# Patient Record
Sex: FEMALE | Race: WHITE | HISPANIC OR LATINO | Employment: FULL TIME | URBAN - METROPOLITAN AREA
[De-identification: names, ages, dates, MRNs, and addresses within clinical notes are randomized per-mention and may not be internally consistent; named-entity substitution may affect disease eponyms.]

---

## 2019-02-14 ENCOUNTER — OFFICE VISIT (OUTPATIENT)
Dept: MEDICAL GROUP | Facility: MEDICAL CENTER | Age: 34
End: 2019-02-14
Payer: COMMERCIAL

## 2019-02-14 ENCOUNTER — TELEPHONE (OUTPATIENT)
Dept: MEDICAL GROUP | Facility: MEDICAL CENTER | Age: 34
End: 2019-02-14

## 2019-02-14 ENCOUNTER — HOSPITAL ENCOUNTER (OUTPATIENT)
Dept: LAB | Facility: MEDICAL CENTER | Age: 34
End: 2019-02-14
Attending: INTERNAL MEDICINE
Payer: COMMERCIAL

## 2019-02-14 VITALS
HEIGHT: 63 IN | RESPIRATION RATE: 20 BRPM | WEIGHT: 293 LBS | OXYGEN SATURATION: 94 % | SYSTOLIC BLOOD PRESSURE: 132 MMHG | BODY MASS INDEX: 51.91 KG/M2 | DIASTOLIC BLOOD PRESSURE: 90 MMHG | TEMPERATURE: 97.2 F | HEART RATE: 88 BPM

## 2019-02-14 DIAGNOSIS — E11.65 UNCONTROLLED TYPE 2 DIABETES MELLITUS WITH HYPERGLYCEMIA (HCC): ICD-10-CM

## 2019-02-14 DIAGNOSIS — K76.0 FATTY LIVER DISEASE, NONALCOHOLIC: ICD-10-CM

## 2019-02-14 DIAGNOSIS — B37.2 CANDIDIASIS OF SKIN: ICD-10-CM

## 2019-02-14 DIAGNOSIS — Z76.89 ESTABLISHING CARE WITH NEW DOCTOR, ENCOUNTER FOR: ICD-10-CM

## 2019-02-14 DIAGNOSIS — N91.5 HYPOMENORRHEA: ICD-10-CM

## 2019-02-14 DIAGNOSIS — E66.01 MORBIDLY OBESE (HCC): ICD-10-CM

## 2019-02-14 DIAGNOSIS — Z23 NEED FOR VACCINATION: ICD-10-CM

## 2019-02-14 LAB
ALBUMIN SERPL BCP-MCNC: 3.8 G/DL (ref 3.2–4.9)
ALBUMIN/GLOB SERPL: 0.9 G/DL
ALP SERPL-CCNC: 112 U/L (ref 30–99)
ALT SERPL-CCNC: 47 U/L (ref 2–50)
ANION GAP SERPL CALC-SCNC: 8 MMOL/L (ref 0–11.9)
AST SERPL-CCNC: 32 U/L (ref 12–45)
BILIRUB SERPL-MCNC: 0.5 MG/DL (ref 0.1–1.5)
BUN SERPL-MCNC: 11 MG/DL (ref 8–22)
CALCIUM SERPL-MCNC: 8.8 MG/DL (ref 8.5–10.5)
CHLORIDE SERPL-SCNC: 101 MMOL/L (ref 96–112)
CHOLEST SERPL-MCNC: 154 MG/DL (ref 100–199)
CO2 SERPL-SCNC: 26 MMOL/L (ref 20–33)
CREAT SERPL-MCNC: 0.47 MG/DL (ref 0.5–1.4)
ERYTHROCYTE [DISTWIDTH] IN BLOOD BY AUTOMATED COUNT: 39.1 FL (ref 35.9–50)
FASTING STATUS PATIENT QL REPORTED: NORMAL
GLOBULIN SER CALC-MCNC: 4.1 G/DL (ref 1.9–3.5)
GLUCOSE BLD-MCNC: 251 MG/DL (ref 70–100)
GLUCOSE SERPL-MCNC: 225 MG/DL (ref 65–99)
HBA1C MFR BLD: 12.3 % (ref ?–5.8)
HCT VFR BLD AUTO: 50.5 % (ref 37–47)
HDLC SERPL-MCNC: 33 MG/DL
HGB BLD-MCNC: 16.4 G/DL (ref 12–16)
INT CON NEG: NEGATIVE
INT CON POS: POSITIVE
LDLC SERPL CALC-MCNC: 87 MG/DL
MCH RBC QN AUTO: 27.8 PG (ref 27–33)
MCHC RBC AUTO-ENTMCNC: 32.5 G/DL (ref 33.6–35)
MCV RBC AUTO: 85.7 FL (ref 81.4–97.8)
PLATELET # BLD AUTO: 232 K/UL (ref 164–446)
PMV BLD AUTO: 11.2 FL (ref 9–12.9)
POTASSIUM SERPL-SCNC: 3.7 MMOL/L (ref 3.6–5.5)
PROT SERPL-MCNC: 7.9 G/DL (ref 6–8.2)
RBC # BLD AUTO: 5.89 M/UL (ref 4.2–5.4)
SODIUM SERPL-SCNC: 135 MMOL/L (ref 135–145)
TRIGL SERPL-MCNC: 169 MG/DL (ref 0–149)
TSH SERPL DL<=0.005 MIU/L-ACNC: 3.32 UIU/ML (ref 0.38–5.33)
WBC # BLD AUTO: 9.4 K/UL (ref 4.8–10.8)

## 2019-02-14 PROCEDURE — 83036 HEMOGLOBIN GLYCOSYLATED A1C: CPT | Performed by: INTERNAL MEDICINE

## 2019-02-14 PROCEDURE — 84443 ASSAY THYROID STIM HORMONE: CPT

## 2019-02-14 PROCEDURE — 99204 OFFICE O/P NEW MOD 45 MIN: CPT | Mod: 25 | Performed by: INTERNAL MEDICINE

## 2019-02-14 PROCEDURE — 90686 IIV4 VACC NO PRSV 0.5 ML IM: CPT | Performed by: INTERNAL MEDICINE

## 2019-02-14 PROCEDURE — 80061 LIPID PANEL: CPT

## 2019-02-14 PROCEDURE — 80053 COMPREHEN METABOLIC PANEL: CPT

## 2019-02-14 PROCEDURE — 90471 IMMUNIZATION ADMIN: CPT | Performed by: INTERNAL MEDICINE

## 2019-02-14 PROCEDURE — 85027 COMPLETE CBC AUTOMATED: CPT

## 2019-02-14 PROCEDURE — 36415 COLL VENOUS BLD VENIPUNCTURE: CPT

## 2019-02-14 PROCEDURE — 82962 GLUCOSE BLOOD TEST: CPT | Performed by: INTERNAL MEDICINE

## 2019-02-14 RX ORDER — FLUCONAZOLE 150 MG/1
150 TABLET ORAL DAILY
Qty: 2 TAB | Refills: 2 | Status: SHIPPED | OUTPATIENT
Start: 2019-02-14 | End: 2019-03-07

## 2019-02-14 RX ORDER — GLIMEPIRIDE 4 MG/1
4 TABLET ORAL EVERY MORNING
Qty: 30 TAB | Refills: 1 | Status: SHIPPED | OUTPATIENT
Start: 2019-02-14 | End: 2019-03-07

## 2019-02-14 ASSESSMENT — PATIENT HEALTH QUESTIONNAIRE - PHQ9: CLINICAL INTERPRETATION OF PHQ2 SCORE: 0

## 2019-02-14 NOTE — PROGRESS NOTES
Subjective:   Priti Begum is a 33 y.o. female here today to establish care and     Chief complaint: Polyuria/ polydipsia, perineal itching    1. Establishing care with new doctor, encounter for    Patient is not seen a doctor for about 3 years, she has not had blood work for over 5 years.  Her mother is my patient.  She did have Pap/pelvic exam by GYN 3 years ago which was negative.    2. Need for vaccination    Requesting flu shot    3. Uncontrolled type 2 diabetes mellitus with hyperglycemia (HCC)    Patient has been checking her fingersticks on her mother's glucometer and they have been reading as high as 300.  Today in the office POCT hemoglobin A1c is 12.3 with fasting blood sugar 251.  Last lab work in 2013 was  normal with A1c 6.2 and FBS 88.  Patient notes increased thirst, excessive urination with nocturia several times per night.  She denies neuropathy, change in vision.    4. Morbidly obese (HCC)    Patient weight has been as high as 450 pounds however she notes that she has lost almost 60 pounds over the past year or so despite no increased exercise level.  She has been trying to watch her diet somewhat, she has cut back on how much soda she is drinking (previously 48-60 ounces per day).  She does not do regular aerobic exercise.  She is busy, however caring for 3 siblings ages 9-13, and taking care of her disabled mother who is on dialysis.  Bariatric surgery has been raised, patient is never pursued this.    5. Hypomenorrhea    Patient rarely gets menstrual.,  This is been attributed to PCO S.  Ultrasound was limited due to her body habitus several years ago.    6. Fatty liver disease, nonalcoholic    Patient had ultrasound in October 2013 which revealed enlarged liver almost 20 cm with fatty infiltration.  Her LFTs were not elevated at that time.    7. Candidiasis of skin    Patient complains of itchiness and redness of both vulva and in the groin.  She is tried over-the-counter  Monistat with some relief, but symptoms remain.      Current medicines (including changes today)  Current Outpatient Prescriptions   Medication Sig Dispense Refill   • fluconazole (DIFLUCAN) 150 MG tablet Take 1 Tab by mouth every day. 2 Tab 2   • SitaGLIPtin-MetFORMIN HCl 100-1000 MG TABLET SR 24 HR Take 1 Tab by mouth every day. 30 Tab 1     No current facility-administered medications for this visit.      She  has no past medical history of Allergy; Arrhythmia; Asthma; or Cancer (HCC).    Social History     Social History   • Marital status: Single     Spouse name: N/A   • Number of children: N/A   • Years of education: N/A     Social History Main Topics   • Smoking status: Never Smoker   • Smokeless tobacco: Never Used   • Alcohol use No   • Drug use: No   • Sexual activity: Yes     Partners: Male      Comment:      Other Topics Concern   • Not on file     Social History Narrative   • No narrative on file     Family History   Problem Relation Age of Onset   • Diabetes Mother    • Hypertension Mother    • Kidney Disease Mother    • Lung Disease Maternal Grandmother         emphysema (smoker)   • Diabetes Maternal Grandmother        ROS       - Constitutional: Negative for fever, chills,  and fatigue/generalized weakness.  Weight loss as above    - HEENT: Negative for headaches, vision changes      - Respiratory: Negative for cough, Shortness of breath    - Cardiovascular: Negative for chest pain and bilateral lower extremity edema.     - Gastrointestinal: Negative for heartburn,  abdominal pain,  diarrhea, constipation     - Genitourinary: Negative for dysuria  and urinary incontinence.  Increased urinary frequency    - Musculoskeletal: Negative for  back pain and joint pain.     - Skin: Pruritus as above    - Neurological: Negative for dizziness,  tremors, focal weakness     - Psychiatric/Behavioral: Negative for depression and memory loss.             Objective:     Blood pressure 132/90, pulse 88,  "temperature 36.2 °C (97.2 °F), temperature source Temporal, resp. rate 20, height 1.6 m (5' 3\"), weight (!) 174.9 kg (385 lb 9.6 oz), SpO2 94 %, not currently breastfeeding. Body mass index is 68.31 kg/m².     Physical Exam:  Constitutional: Alert, no distress.  Skin: Warm, dry, good turgor, no rashes in visible areas.  Eye: Equal, round and reactive, conjunctiva clear, lids normal.  ENMT: Lips without lesions, good dentition, oropharynx clear. Hearing grossly intact.  Neck: No masses, no thyromegaly. No cervical or supraclavicular lymphadenopathy  Respiratory: Unlabored respiratory effort, lungs clear to auscultation, no wheezes, no rhonchi.  Cardiovascular: Regular rate and rhythm, without murmur, no edema.  Abdomen: Soft, non-tender, no masses, no hepatosplenomegaly.  Psych: Alert and oriented x3, normal affect and mood. Insight and judgment good.  Tearful with diagnosis of diabetes            Assessment and Plan:   The following treatment plan was discussed    1. Establishing care with new doctor, encounter for        2. Need for vaccination      - Influenza Vaccine Quad Injection >3Y (PF)    3. Uncontrolled type 2 diabetes mellitus with hyperglycemia (HCC)    -I think patient would best be served by establishing with endocrinology, her mother sees Melquiades Berkowitz.  She will likely require insulin to control her blood sugars.  - Lipid Profile; Future  - SitaGLIPtin-MetFORMIN HCl 100-1000 MG TABLET SR 24 HR; Take 1 Tab by mouth every day.  Dispense: 30 Tab; Refill: 1  - REFERRAL TO ENDOCRINOLOGY  - POCT Glucose  - POCT Hemoglobin A1C    4. Morbidly obese (HCC)    Patient would be a good candidate for bariatric surgery, will control her diabetes first.    5. Hypomenorrhea    -Patient is likely infertile due to PCOS.  Hopefully with weight loss and control of her blood sugar her menses will return.  - TSH WITH REFLEX TO FT4; Future    6. Fatty liver disease, nonalcoholic    -  - Comp Metabolic Panel; Future  - CBC " WITHOUT DIFFERENTIAL; Future    7. Candidiasis of skin      - fluconazole (DIFLUCAN) 150 MG tablet; Take 1 Tab by mouth every day.  Dispense: 2 Tab; Refill: 2      Followup: Return in about 2 weeks (around 2/28/2019).

## 2019-02-14 NOTE — TELEPHONE ENCOUNTER
1. Caller Name: Priti Begum                                           Call Back Number: [phone] (home)       Patient approves a detailed voicemail message: yes    Pt called to let you know that the metformin would cost over $300 after insurance. Is there something else you could prescribe? Please advise.

## 2019-02-14 NOTE — TELEPHONE ENCOUNTER
Call prescription into patient's pharmacy for 2 generic medications.  These will likely not be enough to control her blood sugar, but will help until she can be seen by endocrinology.

## 2019-03-07 ENCOUNTER — OFFICE VISIT (OUTPATIENT)
Dept: MEDICAL GROUP | Facility: MEDICAL CENTER | Age: 34
End: 2019-03-07
Payer: COMMERCIAL

## 2019-03-07 VITALS
HEART RATE: 88 BPM | SYSTOLIC BLOOD PRESSURE: 138 MMHG | BODY MASS INDEX: 51.91 KG/M2 | HEIGHT: 63 IN | WEIGHT: 293 LBS | DIASTOLIC BLOOD PRESSURE: 92 MMHG | RESPIRATION RATE: 20 BRPM | OXYGEN SATURATION: 96 % | TEMPERATURE: 97.4 F

## 2019-03-07 DIAGNOSIS — E66.01 MORBIDLY OBESE (HCC): ICD-10-CM

## 2019-03-07 DIAGNOSIS — I15.2 HYPERTENSION DUE TO ENDOCRINE DISORDER: ICD-10-CM

## 2019-03-07 DIAGNOSIS — E11.65 UNCONTROLLED TYPE 2 DIABETES MELLITUS WITH HYPERGLYCEMIA (HCC): ICD-10-CM

## 2019-03-07 PROBLEM — B37.2 CANDIDIASIS OF SKIN: Status: RESOLVED | Noted: 2019-02-14 | Resolved: 2019-03-07

## 2019-03-07 PROCEDURE — 99214 OFFICE O/P EST MOD 30 MIN: CPT | Performed by: INTERNAL MEDICINE

## 2019-03-07 RX ORDER — INSULIN GLARGINE 100 [IU]/ML
15 INJECTION, SOLUTION SUBCUTANEOUS
Qty: 10 ML | Refills: 2 | Status: SHIPPED | OUTPATIENT
Start: 2019-03-07 | End: 2019-03-08 | Stop reason: SDUPTHER

## 2019-03-07 RX ORDER — LISINOPRIL 10 MG/1
10 TABLET ORAL DAILY
Qty: 30 TAB | Refills: 3 | Status: SHIPPED | OUTPATIENT
Start: 2019-03-07 | End: 2019-09-30

## 2019-03-07 NOTE — PROGRESS NOTES
Chief Complaint   Patient presents with   • Results     labs     Chief complaint: 3-week follow-up of newly diagnosed diabetes.    HISTORY OF PRESENT ILLNESS: Patient is a 33 y.o. female patient who presents today to discuss the evaluation and management of:          1. Uncontrolled type 2 diabetes mellitus with hyperglycemia (HCC)    When I saw the patient 3 weeks ago, she presented with polydipsia, polyuria, severe Candida yeast infection.  Hemoglobin A1c in office was 12.3 and fasting blood sugar 251.  She was started on metformin 1000 mg  twice daily and glimepiride 4 mg every morning.  The patient is doing dramatically better.  All of her symptoms have resolved.  She is having some shakiness in the late morning several hours after taking the glimepiride.  She has been checking her fingersticks on her mother's glucometer, all of her fastings have been in the 150-170 range.    Patient's comp he has a metabolic panel was otherwise normal.  Her cholesterol was 154, LDL 87.  2. Hypertension due to endocrine disorder    Patient's blood pressure remains borderline high.  She is not currently taking any antihypertensive medications.    3. Morbidly obese (HCC)    Unfortunately, patient's weight is gone up 7 pounds.  She likely was dehydrated due to glycosuria when she was here 3 weeks ago.  She is starting to monitor her diet more carefully, and is going to start walking soon.  We discussed bariatric surgery, this is something patient would be interested in once her diabetes is better controlled.        Patient Active Problem List    Diagnosis Date Noted   • Hypertension due to endocrine disorder 03/07/2019   • Uncontrolled type 2 diabetes mellitus with hyperglycemia (HCC) 02/14/2019   • Fatty liver disease, nonalcoholic 01/27/2016   • Hypomenorrhea 01/27/2016   • Morbidly obese (HCC) 10/31/2013   • PCOS (polycystic ovarian syndrome) 10/31/2013        Allergies:Nkda [no known drug allergy]    Current meds including  "changes today  Current Outpatient Prescriptions   Medication Sig Dispense Refill   • insulin glargine (LANTUS) 100 UNIT/ML Solution Inject 15 Units as instructed every bedtime. 10 mL 2   • lisinopril (PRINIVIL) 10 MG Tab Take 1 Tab by mouth every day. 30 Tab 3   • metformin (GLUCOPHAGE) 1000 MG tablet Take 1 Tab by mouth 2 times a day, with meals. 60 Tab 1     No current facility-administered medications for this visit.      Social History   Substance Use Topics   • Smoking status: Never Smoker   • Smokeless tobacco: Never Used   • Alcohol use No     Social History     Social History Narrative   • No narrative on file       Family History   Problem Relation Age of Onset   • Diabetes Mother    • Hypertension Mother    • Kidney Disease Mother    • Lung Disease Maternal Grandmother         emphysema (smoker)   • Diabetes Maternal Grandmother        Review of Systems:  No chest pain, No shortness of breath, No dyspnea on exertion  Gastrointestinal ROS: No abdominal pain, No nausea, vomiting, diarrhea, or constipation        Exam:      Blood pressure 138/92, pulse 88, temperature 36.3 °C (97.4 °F), temperature source Temporal, resp. rate 20, height 1.6 m (5' 3\"), weight (!) 177.9 kg (392 lb 3.2 oz), SpO2 96 %, not currently breastfeeding.  General: Morbidly obese female in NAD affect and mood within normal limits  Head is grossly normal.  Neck: Supple without adenopathy  Pulmonary: Clear to ausculation.  Normal effort. No rales, rhonchi, or wheezing.  Cardiovascular: Regular rate and rhythm without murmur.   Extremities: no clubbing, cyanosis, or edema.  Neuro: moves all extremities symmetrically    Please note that this dictation was created using voice recognition software. I have made every reasonable attempt to correct obvious errors, but I expect that there are errors of grammar and possibly content that I did not discover before finalizing the note.    Assessment/Plan:  1. Uncontrolled type 2 diabetes mellitus with " hyperglycemia (HCC)    -Discontinue glimepiride, start low-dose long-acting insulin at bedtime.  Patient was advised if she has hypoglycemic symptoms with this to let me know and we can discontinue  -We will schedule patient with RN diabetes educator so she can be set up with her own glucometer and test strips at home  -continue metformin  -Patient has initial appointment with endocrinology in 1 month  -Recommended to see an ophthalmologist in a few months   - insulin glargine (LANTUS) 100 UNIT/ML Solution; Inject 15 Units as instructed every bedtime.  Dispense: 10 mL; Refill: 2    2. Hypertension due to endocrine disorder      - lisinopril (PRINIVIL) 10 MG Tab; Take 1 Tab by mouth every day.  Dispense: 30 Tab; Refill: 3    3. Morbidly obese (HCC)    -Discussed that her best chance for long-term significant weight loss would be bariatric surgery.    Followup: 2 months

## 2019-03-08 ENCOUNTER — PATIENT MESSAGE (OUTPATIENT)
Dept: HEALTH INFORMATION MANAGEMENT | Facility: OTHER | Age: 34
End: 2019-03-08

## 2019-03-08 DIAGNOSIS — E11.65 UNCONTROLLED TYPE 2 DIABETES MELLITUS WITH HYPERGLYCEMIA (HCC): ICD-10-CM

## 2019-03-08 RX ORDER — INSULIN GLARGINE 100 [IU]/ML
15 INJECTION, SOLUTION SUBCUTANEOUS
Qty: 1 VIAL | Refills: 0 | Status: SHIPPED | OUTPATIENT
Start: 2019-03-08 | End: 2019-03-08 | Stop reason: SDUPTHER

## 2019-03-11 ENCOUNTER — TELEPHONE (OUTPATIENT)
Dept: MEDICAL GROUP | Facility: PHYSICIAN GROUP | Age: 34
End: 2019-03-11

## 2019-03-18 NOTE — PROGRESS NOTES
RN-TONYE Note    Subjective:     Health changes since last visit/interval Hx: Priti is a new patient to me.  She has Type II DM and PCOS treated with metformin and Basaglar insulin    Medications (including changes made today)  Current Outpatient Prescriptions   Medication Sig Dispense Refill   • Insulin Glargine (BASAGLAR KWIKPEN) 100 UNIT/ML Solution Pen-injector INJECT 15 UNITS SUBCUTANEOUSLY AS DIRECTED NIGHTLY AT BEDTIME (90 DAY SUPPLY) 15 mL 6   • lisinopril (PRINIVIL) 10 MG Tab Take 1 Tab by mouth every day. 30 Tab 3   • metformin (GLUCOPHAGE) 1000 MG tablet Take 1 Tab by mouth 2 times a day, with meals. 60 Tab 1     No current facility-administered medications for this visit.        Taking daily ASA: No  Taking above medications as prescribed: yes  SIDE EFFECTS: Patient reports the following side effects: GI upset resolved    Exercise: started Thiago Fidel every evening  Diet: stopped soda and juice, started slim fast diabetic  Patient's body mass index is 67.48 kg/m². Exercise and nutrition counseling were performed at this visit.      Health Maintenance:   Health Maintenance Due   Topic Date Due   • DIABETES MONOFILAMENT / LE EXAM  1986   • RETINAL SCREENING  03/20/2004   • URINE ACR / MICROALBUMIN  03/20/2004   • IMM PNEUMOCOCCAL 19-64 (ADULT) MEDIUM RISK SERIES (1 of 1 - PPSV23) 03/20/2005   • PAP SMEAR  10/31/2016       Immunizations:   PPSV23: Due  [id number]: N/A  Tdap: Up-to-date  Flu: Up-to-date  Hep B: N/A    DM:   Last A1c:   Lab Results   Component Value Date/Time    HBA1C 12.3 02/14/2019 10:10 AM      A1C GOAL: < 7    Glucose monitoring frequency: daily    Hypoglycemic episodes: no    Last Retinal Exam: DUE  Daily Foot Exam: No advised  Routine Dental Exams: No    No results found for: MICROALBCALC, MALBCRT, MALBEXCR, FKTDOQ54, MICROALBUR, MICRALB, UMICROALBUM, MICROALBTIM     ACR Albumin/Creatinine Ratio goal <30     HTN:   Blood pressure goal <140/<80 yes.   Currently Rx ACE/ARB:  Yes    Dyslipidemia:    Lab Results   Component Value Date/Time    CHOLSTRLTOT 154 02/14/2019 11:42 AM    LDL 87 02/14/2019 11:42 AM    HDL 33 (A) 02/14/2019 11:42 AM    TRIGLYCERIDE 169 (H) 02/14/2019 11:42 AM       Lab Results   Component Value Date/Time    SODIUM 135 02/14/2019 11:42 AM    POTASSIUM 3.7 02/14/2019 11:42 AM    CHLORIDE 101 02/14/2019 11:42 AM    CO2 26 02/14/2019 11:42 AM    GLUCOSE 225 (H) 02/14/2019 11:42 AM    BUN 11 02/14/2019 11:42 AM    CREATININE 0.47 (L) 02/14/2019 11:42 AM    BUNCREATRAT 21 (H) 10/10/2013 09:16 AM     Lab Results   Component Value Date/Time    ALKPHOSPHAT 112 (H) 02/14/2019 11:42 AM    ASTSGOT 32 02/14/2019 11:42 AM    ALTSGPT 47 02/14/2019 11:42 AM    TBILIRUBIN 0.5 02/14/2019 11:42 AM        Currently Rx Statin: No    She  reports that she has never smoked. She has never used smokeless tobacco.    Objective:     Exam:  Monofilament: done   Monofilament testing with a 10 gram force: sensation intact: intact bilaterally  Visual Inspection: Feet without maceration, ulcers, fissures.  Pedal pulses: intact bilaterally    Plan:     Discussed and educated on:   - All medications, side effects and compliance (discussed carefully)  - Annual eye examinations at Ophthalmology  - Diabetic Meal Plan: foods that contain carbs and plate method  - Foot Care: what to look for when checking feet every day and when to contact HCP  - HbA1C: target and what A1C is  - Home glucose monitoring emphasized  - Interpretation of Lab Results  - Long term diabetic complications  - Reminded pt to bring in BS diary at next visit  - Testing Blood Glucose: when to test, recording blood sugars, target range for FSBS and when to contact HCP  - Weight control and daily exercise    Recommended medication changes: Start Trulicity, decrease Basaglar to 10 units and send FSBG in 1 week for insulin dose adjustements

## 2019-03-20 ENCOUNTER — TELEPHONE (OUTPATIENT)
Dept: MEDICAL GROUP | Facility: MEDICAL CENTER | Age: 34
End: 2019-03-20

## 2019-03-20 ENCOUNTER — OFFICE VISIT (OUTPATIENT)
Dept: MEDICAL GROUP | Facility: MEDICAL CENTER | Age: 34
End: 2019-03-20
Payer: COMMERCIAL

## 2019-03-20 VITALS
BODY MASS INDEX: 51.91 KG/M2 | TEMPERATURE: 97.7 F | HEART RATE: 77 BPM | OXYGEN SATURATION: 97 % | SYSTOLIC BLOOD PRESSURE: 122 MMHG | HEIGHT: 63 IN | DIASTOLIC BLOOD PRESSURE: 82 MMHG | WEIGHT: 293 LBS

## 2019-03-20 DIAGNOSIS — E11.9 TYPE 2 DIABETES MELLITUS WITHOUT COMPLICATION, WITHOUT LONG-TERM CURRENT USE OF INSULIN (HCC): ICD-10-CM

## 2019-03-20 DIAGNOSIS — E11.65 UNCONTROLLED TYPE 2 DIABETES MELLITUS WITH HYPERGLYCEMIA (HCC): ICD-10-CM

## 2019-03-20 PROCEDURE — 99214 OFFICE O/P EST MOD 30 MIN: CPT | Performed by: PHYSICIAN ASSISTANT

## 2019-03-20 RX ORDER — LANCETS 30 GAUGE
EACH MISCELLANEOUS
Qty: 100 EACH | Refills: 3 | Status: SHIPPED | OUTPATIENT
Start: 2019-03-20 | End: 2020-06-08 | Stop reason: SDUPTHER

## 2019-03-20 RX ORDER — PIOGLITAZONEHYDROCHLORIDE 15 MG/1
15 TABLET ORAL DAILY
Qty: 30 TAB | Refills: 11 | Status: SHIPPED | OUTPATIENT
Start: 2019-03-20 | End: 2019-04-09

## 2019-03-20 NOTE — TELEPHONE ENCOUNTER
1. Caller Name:Priti Begum                                           Call Back Number: [phone] (home)       Patient approves a detailed voicemail message: yes    Pt called stating that she went to  her Trulicity but after insurance cvg she will have to pay $800. Please advise if there is an alternative.

## 2019-03-20 NOTE — PROGRESS NOTES
Subjective:     HPI    Chief Complaint   Patient presents with   • Diabetes     Recently diagnosed with she has been taking Metformin 1000 mg twice daily as well as 50 units of insulin at night.  She used to drink a lot of juice and sugar drinks that she has cut back on that.   Patient checks her blood sugar at home with very good control numbers, See Rn note.         Priti Begum is a 34 y.o. female who presents for follow up of chronic conditions of diabetes mellitus, hypertension, hyperlipidemia, .    RN-CDE notes reviewed including HPI for DM, HTN, Hyperlipidemia.  Exercise frequency and limitations reviewed.  Feet symptoms reviewed.  Nutritional status reviewed.  Retinal eye exam history reviewed.    Patient additionally reports:  She indicates that she is feeling well and denies any symptoms referable to the above diagnoses. Specifically denies chest pain, palpitations, dyspnea, orthopnea, PND or peripheral edema. Also denies polyuria, polydipsia, urinary complaints, abdominal complaints, myalgias, numbness, weakness or other related symptoms.     Patient Active Problem List    Diagnosis Date Noted   • Hypertension due to endocrine disorder 03/07/2019   • Uncontrolled type 2 diabetes mellitus with hyperglycemia (HCC) 02/14/2019   • Fatty liver disease, nonalcoholic 01/27/2016   • Hypomenorrhea 01/27/2016   • Morbidly obese (HCC) 10/31/2013   • PCOS (polycystic ovarian syndrome) 10/31/2013       Health Maintenance/Immunizations:   Health Maintenance Topics with due status: Overdue       Topic Date Due    DIABETES MONOFILAMENT / LE EXAM 1986    RETINAL SCREENING 03/20/2004    URINE ACR / MICROALBUMIN 03/20/2004    IMM PNEUMOCOCCAL 19-64 (ADULT) MEDIUM RISK SERIES 03/20/2005    PAP SMEAR 10/31/2016       Current medications including changes today:  Current Outpatient Prescriptions   Medication Sig Dispense Refill   • Dulaglutide (TRULICITY) 0.75 MG/0.5ML Solution Pen-injector Inject  "0.75 mg as instructed every 7 days. 4 PEN 11   • Insulin Glargine (BASAGLAR KWIKPEN) 100 UNIT/ML Solution Pen-injector INJECT 15 UNITS SUBCUTANEOUSLY AS DIRECTED NIGHTLY AT BEDTIME (90 DAY SUPPLY) 15 mL 6   • lisinopril (PRINIVIL) 10 MG Tab Take 1 Tab by mouth every day. 30 Tab 3   • metformin (GLUCOPHAGE) 1000 MG tablet Take 1 Tab by mouth 2 times a day, with meals. 60 Tab 1     No current facility-administered medications for this visit.        Allergies:   Allergies   Allergen Reactions   • Nkda [No Known Drug Allergy]         Social History   Substance Use Topics   • Smoking status: Never Smoker   • Smokeless tobacco: Never Used   • Alcohol use No       Family History   Problem Relation Age of Onset   • Diabetes Mother    • Hypertension Mother    • Kidney Disease Mother    • Lung Disease Maternal Grandmother         emphysema (smoker)   • Diabetes Maternal Grandmother        ROS  Pertinent ROS findings as above.   All other systems reviewed and are negative except as per HPI.     Objective:     /82   Pulse 77   Temp 36.5 °C (97.7 °F)   Ht 1.6 m (5' 3\")   Wt (!) 172.8 kg (380 lb 15.3 oz)   SpO2 97%   BMI 67.48 kg/m²     Alert, oriented in no acute distress.  Eye contact is good, speech goal directed, affect calm.  Lungs: clear to auscultation bilaterally with good excursion.  CV: regular rate and rhythm.  Lower extremities warm with no edema.      Lab Results   Component Value Date/Time    HBA1C 12.3 02/14/2019 10:10 AM    HBA1C 6.2 (H) 10/10/2013 09:16 AM        Assessment/Plan:     1. Uncontrolled type 2 diabetes mellitus with hyperglycemia (HCC)  MICROALBUMIN CREAT RATIO URINE    VITAMIN D,25 HYDROXY    Dulaglutide (TRULICITY) 0.75 MG/0.5ML Solution Pen-injector    Diabetic Monofilament LE Exam    REFERRAL TO DIABETIC EDUCATION Diabetes Self Management Education / Training (DSME/T) and Medical Nutrition Therapy (MNT): Initial Group DSME/MNT as authorized by payor, Follow-Up DSME/MNT as authorized " by payor; DSME/T Content: Monitoring Diabete...   Will discontinue insulin, start Trulicity once weekly.  Patient has follow-up appointment with Melquiades Berkowitz in April as well.      -RN-CDE notes reviewed and discussed.  -Patient's body mass index is 67.48 kg/m². Exercise and nutrition counseling were performed at this visit.   Additionally discussed disease management and weight loss goals.   -Education and advice provided today: See RN-CDE note.    Additional education, advice, refills, and referrals per order    Follow-up:   Return if symptoms worsen or fail to improve. sooner should new symptoms or problems arise.    The total time spent seeing the patient in consultation, and formulating an action plan for this visit was 40 minutes.  Greater than 50% of this time was spent counseling, discussing problems documented above, coordinating care and answering questions by the provider and registered nurse--certified diabetes educator.

## 2019-03-20 NOTE — TELEPHONE ENCOUNTER
Please inform patient that if Trulicity is going to cost her $800 a month it is not reasonable to start it.  Patient can continue with her current medicine until seen by endocrinology.  I  also add Actos to her medicine.   Was sent to her pharmacy    Jaqueline Young P.A.-C.

## 2019-03-21 ENCOUNTER — TELEPHONE (OUTPATIENT)
Dept: MEDICAL GROUP | Facility: MEDICAL CENTER | Age: 34
End: 2019-03-21

## 2019-03-21 NOTE — TELEPHONE ENCOUNTER
She should be able to just asked the pharmacist to dispense 50 strips at a time.  There is no need for a doctor's order for this.  She would have to ask if the others are cheaper, if they are I am happy to prescribe if they work with her current glucometer.

## 2019-03-21 NOTE — TELEPHONE ENCOUNTER
1. Caller Name: Priti Begum                                         Call Back Number: [phone] (home)       Patient approves a detailed voicemail message: N\A    Patient advised that her test strips are a bit costly after insurance and is wondering is switching to a lower quantity (50 instead of 100) at a time OR changing test strips to the one touch verio or equivalent would be cheaper? Please advise if we can change this rX?

## 2019-03-21 NOTE — TELEPHONE ENCOUNTER
Yes, the alternative to Trulicity would be keeping on the insulin.  I recommend doing that until she can be seen by Melquiades  in endocrinology.  He may be able to find an assistance program or give the patient samples.  In the interim, I would continue the insulin that she should have at home.

## 2019-03-27 ENCOUNTER — HOSPITAL ENCOUNTER (OUTPATIENT)
Dept: LAB | Facility: MEDICAL CENTER | Age: 34
End: 2019-03-27
Attending: PHYSICIAN ASSISTANT
Payer: COMMERCIAL

## 2019-03-27 DIAGNOSIS — E11.65 UNCONTROLLED TYPE 2 DIABETES MELLITUS WITH HYPERGLYCEMIA (HCC): ICD-10-CM

## 2019-03-27 LAB
25(OH)D3 SERPL-MCNC: 9 NG/ML (ref 30–100)
CREAT UR-MCNC: 114.2 MG/DL
MICROALBUMIN UR-MCNC: <0.7 MG/DL
MICROALBUMIN/CREAT UR: NORMAL MG/G (ref 0–30)

## 2019-03-27 PROCEDURE — 82306 VITAMIN D 25 HYDROXY: CPT

## 2019-03-27 PROCEDURE — 36415 COLL VENOUS BLD VENIPUNCTURE: CPT

## 2019-03-27 PROCEDURE — 82043 UR ALBUMIN QUANTITATIVE: CPT

## 2019-03-27 PROCEDURE — 82570 ASSAY OF URINE CREATININE: CPT

## 2019-04-02 ENCOUNTER — TELEPHONE (OUTPATIENT)
Dept: HEALTH INFORMATION MANAGEMENT | Facility: MEDICAL CENTER | Age: 34
End: 2019-04-02

## 2019-04-02 NOTE — TELEPHONE ENCOUNTER
Patient sends FSBG results:    Fastin, 89, 85  Lunch: 91, 106, 91, 83  Dinner: 102, 96, 104  Bed: 115, 109  Hypoglycemia: none    Current diabetes medications: metformin 1000 mg twice daily, Actos 15 mg daily, Basaglar 10 units HS    Recommendations:  Stop Basaglar and send FSBG in 1 week

## 2019-04-09 ENCOUNTER — OFFICE VISIT (OUTPATIENT)
Dept: ENDOCRINOLOGY | Facility: MEDICAL CENTER | Age: 34
End: 2019-04-09
Payer: COMMERCIAL

## 2019-04-09 VITALS
OXYGEN SATURATION: 95 % | HEIGHT: 62 IN | SYSTOLIC BLOOD PRESSURE: 116 MMHG | HEART RATE: 95 BPM | DIASTOLIC BLOOD PRESSURE: 70 MMHG | WEIGHT: 293 LBS | BODY MASS INDEX: 53.92 KG/M2

## 2019-04-09 DIAGNOSIS — Z79.4 ENCOUNTER FOR LONG-TERM (CURRENT) USE OF INSULIN (HCC): ICD-10-CM

## 2019-04-09 DIAGNOSIS — I10 ESSENTIAL HYPERTENSION: ICD-10-CM

## 2019-04-09 DIAGNOSIS — E11.65 UNCONTROLLED TYPE 2 DIABETES MELLITUS WITH HYPERGLYCEMIA (HCC): ICD-10-CM

## 2019-04-09 PROCEDURE — 99204 OFFICE O/P NEW MOD 45 MIN: CPT | Performed by: PHYSICIAN ASSISTANT

## 2019-04-09 RX ORDER — METFORMIN HYDROCHLORIDE 500 MG/1
1000 TABLET, EXTENDED RELEASE ORAL 2 TIMES DAILY
Qty: 120 TAB | Refills: 6 | Status: SHIPPED | OUTPATIENT
Start: 2019-04-09 | End: 2019-09-30

## 2019-04-09 NOTE — PROGRESS NOTES
New Patient Consult Note  Referred by: Radha Cervantes    Reason for consult: Diabetes Management Type 2    HPI:  Priti Begum is a 34 y.o. old patient who is seeing us today for diabetes care.  This is a pleasant patient with diabetes and I appreciate the opportunity to participate in the care of this patient.  This is a new patient with me today.    Labs of 3/27/19 microalbumin is negative, GFR >60, HbA1c is 12.3    BG Diary:4/9/2019  In the AM: 100, 89, 85  Just before meal:  Just before Bed: 115, 102, 96, 109, 104    Has been Diabetic since  Has a Glucagon pen at home: no    1. Uncontrolled type 2 diabetes mellitus with hyperglycemia (HCC)  This is a new patient with me on 4/9/2019  They are on:  1.  Metformin 1000 one in the AM one in the PM  2.  Actos 15   3.  basaglar 10 units (not on)      2. Essential hypertension  This is stable today and no new changes are needed or required in today's visit  On Lisinopril 10mg      3. Encounter for long-term (current) use of insulin (HCC)  Is on a high risk medication Insulin and we will continue to follow     ROS:   Constitutional: No change in weight , No fatigue, No night sweats.  HEENT: No Headache.  Eyes:  No blurred vision, No visual changes.  Cardiac: No chest pain, No palpitations.  Resp: No shortness of breath, No cough,   Gastro: No nausea or vomiting, No diarrhea.  Neuro: Denies numbness or tinging in bilateral feet or hands, and no loss of sensation.  Endo: No heat or cold intolerance.  : No polyuria, No polydipsia, No chronic UTI's.  Lower extremities: No lower leg edema bilateral.  All other systems were reviewed and were negative.    Past Medical History:  Patient Active Problem List    Diagnosis Date Noted   • Encounter for long-term (current) use of insulin (HCC) 04/09/2019   • Hypertension due to endocrine disorder 03/07/2019   • Uncontrolled type 2 diabetes mellitus with hyperglycemia (HCC) 02/14/2019   • Fatty liver disease,  "nonalcoholic 01/27/2016   • Hypomenorrhea 01/27/2016   • Morbidly obese (HCC) 10/31/2013   • PCOS (polycystic ovarian syndrome) 10/31/2013       Past Surgical History:  History reviewed. No pertinent surgical history.    Allergies:  Glimepiride [kdc:yellow dye+brilliant blue fcf+glimepiride]    Social History:  Social History     Social History   • Marital status: Single     Spouse name: N/A   • Number of children: N/A   • Years of education: N/A     Occupational History   • Not on file.     Social History Main Topics   • Smoking status: Never Smoker   • Smokeless tobacco: Never Used   • Alcohol use No   • Drug use: No   • Sexual activity: Yes     Partners: Male      Comment:      Other Topics Concern   • Not on file     Social History Narrative   • No narrative on file       Family History:  Family History   Problem Relation Age of Onset   • Diabetes Mother    • Hypertension Mother    • Kidney Disease Mother    • Lung Disease Maternal Grandmother         emphysema (smoker)   • Diabetes Maternal Grandmother        Medications:    Current Outpatient Prescriptions:   •  metFORMIN ER (GLUCOPHAGE XR) 500 MG TABLET SR 24 HR, Take 2 Tabs by mouth 2 times a day. Please give Generic XR Metformin, Disp: 120 Tab, Rfl: 6  •  lisinopril (PRINIVIL) 10 MG Tab, Take 1 Tab by mouth every day., Disp: 30 Tab, Rfl: 3  •  Blood Glucose Test Strips, Test strips order: Test strips for One Touch Verio Flex meter. Sig: use 1x/day and prn ssx high or low sugar #100 RF x 3, Disp: 100 Strip, Rfl: 3  •  Lancets, Lancets order: Lancets for One Touch Delica   Sig: use 1x/day and prn ssx high or low sugar. #100 RF x 3, Disp: 100 Each, Rfl: 3      Physical Examination:   Vital signs: /70 (BP Location: Left arm, Patient Position: Sitting)   Pulse 95   Ht 1.575 m (5' 2\")   Wt (!) 170.6 kg (376 lb)   SpO2 95%   BMI 68.77 kg/m²   General: No distress, cooperative, well dressed and well nourished.   Eyes: No scleral icterus or " discharge, No hyposphagma  ENMT: Normal on external inspection of nose, lips, No nasal drainage   Neck: No abnormal masses on inspection  Resp: Normal effort, Bilateral clear to auscultation, No wheezing, No rales  CVS: Regular rate and rhythm, S1 S2 normal, No murmur. No gallop  Extremities: No edema bilateral extremities  Neuro: Alert and oriented  Skin: No rash, No Ulcers  Psych: Normal mood and affect      Assessment and Plan:    1. Uncontrolled type 2 diabetes mellitus with hyperglycemia (HCC)  They are on:  1.  Metformin ER 500mg  two in the AM two in the PM  2.  Actos 15  (STOP)  3.  basaglar 10 units (not on)      2. Essential hypertension  On lisinopril 10mg (stop after loosing another 5-10 pounds)    3. Encounter for long-term (current) use of insulin (HCC)  Off all insulin    Return in about 3 months (around 7/9/2019).    Thank you kindly for allowing me to participate in the diabetes care plan for this patient.    Melquiades Berkowitz PA-C, BC-ADM  Board Certified - Advanced Diabetes Management  04/09/19    CC:   Radha Cervantes

## 2019-04-09 NOTE — PATIENT INSTRUCTIONS
They are on:  1.  Metformin ER 500mg  two in the AM two in the PM  2.  Actos 15  (STOP)  3.  basaglar 10 units (not on)

## 2019-05-08 ENCOUNTER — HOSPITAL ENCOUNTER (EMERGENCY)
Facility: MEDICAL CENTER | Age: 34
End: 2019-05-08
Attending: EMERGENCY MEDICINE
Payer: COMMERCIAL

## 2019-05-08 ENCOUNTER — APPOINTMENT (OUTPATIENT)
Dept: RADIOLOGY | Facility: MEDICAL CENTER | Age: 34
End: 2019-05-08
Attending: EMERGENCY MEDICINE
Payer: COMMERCIAL

## 2019-05-08 VITALS
SYSTOLIC BLOOD PRESSURE: 142 MMHG | BODY MASS INDEX: 53.92 KG/M2 | DIASTOLIC BLOOD PRESSURE: 77 MMHG | OXYGEN SATURATION: 93 % | RESPIRATION RATE: 16 BRPM | WEIGHT: 293 LBS | HEART RATE: 72 BPM | TEMPERATURE: 96.9 F | HEIGHT: 62 IN

## 2019-05-08 DIAGNOSIS — D25.0 SUBMUCOUS LEIOMYOMA OF UTERUS: ICD-10-CM

## 2019-05-08 DIAGNOSIS — N93.9 VAGINAL BLEEDING: ICD-10-CM

## 2019-05-08 LAB
ALBUMIN SERPL BCP-MCNC: 3.7 G/DL (ref 3.2–4.9)
ALBUMIN/GLOB SERPL: 0.9 G/DL
ALP SERPL-CCNC: 82 U/L (ref 30–99)
ALT SERPL-CCNC: 27 U/L (ref 2–50)
ANION GAP SERPL CALC-SCNC: 7 MMOL/L (ref 0–11.9)
AST SERPL-CCNC: 20 U/L (ref 12–45)
B-HCG SERPL-ACNC: <0.6 MIU/ML (ref 0–10)
BASOPHILS # BLD AUTO: 0.5 % (ref 0–1.8)
BASOPHILS # BLD: 0.06 K/UL (ref 0–0.12)
BILIRUB SERPL-MCNC: 0.5 MG/DL (ref 0.1–1.5)
BUN SERPL-MCNC: 7 MG/DL (ref 8–22)
CALCIUM SERPL-MCNC: 8.9 MG/DL (ref 8.4–10.2)
CHLORIDE SERPL-SCNC: 104 MMOL/L (ref 96–112)
CO2 SERPL-SCNC: 24 MMOL/L (ref 20–33)
CREAT SERPL-MCNC: 0.52 MG/DL (ref 0.5–1.4)
EOSINOPHIL # BLD AUTO: 0.31 K/UL (ref 0–0.51)
EOSINOPHIL NFR BLD: 2.8 % (ref 0–6.9)
ERYTHROCYTE [DISTWIDTH] IN BLOOD BY AUTOMATED COUNT: 39.9 FL (ref 35.9–50)
GLOBULIN SER CALC-MCNC: 4.2 G/DL (ref 1.9–3.5)
GLUCOSE SERPL-MCNC: 102 MG/DL (ref 65–99)
HCT VFR BLD AUTO: 47.2 % (ref 37–47)
HGB BLD-MCNC: 15.2 G/DL (ref 12–16)
IMM GRANULOCYTES # BLD AUTO: 0.03 K/UL (ref 0–0.11)
IMM GRANULOCYTES NFR BLD AUTO: 0.3 % (ref 0–0.9)
LIPASE SERPL-CCNC: 31 U/L (ref 7–58)
LYMPHOCYTES # BLD AUTO: 3.52 K/UL (ref 1–4.8)
LYMPHOCYTES NFR BLD: 31.7 % (ref 22–41)
MCH RBC QN AUTO: 27.6 PG (ref 27–33)
MCHC RBC AUTO-ENTMCNC: 32.2 G/DL (ref 33.6–35)
MCV RBC AUTO: 85.8 FL (ref 81.4–97.8)
MONOCYTES # BLD AUTO: 0.55 K/UL (ref 0–0.85)
MONOCYTES NFR BLD AUTO: 5 % (ref 0–13.4)
NEUTROPHILS # BLD AUTO: 6.63 K/UL (ref 2–7.15)
NEUTROPHILS NFR BLD: 59.7 % (ref 44–72)
NRBC # BLD AUTO: 0 K/UL
NRBC BLD-RTO: 0 /100 WBC
NUMBER OF RH DOSES IND 8505RD: NORMAL
PLATELET # BLD AUTO: 296 K/UL (ref 164–446)
PMV BLD AUTO: 9.9 FL (ref 9–12.9)
POTASSIUM SERPL-SCNC: 3.7 MMOL/L (ref 3.6–5.5)
PROT SERPL-MCNC: 7.9 G/DL (ref 6–8.2)
RBC # BLD AUTO: 5.5 M/UL (ref 4.2–5.4)
RH BLD: NORMAL
SODIUM SERPL-SCNC: 135 MMOL/L (ref 135–145)
WBC # BLD AUTO: 11.1 K/UL (ref 4.8–10.8)

## 2019-05-08 PROCEDURE — 84702 CHORIONIC GONADOTROPIN TEST: CPT

## 2019-05-08 PROCEDURE — 96374 THER/PROPH/DIAG INJ IV PUSH: CPT

## 2019-05-08 PROCEDURE — 86901 BLOOD TYPING SEROLOGIC RH(D): CPT

## 2019-05-08 PROCEDURE — 76856 US EXAM PELVIC COMPLETE: CPT

## 2019-05-08 PROCEDURE — 99285 EMERGENCY DEPT VISIT HI MDM: CPT

## 2019-05-08 PROCEDURE — 85025 COMPLETE CBC W/AUTO DIFF WBC: CPT

## 2019-05-08 PROCEDURE — 83690 ASSAY OF LIPASE: CPT

## 2019-05-08 PROCEDURE — 80053 COMPREHEN METABOLIC PANEL: CPT

## 2019-05-08 PROCEDURE — 36415 COLL VENOUS BLD VENIPUNCTURE: CPT

## 2019-05-08 PROCEDURE — 700111 HCHG RX REV CODE 636 W/ 250 OVERRIDE (IP): Performed by: EMERGENCY MEDICINE

## 2019-05-08 RX ORDER — KETOROLAC TROMETHAMINE 30 MG/ML
30 INJECTION, SOLUTION INTRAMUSCULAR; INTRAVENOUS ONCE
Status: COMPLETED | OUTPATIENT
Start: 2019-05-08 | End: 2019-05-08

## 2019-05-08 RX ADMIN — KETOROLAC TROMETHAMINE 30 MG: 30 INJECTION, SOLUTION INTRAMUSCULAR at 20:33

## 2019-05-09 NOTE — DISCHARGE INSTRUCTIONS
Please follow-up with gynecology for further evaluation and manage as you will need testing of your endometrium/uterus for possible cancer.  Return to [facility] if you have increasing symptomatology, severe vaginal bleeding or pain as we do not have gynecology on call this facility

## 2019-05-09 NOTE — ED TRIAGE NOTES
"Pt comes in w/   C/o started what she believes is a period   She has not had one in a very long time \"years\" however she is losing weight was was told by PCP that she may started having them again   This started on Monday w/ spotting then today having heavy vaginal bleeding w/ severe cramps   Was told by her PCP to comes in and be evaluated   "

## 2019-05-09 NOTE — ED PROVIDER NOTES
ED Provider Note  CHIEF COMPLAINT  Chief Complaint   Patient presents with   • Vaginal Bleeding     started spotting on monday but today starting having heavier bleeding and cramping        HPI  Priti Begum is a 34 y.o. female who presents to the emergency department complaining of vaginal bleeding and abdominal cramping/uterine cramping.  The patient has had significantly irregular menstrual cycles in the past and states that she was on Depo-Provera followed by oral birth control pills now she is not on any medication whatsoever.  She was recently diagnosed with diabetes and excessive weight/obesity and is been trying to lose weight was informed that when she lost significant weight she will start having her periods are regular cycle.  The pain is in the uterus and feels like a cramping sensation, she started spotting 2 days prior, had blood clots expressed yesterday and has decreased in blood output today.  She states in the past she has had menstrual cycles following her sexual activity that lasted approximately day.  She is a G0, P0 female and did not check to see if she is pregnant.  She did call her gynecologist who informed her to come to the hospital for evaluation.    REVIEW OF SYSTEMS  Positives as above. Pertinent negatives include dysuria, hematochezia, melena, nausea, vomiting, fever mellitus, dizziness  All other review of systems are negative    PAST MEDICAL HISTORY  History reviewed. No pertinent past medical history.    FAMILY HISTORY  Noncontributory    SOCIAL HISTORY  Social History     Social History   • Marital status: Single     Spouse name: N/A   • Number of children: N/A   • Years of education: N/A     Social History Main Topics   • Smoking status: Never Smoker   • Smokeless tobacco: Never Used   • Alcohol use No   • Drug use: No   • Sexual activity: Yes     Partners: Male      Comment:      Other Topics Concern   • Not on file     Social History Narrative   • No  "narrative on file       SURGICAL HISTORY  History reviewed. No pertinent surgical history.    CURRENT MEDICATIONS  Home Medications     Reviewed by Tejal Lee R.N. (Registered Nurse) on 05/08/19 at 1808  Med List Status: Partial   Medication Last Dose Status   Blood Glucose Test Strips  Active   Lancets  Active   lisinopril (PRINIVIL) 10 MG Tab 5/8/2019 Active   metFORMIN ER (GLUCOPHAGE XR) 500 MG TABLET SR 24 HR 5/8/2019 Active   vitamin D (CHOLECALCIFEROL) 1000 UNIT Tab  Active                ALLERGIES  Allergies   Allergen Reactions   • Glimepiride [Kdc:Yellow Dye+Brilliant Blue Fcf+Glimepiride] Unspecified     \"giddery\"       PHYSICAL EXAM  VITAL SIGNS: /77   Pulse 69   Temp 36.3 °C (97.4 °F) (Temporal)   Resp 16   Ht 1.575 m (5' 2\")   Wt (!) 168.7 kg (371 lb 14.7 oz)   LMP 05/06/2019 Comment: pt has not had a period for many years  this is the first one   SpO2 95%   BMI 68.02 kg/m²      Constitutional: Obese female no acute distress, Non-toxic appearance.   Eyes: PERRLA, EOMI, Conjunctiva normal, No discharge.   Cardiovascular: Normal heart rate, Normal rhythm, No murmurs, No rubs, No gallops, and intact distal pulses.   Thorax & Lungs:  No respiratory distress, no rales, no rhonchi, No wheezing, No chest wall tenderness.   Abdomen: Bowel sounds normal, Soft, slight suprapubic tenderness, No guarding, No rebound, No pulsatile masses.  Skin: Warm, Dry, No erythema, No rash.   Extremities: Full range of motion, no deformity, no edema.  Neurologic: Alert & oriented x 3, No focal deficits noted, acting appropriately on exam.  Psychiatric: Affect normal for clinical presentation.      RADIOLOGY/PROCEDURES  US-PELVIC COMPLETE (TRANSABDOMINAL/TRANSVAGINAL) (COMBO)   Final Result      1.48 x 1.16 x 1.3 cm left uterine lesion likely represents a fibroid.      Nabothian cysts.      Nonvisualization of the right ovary.               COURSE & MEDICAL DECISION MAKING  Pertinent Labs & Imaging studies " reviewed. (See chart for details)  With complaint of vaginal bleeding.  Here in the emergency department, she is not pregnant, she has noted significant anemia, she has no evidence of hemodynamic instability.  Ultrasound was completed revealed evidence of a uterine fibroid.  Although the ovary was not evaluated on the right side she has no significant right-sided abdominal, she is only complaining of uterine cramping.  I do believe that the patient is having a significant menstrual cycle as well as has uterine fibroid.  The patient will be following up with gynecology for endometrial testing as I cannot complete exclude cancer and strict return precautions have been given for the patient to follow-up that [facility] she has increasing symptomatology as we do not have gynecology on call this facility.    New Prescriptions    No medications on file       FINAL IMPRESSION     1. Vaginal bleeding Active   2. Submucous leiomyoma of uterus Active       DISPOSITION:  Patient will be discharged home in stable condition.    FOLLOW UP:  [address] [id number] [phone]               Electronically signed by: Peña Lee, 5/8/2019 6:23 PM

## 2019-05-09 NOTE — ED NOTES
Pt states she hasn't had a period in 3 years. Doctors told her it was d/t her weight. Pt states she has been exercising and trying to lose weight and was told my her doctor that her cycle might restart once she loses. Pt is sexually active and unsure if she is pregnant or not. Pt has passed some clots.

## 2019-06-11 ENCOUNTER — OFFICE VISIT (OUTPATIENT)
Dept: MEDICAL GROUP | Facility: MEDICAL CENTER | Age: 34
End: 2019-06-11
Payer: COMMERCIAL

## 2019-06-11 VITALS
SYSTOLIC BLOOD PRESSURE: 126 MMHG | HEIGHT: 62 IN | HEART RATE: 78 BPM | WEIGHT: 293 LBS | TEMPERATURE: 98.3 F | RESPIRATION RATE: 14 BRPM | BODY MASS INDEX: 53.92 KG/M2 | DIASTOLIC BLOOD PRESSURE: 78 MMHG | OXYGEN SATURATION: 98 %

## 2019-06-11 DIAGNOSIS — E11.9 CONTROLLED TYPE 2 DIABETES MELLITUS WITHOUT COMPLICATION, WITHOUT LONG-TERM CURRENT USE OF INSULIN (HCC): ICD-10-CM

## 2019-06-11 DIAGNOSIS — E66.01 MORBIDLY OBESE (HCC): ICD-10-CM

## 2019-06-11 DIAGNOSIS — I15.2 HYPERTENSION DUE TO ENDOCRINE DISORDER: ICD-10-CM

## 2019-06-11 DIAGNOSIS — E55.9 VITAMIN D DEFICIENCY: ICD-10-CM

## 2019-06-11 PROBLEM — Z79.4 ENCOUNTER FOR LONG-TERM (CURRENT) USE OF INSULIN (HCC): Status: RESOLVED | Noted: 2019-04-09 | Resolved: 2019-06-11

## 2019-06-11 PROBLEM — E11.65 UNCONTROLLED TYPE 2 DIABETES MELLITUS WITH HYPERGLYCEMIA (HCC): Status: RESOLVED | Noted: 2019-02-14 | Resolved: 2019-06-11

## 2019-06-11 PROCEDURE — 99214 OFFICE O/P EST MOD 30 MIN: CPT | Performed by: INTERNAL MEDICINE

## 2019-06-11 NOTE — PROGRESS NOTES
Chief Complaint   Patient presents with   • Diabetes     3 month follow up visit      Chief complaint: 3-month follow-up of diabetes, obesity    HISTORY OF PRESENT ILLNESS: Patient is a 34 y.o. female patient who presents today to discuss the evaluation and management of:          1. Morbidly obese (HCC)    Patient is successfully lost 20 pounds in the last 3 months through dietary modification and increased cavity.  She walks or does aerobics 6 days a week.  She is significantly changed her diet as well.  She does have more energy, and less joint pain.  She is talked to several people who have had adverse events following bariatric surgery, at this time she is not interested in a referral.    2. Controlled type 2 diabetes mellitus without complication, without long-term current use of insulin ([facility])    Patient presented with a hemoglobin A1c of 12.3, with her above measures and metformin thousand milligrams twice a day her fingersticks have been very well controlled.  She will be having her A1c rechecked next month at the endocrinology clinic.    3. Hypertension due to endocrine disorder    Patient's blood pressure is very well controlled, she currently is on lisinopril 10 mg daily.  When she first presented in February, her BP was 132/90.    4. Vitamin D deficiency    Vitamin D level 9, patient is currently taking 5000 IUs daily.    Patient did have a periodLast month for the first time in years, she had severe cramps and heavy bleeding.  She went to the emergency room, she had a pelvic ultrasound which showed a small fibroid, however no other significant abnormalities.    Patient Active Problem List    Diagnosis Date Noted   • Controlled type 2 diabetes mellitus without complication, without long-term current use of insulin ([facility]) 06/11/2019   • Vitamin D deficiency 06/11/2019   • Hypertension due to endocrine disorder 03/07/2019   • Fatty liver disease, nonalcoholic 01/27/2016   • Hypomenorrhea 01/27/2016   •  "Morbidly obese (HCC) 10/31/2013   • PCOS (polycystic ovarian syndrome) 10/31/2013        Allergies:Glimepiride [kdc:yellow dye+brilliant blue fcf+glimepiride]    Current meds including changes today  Current Outpatient Prescriptions   Medication Sig Dispense Refill   • vitamin D (CHOLECALCIFEROL) 1000 UNIT Tab Take 1,000 Units by mouth every day.     • metFORMIN ER (GLUCOPHAGE XR) 500 MG TABLET SR 24 HR Take 2 Tabs by mouth 2 times a day. Please give Generic XR Metformin 120 Tab 6   • Blood Glucose Test Strips Test strips order: Test strips for One Touch Verio Flex meter. Sig: use 1x/day and prn ssx high or low sugar #100 RF x 3 100 Strip 3   • Lancets Lancets order: Lancets for One Touch Delica   Sig: use 1x/day and prn ssx high or low sugar. #100 RF x 3 100 Each 3   • lisinopril (PRINIVIL) 10 MG Tab Take 1 Tab by mouth every day. 30 Tab 3     No current facility-administered medications for this visit.      Social History   Substance Use Topics   • Smoking status: Never Smoker   • Smokeless tobacco: Never Used   • Alcohol use No     Social History     Social History Narrative   • No narrative on file       Family History   Problem Relation Age of Onset   • Diabetes Mother    • Hypertension Mother    • Kidney Disease Mother    • Lung Disease Maternal Grandmother         emphysema (smoker)   • Diabetes Maternal Grandmother        Review of Systems:  No chest pain, No shortness of breath, No dyspnea on exertion  Gastrointestinal ROS: No abdominal pain, No nausea, vomiting, diarrhea, or constipation        Exam:      /78 (BP Location: Left arm, Patient Position: Sitting, BP Cuff Size: Large adult)   Pulse 78   Temp 36.8 °C (98.3 °F) (Temporal)   Resp 14   Ht 1.575 m (5' 2\")   Wt (!) 165.1 kg (364 lb)   SpO2 98%   General: Morbidly obese female in NAD affect and mood within normal limits  Head is grossly normal.  Neck: Supple without adenopathy  Pulmonary: Clear to ausculation.  Normal effort. No rales, " rhonchi, or wheezing.  Cardiovascular: Regular rate and rhythm without murmur.   Extremities: no clubbing, cyanosis, or edema.  Neuro: moves all extremities symmetrically    Please note that this dictation was created using voice recognition software. I have made every reasonable attempt to correct obvious errors, but I expect that there are errors of grammar and possibly content that I did not discover before finalizing the note.    Assessment/Plan:  1. Morbidly obese ([facility])    Encouraged patient to continue with her weight loss efforts.  We discussed that statistically speaking, patients with her BMI can rarely maintain adequate weight loss without bariatric surgery.  It is very reasonable for her to continue with her current efforts now, there is no urgency or need to refer currently.    2. Controlled type 2 diabetes mellitus without complication, without long-term current use of insulin ([facility])    Blood sugar control would appear to be excellent by her fingersticks, continue metformin.  Has follow-up with endocrinology next month    3. Hypertension due to endocrine disorder    Controlled, continue lisinopril    4. Vitamin D deficiency    On supplementation, remeasure in several months    Followup: Patient will seek to establish with a new PCP, probably Dr. Sparks in [facility].

## 2019-07-09 ENCOUNTER — OFFICE VISIT (OUTPATIENT)
Dept: ENDOCRINOLOGY | Facility: MEDICAL CENTER | Age: 34
End: 2019-07-09
Payer: COMMERCIAL

## 2019-07-09 VITALS
HEIGHT: 62 IN | SYSTOLIC BLOOD PRESSURE: 122 MMHG | BODY MASS INDEX: 53.92 KG/M2 | OXYGEN SATURATION: 98 % | WEIGHT: 293 LBS | DIASTOLIC BLOOD PRESSURE: 74 MMHG | HEART RATE: 76 BPM

## 2019-07-09 DIAGNOSIS — E11.9 CONTROLLED TYPE 2 DIABETES MELLITUS WITHOUT COMPLICATION, WITHOUT LONG-TERM CURRENT USE OF INSULIN (HCC): ICD-10-CM

## 2019-07-09 LAB
HBA1C MFR BLD: 5.4 % (ref 0–5.6)
INT CON NEG: NEGATIVE
INT CON POS: POSITIVE

## 2019-07-09 PROCEDURE — 83036 HEMOGLOBIN GLYCOSYLATED A1C: CPT | Performed by: PHYSICIAN ASSISTANT

## 2019-07-09 PROCEDURE — 99214 OFFICE O/P EST MOD 30 MIN: CPT | Performed by: PHYSICIAN ASSISTANT

## 2019-07-09 NOTE — PROGRESS NOTES
Return to office Patient Consult Note  Referred by: Shelby Sparks M.D.    Reason for consult: Diabetes Management Type 2    HPI:  Priti Begum is a 34 y.o. old patient who is seeing us today for diabetes care.  This is a pleasant patient with diabetes and I appreciate the opportunity to participate in the care of this patient.    Labs of 7/9/2019 HbA1c is 5.4  Labs of 3/27/19 microalbumin is negative, GFR >60, HbA1c is 12.3       BG Diary:7/9/2019  In the AM: 109, 89, 86, 97, 124, 110    BG Diary:4/9/2019  In the AM: 100, 89, 85  Just before meal:  Just before Bed: 115, 102, 96, 109, 104       1. Controlled type 2 diabetes mellitus without complication, without long-term current use of insulin ([facility])    This is a new patient with me on 4/9/2019  They are on:  1.  Metformin ER 500mg  two in the AM two in the PM  2.  Actos 15 (not on)  3.  basaglar 10 units (not on)        2. Essential hypertension  This is stable today and no new changes are needed or required in today's visit  On lisinopril       ROS:   Constitutional: No night sweats.  Eyes:  No visual changes.  Cardiac: No chest pain, No palpitations or racing heart rate.  Resp: No shortness of breath, No cough,   Gi: No Diarrhea    All other systems were reviewed and were/are negative.      Past Medical History:  Patient Active Problem List    Diagnosis Date Noted   • Controlled type 2 diabetes mellitus without complication, without long-term current use of insulin ([facility]) 06/11/2019   • Vitamin D deficiency 06/11/2019   • Hypertension due to endocrine disorder 03/07/2019   • Fatty liver disease, nonalcoholic 01/27/2016   • Hypomenorrhea 01/27/2016   • Morbidly obese (HCC) 10/31/2013   • PCOS (polycystic ovarian syndrome) 10/31/2013       Past Surgical History:  History reviewed. No pertinent surgical history.    Allergies:  Glimepiride [kdc:yellow dye+brilliant blue fcf+glimepiride]    Social History:  Social History     Social History   •  "Marital status: Single     Spouse name: N/A   • Number of children: N/A   • Years of education: N/A     Occupational History   • Not on file.     Social History Main Topics   • Smoking status: Never Smoker   • Smokeless tobacco: Never Used   • Alcohol use No   • Drug use: No   • Sexual activity: Yes     Partners: Male      Comment:      Other Topics Concern   • Not on file     Social History Narrative   • No narrative on file       Family History:  Family History   Problem Relation Age of Onset   • Diabetes Mother    • Hypertension Mother    • Kidney Disease Mother    • Lung Disease Maternal Grandmother         emphysema (smoker)   • Diabetes Maternal Grandmother        Medications:    Current Outpatient Prescriptions:   •  vitamin D (CHOLECALCIFEROL) 1000 UNIT Tab, Take 1,000 Units by mouth every day., Disp: , Rfl:   •  metFORMIN ER (GLUCOPHAGE XR) 500 MG TABLET SR 24 HR, Take 2 Tabs by mouth 2 times a day. Please give Generic XR Metformin, Disp: 120 Tab, Rfl: 6  •  Blood Glucose Test Strips, Test strips order: Test strips for One Touch Verio Flex meter. Sig: use 1x/day and prn ssx high or low sugar #100 RF x 3, Disp: 100 Strip, Rfl: 3  •  Lancets, Lancets order: Lancets for One Touch Delica   Sig: use 1x/day and prn ssx high or low sugar. #100 RF x 3, Disp: 100 Each, Rfl: 3  •  lisinopril (PRINIVIL) 10 MG Tab, Take 1 Tab by mouth every day., Disp: 30 Tab, Rfl: 3        Physical Examination:   Vital signs: /74 (BP Location: Left arm, Patient Position: Sitting)   Pulse 76   Ht 1.575 m (5' 2\")   Wt (!) 162.4 kg (358 lb)   SpO2 98%   BMI 65.48 kg/m²   General: No distress, cooperative, well dressed and well nourished.   Eyes: No scleral icterus or discharge, No hyposphagma  ENMT: Normal on external inspection of nose, lips, No nasal drainage   Neck: No abnormal masses on inspection  Resp: Normal effort, Bilateral clear to auscultation, No wheezing, No rales  CVS: Regular rate and rhythm, S1 S2 " normal, No murmur. No gallop  Extremities: No edema bilateral extremities  Neuro: Alert and oriented  Skin: No rash, No Ulcers  Psych: Normal mood and affect      Assessment and Plan:    1. Controlled type 2 diabetes mellitus without complication, without long-term current use of insulin (HCC)    They are on:  1.  Metformin ER 500mg one in the AM two in the PM       2. Essential hypertension  This is stable today and no new changes are needed or required in today's visit  STOP  lisinopril    Return in about 3 months (around 10/9/2019).      Thank you kindly for allowing me to participate in the diabetes care plan for this patient.    Melquiades Berkowitz PA-C, BC-ADM  Board Certified - Advanced Diabetes Management  07/09/19    CC:   Shelby Sparks M.D.

## 2019-09-30 ENCOUNTER — OFFICE VISIT (OUTPATIENT)
Dept: MEDICAL GROUP | Facility: MEDICAL CENTER | Age: 34
End: 2019-09-30
Payer: COMMERCIAL

## 2019-09-30 VITALS
HEART RATE: 78 BPM | HEIGHT: 62 IN | BODY MASS INDEX: 53.92 KG/M2 | TEMPERATURE: 98.4 F | OXYGEN SATURATION: 99 % | DIASTOLIC BLOOD PRESSURE: 68 MMHG | RESPIRATION RATE: 14 BRPM | WEIGHT: 293 LBS | SYSTOLIC BLOOD PRESSURE: 132 MMHG

## 2019-09-30 DIAGNOSIS — I15.2 HYPERTENSION DUE TO ENDOCRINE DISORDER: ICD-10-CM

## 2019-09-30 DIAGNOSIS — E11.9 CONTROLLED TYPE 2 DIABETES MELLITUS WITHOUT COMPLICATION, WITHOUT LONG-TERM CURRENT USE OF INSULIN (HCC): ICD-10-CM

## 2019-09-30 DIAGNOSIS — Z20.7 SCABIES EXPOSURE: ICD-10-CM

## 2019-09-30 DIAGNOSIS — Z00.00 PREVENTATIVE HEALTH CARE: ICD-10-CM

## 2019-09-30 DIAGNOSIS — K76.0 FATTY LIVER DISEASE, NONALCOHOLIC: ICD-10-CM

## 2019-09-30 DIAGNOSIS — E55.9 VITAMIN D DEFICIENCY: ICD-10-CM

## 2019-09-30 DIAGNOSIS — Z23 NEED FOR VACCINATION: ICD-10-CM

## 2019-09-30 PROCEDURE — 90686 IIV4 VACC NO PRSV 0.5 ML IM: CPT | Performed by: INTERNAL MEDICINE

## 2019-09-30 PROCEDURE — 99214 OFFICE O/P EST MOD 30 MIN: CPT | Mod: 25 | Performed by: INTERNAL MEDICINE

## 2019-09-30 PROCEDURE — 90472 IMMUNIZATION ADMIN EACH ADD: CPT | Performed by: INTERNAL MEDICINE

## 2019-09-30 PROCEDURE — 90732 PPSV23 VACC 2 YRS+ SUBQ/IM: CPT | Performed by: INTERNAL MEDICINE

## 2019-09-30 PROCEDURE — 90471 IMMUNIZATION ADMIN: CPT | Performed by: INTERNAL MEDICINE

## 2019-09-30 RX ORDER — METFORMIN HYDROCHLORIDE 500 MG/1
1000 TABLET, EXTENDED RELEASE ORAL DAILY
Qty: 90 TAB | Refills: 1 | Status: SHIPPED
Start: 2019-09-30 | End: 2020-01-06 | Stop reason: SDUPTHER

## 2019-09-30 RX ORDER — PERMETHRIN 50 MG/G
CREAM TOPICAL
Qty: 1 TUBE | Refills: 1 | Status: SHIPPED
Start: 2019-09-30 | End: 2020-01-06

## 2019-10-01 ENCOUNTER — OFFICE VISIT (OUTPATIENT)
Dept: ENDOCRINOLOGY | Facility: MEDICAL CENTER | Age: 34
End: 2019-10-01
Payer: COMMERCIAL

## 2019-10-01 VITALS
WEIGHT: 293 LBS | HEART RATE: 80 BPM | BODY MASS INDEX: 53.92 KG/M2 | OXYGEN SATURATION: 97 % | HEIGHT: 62 IN | DIASTOLIC BLOOD PRESSURE: 70 MMHG | SYSTOLIC BLOOD PRESSURE: 116 MMHG

## 2019-10-01 DIAGNOSIS — E11.9 CONTROLLED TYPE 2 DIABETES MELLITUS WITHOUT COMPLICATION, WITHOUT LONG-TERM CURRENT USE OF INSULIN (HCC): ICD-10-CM

## 2019-10-01 DIAGNOSIS — I15.2 HYPERTENSION DUE TO ENDOCRINE DISORDER: ICD-10-CM

## 2019-10-01 LAB
HBA1C MFR BLD: 5.4 % (ref 0–5.6)
INT CON NEG: NEGATIVE
INT CON POS: POSITIVE

## 2019-10-01 PROCEDURE — 83036 HEMOGLOBIN GLYCOSYLATED A1C: CPT | Performed by: PHYSICIAN ASSISTANT

## 2019-10-01 PROCEDURE — 99214 OFFICE O/P EST MOD 30 MIN: CPT | Performed by: PHYSICIAN ASSISTANT

## 2019-10-01 NOTE — PROGRESS NOTES
CC:  Diagnoses of Need for vaccination, Scabies exposure, Hypertension due to endocrine disorder, Controlled type 2 diabetes mellitus without complication, without long-term current use of insulin (HCC), Fatty liver disease, nonalcoholic, Vitamin D deficiency, and Preventative health care were pertinent to this visit.    HISTORY OF THE PRESENT ILLNESS: Patient is a 34 y.o. female. This pleasant patient is here today to establish care.    Margarita has done an amazing job with improving her health, through diet and exercise she has lost 100 pounds going from 444 pounds down to 340 pounds.  With the weight loss she has required less metformin, the endocrinology team took her off the lisinopril 10 mg dose, and she has improved energy.  Further she used to have gasping for air at night and symptoms sleep apnea which also resolved w/weight loss.  Her  says she no longer gasps for air, snores, and she denies morning headache or fatigue.  Her hematocrit was very minimally elevated at 47 labs 5/8/2019.  TSH was checked within the last year was normal.  Further she reports that now her menstrual cycles are regular, she thinks she is now ovulating with the weight loss.  Does have a history of polycystic ovarian, denies hirsuitism,  again she is on metformin.  Most recent A1c of 5.4 on labs from 7/9/2019 she says she is pending endocrine appointment soon.  She knows she will likely have a lower dose of metformin at that appointment.  This time she denies any symptomatic hypoglycemia, foot sores, neuropathy.  She is due for retinal exam she wishes to do this in December when her insurance kicks in.  Same for Pap smear with gynecology last one was about 5 years ago, all normal, no current gynecologic symptoms concerns.  Prior pelvic ultrasound did show likely fibroids.  She says at the time of ultrasound she was having heavy menses, this has resolved with the weight loss.    Developed itchy rash over her right palm after  being exposed to her cousin who had scabies.  The rash is particularly itchy at night, in the morning she has papules to puslike lesions.  No other rash on body.      Allergies: Glimepiride [kdc:yellow dye+brilliant blue fcf+glimepiride]    Current Outpatient Medications Ordered in Epic   Medication Sig Dispense Refill   • metFORMIN ER (GLUCOPHAGE XR) 500 MG TABLET SR 24 HR Take 2 Tabs by mouth every day. Please give Generic XR Metformin 90 Tab 1   • permethrin (ELIMITE) 5 % Cream Thoroughly massage cream to hand rash, leave on for 8 to 14 hours before removing (shower or bath). 1 Tube 1   • vitamin D (CHOLECALCIFEROL) 1000 UNIT Tab Take 1,000 Units by mouth every day.     • Blood Glucose Test Strips Test strips order: Test strips for One Touch Verio Flex meter. Sig: use 1x/day and prn ssx high or low sugar #100 RF x 3 100 Strip 3   • Lancets Lancets order: Lancets for One Touch Delica   Sig: use 1x/day and prn ssx high or low sugar. #100 RF x 3 100 Each 3     No current Epic-ordered facility-administered medications on file.        Past Medical History:   Diagnosis Date   • Diabetes (HCC)        History reviewed. No pertinent surgical history.    Social History     Tobacco Use   • Smoking status: Never Smoker   • Smokeless tobacco: Never Used   Substance Use Topics   • Alcohol use: No   • Drug use: No       Social History     Social History Narrative   • Not on file       Family History   Problem Relation Age of Onset   • Diabetes Mother    • Hypertension Mother    • Kidney Disease Mother    • Lung Disease Maternal Grandmother         emphysema (smoker)   • Diabetes Maternal Grandmother        ROS:     - Constitutional: Negative for fever, chills    - Eyes:   Negative for blurry vision, eye pain, discharge    - ENT:  Negative for hearing changes, ear pain, ear discharge, rhinorrhea, sinus congestion, sore throat     - Respiratory: Negative for cough, sputum production, chest congestion, dyspnea, wheezing, and  "crackles.      - Cardiovascular: Negative for chest pain, palpitations, orthopnea, and bilateral lower extremity edema.     - Gastrointestinal: Negative for heartburn, nausea, vomiting, abdominal pain, hematochezia, melena, diarrhea, constipation, and greasy/foul-smelling stools.     - Genitourinary: Negative for dysuria    - Musculoskeletal: Negative for myalgias, back pain, and joint pain.     - Skin: See HPI    - Neurological: Negative for migraines, numbness, ataxia, tremors, vertigo    - Endo:Negative for polyuria, heat/cold intolerance, excessive thirst    - Hem/lymphatic: Negative for easy bruising, blood clots, lymphedema, swollen glands    -Allergic/immun: Negative for allergic rhinitis    - Psychiatric/Behavioral: Negative for depression, suicidal/homicidal ideation and memory loss.      Exam: /68 (BP Location: Left arm, Patient Position: Sitting, BP Cuff Size: Adult)   Pulse 78   Temp 36.9 °C (98.4 °F) (Temporal)   Resp 14   Ht 1.575 m (5' 2\")   Wt (!) 154.2 kg (340 lb)   SpO2 99%  Body mass index is 62.19 kg/m².    General: Normal appearing. No distress.  EYES: Conjunctiva clear lids without ptosis, pupils equal  EARS: Normal shape and contour   Pulmonary: Clear to ausculation.  Normal effort. No rales or wheezing.  Cardiovascular: Regular rate and rhythm without significant murmur.   Abdomen: Soft, nontender, nondistended. Normal bowel sounds.  Neurologic: Cranial nerves grossly nonfocal  Lymph: No cervical, supraclavicular nodes palpable  Skin: Warm and dry.  No obvious lesions.  Palm there is a scabby to papular/pustular lesion, some sparingly between interdigits as well with signs of excoriation.  Musculoskeletal: Normal gait. No extremity cyanosis, clubbing, or edema.  Psych: Normal mood and affect. Alert and oriented x3. Judgment and insight is normal.      Assessment/Plan  1. Need for vaccination  - Influenza Vaccine Quad Injection (PF)  - Pneumococal Polysaccharide Vaccine 23-Valent " =>3YO SQ/IM    2. Scabies exposure  Rashes suggestive of scabies and with the history of recent exposure to cousin will treat with permethrin.  Up-to-date article printed for patient in English and Kazakh to educate family members.  She will wash all sheets, bed linens, etc. in hot water, dry in high heat, etc. to eliminate the infection.  - permethrin (ELIMITE) 5 % Cream; Thoroughly massage cream to hand rash, leave on for 8 to 14 hours before removing (shower or bath).  Dispense: 1 Tube; Refill: 1    3. Hypertension due to endocrine disorder  Endocrinology took her off lisinopril 10 mg, she will monitor her blood pressure at home, we suspect it is close to the goal of 120/80.  She is doing a great job with weight loss suspect regardless blood pressure will trend down.  She will also monitor salt in her diet with goal of 2 g or less per day.  - CBC WITH DIFFERENTIAL; Future  - Comp Metabolic Panel; Future    4. Controlled type 2 diabetes mellitus without complication, without long-term current use of insulin (HCC)  Diabetes currently well controlled, likely will need dose down titration of metformin with her weight loss, she plans to follow-up w/endocrine soon.  Will need retinal exam, patient prefers to do this around December.  - HEMOGLOBIN A1C; Future  - CBC WITH DIFFERENTIAL; Future  - Comp Metabolic Panel; Future  - Lipid Profile; Future  - MICROALBUMIN CREAT RATIO URINE; Future    5. Fatty liver disease, nonalcoholic  Fatty liver was seen on prior ultrasound 2013, suspect this will also resolve with her diet/exercise efforts.  - Comp Metabolic Panel; Future  - Lipid Profile; Future    6. Vitamin D deficiency  Prior level of 9, she is currently on cholecalciferol thousand units daily, will plan to recheck this to see if she needs to be loaded with ergocalciferol.  - VITAMIN D,25 HYDROXY; Future    7. Preventative health care  - CBC WITH DIFFERENTIAL; Future  - Comp Metabolic Panel; Future  - Lipid Profile;  Future  - VITAMIN D,25 HYDROXY; Future    Return to clinic 3 months          Please note that this dictation was created using voice recognition software. I have made every reasonable attempt to correct obvious errors, but I expect that there are errors of grammar and possibly content that I did not discover before finalizing the note.

## 2019-10-01 NOTE — PROGRESS NOTES
Return to office Patient Consult Note  Referred by: Shelby Sparks M.D.    Reason for consult: Diabetes Management Type 2    HPI:  Priti Begum is a 34 y.o. old patient who is seeing us today for diabetes care.  This is a pleasant patient with diabetes and I appreciate the opportunity to participate in the care of this patient.    Labs of 10/1/2019 HbA1c is 5.4  Labs of 7/9/2019 HbA1c is 5.4  Labs of 3/27/19 microalbumin is negative, GFR >60, HbA1c is 12.3    BG Diary:10/1/2019  In the AM:  103, 101, 87, 90, 97, 100      1. Controlled type 2 diabetes mellitus without complication, without long-term current use of insulin (Formerly [facility])    This is a new patient with me on 4/9/2019  They are  on:  1.  Metformin ER 500mg  two in the AM two in the PM  2.  Actos 15 (not on)  3.  basaglar 10 units (not on)        2. Essential hypertension  This is stable today and no new changes are needed or required in today's visit  On lisinopril       ROS:   Constitutional: No night sweats.  Eyes:  No visual changes.  Cardiac: No chest pain, No palpitations or racing heart rate.  Resp: No shortness of breath, No cough,   Gi: No Diarrhea    All other systems were reviewed and were/are negative.     Past Medical History:  Patient Active Problem List    Diagnosis Date Noted   • Controlled type 2 diabetes mellitus without complication, without long-term current use of insulin (Formerly [facility]) 06/11/2019   • Vitamin D deficiency 06/11/2019   • Preventative health care 04/09/2019   • Hypertension due to endocrine disorder 03/07/2019   • Fatty liver disease, nonalcoholic 01/27/2016   • Morbidly obese (HCC) 10/31/2013   • PCOS (polycystic ovarian syndrome) 10/31/2013       Past Surgical History:  History reviewed. No pertinent surgical history.    Allergies:  Glimepiride [kdc:yellow dye+brilliant blue fcf+glimepiride]    Social History:  Social History     Socioeconomic History   • Marital status:      Spouse name: Not on file   •  Number of children: Not on file   • Years of education: Not on file   • Highest education level: Not on file   Occupational History   • Not on file   Social Needs   • Financial resource strain: Not on file   • Food insecurity:     Worry: Not on file     Inability: Not on file   • Transportation needs:     Medical: Not on file     Non-medical: Not on file   Tobacco Use   • Smoking status: Never Smoker   • Smokeless tobacco: Never Used   Substance and Sexual Activity   • Alcohol use: No   • Drug use: No   • Sexual activity: Yes     Partners: Male     Comment:    Lifestyle   • Physical activity:     Days per week: Not on file     Minutes per session: Not on file   • Stress: Not on file   Relationships   • Social connections:     Talks on phone: Not on file     Gets together: Not on file     Attends Mosque service: Not on file     Active member of club or organization: Not on file     Attends meetings of clubs or organizations: Not on file     Relationship status: Not on file   • Intimate partner violence:     Fear of current or ex partner: Not on file     Emotionally abused: Not on file     Physically abused: Not on file     Forced sexual activity: Not on file   Other Topics Concern   • Not on file   Social History Narrative   • Not on file       Family History:  Family History   Problem Relation Age of Onset   • Diabetes Mother    • Hypertension Mother    • Kidney Disease Mother    • Lung Disease Maternal Grandmother         emphysema (smoker)   • Diabetes Maternal Grandmother        Medications:    Current Outpatient Medications:   •  metFORMIN ER (GLUCOPHAGE XR) 500 MG TABLET SR 24 HR, Take 2 Tabs by mouth every day. Please give Generic XR Metformin, Disp: 90 Tab, Rfl: 1  •  permethrin (ELIMITE) 5 % Cream, Thoroughly massage cream to hand rash, leave on for 8 to 14 hours before removing (shower or bath)., Disp: 1 Tube, Rfl: 1  •  vitamin D (CHOLECALCIFEROL) 1000 UNIT Tab, Take 1,000 Units by mouth every  "day., Disp: , Rfl:   •  Blood Glucose Test Strips, Test strips order: Test strips for One Touch Verio Flex meter. Sig: use 1x/day and prn ssx high or low sugar #100 RF x 3, Disp: 100 Strip, Rfl: 3  •  Lancets, Lancets order: Lancets for One Touch Delica   Sig: use 1x/day and prn ssx high or low sugar. #100 RF x 3, Disp: 100 Each, Rfl: 3        Physical Examination:   Vital signs: /70 (BP Location: Left arm, Patient Position: Sitting)   Pulse 80   Ht 1.575 m (5' 2\")   Wt (!) 153.3 kg (338 lb)   SpO2 97%   BMI 61.82 kg/m²   General: No distress, cooperative, well dressed and well nourished.   Eyes: No scleral icterus or discharge, No hyposphagma  ENMT: Normal on external inspection of nose, lips, No nasal drainage   Neck: No abnormal masses on inspection  Resp: Normal effort, Bilateral clear to auscultation, No wheezing, No rales  CVS: Regular rate and rhythm, S1 S2 normal, No murmur. No gallop  Extremities: No edema bilateral extremities  Neuro: Alert and oriented  Skin: No rash, No Ulcers  Psych: Normal mood and affect      Assessment and Plan:    1. Controlled type 2 diabetes mellitus without complication, without long-term current use of insulin (HCC)    They are  on:  1.  Metformin ER 500mg  two in the AM (change to just once a day)  2.  Actos 15 (not on)  3.  basaglar 10 units (not on)    Under very good control     2. Hypertension due to endocrine disorder  This is stable today and no new changes are needed or required in today's visit    Return in about 1 year (around 10/1/2020).    Thank you kindly for allowing me to participate in the diabetes care plan for this patient.    Melquiades Berkowitz PA-C, BC-ADM  Board Certified - Advanced Diabetes Management  10/01/19    CC:   Shelby Sparks M.D.    "

## 2019-10-02 ENCOUNTER — HOSPITAL ENCOUNTER (OUTPATIENT)
Facility: MEDICAL CENTER | Age: 34
End: 2019-10-02
Attending: PREVENTIVE MEDICINE
Payer: COMMERCIAL

## 2019-10-02 ENCOUNTER — EMPLOYEE HEALTH (OUTPATIENT)
Dept: OCCUPATIONAL MEDICINE | Facility: CLINIC | Age: 34
End: 2019-10-02

## 2019-10-02 ENCOUNTER — EH NON-PROVIDER (OUTPATIENT)
Dept: OCCUPATIONAL MEDICINE | Facility: CLINIC | Age: 34
End: 2019-10-02

## 2019-10-02 VITALS
BODY MASS INDEX: 53.92 KG/M2 | HEART RATE: 109 BPM | WEIGHT: 293 LBS | HEIGHT: 62 IN | DIASTOLIC BLOOD PRESSURE: 64 MMHG | RESPIRATION RATE: 14 BRPM | SYSTOLIC BLOOD PRESSURE: 110 MMHG | OXYGEN SATURATION: 98 % | TEMPERATURE: 97.6 F

## 2019-10-02 DIAGNOSIS — Z02.1 PRE-EMPLOYMENT DRUG SCREENING: ICD-10-CM

## 2019-10-02 DIAGNOSIS — Z02.1 PRE-EMPLOYMENT HEALTH SCREENING EXAMINATION: ICD-10-CM

## 2019-10-02 LAB
AMP AMPHETAMINE: NORMAL
BAR BARBITURATES: NORMAL
BZO BENZODIAZEPINES: NORMAL
COC COCAINE: NORMAL
INT CON NEG: NORMAL
INT CON POS: NORMAL
MDMA ECSTASY: NORMAL
MET METHAMPHETAMINES: NORMAL
MTD METHADONE: NORMAL
OPI OPIATES: NORMAL
OXY OXYCODONE: NORMAL
PCP PHENCYCLIDINE: NORMAL
POC URINE DRUG SCREEN OCDRS: NEGATIVE
THC: NORMAL
VZV IGG SER IA-ACNC: 2.19

## 2019-10-02 PROCEDURE — 86787 VARICELLA-ZOSTER ANTIBODY: CPT | Performed by: PREVENTIVE MEDICINE

## 2019-10-02 PROCEDURE — 8915 PR COMPREHENSIVE PHYSICAL: Performed by: NURSE PRACTITIONER

## 2019-10-02 PROCEDURE — 86480 TB TEST CELL IMMUN MEASURE: CPT | Performed by: PREVENTIVE MEDICINE

## 2019-10-02 PROCEDURE — 80305 DRUG TEST PRSMV DIR OPT OBS: CPT | Performed by: PREVENTIVE MEDICINE

## 2019-10-04 LAB
GAMMA INTERFERON BACKGROUND BLD IA-ACNC: 0.11 IU/ML
M TB IFN-G BLD-IMP: POSITIVE
M TB IFN-G CD4+ BCKGRND COR BLD-ACNC: 3.52 IU/ML
MITOGEN IGNF BCKGRD COR BLD-ACNC: >10 IU/ML
QFT TB2 - NIL TBQ2: 4.83 IU/ML

## 2019-10-07 ENCOUNTER — EH NON-PROVIDER (OUTPATIENT)
Dept: OCCUPATIONAL MEDICINE | Facility: CLINIC | Age: 34
End: 2019-10-07

## 2019-10-07 ENCOUNTER — HOSPITAL ENCOUNTER (OUTPATIENT)
Facility: MEDICAL CENTER | Age: 34
End: 2019-10-07
Attending: NURSE PRACTITIONER
Payer: COMMERCIAL

## 2019-10-07 ENCOUNTER — TELEPHONE (OUTPATIENT)
Dept: OCCUPATIONAL MEDICINE | Facility: CLINIC | Age: 34
End: 2019-10-07

## 2019-10-07 DIAGNOSIS — Z02.89 VISIT FOR OCCUPATIONAL HEALTH EXAMINATION: ICD-10-CM

## 2019-10-07 DIAGNOSIS — Z02.89 VISIT FOR OCCUPATIONAL HEALTH EXAMINATION: Primary | ICD-10-CM

## 2019-10-07 PROCEDURE — 86480 TB TEST CELL IMMUN MEASURE: CPT | Performed by: NURSE PRACTITIONER

## 2019-10-09 LAB
GAMMA INTERFERON BACKGROUND BLD IA-ACNC: 0.07 IU/ML
M TB IFN-G BLD-IMP: POSITIVE
M TB IFN-G CD4+ BCKGRND COR BLD-ACNC: 1.78 IU/ML
MITOGEN IGNF BCKGRD COR BLD-ACNC: >10 IU/ML
QFT TB2 - NIL TBQ2: 2.07 IU/ML

## 2019-10-10 ENCOUNTER — EH NON-PROVIDER (OUTPATIENT)
Dept: OCCUPATIONAL MEDICINE | Facility: CLINIC | Age: 34
End: 2019-10-10

## 2019-10-10 ENCOUNTER — APPOINTMENT (OUTPATIENT)
Dept: RADIOLOGY | Facility: IMAGING CENTER | Age: 34
End: 2019-10-10
Attending: NURSE PRACTITIONER
Payer: COMMERCIAL

## 2019-10-10 DIAGNOSIS — Z02.89 VISIT FOR OCCUPATIONAL HEALTH EXAMINATION: ICD-10-CM

## 2019-10-10 DIAGNOSIS — Z02.89 VISIT FOR OCCUPATIONAL HEALTH EXAMINATION: Primary | ICD-10-CM

## 2019-10-10 PROCEDURE — 71045 X-RAY EXAM CHEST 1 VIEW: CPT | Performed by: NURSE PRACTITIONER

## 2019-12-04 DIAGNOSIS — E11.9 TYPE 2 DIABETES MELLITUS WITHOUT COMPLICATION, WITHOUT LONG-TERM CURRENT USE OF INSULIN (HCC): ICD-10-CM

## 2019-12-04 DIAGNOSIS — Z00.00 PREVENTATIVE HEALTH CARE: ICD-10-CM

## 2019-12-23 DIAGNOSIS — R21 RASH IN ADULT: ICD-10-CM

## 2019-12-23 DIAGNOSIS — Z00.00 PREVENTATIVE HEALTH CARE: ICD-10-CM

## 2019-12-23 DIAGNOSIS — Z20.2 EXPOSURE TO SYPHILIS: ICD-10-CM

## 2019-12-24 ENCOUNTER — HOSPITAL ENCOUNTER (OUTPATIENT)
Dept: LAB | Facility: MEDICAL CENTER | Age: 34
End: 2019-12-24
Attending: INTERNAL MEDICINE
Payer: COMMERCIAL

## 2019-12-24 DIAGNOSIS — K76.0 FATTY LIVER DISEASE, NONALCOHOLIC: ICD-10-CM

## 2019-12-24 DIAGNOSIS — I15.2 HYPERTENSION DUE TO ENDOCRINE DISORDER: ICD-10-CM

## 2019-12-24 DIAGNOSIS — E11.9 CONTROLLED TYPE 2 DIABETES MELLITUS WITHOUT COMPLICATION, WITHOUT LONG-TERM CURRENT USE OF INSULIN (HCC): ICD-10-CM

## 2019-12-24 DIAGNOSIS — E55.9 VITAMIN D DEFICIENCY: ICD-10-CM

## 2019-12-24 DIAGNOSIS — Z00.00 PREVENTATIVE HEALTH CARE: ICD-10-CM

## 2019-12-24 LAB
25(OH)D3 SERPL-MCNC: 32 NG/ML (ref 30–100)
ALBUMIN SERPL BCP-MCNC: 3.8 G/DL (ref 3.2–4.9)
ALBUMIN/GLOB SERPL: 1 G/DL
ALP SERPL-CCNC: 86 U/L (ref 30–99)
ALT SERPL-CCNC: 15 U/L (ref 2–50)
ANION GAP SERPL CALC-SCNC: 8 MMOL/L (ref 0–11.9)
AST SERPL-CCNC: 11 U/L (ref 12–45)
BASOPHILS # BLD AUTO: 0.4 % (ref 0–1.8)
BASOPHILS # BLD: 0.04 K/UL (ref 0–0.12)
BILIRUB SERPL-MCNC: 0.4 MG/DL (ref 0.1–1.5)
BUN SERPL-MCNC: 10 MG/DL (ref 8–22)
CALCIUM SERPL-MCNC: 9 MG/DL (ref 8.5–10.5)
CHLORIDE SERPL-SCNC: 103 MMOL/L (ref 96–112)
CHOLEST SERPL-MCNC: 140 MG/DL (ref 100–199)
CO2 SERPL-SCNC: 27 MMOL/L (ref 20–33)
CREAT SERPL-MCNC: 0.57 MG/DL (ref 0.5–1.4)
CREAT UR-MCNC: 82.2 MG/DL
EOSINOPHIL # BLD AUTO: 0.31 K/UL (ref 0–0.51)
EOSINOPHIL NFR BLD: 3.3 % (ref 0–6.9)
ERYTHROCYTE [DISTWIDTH] IN BLOOD BY AUTOMATED COUNT: 40.8 FL (ref 35.9–50)
FASTING STATUS PATIENT QL REPORTED: NORMAL
GLOBULIN SER CALC-MCNC: 3.8 G/DL (ref 1.9–3.5)
GLUCOSE SERPL-MCNC: 112 MG/DL (ref 65–99)
HCT VFR BLD AUTO: 44.9 % (ref 37–47)
HDLC SERPL-MCNC: 40 MG/DL
HGB BLD-MCNC: 14.3 G/DL (ref 12–16)
IMM GRANULOCYTES # BLD AUTO: 0.01 K/UL (ref 0–0.11)
IMM GRANULOCYTES NFR BLD AUTO: 0.1 % (ref 0–0.9)
LDLC SERPL CALC-MCNC: 75 MG/DL
LYMPHOCYTES # BLD AUTO: 2.8 K/UL (ref 1–4.8)
LYMPHOCYTES NFR BLD: 29.7 % (ref 22–41)
MCH RBC QN AUTO: 27.2 PG (ref 27–33)
MCHC RBC AUTO-ENTMCNC: 31.8 G/DL (ref 33.6–35)
MCV RBC AUTO: 85.5 FL (ref 81.4–97.8)
MICROALBUMIN UR-MCNC: <0.7 MG/DL
MICROALBUMIN/CREAT UR: NORMAL MG/G (ref 0–30)
MONOCYTES # BLD AUTO: 0.46 K/UL (ref 0–0.85)
MONOCYTES NFR BLD AUTO: 4.9 % (ref 0–13.4)
NEUTROPHILS # BLD AUTO: 5.82 K/UL (ref 2–7.15)
NEUTROPHILS NFR BLD: 61.6 % (ref 44–72)
NRBC # BLD AUTO: 0 K/UL
NRBC BLD-RTO: 0 /100 WBC
PLATELET # BLD AUTO: 267 K/UL (ref 164–446)
PMV BLD AUTO: 10.6 FL (ref 9–12.9)
POTASSIUM SERPL-SCNC: 4 MMOL/L (ref 3.6–5.5)
PROT SERPL-MCNC: 7.6 G/DL (ref 6–8.2)
RBC # BLD AUTO: 5.25 M/UL (ref 4.2–5.4)
SODIUM SERPL-SCNC: 138 MMOL/L (ref 135–145)
TREPONEMA PALLIDUM IGG+IGM AB [PRESENCE] IN SERUM OR PLASMA BY IMMUNOASSAY: NON REACTIVE
TRIGL SERPL-MCNC: 123 MG/DL (ref 0–149)
WBC # BLD AUTO: 9.4 K/UL (ref 4.8–10.8)

## 2019-12-24 PROCEDURE — 86780 TREPONEMA PALLIDUM: CPT

## 2019-12-24 PROCEDURE — 82043 UR ALBUMIN QUANTITATIVE: CPT

## 2019-12-24 PROCEDURE — 82570 ASSAY OF URINE CREATININE: CPT

## 2019-12-24 PROCEDURE — 82306 VITAMIN D 25 HYDROXY: CPT

## 2019-12-24 PROCEDURE — 83036 HEMOGLOBIN GLYCOSYLATED A1C: CPT

## 2019-12-24 PROCEDURE — 85025 COMPLETE CBC W/AUTO DIFF WBC: CPT

## 2019-12-24 PROCEDURE — 36415 COLL VENOUS BLD VENIPUNCTURE: CPT

## 2019-12-24 PROCEDURE — 80053 COMPREHEN METABOLIC PANEL: CPT

## 2019-12-24 PROCEDURE — 80061 LIPID PANEL: CPT

## 2019-12-25 LAB
EST. AVERAGE GLUCOSE BLD GHB EST-MCNC: 126 MG/DL
HBA1C MFR BLD: 6 % (ref 0–5.6)

## 2019-12-26 ENCOUNTER — TELEPHONE (OUTPATIENT)
Dept: MEDICAL GROUP | Facility: MEDICAL CENTER | Age: 34
End: 2019-12-26

## 2019-12-26 ENCOUNTER — DOCUMENTATION (OUTPATIENT)
Dept: MEDICAL GROUP | Facility: MEDICAL CENTER | Age: 34
End: 2019-12-26

## 2019-12-26 NOTE — TELEPHONE ENCOUNTER
1. Caller Name: Priti Begum                                           Call Back Number: [phone]        Patient approves a detailed voicemail message: N\A    Pt came into office. Wanted to see what her lab results were. Told pt that results were not released yet but if we need to bring her into a room we will.    GERALD Sempel  Med Ass't

## 2020-01-06 ENCOUNTER — OFFICE VISIT (OUTPATIENT)
Dept: MEDICAL GROUP | Facility: MEDICAL CENTER | Age: 35
End: 2020-01-06
Payer: COMMERCIAL

## 2020-01-06 VITALS
OXYGEN SATURATION: 96 % | DIASTOLIC BLOOD PRESSURE: 74 MMHG | HEIGHT: 62 IN | HEART RATE: 80 BPM | WEIGHT: 293 LBS | BODY MASS INDEX: 53.92 KG/M2 | SYSTOLIC BLOOD PRESSURE: 116 MMHG | TEMPERATURE: 98.3 F | RESPIRATION RATE: 14 BRPM

## 2020-01-06 DIAGNOSIS — R39.15 URINARY URGENCY: ICD-10-CM

## 2020-01-06 DIAGNOSIS — E66.01 MORBIDLY OBESE (HCC): ICD-10-CM

## 2020-01-06 DIAGNOSIS — E11.9 TYPE 2 DIABETES MELLITUS WITHOUT COMPLICATION, WITHOUT LONG-TERM CURRENT USE OF INSULIN (HCC): ICD-10-CM

## 2020-01-06 DIAGNOSIS — E55.9 VITAMIN D DEFICIENCY: ICD-10-CM

## 2020-01-06 DIAGNOSIS — I15.2 HYPERTENSION DUE TO ENDOCRINE DISORDER: ICD-10-CM

## 2020-01-06 DIAGNOSIS — E11.9 CONTROLLED TYPE 2 DIABETES MELLITUS WITHOUT COMPLICATION, WITHOUT LONG-TERM CURRENT USE OF INSULIN (HCC): ICD-10-CM

## 2020-01-06 PROCEDURE — 99214 OFFICE O/P EST MOD 30 MIN: CPT | Performed by: INTERNAL MEDICINE

## 2020-01-06 RX ORDER — METFORMIN HYDROCHLORIDE 500 MG/1
1000 TABLET, EXTENDED RELEASE ORAL DAILY
Qty: 90 TAB | Refills: 1 | Status: SHIPPED
Start: 2020-01-06 | End: 2020-01-14 | Stop reason: SDUPTHER

## 2020-01-06 ASSESSMENT — PATIENT HEALTH QUESTIONNAIRE - PHQ9: CLINICAL INTERPRETATION OF PHQ2 SCORE: 0

## 2020-01-07 NOTE — PROGRESS NOTES
CC:  Diagnoses of Controlled type 2 diabetes mellitus without complication, without long-term current use of insulin (HCC), Morbidly obese (HCC), Vitamin D deficiency, Hypertension due to endocrine disorder, Type 2 diabetes mellitus without complication, without long-term current use of insulin (HCC), and Urinary urgency were pertinent to this visit.    HISTORY OF THE PRESENT ILLNESS: Patient is a 34 y.o. female. This pleasant patient is here today to follow-up.    Continues to do well, reviewed labs from 12/24/2019 vitamin D improved from 9 up to 32.  Hematocrit normalized from 47.2 down to 44.9.  A1c did go up from 5.4 up to 6.0 associated with some weight gain over the holidays which she is working on losing at this time and would like to follow-up with nutritionist.  She tells me she does not wish to take a prescribed medication to assist with weight loss as she does desire near future pregnancy and worries about any complications.  Her endocrinologist patient tells me he did not feel strongly she should be on a statin, and noted that with her diet and exercise she has significantly improved her cholesterol recent labs total cholesterol 140, triglycerides 123, HDL 40 and LDL 75.  Endocrinology note was briefly reviewed from 10/1/2019.  Having difficulty scheduling her routine retinal exam.  Blood pressure remains controlled.  She is pending gynecology on Friday.  Has had urinary urgency with some related incontinence only early in the morning around 5 AM that seems to start about 2 weeks ago and occurring about 3 times per week.  She denies any dysuria, hematuria, or symptoms of UTI.  She does typically drink a Pepsi in the evening before bed, she will stop any caffeine intake before bed and certainly try to only limit to the mornings, no fluid 2 hours for bedtime and see if this helps.  Additionally may be components of weak pelvic musculature she will work on Kegel exercises, and that weight gain which all may  be contributing factors.    Allergies: Glimepiride [kdc:yellow dye+brilliant blue fcf+glimepiride]    Current Outpatient Medications Ordered in Epic   Medication Sig Dispense Refill   • metFORMIN ER (GLUCOPHAGE XR) 500 MG TABLET SR 24 HR Take 2 Tabs by mouth every day. Please give Generic XR Metformin 90 Tab 1   • Blood Glucose Test Strips Test strips order: Test strips for One Touch Verio Flex meter. Sig: use 1x/day and prn ssx high or low sugar #100 RF x 3 100 Strip 3   • vitamin D (CHOLECALCIFEROL) 1000 UNIT Tab Take 1,000 Units by mouth every day.     • Lancets Lancets order: Lancets for One Touch Delica   Sig: use 1x/day and prn ssx high or low sugar. #100 RF x 3 100 Each 3     No current Epic-ordered facility-administered medications on file.        Past Medical History:   Diagnosis Date   • Diabetes (HCC)        History reviewed. No pertinent surgical history.    Social History     Tobacco Use   • Smoking status: Never Smoker   • Smokeless tobacco: Never Used   Substance Use Topics   • Alcohol use: No   • Drug use: No       Social History     Patient does not qualify to have social determinant information on file (likely too young).   Social History Narrative   • Not on file       Family History   Problem Relation Age of Onset   • Diabetes Mother    • Hypertension Mother    • Kidney Disease Mother    • Lung Disease Maternal Grandmother         emphysema (smoker)   • Diabetes Maternal Grandmother        ROS:     - Constitutional: Negative for fever, chills, unexpected weight change, night sweats    - Eyes:   Negative for blurry vision, eye pain, discharge    - ENT:  Negative for hearing changes, ear pain, ear discharge, rhinorrhea, sinus congestion, sore throat     - Respiratory: Negative for cough, sputum production, chest congestion, dyspnea, wheezing, and crackles.      - Cardiovascular: Negative for chest pain, palpitations, orthopnea, and bilateral lower extremity edema.     - Gastrointestinal: Negative  "for abdominal pain.  Some constipation with certain foods like cheese  - Genitourinary see HPI    - Musculoskeletal: Negative for myalgias, back pain, and joint pain.     - Neurological: Negative for vertigo    - Endo:Negative for polyuria, heat/cold intolerance, excessive thirst    - Hem/lymphatic: Negative for easy bruising, blood clots, lymphedema, swollen glands    -Allergic/immun: Negative for allergic rhinitis    - Psychiatric/Behavioral: Negative for depression, suicidal/homicidal ideation and memory loss.      Exam: /74 (BP Location: Right arm, Patient Position: Sitting, BP Cuff Size: Adult long)   Pulse 80   Temp 36.8 °C (98.3 °F) (Temporal)   Resp 14   Ht 1.575 m (5' 2\")   Wt (!) 162 kg (357 lb 2.3 oz)   SpO2 96%  Body mass index is 65.32 kg/m².    General: Normal appearing. No distress.  EYES: Conjunctiva clear lids without ptosis, pupils equal  EARS: Normal shape and contour   Neurologic: Cranial nerves grossly nonfocal  Skin: Warm and dry.  No obvious lesions.  Musculoskeletal: Normal gait. No extremity cyanosis, clubbing, or edema.  Psych: Normal mood and affect. Alert and oriented x3. Judgment and insight is normal.      Assessment/Plan  1. Controlled type 2 diabetes mellitus without complication, without long-term current use of insulin (HCC)  A1c did increase from 5.4 up to 6.0 in the setting of weight gain.  Patient is working diligently to get back on track and achieve her BMI goals.  She will schedule her retinal exam.  Overall she is still doing very well.  - REFERRAL TO OPHTHALMOLOGY  - HEMOGLOBIN A1C; Future  - Basic Metabolic Panel; Future  - metFORMIN ER (GLUCOPHAGE XR) 500 MG TABLET SR 24 HR; Take 2 Tabs by mouth every day. Please give Generic XR Metformin  Dispense: 90 Tab; Refill: 1    2. Morbidly obese (HCC)  She does not wish to use pharmaceutical medication because she desires pregnancy in the near future.  She will try the my fitness pal phone application and see if this " "can assist with her goals and she would like to follow-up with nutrition specialist.  - REFERRAL TO [facility] (HIP) Services Requested: Physician Medical Weight Management Program; Reason for Referral? Waist Circumference>40\"; Reason for Visit: Medical Condition Requiring Nutrition Counseling    3. Vitamin D deficiency  Level has significantly improved she is now off ergocalciferol and on daily cholecalciferol.  We will plan to recheck in 6 months  - VITAMIN D,25 HYDROXY; Future    4. Hypertension due to endocrine disorder  Stable, chronic and controlled no longer on medication after weight loss.  - Basic Metabolic Panel; Future    5. Type 2 diabetes mellitus without complication, without long-term current use of insulin (HCC)  See #1 above  - Blood Glucose Test Strips; Test strips order: Test strips for One Touch Verio Flex meter. Sig: use 1x/day and prn ssx high or low sugar #100 RF x 3  Dispense: 100 Strip; Refill: 3    6.  Urinary urgency with some incontinence  Advised patient no caffeine in the evening and even cut back to only using caffeine early in the morning.  No fluid 2 hours before bedtime.  She does not drink alcohol before bed but also advised no alcohol.  Advised regular Kegel muscle exercises.  Achieve the weight loss again.  Follow-up gynecology already pending this Friday.  If needed could consider referral to pelvic floor therapy with specialized physical therapist.    Return to clinic 6 months or sooner if needed      Please note that this dictation was created using voice recognition software. I have made every reasonable attempt to correct obvious errors, but I expect that there are errors of grammar and possibly content that I did not discover before finalizing the note.    "

## 2020-01-10 ENCOUNTER — HOSPITAL ENCOUNTER (OUTPATIENT)
Facility: MEDICAL CENTER | Age: 35
End: 2020-01-10
Attending: OBSTETRICS & GYNECOLOGY
Payer: COMMERCIAL

## 2020-01-10 ENCOUNTER — GYNECOLOGY VISIT (OUTPATIENT)
Dept: OBGYN | Facility: CLINIC | Age: 35
End: 2020-01-10
Payer: COMMERCIAL

## 2020-01-10 VITALS — DIASTOLIC BLOOD PRESSURE: 79 MMHG | SYSTOLIC BLOOD PRESSURE: 144 MMHG | WEIGHT: 293 LBS | BODY MASS INDEX: 64.38 KG/M2

## 2020-01-10 DIAGNOSIS — Z01.419 WELL WOMAN EXAM WITH ROUTINE GYNECOLOGICAL EXAM: ICD-10-CM

## 2020-01-10 DIAGNOSIS — Z11.3 SCREENING EXAMINATION FOR VENEREAL DISEASE: ICD-10-CM

## 2020-01-10 DIAGNOSIS — Z11.51 SCREENING FOR HUMAN PAPILLOMAVIRUS (HPV): ICD-10-CM

## 2020-01-10 DIAGNOSIS — Z12.4 SCREENING FOR CERVICAL CANCER: ICD-10-CM

## 2020-01-10 PROCEDURE — 99385 PREV VISIT NEW AGE 18-39: CPT | Performed by: OBSTETRICS & GYNECOLOGY

## 2020-01-10 PROCEDURE — 87591 N.GONORRHOEAE DNA AMP PROB: CPT

## 2020-01-10 PROCEDURE — 88175 CYTOPATH C/V AUTO FLUID REDO: CPT

## 2020-01-10 PROCEDURE — 87491 CHLMYD TRACH DNA AMP PROBE: CPT

## 2020-01-10 PROCEDURE — 87624 HPV HI-RISK TYP POOLED RSLT: CPT

## 2020-01-10 NOTE — NON-PROVIDER
Pt here for NP annual exam  Pt states has irregular periods    Good# [phone]  Pharmacy verified

## 2020-01-13 LAB
C TRACH DNA GENITAL QL NAA+PROBE: NEGATIVE
CYTOLOGY REG CYTOL: NORMAL
HPV HR 12 DNA CVX QL NAA+PROBE: NEGATIVE
HPV16 DNA SPEC QL NAA+PROBE: NEGATIVE
HPV18 DNA SPEC QL NAA+PROBE: NEGATIVE
N GONORRHOEA DNA GENITAL QL NAA+PROBE: NEGATIVE
SPECIMEN SOURCE: NORMAL
SPECIMEN SOURCE: NORMAL

## 2020-01-14 DIAGNOSIS — E11.9 CONTROLLED TYPE 2 DIABETES MELLITUS WITHOUT COMPLICATION, WITHOUT LONG-TERM CURRENT USE OF INSULIN (HCC): ICD-10-CM

## 2020-01-14 RX ORDER — METFORMIN HYDROCHLORIDE 500 MG/1
1000 TABLET, EXTENDED RELEASE ORAL DAILY
Qty: 90 TAB | Refills: 1 | Status: SHIPPED | OUTPATIENT
Start: 2020-01-14 | End: 2020-07-21

## 2020-01-27 ENCOUNTER — OFFICE VISIT (OUTPATIENT)
Dept: HEALTH INFORMATION MANAGEMENT | Facility: MEDICAL CENTER | Age: 35
End: 2020-01-27
Payer: COMMERCIAL

## 2020-01-27 VITALS
SYSTOLIC BLOOD PRESSURE: 138 MMHG | DIASTOLIC BLOOD PRESSURE: 86 MMHG | HEIGHT: 63 IN | WEIGHT: 293 LBS | OXYGEN SATURATION: 92 % | BODY MASS INDEX: 51.91 KG/M2 | HEART RATE: 77 BPM

## 2020-01-27 DIAGNOSIS — E66.01 MORBIDLY OBESE (HCC): ICD-10-CM

## 2020-01-27 DIAGNOSIS — E55.9 VITAMIN D DEFICIENCY: ICD-10-CM

## 2020-01-27 DIAGNOSIS — I15.2 HYPERTENSION DUE TO ENDOCRINE DISORDER: ICD-10-CM

## 2020-01-27 DIAGNOSIS — E28.2 PCOS (POLYCYSTIC OVARIAN SYNDROME): ICD-10-CM

## 2020-01-27 DIAGNOSIS — E11.9 CONTROLLED TYPE 2 DIABETES MELLITUS WITHOUT COMPLICATION, WITHOUT LONG-TERM CURRENT USE OF INSULIN (HCC): ICD-10-CM

## 2020-01-27 DIAGNOSIS — E88.810 METABOLIC SYNDROME: ICD-10-CM

## 2020-01-27 PROCEDURE — 99205 OFFICE O/P NEW HI 60 MIN: CPT | Performed by: INTERNAL MEDICINE

## 2020-01-27 PROCEDURE — 97802 MEDICAL NUTRITION INDIV IN: CPT | Performed by: DIETITIAN, REGISTERED

## 2020-01-27 RX ORDER — LISINOPRIL 10 MG/1
10 TABLET ORAL DAILY
Qty: 30 TAB | Refills: 3 | Status: SHIPPED | OUTPATIENT
Start: 2020-01-27 | End: 2020-06-08 | Stop reason: SDUPTHER

## 2020-01-27 ASSESSMENT — PATIENT HEALTH QUESTIONNAIRE - PHQ9: CLINICAL INTERPRETATION OF PHQ2 SCORE: 0

## 2020-01-27 NOTE — PROGRESS NOTES
Bariatric Medicine H&P  Chief Complaint   Patient presents with   • Weight Gain       Referred by:  Shelby Sparks M.D.    History of Present Illness:   Priti Begum is a 34 y.o.  female who presents for weight management and to help address co-morbidities caused by overweight, as below.    The patient wants to improve her diabetes control.  She has tried tracking with fit bit a few months ago, it helps her stay on track towards her weight loss goal.  She has not otherwise been in a formal weight loss program and has not been on anti-obesity medication.  She is lost about 100 pounds in the last year on her own, following more of a ketogenic diet.  Her mom had severe diabetes, required care and she was taking care of her but is afraid herself of developing complications from diabetes.  Her weight loss has stalled, she would like to lose another 100 pounds.    She has not use anti-obesity medication recently, but would like to consider it.  She would also like to use meal replacement therapy.    She is hoping to get pregnant soon.    Brief Diet History (see also RD notes):  AM: Oatmeal, bagel  Lunch: Tuna, beans  Dinner: Tuna, beans  Snacks: Fruit, chips  Drinks: Water, diet soda    T2DM: On metformin, with most recent A1c 6.0 12/2019  HTN: Blood pressure mildly elevated without antihypertensive, was on lisinopril 10  PCOS: On metformin  VDD: Takes vitamin D supplement daily, with most recent vitamin D level normal 12/2019    Behavior-Related History:  Binge eating screen: Positive  H/o abuse: Negative     Exercise:   Walking 2 times per week x30 minutes each     Review of Systems   Denies excessive fatigue, lack of energy, shortness of breath, back pain, heartburn, excessive dry skin or headaches  Sleep apnea screen: Negative  All other ROS were reviewed with patient today and are negative.      PMH/PSH:  I have reviewed the patient's medical, social and family history, allergies, and  "medications today.  Prior records reviewed.  Personal Hx of Bariatric Surgery: None  She wants to consider bariatric surgery if she is unable to lose another 100 pounds on her own.  [facility]    Physical Exam:   /86 (BP Location: Left arm, Patient Position: Sitting, BP Cuff Size: Large adult long)   Pulse 77   Ht 1.6 m (5' 3\")   Wt (!) 160.3 kg (353 lb 6.4 oz)   SpO2 92%   BMI 62.60 kg/m²   Waist Measurement   Waist: 56 inch/inches  Body fat % 56  REE 2148 kcal/day    Constitutional: Oriented to person, place, and time and well-developed, well-nourished, and in no distress.    HENT: No facial plethora.  No Cushingoid features.  No scalloped tongue.  No dental erosions.  No swollen parotids.  Head: Normocephalic.   Eyes: EOM are normal. Pupils are equal, round, and reactive to light. No periorbital edema.  No lateral thinning of eyebrows.  No vertical nystagmus.  Neck: Normal range of motion. Neck supple. No thyromegaly present. No buffalo hump.  Cardiovascular: Normal rate and regular rhythm.  No murmur heard.  Pulmonary/Chest: Effort normal and breath sounds normal. No wheezes.   Abdominal: Soft. Bowel sounds are normal.  Grade 1 pannus.  No ascites.  No hepatosplenomegaly.   Musculoskeletal: Normal range of motion. No edema.   Neurological: Alert and oriented to person, place, and time. Normal reflexes. No cranial nerve deficit. No muscle weakness.  Gait normal.   Skin: Warm and dry. Not diaphoretic. No hirsuitism.  No acanthosis nigricans.  Not excessively dry, scaly.  No acne.  No bruising/ecchymosis.  No hyperpigmentation.  No xanthomas or acrochordon.    Psychiatric: Mood, memory, affect and judgment normal.     Laboratory:   Prior labs reviewed.  EKG: Sinus rhythm, rate 78, no ST-T changes.  Corrected QT 0.439  Ordered and reviewed by me today.    Dietitian Assessment: I have reviewed the Dietitian's assessment related to this encounter.       ASSESSMENT/PLAN:  Body mass index is 62.6 kg/m². "   Obesity Stage ([place]) 2; Class 3    1. Morbidly obese (HCC)     2. PCOS (polycystic ovarian syndrome)     3. Hypertension due to endocrine disorder  lisinopril (PRINIVIL) 10 MG Tab    EKG   4. Controlled type 2 diabetes mellitus without complication, without long-term current use of insulin (HCC)     5. Vitamin D deficiency     6. Metabolic syndrome         The patient has made great progress on her own towards weight loss, hopes to lose another 100 pounds.  Resume tracking intake, work on better balance and macronutrients.  Start meal replacement therapy.  Continue metformin given PCOS and T2DM.  Resume blood pressure control with lisinopril, which is also important given her T2DM diagnosis.  Lipids controlled without statin.  Continue vitamin D.  Gradually increase activity, as she is planning.    The patient and I have discussed at length and agree to the following recommendations, which are all addressing the above diagnoses:    Weight Goal: 5% wt loss at one month after start (pt goal weight is -100 lb)  Diet:     MR:  1 ND MR qam  High Protein/Low Carb Meals and 2 snacks between meals daily  >100 g protein, <100 g total carbs daily, <1800 kcal/d  Track daily intake with My Fitness Pal, bring to next visit  64+ oz water per day  Avoid excess meals out on weekends, speak with spouse  Physical Activity: Walking minimum 30 minutes daily at work  Risk level for moderate/vigorous exercise program:   Low  New Rx:   None  She is hoping to get pregnant soon.  Behavior change:   Speak with spouse about emotional support for weight loss, not undermining her efforts  Track, stimulus narrowing  Follow-up: one month with MD, 2 wks with RD    Face to face time spent 60 minutes,  with >50% of time devoted to one on one counseling on weight management issues, as documented above.      Patient's body mass index is 62.6 kg/m². Exercise and nutrition counseling were performed at this visit.        Thank you for your  referral!

## 2020-01-28 NOTE — PROGRESS NOTES
"1/27/2020     Priti Begum 34 y.o.        Time in/out: 3:51-4:25 PM    Anthropometrics/Objective  Vitals:    01/27/20 1507   BP: 138/86   Weight: (!) 160.3 kg (353 lb 6.4 oz)   Height: 1.6 m (5' 3\")     BMI: Body mass index is 62.6 kg/m².  Stated Goal Weight: see MD note  See comprehensive patient history form for further information     Subjective:  Pt is here today for the initial screening visit for the medical weight management program.   - lost 100 lbs in the past year following a keto diet  - diagnosed with T2DM 1 year ago  - no previous nutrition or diabetes education  - checks BS's 4x daily and they usually range from  (higher in the AM)  - does not drink any sugar sweetened beverages; sometimes drinks coke zero  - goes out to eat on the weekends    See HIP Medical Questionnaire in media for more detailed diet history     Nutrition Diagnosis (PES Statement)  · Obesity related to excessive energy intake and inadequate energy expenditure as evidenced by BMI >30     Client history:  Condition(s) associated with a diagnosis or treatment / Pertinent Medical Hx: PCOS, non-alcoholic fatty liver disease, HTN, T2DM, Vit D deficiency, metabolic syndrome    Biochemical data, medical test and procedures  Lab Results   Component Value Date/Time    HBA1C 6.0 (H) 12/24/2019 06:19 AM     Lab Results   Component Value Date/Time    POCGLUCOSE 251 (A) 02/14/2019 10:10 AM     Lab Results   Component Value Date/Time    CHOLSTRLTOT 140 12/24/2019 06:19 AM    LDL 75 12/24/2019 06:19 AM    HDL 40 12/24/2019 06:19 AM    TRIGLYCERIDE 123 12/24/2019 06:19 AM         Nutrition Intervention  Nutrition Prescription  Recommended Daily Kcals: 6908-1965 Kcal based on REE of 2148 (*1800 kcals per Dr. Yates's recommendation)  Recommended Daily Protein: 100g or ~30% of kcals    Recommended Daily CHO: <100g or ~30% of kcals    Meal Plan Recommendation   Calorie controlled, high protein, low carb diet    with 0-1 " meal replacements per day  1-2 snacks; 1 oz protein + non-starchy veggies or 1 fruit      Comprehensive Nutrition Education Instruction or training leading to in-depth nutrition related knowledge about:   Benefits to following meal plan, combine carb, protein and fat at each meal, meal timing and spacing, portion control, sweets and alcohol in moderation.  Handouts provided regarding topics discussed:   - Meal Plan   - My Plate Planner    - Snack list with fruit   - Meal ideas   - MyFitnessPal How-To     Monitoring & Evaluation Plan    Behavioral-Environmental:  Behavior: Keep a food journal and bring to next appointment   Physical activity: Did not discuss today.    Food / Nutrient Intake:  Food intake: Follow meal plan as discussed. Avoid concentrated sweets and processed carbs. Use the plate method for portion control and macronutrient balance. Limit carbs/starch to up to 1/2 cup per meal. Snacks should be 1oz protein + ns veggies or fruit. Fruit once per day.     Fluid intake: Consume at least 64 oz water per day. Avoid all sweetened beverages. Limit coffee to 1 cup a day (ideally no cream or sugar). Limit or avoid alcohol as discussed with Dr. Yates.     Physical Signs / Symptoms:  Weight loss towards BMI < 30   Weight change -1-2 lbs per week    Assessment Notes:  Today I oriented Margarita to Dr. Duarte's Medical Weight Management program. Encouraged a higher protein, lower carbohydrate, and calorie controlled meal plan consisting of 3 meals and 1-2 snacks per day with optional use of meal replacements. Encouraged patient to track their food/beverage intake on My fitness Pal or utilize a written food journal.  We discussed how to build protein into each meal and snack, incorporating 1/2 plate non-starchy vegetable twice daily, optional 1/2 cup of complex carbohydrate at meals if desired, and avoiding refined carbohydrates and simple sugars. Reviewed snack guidelines to include 1 oz protein and optional  non-starchy vegetable or 1 serving of fruit.      Margarita will be aiming for 1800 kcal/d.  She will also be looking at the macronutrient feature and aiming for 100g or less of carbohydrate and 100g or more of protein per day per Dr. Duarte recommendations (or 30% or less of CHO and 30% or more of protein from calories). Set goals in My Fitness Pal for pt. Reviewed meal timing and she will have 1 ND MR shake at breakfast. She will start tracking on My Fitness Pal. Next visit suggest to review nutrition basics.     F/U: 2 weeks RD, 1 month RD/MD

## 2020-02-11 ENCOUNTER — NON-PROVIDER VISIT (OUTPATIENT)
Dept: HEALTH INFORMATION MANAGEMENT | Facility: MEDICAL CENTER | Age: 35
End: 2020-02-11
Payer: COMMERCIAL

## 2020-02-11 VITALS — BODY MASS INDEX: 51.91 KG/M2 | WEIGHT: 293 LBS | HEIGHT: 63 IN

## 2020-02-11 DIAGNOSIS — E66.01 MORBIDLY OBESE (HCC): ICD-10-CM

## 2020-02-11 PROCEDURE — 97803 MED NUTRITION INDIV SUBSEQ: CPT | Performed by: DIETITIAN, REGISTERED

## 2020-02-11 NOTE — PROGRESS NOTES
"Nutrition Reassess: Medical Weight Management   Today's date: 2/11/2020  Referring Provider: No ref. provider found      Time: in/out 8:07-8:43 AM  Visit#: 2    Subjective:  - does not like ND MR anderson d/t texture, taste, etc.   - has been using Atkins MR bar at breakfast  - tends to have more snacks when working the later shifts  - has not been tracking regularly on My Fitness Pal   - eats a larger meal for dinner, notices BS's in the AM are elevated  - does not like to eat meat, chicken, turkey, tofu, fish  - pt is motivated to control her diabetes so she does not have to take insulin    Diet history:   Breakfast - Atkins MR Bar  Snack - 0  Lunch - eggs, green beans, robles beans  Snack - snack bar  Dinner - 2 cups spaghetti, 1 piece of bread, 1 cup Ragu sauce, 1 tbs. meat    Anthropometrics/Objective  Today's weight:    Vitals:    02/11/20 1130   Weight: (!) 158.7 kg (349 lb 14.4 oz)   Height: 1.6 m (5' 3\")     BMI:  Body mass index is 61.98 kg/m².  Starting weight/date 353.4 lb (01/27/20)   Total weight change : -3.5 lb            Meal Plan:   Calorie controlled, high protein, low carb diet with 0-1 meal replacements per day    ReAssesment/Notes:  Based on patients diet recall, she is having larger dinners that are high in carbohydrates (beans, tortillas, bread, pasta). As she was unsure about which foods affect her blood sugar, we reviewed nutrition basics for a better understanding of carbohydrates, proteins and fats and clarified the difference between simple vs. complex carbohydrates. Briefly reviewed the plate method for meal balance and portion control focusing on 1/2 cup of complex carbs at meals. Suggested that she can also focus on having a 'fist size' portion or less of carbs at all meals.  Pt set her goals to have a 'fist size' of carbs or less at each meal and to find different protein sources that she likes. Provided her with the Meal Replacement Shake Options, Meal Replacement Bar Options and " Protein Snack Bar Options handouts. Suggested for her to attend the type 2 diabetes class but pt politely declined. Next visit suggest to review label reading.      Follow-up: 2 weeks with TAN/MD

## 2020-02-25 ENCOUNTER — APPOINTMENT (OUTPATIENT)
Dept: HEALTH INFORMATION MANAGEMENT | Facility: MEDICAL CENTER | Age: 35
End: 2020-02-25
Payer: COMMERCIAL

## 2020-03-05 ENCOUNTER — PATIENT MESSAGE (OUTPATIENT)
Dept: HEALTH INFORMATION MANAGEMENT | Facility: OTHER | Age: 35
End: 2020-03-05

## 2020-03-06 ENCOUNTER — OFFICE VISIT (OUTPATIENT)
Dept: MEDICAL GROUP | Facility: MEDICAL CENTER | Age: 35
End: 2020-03-06
Payer: COMMERCIAL

## 2020-03-06 VITALS
SYSTOLIC BLOOD PRESSURE: 126 MMHG | RESPIRATION RATE: 16 BRPM | DIASTOLIC BLOOD PRESSURE: 72 MMHG | HEIGHT: 62 IN | TEMPERATURE: 98.2 F | WEIGHT: 293 LBS | OXYGEN SATURATION: 98 % | HEART RATE: 77 BPM | BODY MASS INDEX: 53.92 KG/M2

## 2020-03-06 DIAGNOSIS — I15.2 HYPERTENSION DUE TO ENDOCRINE DISORDER: ICD-10-CM

## 2020-03-06 DIAGNOSIS — E11.9 CONTROLLED TYPE 2 DIABETES MELLITUS WITHOUT COMPLICATION, WITHOUT LONG-TERM CURRENT USE OF INSULIN (HCC): ICD-10-CM

## 2020-03-06 DIAGNOSIS — E88.810 METABOLIC SYNDROME: ICD-10-CM

## 2020-03-06 DIAGNOSIS — K76.0 FATTY LIVER DISEASE, NONALCOHOLIC: ICD-10-CM

## 2020-03-06 PROCEDURE — 99214 OFFICE O/P EST MOD 30 MIN: CPT | Performed by: INTERNAL MEDICINE

## 2020-03-06 RX ORDER — TOPIRAMATE 50 MG/1
50 TABLET, FILM COATED ORAL 2 TIMES DAILY
Qty: 180 TAB | Refills: 1 | Status: SHIPPED | OUTPATIENT
Start: 2020-03-06 | End: 2020-07-21

## 2020-03-06 RX ORDER — PHENTERMINE HYDROCHLORIDE 37.5 MG/1
37.5 TABLET ORAL
Qty: 30 TAB | Refills: 2 | Status: SHIPPED | OUTPATIENT
Start: 2020-03-06 | End: 2020-06-04

## 2020-03-06 ASSESSMENT — FIBROSIS 4 INDEX: FIB4 SCORE: 0.35

## 2020-03-07 NOTE — PROGRESS NOTES
CHIEF COMPLAINT  Chief Complaint   Patient presents with   • Obesity     HPI  [place] Osmel Begum is a 34 y.o. female who presents today for the following     Body mass index is 64.52 kg/m².  DM2, fatty liver, hypertension, metabolic syndrome  The patient is 34-year-old, female with history of the above comorbidities and severe/morbid obesity.    Background:  Onset: since young age  Diet: Regular  Exercise: Insufficient  No temperature intolerance. No change in hair/skin quality, BMs.   No HTN, buffalo hump, purple striae, flushing.  FH of obesity: Brother    She has been considering to start phentermine, and came to the office to discuss.    Reviewed PMH, PSH, FH, SH, ALL, HCM/IMM, no changes  Reviewed MEDS, no changes    Patient Active Problem List    Diagnosis Date Noted   • Metabolic syndrome 01/27/2020   • Controlled type 2 diabetes mellitus without complication, without long-term current use of insulin (HCC) 06/11/2019   • Vitamin D deficiency 06/11/2019   • Preventative health care 04/09/2019   • Hypertension due to endocrine disorder 03/07/2019   • Fatty liver disease, nonalcoholic 01/27/2016   • Morbidly obese (HCC) 10/31/2013   • PCOS (polycystic ovarian syndrome) 10/31/2013     CURRENT MEDICATIONS  Current Outpatient Medications   Medication Sig Dispense Refill   • phentermine (ADIPEX-P) 37.5 MG tablet Take 1 Tab by mouth every morning before breakfast for 90 days. 30 Tab 2   • topiramate (TOPAMAX) 50 MG tablet Take 1 Tab by mouth 2 times a day. 180 Tab 1   • lisinopril (PRINIVIL) 10 MG Tab Take 1 Tab by mouth every day. 30 Tab 3   • metFORMIN ER (GLUCOPHAGE XR) 500 MG TABLET SR 24 HR Take 2 Tabs by mouth every day. Please give Generic XR Metformin 90 Tab 1   • Blood Glucose Test Strips Test strips order: Test strips for One Touch Verio Flex meter. Sig: use 1x/day and prn ssx high or low sugar #100 RF x 3 100 Strip 3   • vitamin D (CHOLECALCIFEROL) 1000 UNIT Tab Take 1,000 Units by mouth every  "day.     • Lancets Lancets order: Lancets for One Touch Delica   Sig: use 1x/day and prn ssx high or low sugar. #100 RF x 3 100 Each 3     No current facility-administered medications for this visit.      ALLERGIES  Allergies: Glimepiride [kdc:yellow dye+brilliant blue fcf+glimepiride]  PAST MEDICAL HISTORY  Past Medical History:   Diagnosis Date   • Diabetes (HCC)      SURGICAL HISTORY  She  has no past surgical history on file.  SOCIAL HISTORY  Social History     Tobacco Use   • Smoking status: Never Smoker   • Smokeless tobacco: Never Used   Substance Use Topics   • Alcohol use: No   • Drug use: No     Social History     Social History Narrative   • Not on file     FAMILY HISTORY  Family History   Problem Relation Age of Onset   • Diabetes Mother    • Hypertension Mother    • Kidney Disease Mother    • Lung Disease Maternal Grandmother         emphysema (smoker)   • Diabetes Maternal Grandmother      Family Status   Relation Name Status   • Mo  Alive   • MGMo  Alive   • Sis  Alive   • Bro  Alive   • MGFa   at age 50        heart failure   • Bro  Alive     ROS   Constitutional: Negative for fever, chills, fatigue.  HENT: Negative for congestion, sore throat.  Eyes: Negative for vision problems.   Respiratory: Negative for cough, shortness of breath.  Cardiovascular: Negative for chest pain, palpitations.   Gastrointestinal: Complains of knee pain.   Genitourinary: Negative for dysuria.  Musculoskeletal: Negative for significant myalgia, back and joint pain.   Skin: Negative for rash.   Neuro: Negative for dizziness, weakness and headaches.   Endo/Heme/Allergies: Does not bruise/bleed easily.   Psychiatric/Behavioral: Negative for depression.    PHYSICAL EXAM   Blood Pressure 126/72 (BP Location: Left arm, Patient Position: Sitting, BP Cuff Size: Large adult)   Pulse 77   Temperature 36.8 °C (98.2 °F) (Temporal)   Respiration 16   Height 1.575 m (5' 2\")   Weight (Abnormal) 160 kg (352 lb 11.8 oz)   " Oxygen Saturation 98%  Body mass index is 64.52 kg/m².  General:  NAD, well appearing  HEENT:   NC/AT, PERRLA, EOMI.  Cardiovascular: RRR.   No m/r/g.       Lungs:   CTAB, no w/r/r.  Abdomen: Soft, NT/ND. unable to palpate liver/spleen due to WT.  Extremities:  2+ DP and radial pulses bilaterally.  No c/c/e.   Skin:  Warm, dry.  No erythema. No rash.   Neurologic: Alert & oriented x 3. CN II-XII grossly intact. No focal deficits.  Psychiatric:  Affect normal, mood normal, judgment normal.    Labs     Labs are reviewed and discussed with a patient  Lab Results   Component Value Date/Time    CHOLSTRLTOT 140 12/24/2019 06:19 AM    LDL 75 12/24/2019 06:19 AM    HDL 40 12/24/2019 06:19 AM    TRIGLYCERIDE 123 12/24/2019 06:19 AM       Lab Results   Component Value Date/Time    SODIUM 138 12/24/2019 06:19 AM    POTASSIUM 4.0 12/24/2019 06:19 AM    CHLORIDE 103 12/24/2019 06:19 AM    CO2 27 12/24/2019 06:19 AM    GLUCOSE 112 (H) 12/24/2019 06:19 AM    BUN 10 12/24/2019 06:19 AM    CREATININE 0.57 12/24/2019 06:19 AM    BUNCREATRAT 21 (H) 10/10/2013 09:16 AM     Lab Results   Component Value Date/Time    ALKPHOSPHAT 86 12/24/2019 06:19 AM    ASTSGOT 11 (L) 12/24/2019 06:19 AM    ALTSGPT 15 12/24/2019 06:19 AM    TBILIRUBIN 0.4 12/24/2019 06:19 AM      Lab Results   Component Value Date/Time    HBA1C 6.0 (H) 12/24/2019 06:19 AM    HBA1C 5.4 10/01/2019 07:41 AM    HBA1C 5.4 07/09/2019 08:15 AM     Lab Results   Component Value Date/Time    TSH 3.760 10/10/2013 09:16 AM     No results found for: FREET4    Lab Results   Component Value Date/Time    WBC 9.4 12/24/2019 06:19 AM    RBC 5.25 12/24/2019 06:19 AM    HEMOGLOBIN 14.3 12/24/2019 06:19 AM    HEMATOCRIT 44.9 12/24/2019 06:19 AM    MCV 85.5 12/24/2019 06:19 AM    MCH 27.2 12/24/2019 06:19 AM    MCHC 31.8 (L) 12/24/2019 06:19 AM    MPV 10.6 12/24/2019 06:19 AM    NEUTSPOLYS 61.60 12/24/2019 06:19 AM    LYMPHOCYTES 29.70 12/24/2019 06:19 AM    MONOCYTES 4.90 12/24/2019 06:19  AM    EOSINOPHILS 3.30 12/24/2019 06:19 AM    BASOPHILS 0.40 12/24/2019 06:19 AM      Imaging     None    Assessment and Plan     Priti Begum is a 34 y.o. female    1. BMI 60.0-69.9, adult (HCC)  We have a long discussion about importance of weight loss  -Advised maximum 1500 kcal diet, showed her images/Internet resources  -Advised pool/water aerobics    - phentermine (ADIPEX-P) 37.5 MG tablet; Take 1 Tab by mouth every morning before breakfast for 90 days.  Dispense: 30 Tab; Refill: 2    Obtained and reviewed patient utilization report from [facility] pharmacy database on 3/6/2020 10:15 PM  prior to writing prescription for controlled substance II, III or IV per [place] bill . Based on assessment of the report, the prescription is medically necessary.     - topiramate (TOPAMAX) 50 MG tablet; Take 1 Tab by mouth 2 times a day.  Dispense: 180 Tab; Refill: 1    2. Controlled type 2 diabetes mellitus without complication, without long-term current use of insulin (HCC)  May improve with weight loss    3. Fatty liver disease, nonalcoholic  Follow-up with PCP    4. Hypertension due to endocrine disorder  Controlled, continue current treatment, lisinopril 10 mg daily    5. Metabolic syndrome  As above    Counseling:   - Smoking:  Nonsmoker    Followup: In 3 months, and as needed    All questions are answered.    Please note that this dictation was created using voice recognition software, and that there might be errors of stefan and possibly content.

## 2020-04-02 ENCOUNTER — HOSPITAL ENCOUNTER (OUTPATIENT)
Dept: RADIOLOGY | Facility: MEDICAL CENTER | Age: 35
End: 2020-04-02
Attending: INTERNAL MEDICINE
Payer: COMMERCIAL

## 2020-04-02 ENCOUNTER — OFFICE VISIT (OUTPATIENT)
Dept: MEDICAL GROUP | Facility: MEDICAL CENTER | Age: 35
End: 2020-04-02
Payer: COMMERCIAL

## 2020-04-02 VITALS
DIASTOLIC BLOOD PRESSURE: 84 MMHG | RESPIRATION RATE: 18 BRPM | WEIGHT: 293 LBS | HEIGHT: 62 IN | SYSTOLIC BLOOD PRESSURE: 118 MMHG | BODY MASS INDEX: 53.92 KG/M2 | TEMPERATURE: 97.7 F | HEART RATE: 81 BPM | OXYGEN SATURATION: 97 %

## 2020-04-02 DIAGNOSIS — M54.41 ACUTE BACK PAIN WITH SCIATICA, RIGHT: ICD-10-CM

## 2020-04-02 LAB
APPEARANCE UR: NORMAL
BILIRUB UR STRIP-MCNC: NORMAL MG/DL
COLOR UR AUTO: NORMAL
GLUCOSE UR STRIP.AUTO-MCNC: NORMAL MG/DL
INT CON NEG: NEGATIVE
INT CON POS: POSITIVE
KETONES UR STRIP.AUTO-MCNC: NORMAL MG/DL
LEUKOCYTE ESTERASE UR QL STRIP.AUTO: NORMAL
NITRITE UR QL STRIP.AUTO: NORMAL
PH UR STRIP.AUTO: 6 [PH] (ref 5–8)
POC URINE PREGNANCY TEST: NORMAL
PROT UR QL STRIP: NORMAL MG/DL
RBC UR QL AUTO: NORMAL
SP GR UR STRIP.AUTO: 1.03
UROBILINOGEN UR STRIP-MCNC: 1 MG/DL

## 2020-04-02 PROCEDURE — 72100 X-RAY EXAM L-S SPINE 2/3 VWS: CPT

## 2020-04-02 PROCEDURE — 99214 OFFICE O/P EST MOD 30 MIN: CPT | Performed by: INTERNAL MEDICINE

## 2020-04-02 PROCEDURE — 81002 URINALYSIS NONAUTO W/O SCOPE: CPT | Performed by: INTERNAL MEDICINE

## 2020-04-02 PROCEDURE — 81025 URINE PREGNANCY TEST: CPT | Performed by: INTERNAL MEDICINE

## 2020-04-02 RX ORDER — CYCLOBENZAPRINE HCL 5 MG
5-10 TABLET ORAL 3 TIMES DAILY PRN
Qty: 60 TAB | Refills: 0 | Status: SHIPPED | OUTPATIENT
Start: 2020-04-02 | End: 2020-07-21

## 2020-04-02 RX ORDER — HYDROCODONE BITARTRATE AND ACETAMINOPHEN 5; 325 MG/1; MG/1
1 TABLET ORAL EVERY 6 HOURS PRN
Qty: 20 TAB | Refills: 0 | Status: SHIPPED | OUTPATIENT
Start: 2020-04-02 | End: 2020-04-09

## 2020-04-02 ASSESSMENT — FIBROSIS 4 INDEX: FIB4 SCORE: 0.36

## 2020-04-02 NOTE — PROGRESS NOTES
CC:  The encounter diagnosis was Acute back pain with sciatica, right.    HISTORY OF THE PRESENT ILLNESS: Patient is a 35 y.o. female. This pleasant patient is here today for management of acute back pain.    She says she has a history of chronic low back pain that seemed to have resolved over the past year, w/current episode now has new radicular symptoms down her right leg.    Symptoms started Saturday when she was doing the dishes, pain radiated from her back down her right leg to the knee described as burning, and severe 8 out of 10 pain associated with back tightness and spasm.  No loss of bowel /bladder, no groin numbness, no unintentional weight loss, no night sweats, no fever/chills.  No urinary symptoms such as hematuria, dysuria, frequency.  No rash of the skin or focal swelling of the back.  She feels weak due to pain but no true weakness.  Took 1 Aleve today just prior to our appt.  Using salon pas with some minimal benefit.  No recent trauma or falls.  Symptoms are also improved with sitting.  Worse with laying down or bending/twisting mechanism.  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    Allergies: Glimepiride [kdc:yellow dye+brilliant blue fcf+glimepiride]    Current Outpatient Medications Ordered in Epic   Medication Sig Dispense Refill   • cyclobenzaprine (FLEXERIL) 5 MG tablet Take 1-2 Tabs by mouth 3 times a day as needed. 60 Tab 0   • HYDROcodone-acetaminophen (NORCO) 5-325 MG Tab per tablet Take 1 Tab by mouth every 6 hours as needed for up to 7 days. 20 Tab 0   • phentermine (ADIPEX-P) 37.5 MG tablet Take 1 Tab by mouth every morning before breakfast for 90 days. 30 Tab 2   • topiramate (TOPAMAX) 50 MG tablet Take 1 Tab by mouth 2 times a day. 180 Tab 1   • lisinopril (PRINIVIL) 10 MG Tab Take 1 Tab by mouth every day. 30 Tab 3   • metFORMIN ER (GLUCOPHAGE XR) 500 MG TABLET SR 24 HR Take 2 Tabs by mouth every day.  Please give Generic XR Metformin 90 Tab 1   • Blood Glucose Test Strips Test strips order: Test strips for One Touch Verio Flex meter. Sig: use 1x/day and prn ssx high or low sugar #100 RF x 3 100 Strip 3   • vitamin D (CHOLECALCIFEROL) 1000 UNIT Tab Take 1,000 Units by mouth every day.     • Lancets Lancets order: Lancets for One Touch Delica   Sig: use 1x/day and prn ssx high or low sugar. #100 RF x 3 100 Each 3     No current Epic-ordered facility-administered medications on file.        Past Medical History:   Diagnosis Date   • Diabetes (HCC)        History reviewed. No pertinent surgical history.    Social History     Tobacco Use   • Smoking status: Never Smoker   • Smokeless tobacco: Never Used   Substance Use Topics   • Alcohol use: No   • Drug use: No       Social History     Social History Narrative   • Not on file       Family History   Problem Relation Age of Onset   • Diabetes Mother    • Hypertension Mother    • Kidney Disease Mother    • Lung Disease Maternal Grandmother         emphysema (smoker)   • Diabetes Maternal Grandmother        ROS:     - Constitutional: Negative for fever, chills      - Respiratory: Negative for cough, sputum production, chest congestion, dyspnea, wheezing, and crackles.      - Cardiovascular: Negative for chest pain, palpitations, orthopnea, and bilateral lower extremity edema.     - Gastrointestinal: Negative for  nausea, vomiting, abdominal pain, hematochezia, melena, diarrhea,  and greasy/foul-smelling stools.  Little constipated today.    - Genitourinary: Negative for dysuria, hematuria, pyuria, urinary urgency, and urinary incontinence.     - Musculoskeletal: See HPI    - Skin: Negative for rash, itching, cyanotic skin color change.     - Neurological: Negative for vertigo    - Endo:Negative for polyuria, heat/cold intolerance, excessive thirst    - Hem/lymphatic: Negative for swollen glands    -Allergic/immun: Negative for allergic rhinitis    -  "Psychiatric/Behavioral: Negative for depression, suicidal/homicidal ideation and memory loss.      Exam: /84 (BP Location: Left arm, Patient Position: Sitting, BP Cuff Size: Adult)   Pulse 81   Temp 36.5 °C (97.7 °F) (Temporal)   Resp 18   Ht 1.575 m (5' 2\")   Wt (!) 162.2 kg (357 lb 9.4 oz)   SpO2 97%  Body mass index is 65.4 kg/m².    General: Normal appearing. In pain, tearing.  EYES: Conjunctiva clear lids without ptosis, pupils equal  EARS: Normal shape and contour   Pulmonary: Clear to ausculation.  Normal effort. No rales or wheezing.  Cardiovascular: Regular rate and rhythm without significant murmur.   Abdomen: Soft, nontender, nondistended. Normal bowel sounds.  Back:Examination there is no redness, rash, focal swelling.  I do not feel any focal muscle spasms.  Neurologic: Cranial nerves grossly nonfocal.  patellar DTRs 2+ equal and symmetric.  Seated straight leg raise negative bilaterally.  Gross sensation intact bilaterally.  Strength is 5/5 bilateral and symmetric throughout including bilateral hallux, noted significant increase in pain with bilateral hip flexor strength testing. No clonus.   Skin: Warm and dry.  No obvious lesions.  Musculoskeletal: Normal gait. No extremity cyanosis, clubbing, or edema.  Psych: Normal mood.. Alert and oriented x3. Judgment and insight is normal.    Lumbar x-ray today 4/2/2020 shows mild retrolisthesis with slight disc height loss.    Assessment/Plan  1. Acute back pain with sciatica, right  Suspect musculoskeletal source, will prescribe muscle relaxer to relieve the spasm component, for severe pain if Aleve is not helping the symptoms plan to give Norco.  Patient advised that Norco can be sedating do not drive, drink alcohol, other sedating medications operate machinery on this.  ORT score reviewed and low, contract signed.  If radicular component of pain persists, discussed with the patient, could try gabapentin or Cymbalta, but hopefully conservative " measures will relieve the symptom.  Letter to be off work until Monday.  Team arranged for physical therapy tomorrow.  X-ray to check for alignment and other potential causes of discomfort.  - REFERRAL TO PHYSICAL THERAPY Reason for Therapy: Eval/Treat/Report  - DX-LUMBAR SPINE-2 OR 3 VIEWS; Future  - POCT Pregnancy  - cyclobenzaprine (FLEXERIL) 5 MG tablet; Take 1-2 Tabs by mouth 3 times a day as needed.  Dispense: 60 Tab; Refill: 0  - HYDROcodone-acetaminophen (NORCO) 5-325 MG Tab per tablet; Take 1 Tab by mouth every 6 hours as needed for up to 7 days.  Dispense: 20 Tab; Refill: 0  - Consent for Opiate Prescription  - Controlled Substance Treatment Agreement  - POCT Urinalysis (negative for LE/nitrite)      Return to clinic as needed        Please note that this dictation was created using voice recognition software. I have made every reasonable attempt to correct obvious errors, but I expect that there are errors of grammar and possibly content that I did not discover before finalizing the note.

## 2020-04-02 NOTE — LETTER
April 2, 2020       Patient: Priti Begum   YOB: 1985   Date of Visit: 4/2/2020         To Whom It May Concern:    It is my medical opinion that Priti Begum be excused from working starting 11 am 4/2/20 through 4/6/20 for an acute medical concern.    If you have any questions or concerns, please don't hesitate to call [phone]          Sincerely,          Shelby Sparks M.D.  Electronically Signed

## 2020-04-08 DIAGNOSIS — E11.9 TYPE 2 DIABETES MELLITUS WITHOUT COMPLICATION, WITHOUT LONG-TERM CURRENT USE OF INSULIN (HCC): ICD-10-CM

## 2020-04-13 DIAGNOSIS — E11.9 CONTROLLED TYPE 2 DIABETES MELLITUS WITHOUT COMPLICATION, WITHOUT LONG-TERM CURRENT USE OF INSULIN (HCC): ICD-10-CM

## 2020-04-13 LAB
HBA1C MFR BLD: 6.1 % (ref 0–5.6)
INT CON NEG: NEGATIVE
INT CON POS: POSITIVE

## 2020-04-13 PROCEDURE — 83036 HEMOGLOBIN GLYCOSYLATED A1C: CPT | Performed by: INTERNAL MEDICINE

## 2020-04-24 ENCOUNTER — TELEMEDICINE (OUTPATIENT)
Dept: ENDOCRINOLOGY | Facility: MEDICAL CENTER | Age: 35
End: 2020-04-24
Payer: COMMERCIAL

## 2020-04-24 DIAGNOSIS — E11.9 CONTROLLED TYPE 2 DIABETES MELLITUS WITHOUT COMPLICATION, WITHOUT LONG-TERM CURRENT USE OF INSULIN (HCC): ICD-10-CM

## 2020-04-24 DIAGNOSIS — I15.2 HYPERTENSION DUE TO ENDOCRINE DISORDER: ICD-10-CM

## 2020-04-24 DIAGNOSIS — E28.2 PCOS (POLYCYSTIC OVARIAN SYNDROME): ICD-10-CM

## 2020-04-24 PROCEDURE — 99214 OFFICE O/P EST MOD 30 MIN: CPT | Mod: 95,CR | Performed by: INTERNAL MEDICINE

## 2020-04-24 RX ORDER — SEMAGLUTIDE 1.34 MG/ML
0.5 INJECTION, SOLUTION SUBCUTANEOUS
Qty: 1 PEN | Refills: 3 | Status: SHIPPED | OUTPATIENT
Start: 2020-04-24 | End: 2020-06-16 | Stop reason: SDUPTHER

## 2020-04-24 NOTE — PROGRESS NOTES
CHIEF COMPLAINT: Patient is here for follow up of Type 2 Diabetes Mellitus .Patient was presented for a telehealth consultation via secure and encrypted videoconferencing technology. This encounter was conducted via Zoom . Verbal consent was obtained. Patient's identity was verified.    HPI:     Priti Begum is a 35 y.o. female with Type 2 Diabetes Mellitus here for follow up.    Labs from 4/13/2020 show HbA1c is good at 6.1%    She is on Metformin 500mg ER daily     She denies hyperglycemia   She denies hypoglycemia    She has a history of obesity and has been getting phentermine  She is interested in a GLP-1 analog    She has fatty liver disease which is an obesity related complication   Aside from DM    She also has PCOS  She does reports irregular cycles and has acne    She also has HTN which is well controlled  She is on lisinopril         BG Diary:04/24/20  Breakfast: 120, 130, 140    Weight has been stable    Diabetes Complications   Retinopathy: No known retinopathy.  Last eye exam: Jan 17 2020  Neuropathy: Denies paresthesias or numbness in hands or feet. Denies any foot wounds.  Exercise: Minimal.  Diet: Fair.  Patient's medications, allergies, and social histories were reviewed and updated as appropriate.    ROS:     CONS:     No fever, no chills   EYES:     No diplopia, no blurry vision   CV:           No chest pain, no palpitations   PULM:     No SOB, no cough, no hemoptysis.   GI:            No nausea, no vomiting, no diarrhea, no constipation   ENDO:     No polyuria, no polydipsia, no heat intolerance, no cold intolerance       Past Medical History:  Problem List:  2020-01: Metabolic syndrome  2019-06: Controlled type 2 diabetes mellitus without complication,   without long-term current use of insulin (HCC)  2019-06: Vitamin D deficiency  2019-04: Preventative health care  2019-03: Hypertension due to endocrine disorder  2019-02: Uncontrolled type 2 diabetes mellitus with  hyperglycemia   (HCC)  2019-02: Candidiasis of skin  2016-01: Fatty liver disease, nonalcoholic  2016-01: Hypomenorrhea  2016-01: Family history of diabetes mellitus (DM)  2013-10: Morbidly obese (HCC)  2013-10: PCOS (polycystic ovarian syndrome)      Past Surgical History:  No past surgical history on file.     Allergies:  Glimepiride [kdc:yellow dye+brilliant blue fcf+glimepiride]     Social History:  Social History     Tobacco Use   • Smoking status: Never Smoker   • Smokeless tobacco: Never Used   Substance Use Topics   • Alcohol use: No   • Drug use: No        Family History:   family history includes Diabetes in her maternal grandmother and mother; Hypertension in her mother; Kidney Disease in her mother; Lung Disease in her maternal grandmother.      PHYSICAL EXAM:   OBJECTIVE:  Vital signs: There were no vitals taken for this visit.  GENERAL: Well-developed, well-nourished in no apparent distress.   EYE:  No ocular asymmetry, PERRLA  HENT: Pink, moist mucous membranes.    NECK: No thyromegaly.   CARDIOVASCULAR:  No murmurs  LUNGS: Clear breath sounds  ABDOMEN: Soft, nontender   EXTREMITIES: No clubbing, cyanosis, or edema.   NEUROLOGICAL: No gross focal motor abnormalities   LYMPH: No cervical adenopathy palpated.   SKIN: No rashes, lesions.     Labs:  Lab Results   Component Value Date/Time    HBA1C 6.1 (A) 04/13/2020 04:34 PM        Lab Results   Component Value Date/Time    WBC 9.4 12/24/2019 06:19 AM    RBC 5.25 12/24/2019 06:19 AM    HEMOGLOBIN 14.3 12/24/2019 06:19 AM    MCV 85.5 12/24/2019 06:19 AM    MCH 27.2 12/24/2019 06:19 AM    MCHC 31.8 (L) 12/24/2019 06:19 AM    RDW 40.8 12/24/2019 06:19 AM    MPV 10.6 12/24/2019 06:19 AM       Lab Results   Component Value Date/Time    SODIUM 138 12/24/2019 06:19 AM    POTASSIUM 4.0 12/24/2019 06:19 AM    CHLORIDE 103 12/24/2019 06:19 AM    CO2 27 12/24/2019 06:19 AM    ANION 8.0 12/24/2019 06:19 AM    GLUCOSE 112 (H) 12/24/2019 06:19 AM    BUN 10 12/24/2019  06:19 AM    CREATININE 0.57 12/24/2019 06:19 AM    CALCIUM 9.0 12/24/2019 06:19 AM    ASTSGOT 11 (L) 12/24/2019 06:19 AM    ALTSGPT 15 12/24/2019 06:19 AM    TBILIRUBIN 0.4 12/24/2019 06:19 AM    ALBUMIN 3.8 12/24/2019 06:19 AM    TOTPROTEIN 7.6 12/24/2019 06:19 AM    GLOBULIN 3.8 (H) 12/24/2019 06:19 AM    AGRATIO 1.0 12/24/2019 06:19 AM       Lab Results   Component Value Date/Time    CHOLSTRLTOT 140 12/24/2019 0619    TRIGLYCERIDE 123 12/24/2019 0619    HDL 40 12/24/2019 0619    LDL 75 12/24/2019 0619       Lab Results   Component Value Date/Time    MALBCRT see below 12/24/2019 06:19 AM    MICROALBUR <0.7 12/24/2019 06:19 AM        Lab Results   Component Value Date/Time    TSHULTRASEN 3.320 02/14/2019 1142     No results found for: FREEDIR  No results found for: FREET3  No results found for: THYSTIMIG        ASSESSMENT/PLAN:     1. Controlled type 2 diabetes mellitus without complication, without long-term current use of insulin (HCC)  Controlled  Start ozempic 0.25mg weekly - will titrate as tolerated  Continue metformin Er 500mg daily for now  Reviewed SE of GLP-1 analog therapy  Reviewed importance of watching carb intake  Will plan for follow up in 3 months with a1c    2. Hypertension due to endocrine disorder  Controlled  Will check urine microalbumin with next labs    3. PCOS (polycystic ovarian syndrome)  Will check fasting LHL. FSH and SHBG and total and free testosterone with next labs      Return in about 3 months (around 7/24/2020).      This patient during there office visit today was started on a new medication.  Side effects of the new medication were discussed with the patient today in the office.     Thank you kindly for allowing me to participate in the diabetes care plan for this patient.    Dickson Rivera MD, [facility], [facility]  04/24/20    CC:   Shelby Sparks M.D.

## 2020-06-08 ENCOUNTER — PATIENT MESSAGE (OUTPATIENT)
Dept: MEDICAL GROUP | Facility: MEDICAL CENTER | Age: 35
End: 2020-06-08

## 2020-06-08 DIAGNOSIS — E11.9 TYPE 2 DIABETES MELLITUS WITHOUT COMPLICATION, WITHOUT LONG-TERM CURRENT USE OF INSULIN (HCC): ICD-10-CM

## 2020-06-08 DIAGNOSIS — Z20.7 SCABIES EXPOSURE: ICD-10-CM

## 2020-06-08 DIAGNOSIS — I15.2 HYPERTENSION DUE TO ENDOCRINE DISORDER: ICD-10-CM

## 2020-06-08 RX ORDER — LANCETS 30 GAUGE
EACH MISCELLANEOUS
Qty: 100 EACH | Refills: 0 | Status: SHIPPED | OUTPATIENT
Start: 2020-06-08 | End: 2020-07-21 | Stop reason: SDUPTHER

## 2020-06-08 RX ORDER — PERMETHRIN 50 MG/G
CREAM TOPICAL
Start: 2020-06-08

## 2020-06-08 RX ORDER — LISINOPRIL 10 MG/1
10 TABLET ORAL DAILY
Qty: 30 TAB | Refills: 0 | Status: SHIPPED | OUTPATIENT
Start: 2020-06-08 | End: 2020-07-21 | Stop reason: SDUPTHER

## 2020-06-15 DIAGNOSIS — E11.9 TYPE 2 DIABETES MELLITUS WITHOUT COMPLICATION, WITHOUT LONG-TERM CURRENT USE OF INSULIN (HCC): ICD-10-CM

## 2020-06-15 RX ORDER — PERMETHRIN 50 MG/G
1 CREAM TOPICAL ONCE
COMMUNITY
End: 2020-06-15 | Stop reason: SDUPTHER

## 2020-06-15 NOTE — TELEPHONE ENCOUNTER
Received request via: Patient    Was the patient seen in the last year in this department? Yes    Does the patient have an active prescription (recently filled or refills available) for medication(s) requested? No     Pt stated that she needs refills of both Rx. Pt stated that she uses One Touch Verio Strips as well.    GERALD Sempel  [facility]

## 2020-06-16 RX ORDER — SEMAGLUTIDE 1.34 MG/ML
0.5 INJECTION, SOLUTION SUBCUTANEOUS
Qty: 1 PEN | Refills: 3 | Status: SHIPPED | OUTPATIENT
Start: 2020-06-16 | End: 2020-08-04

## 2020-06-16 RX ORDER — PERMETHRIN 50 MG/G
1 CREAM TOPICAL ONCE
Qty: 30 G | Refills: 6 | Status: SHIPPED | OUTPATIENT
Start: 2020-06-16 | End: 2020-06-16

## 2020-06-16 NOTE — TELEPHONE ENCOUNTER
Received request via: Patient    Was the patient seen in the last year in this department? Yes    Does the patient have an active prescription (recently filled or refills available) for medication(s) requested? No     Semaglutide,0.25 or 0.5MG/DOS

## 2020-07-07 DIAGNOSIS — E11.9 CONTROLLED TYPE 2 DIABETES MELLITUS WITHOUT COMPLICATION, WITHOUT LONG-TERM CURRENT USE OF INSULIN (HCC): ICD-10-CM

## 2020-07-13 DIAGNOSIS — Z11.59 SCREENING FOR VIRAL DISEASE: ICD-10-CM

## 2020-07-13 DIAGNOSIS — Z20.828 EXPOSURE TO SARS-ASSOCIATED CORONAVIRUS: ICD-10-CM

## 2020-07-13 DIAGNOSIS — Z03.818 ENCNTR FOR OBS FOR SUSP EXPSR TO OTH BIOLG AGENTS RULED OUT: ICD-10-CM

## 2020-07-14 ENCOUNTER — HOSPITAL ENCOUNTER (OUTPATIENT)
Dept: LAB | Facility: MEDICAL CENTER | Age: 35
End: 2020-07-14
Attending: INTERNAL MEDICINE
Payer: COMMERCIAL

## 2020-07-14 ENCOUNTER — HOSPITAL ENCOUNTER (OUTPATIENT)
Dept: LAB | Facility: MEDICAL CENTER | Age: 35
End: 2020-07-14
Payer: COMMERCIAL

## 2020-07-14 DIAGNOSIS — Z11.59 SCREENING FOR VIRAL DISEASE: ICD-10-CM

## 2020-07-14 DIAGNOSIS — E28.2 PCOS (POLYCYSTIC OVARIAN SYNDROME): ICD-10-CM

## 2020-07-14 DIAGNOSIS — E55.9 VITAMIN D DEFICIENCY: ICD-10-CM

## 2020-07-14 DIAGNOSIS — Z03.818 ENCNTR FOR OBS FOR SUSP EXPSR TO OTH BIOLG AGENTS RULED OUT: ICD-10-CM

## 2020-07-14 DIAGNOSIS — I15.2 HYPERTENSION DUE TO ENDOCRINE DISORDER: ICD-10-CM

## 2020-07-14 DIAGNOSIS — E11.9 CONTROLLED TYPE 2 DIABETES MELLITUS WITHOUT COMPLICATION, WITHOUT LONG-TERM CURRENT USE OF INSULIN (HCC): ICD-10-CM

## 2020-07-14 DIAGNOSIS — Z20.828 EXPOSURE TO SARS-ASSOCIATED CORONAVIRUS: ICD-10-CM

## 2020-07-14 LAB
25(OH)D3 SERPL-MCNC: 33 NG/ML (ref 30–100)
ALBUMIN SERPL BCP-MCNC: 4 G/DL (ref 3.2–4.9)
ALBUMIN/GLOB SERPL: 1.2 G/DL
ALP SERPL-CCNC: 90 U/L (ref 30–99)
ALT SERPL-CCNC: 15 U/L (ref 2–50)
ANION GAP SERPL CALC-SCNC: 10 MMOL/L (ref 7–16)
ANION GAP SERPL CALC-SCNC: 9 MMOL/L (ref 7–16)
AST SERPL-CCNC: 12 U/L (ref 12–45)
BDY FAT % MEASURED: ABNORMAL %
BILIRUB SERPL-MCNC: 0.3 MG/DL (ref 0.1–1.5)
BP DIAS: 82 MMHG
BP SYS: 122 MMHG
BUN SERPL-MCNC: 13 MG/DL (ref 8–22)
BUN SERPL-MCNC: 13 MG/DL (ref 8–22)
CALCIUM SERPL-MCNC: 8.9 MG/DL (ref 8.5–10.5)
CALCIUM SERPL-MCNC: 8.9 MG/DL (ref 8.5–10.5)
CHLORIDE SERPL-SCNC: 103 MMOL/L (ref 96–112)
CHLORIDE SERPL-SCNC: 104 MMOL/L (ref 96–112)
CHOLEST SERPL-MCNC: 144 MG/DL (ref 100–199)
CHOLEST SERPL-MCNC: 144 MG/DL (ref 100–199)
CO2 SERPL-SCNC: 23 MMOL/L (ref 20–33)
CO2 SERPL-SCNC: 24 MMOL/L (ref 20–33)
CREAT SERPL-MCNC: 0.51 MG/DL (ref 0.5–1.4)
CREAT SERPL-MCNC: 0.53 MG/DL (ref 0.5–1.4)
CREAT UR-MCNC: 184.56 MG/DL
DIABETES HTDIA: YES
EST. AVERAGE GLUCOSE BLD GHB EST-MCNC: 120 MG/DL
EST. AVERAGE GLUCOSE BLD GHB EST-MCNC: 120 MG/DL
EST. AVERAGE GLUCOSE BLD GHB EST-MCNC: 123 MG/DL
EVENT NAME HTEVT: NORMAL
FASTING HTFAS: YES
FASTING STATUS PATIENT QL REPORTED: NORMAL
FASTING STATUS PATIENT QL REPORTED: NORMAL
FSH SERPL-ACNC: 4.4 MIU/ML
GLOBULIN SER CALC-MCNC: 3.3 G/DL (ref 1.9–3.5)
GLUCOSE SERPL-MCNC: 105 MG/DL (ref 65–99)
GLUCOSE SERPL-MCNC: 105 MG/DL (ref 65–99)
GLUCOSE SERPL-MCNC: 108 MG/DL (ref 65–99)
HBA1C MFR BLD: 5.8 % (ref 0–5.6)
HBA1C MFR BLD: 5.8 % (ref 0–5.6)
HBA1C MFR BLD: 5.9 % (ref 0–5.6)
HDLC SERPL-MCNC: 42 MG/DL
HDLC SERPL-MCNC: 42 MG/DL
HYPERTENSION HTHYP: NO
LDLC SERPL CALC-MCNC: 81 MG/DL
LDLC SERPL CALC-MCNC: 81 MG/DL
LH SERPL-ACNC: 6.1 IU/L
MICROALBUMIN UR-MCNC: <1.2 MG/DL
MICROALBUMIN/CREAT UR: NORMAL MG/G (ref 0–30)
POTASSIUM SERPL-SCNC: 3.9 MMOL/L (ref 3.6–5.5)
POTASSIUM SERPL-SCNC: 4 MMOL/L (ref 3.6–5.5)
PROT SERPL-MCNC: 7.3 G/DL (ref 6–8.2)
SARS-COV-2 AB SERPL QL IA: NORMAL
SCREENING LOC CITY HTCIT: NORMAL
SCREENING LOC STATE HTSTA: NORMAL
SCREENING LOCATION HTLOC: NORMAL
SMOKING HTSMO: NO
SODIUM SERPL-SCNC: 136 MMOL/L (ref 135–145)
SODIUM SERPL-SCNC: 137 MMOL/L (ref 135–145)
SUBSCRIBER ID HTSID: NORMAL
T4 FREE SERPL-MCNC: 1.08 NG/DL (ref 0.93–1.7)
TRIGL SERPL-MCNC: 104 MG/DL (ref 0–149)
TRIGL SERPL-MCNC: 106 MG/DL (ref 0–149)
TSH SERPL DL<=0.005 MIU/L-ACNC: 4.13 UIU/ML (ref 0.38–5.33)

## 2020-07-14 PROCEDURE — 80061 LIPID PANEL: CPT

## 2020-07-14 PROCEDURE — 82570 ASSAY OF URINE CREATININE: CPT

## 2020-07-14 PROCEDURE — 83036 HEMOGLOBIN GLYCOSYLATED A1C: CPT | Mod: 91

## 2020-07-14 PROCEDURE — 84270 ASSAY OF SEX HORMONE GLOBUL: CPT

## 2020-07-14 PROCEDURE — 80048 BASIC METABOLIC PNL TOTAL CA: CPT

## 2020-07-14 PROCEDURE — 84443 ASSAY THYROID STIM HORMONE: CPT

## 2020-07-14 PROCEDURE — 36415 COLL VENOUS BLD VENIPUNCTURE: CPT

## 2020-07-14 PROCEDURE — 83036 HEMOGLOBIN GLYCOSYLATED A1C: CPT

## 2020-07-14 PROCEDURE — 82306 VITAMIN D 25 HYDROXY: CPT

## 2020-07-14 PROCEDURE — 84439 ASSAY OF FREE THYROXINE: CPT

## 2020-07-14 PROCEDURE — 82043 UR ALBUMIN QUANTITATIVE: CPT

## 2020-07-14 PROCEDURE — 82947 ASSAY GLUCOSE BLOOD QUANT: CPT

## 2020-07-14 PROCEDURE — 84402 ASSAY OF FREE TESTOSTERONE: CPT

## 2020-07-14 PROCEDURE — 86769 SARS-COV-2 COVID-19 ANTIBODY: CPT

## 2020-07-14 PROCEDURE — 80053 COMPREHEN METABOLIC PANEL: CPT

## 2020-07-14 PROCEDURE — 83001 ASSAY OF GONADOTROPIN (FSH): CPT

## 2020-07-14 PROCEDURE — 80061 LIPID PANEL: CPT | Mod: 91

## 2020-07-14 PROCEDURE — 83002 ASSAY OF GONADOTROPIN (LH): CPT

## 2020-07-14 PROCEDURE — S5190 WELLNESS ASSESSMENT BY NONPH: HCPCS

## 2020-07-16 LAB — SHBG SERPL-SCNC: 39 NMOL/L (ref 30–135)

## 2020-07-19 LAB — TESTOST FREE SERPL-MCNC: 3.8 PG/ML (ref 1.3–9.2)

## 2020-07-21 ENCOUNTER — OFFICE VISIT (OUTPATIENT)
Dept: MEDICAL GROUP | Facility: MEDICAL CENTER | Age: 35
End: 2020-07-21
Payer: COMMERCIAL

## 2020-07-21 VITALS
SYSTOLIC BLOOD PRESSURE: 142 MMHG | BODY MASS INDEX: 53.92 KG/M2 | HEART RATE: 86 BPM | DIASTOLIC BLOOD PRESSURE: 82 MMHG | OXYGEN SATURATION: 95 % | WEIGHT: 293 LBS | HEIGHT: 62 IN | RESPIRATION RATE: 16 BRPM | TEMPERATURE: 96.6 F

## 2020-07-21 DIAGNOSIS — F41.9 ANXIETY: ICD-10-CM

## 2020-07-21 DIAGNOSIS — E66.01 MORBID OBESITY (HCC): ICD-10-CM

## 2020-07-21 DIAGNOSIS — E11.9 CONTROLLED TYPE 2 DIABETES MELLITUS WITHOUT COMPLICATION, WITHOUT LONG-TERM CURRENT USE OF INSULIN (HCC): ICD-10-CM

## 2020-07-21 DIAGNOSIS — E11.9 TYPE 2 DIABETES MELLITUS WITHOUT COMPLICATION, WITHOUT LONG-TERM CURRENT USE OF INSULIN (HCC): ICD-10-CM

## 2020-07-21 DIAGNOSIS — I15.2 HYPERTENSION DUE TO ENDOCRINE DISORDER: ICD-10-CM

## 2020-07-21 PROCEDURE — 99214 OFFICE O/P EST MOD 30 MIN: CPT | Performed by: INTERNAL MEDICINE

## 2020-07-21 RX ORDER — LISINOPRIL 10 MG/1
10 TABLET ORAL DAILY
Qty: 30 TAB | Refills: 0 | Status: SHIPPED | OUTPATIENT
Start: 2020-07-21 | End: 2020-08-11 | Stop reason: SDUPTHER

## 2020-07-21 RX ORDER — LANCETS 30 GAUGE
EACH MISCELLANEOUS
Qty: 100 EACH | Refills: 0 | Status: SHIPPED | OUTPATIENT
Start: 2020-07-21 | End: 2020-08-06 | Stop reason: SDUPTHER

## 2020-07-21 RX ORDER — HYDROXYZINE HYDROCHLORIDE 25 MG/1
25 TABLET, FILM COATED ORAL 3 TIMES DAILY PRN
Qty: 30 TAB | Refills: 1 | Status: SHIPPED | OUTPATIENT
Start: 2020-07-21 | End: 2022-02-24 | Stop reason: SDUPTHER

## 2020-07-21 ASSESSMENT — FIBROSIS 4 INDEX: FIB4 SCORE: 0.39

## 2020-07-21 NOTE — PROGRESS NOTES
CC:  Diagnoses of Controlled type 2 diabetes mellitus without complication, without long-term current use of insulin (HCC), Hypertension due to endocrine disorder, Type 2 diabetes mellitus without complication, without long-term current use of insulin (HCC), Anxiety, and Morbidly obese (HCC) were pertinent to this visit.    HISTORY OF THE PRESENT ILLNESS: Patient is a 35 y.o. female. This pleasant patient is here today for routine follow-up.    She has been doing a great job with her diabetes, most recent A1c 5.9 on labs 7/14/2020.  States compliance on metformin and Ozempic.  Blood pressure remains well controlled on lisinopril, little elevated a because she was running late and had to rush to clinic.  She is followed by endocrine team, f/u roughly 2 weeks.  The phentermine was not effective for weight loss over 3-month period, though she says her weight did at least remain stable.  Topamax also did not give any additional weight loss benefit.  She will talk to her endocrinologist if medication such as SGLT type II would be helpful to add to her current regimen for help with weight loss.  Overall she does not wish to take medications for weight loss, she would prefer to do it through diet and exercise.  Walking every day now.  Back pain did resolve.    Additional labs show lipid panel reasonably well controlled, not on statin, labs from 7/14/2020 total cholesterol 144, triglycerides 104, HDL 42 and LDL 81.  Thyroid normal.  CMP within normal limits.    Acute anxiety recently.  Is somewhat multifactorial with stressors from the pandemic, but mostly regarding her teeth.  Unfortunately she has periodontal disease, did lose two lower front teeth.  This is understandably very stressful.  She has false teeth for this area, though the sensation of them in her mouth causes some gagging and even vomiting.  She feels that as needed anxiety medication may be helpful as she is adjusting to this and she is following regularly  with her dentist as well.      Allergies: Glimepiride [kdc:yellow dye+brilliant blue fcf+glimepiride]    Current Outpatient Medications Ordered in Epic   Medication Sig Dispense Refill   • metFORMIN (GLUCOPHAGE) 500 MG Tab Take 1 Tab by mouth every day. 90 Tab 3   • lisinopril (PRINIVIL) 10 MG Tab Take 1 Tab by mouth every day. 30 Tab 0   • glucose blood strip 1 Strip by Other route as needed. 90 Strip 3   • Lancets Lancets order: Lancets for One Touch Delica   Sig: use 1x/day and prn ssx high or low sugar. #100 RF x 3 100 Each 0   • hydrOXYzine HCl (ATARAX) 25 MG Tab Take 1 Tab by mouth 3 times a day as needed for Anxiety. 30 Tab 1   • Blood Glucose Test Strips USE 1 STRIP TO CHECK GLUCOSE ONCE DAILY AND  AS  NEEDED  FOR  SYMPTOMS  OF  HIGH  OR  LOW  SUGAR  *E11.9* 50 Each 6   • Semaglutide,0.25 or 0.5MG/DOS, (OZEMPIC, 0.25 OR 0.5 MG/DOSE,) 2 MG/1.5ML Solution Pen-injector Inject 0.5 mg as instructed every 7 days. 1 PEN 3   • vitamin D (CHOLECALCIFEROL) 1000 UNIT Tab Take 1,000 Units by mouth every day.       No current [facility]-ordered facility-administered medications on file.        Past Medical History:   Diagnosis Date   • Diabetes (HCC)        No past surgical history on file.    Social History     Tobacco Use   • Smoking status: Never Smoker   • Smokeless tobacco: Never Used   Substance Use Topics   • Alcohol use: No   • Drug use: No       Social History     Social History Narrative   • Not on file       Family History   Problem Relation Age of Onset   • Diabetes Mother    • Hypertension Mother    • Kidney Disease Mother    • Lung Disease Maternal Grandmother         emphysema (smoker)   • Diabetes Maternal Grandmother        ROS:     - Constitutional: Negative for fever, chills     - Eyes:   Negative for  eye pain, discharge    - ENT:  Negative for   sore throat     - Respiratory: Negative for cough    - Cardiovascular: Negative for chest pain    - Gastrointestinal: Negative for abdominal pain    -  "Genitourinary: Negative for dysuria    - Musculoskeletal: no current lbp    - Skin: Negative for rash, itching, cyanotic skin color change.     - Neurological: Negative for vertigo    - Endo:Negative for polyuria, heat/cold intolerance, excessive thirst    - Hem/lymphatic: Negative for swollen glands    -Allergic/immun: Negative for allergic rhinitis    - Psychiatric/Behavioral: Negative for suicidal/homicidal ideation and memory loss.      Exam: /82 (BP Location: Left arm, Patient Position: Sitting, BP Cuff Size: Adult)   Pulse 86   Temp 35.9 °C (96.6 °F) (Temporal)   Resp 16   Ht 1.575 m (5' 2\")   Wt (!) 161 kg (355 lb)   SpO2 95%  Body mass index is 64.93 kg/m².    General: Normal appearing. No distress.  EYES: Conjunctiva clear lids without ptosis, pupils equal  EARS: Normal shape and contour   Pulmonary: Clear to ausculation.  Normal effort. No rales or wheezing.  Cardiovascular: Regular rate and rhythm without significant murmur.   Abdomen: Soft, nontender, nondistended. Normal bowel sounds.  Neurologic: Cranial nerves grossly nonfocal  Skin: Warm and dry.  No obvious lesions.  Musculoskeletal: Normal gait. No extremity cyanosis, clubbing, or edema.  Psych: Normal mood and affect. Alert and oriented x3. Judgment and insight is normal.    Assessment/Plan  1. Controlled type 2 diabetes mellitus without complication, without long-term current use of insulin (HCC)  Stable, chronic, currently controlled.  She will follow-up with her endocrinology team roughly 2 weeks.  Blood pressures well controlled on ACE inhibitor.  Lipids well controlled on labs, patient very young age, defer to endocrine when to start statin in the future.  Could consider addition of SGLT2 medication to her current metformin GLP-1 to additionally aid in weight loss.  - metFORMIN (GLUCOPHAGE) 500 MG Tab; Take 1 Tab by mouth every day.  Dispense: 90 Tab; Refill: 3    2. Hypertension due to endocrine disorder  Stable, chronic " well-controlled continue current management.  - lisinopril (PRINIVIL) 10 MG Tab; Take 1 Tab by mouth every day.  Dispense: 30 Tab; Refill: 0    3. Type 2 diabetes mellitus without complication, without long-term current use of insulin (HCC)  Stable, chronic and controlled continue with current management.  - glucose blood strip; 1 Strip by Other route as needed.  Dispense: 90 Strip; Refill: 3  - Lancets; Lancets order: Lancets for One Touch Delica   Sig: use 1x/day and prn ssx high or low sugar. #100 RF x 3  Dispense: 100 Each; Refill: 0    4. Anxiety  New issue, acute stressor related dental source is understandably difficult.  Plan to use hydroxyzine as needed for anxiety, if frequent daily use, can consider changing to SSRI, or counseling.  She will continue close follow-up with her dentist.  - hydrOXYzine HCl (ATARAX) 25 MG Tab; Take 1 Tab by mouth 3 times a day as needed for Anxiety.  Dispense: 30 Tab; Refill: 1    5. Morbidly obese (HCC)  Has tried phentermine, Topamax.  Could consider SGLT type II medication as above for additional weight loss.  She prefers to have regular exercise, watch diet, to reach BMI goal.  Stable and chronic condition.    RTC 6 months per patient request          Please note that this dictation was created using voice recognition software. I have made every reasonable attempt to correct obvious errors, but I expect that there are errors of grammar and possibly content that I did not discover before finalizing the note.

## 2020-08-04 ENCOUNTER — OFFICE VISIT (OUTPATIENT)
Dept: ENDOCRINOLOGY | Facility: MEDICAL CENTER | Age: 35
End: 2020-08-04
Attending: INTERNAL MEDICINE
Payer: COMMERCIAL

## 2020-08-04 VITALS
BODY MASS INDEX: 53.92 KG/M2 | HEART RATE: 82 BPM | DIASTOLIC BLOOD PRESSURE: 78 MMHG | OXYGEN SATURATION: 98 % | SYSTOLIC BLOOD PRESSURE: 124 MMHG | HEIGHT: 62 IN | WEIGHT: 293 LBS

## 2020-08-04 DIAGNOSIS — E28.2 PCOS (POLYCYSTIC OVARIAN SYNDROME): ICD-10-CM

## 2020-08-04 DIAGNOSIS — E11.9 CONTROLLED TYPE 2 DIABETES MELLITUS WITHOUT COMPLICATION, WITHOUT LONG-TERM CURRENT USE OF INSULIN (HCC): ICD-10-CM

## 2020-08-04 DIAGNOSIS — I15.2 HYPERTENSION DUE TO ENDOCRINE DISORDER: ICD-10-CM

## 2020-08-04 DIAGNOSIS — Z79.84 LONG TERM (CURRENT) USE OF ORAL HYPOGLYCEMIC DRUGS: ICD-10-CM

## 2020-08-04 PROCEDURE — 99214 OFFICE O/P EST MOD 30 MIN: CPT | Performed by: INTERNAL MEDICINE

## 2020-08-04 PROCEDURE — 99211 OFF/OP EST MAY X REQ PHY/QHP: CPT | Performed by: INTERNAL MEDICINE

## 2020-08-04 RX ORDER — METFORMIN HYDROCHLORIDE 500 MG/1
1000 TABLET, EXTENDED RELEASE ORAL 2 TIMES DAILY
Qty: 180 TAB | Refills: 2 | Status: SHIPPED | OUTPATIENT
Start: 2020-08-04 | End: 2020-11-02

## 2020-08-04 RX ORDER — SEMAGLUTIDE 1.34 MG/ML
1 INJECTION, SOLUTION SUBCUTANEOUS
Qty: 6 PEN | Refills: 3 | Status: SHIPPED | OUTPATIENT
Start: 2020-08-04 | End: 2020-11-02

## 2020-08-04 ASSESSMENT — FIBROSIS 4 INDEX: FIB4 SCORE: 0.39

## 2020-08-04 NOTE — PROGRESS NOTES
CHIEF COMPLAINT: Patient is here for follow up of Type 2 Diabetes Mellitus .    HPI:     Priti Beugm is a 35 y.o. female with Type 2 Diabetes Mellitus here for follow up.    Labs from 4/13/2020 show HbA1c is good at 6.1%    She is on Metformin 500mg ER daily Ozempic 0.5mg weekly.  She reports that previously she was unable to tolerate higher doses of metformin but she thinks that she is doing better now    She has a history of morbid obesity and other obesity related complications such as PCOS and fatty liver disease with a previous baseline weight of over 400 pounds.  She has work diligently on trying to lose weight since her mother got sick    She reports that ever since she was started Ozempic her weight has improved drastically and she has lost more than 20 pounds since her last visit  She denies hyperglycemia   She denies hypoglycemia      With regards to her PCOS  Her most recent free testosterone level is normal at 3.8  Sex hormone binding protein levels are normal  She reports that her cycles are more regular since she is lost weight      She also has HTN which is well controlled  She is on lisinopril and is tolerating this well  She does not have diabetic kidney disease and her last urine microalbumin creatinine ratio was less than 1.2 on July 14, 2020      BG Diary:  Breakfast: 120, 130, 140    Weight has been stable    Diabetes Complications   Retinopathy: No known retinopathy.  Last eye exam: Jan 17 2020  Neuropathy: Denies paresthesias or numbness in hands or feet. Denies any foot wounds.  Exercise: Minimal.  Diet: Fair.  Patient's medications, allergies, and social histories were reviewed and updated as appropriate.    ROS:     CONS:     No fever, no chills   EYES:     No diplopia, no blurry vision   CV:           No chest pain, no palpitations   PULM:     No SOB, no cough, no hemoptysis.   GI:            No nausea, no vomiting, no diarrhea, no constipation   ENDO:     No polyuria,  "no polydipsia, no heat intolerance, no cold intolerance       Past Medical History:  Problem List:  2020-01: Metabolic syndrome  2019-06: Controlled type 2 diabetes mellitus without complication,   without long-term current use of insulin ([facility])  2019-06: Vitamin D deficiency  2019-04: Preventative health care  2019-03: Hypertension due to endocrine disorder  2019-02: Uncontrolled type 2 diabetes mellitus with hyperglycemia   ([facility])  2019-02: Candidiasis of skin  2016-01: Fatty liver disease, nonalcoholic  2016-01: Hypomenorrhea  2016-01: Family history of diabetes mellitus (DM)  2013-10: Morbidly obese ([facility])  2013-10: PCOS (polycystic ovarian syndrome)      Past Surgical History:  No past surgical history on file.     Allergies:  Glimepiride [kdc:yellow dye+brilliant blue fcf+glimepiride]     Social History:  Social History     Tobacco Use   • Smoking status: Never Smoker   • Smokeless tobacco: Never Used   Substance Use Topics   • Alcohol use: No   • Drug use: No        Family History:   family history includes Diabetes in her maternal grandmother and mother; Hypertension in her mother; Kidney Disease in her mother; Lung Disease in her maternal grandmother.      PHYSICAL EXAM:   OBJECTIVE:  Vital signs: /78 (BP Location: Left arm, Patient Position: Sitting, BP Cuff Size: Adult)   Pulse 82   Ht 1.575 m (5' 2\")   Wt (!) 163.6 kg (360 lb 9.6 oz)   SpO2 98%   BMI 65.95 kg/m²   GENERAL: Morbidly obese female in no apparent distress.   EYE:  No ocular asymmetry, PERRLA  HENT: Pink, moist mucous membranes.    NECK: No thyromegaly.   CARDIOVASCULAR:  No murmurs  LUNGS: Clear breath sounds  ABDOMEN: Soft, nontender   EXTREMITIES: No clubbing, cyanosis, or edema.   NEUROLOGICAL: No gross focal motor abnormalities   LYMPH: No cervical adenopathy palpated.   SKIN: No rashes, lesions.   Monofilament testing with a 10 gram force: sensation: intact bilaterally  Visual Inspection: Feet without maceration, ulcers, or " fissures.  Pedal pulses: intact bilaterally      Labs:  Lab Results   Component Value Date/Time    HBA1C 5.8 (H) 07/14/2020 07:55 AM        Lab Results   Component Value Date/Time    WBC 9.4 12/24/2019 06:19 AM    RBC 5.25 12/24/2019 06:19 AM    HEMOGLOBIN 14.3 12/24/2019 06:19 AM    MCV 85.5 12/24/2019 06:19 AM    MCH 27.2 12/24/2019 06:19 AM    MCHC 31.8 (L) 12/24/2019 06:19 AM    RDW 40.8 12/24/2019 06:19 AM    MPV 10.6 12/24/2019 06:19 AM       Lab Results   Component Value Date/Time    SODIUM 136 07/14/2020 06:40 AM    POTASSIUM 3.9 07/14/2020 06:40 AM    CHLORIDE 103 07/14/2020 06:40 AM    CO2 23 07/14/2020 06:40 AM    ANION 10.0 07/14/2020 06:40 AM    GLUCOSE 108 (H) 07/14/2020 06:54 AM    BUN 13 07/14/2020 06:40 AM    CREATININE 0.51 07/14/2020 06:40 AM    CALCIUM 8.9 07/14/2020 06:40 AM    ASTSGOT 12 07/14/2020 06:40 AM    ALTSGPT 15 07/14/2020 06:40 AM    TBILIRUBIN 0.3 07/14/2020 06:40 AM    ALBUMIN 4.0 07/14/2020 06:40 AM    TOTPROTEIN 7.3 07/14/2020 06:40 AM    GLOBULIN 3.3 07/14/2020 06:40 AM    AGRATIO 1.2 07/14/2020 06:40 AM       Lab Results   Component Value Date/Time    CHOLSTRLTOT 140 12/24/2019 0619    TRIGLYCERIDE 123 12/24/2019 0619    HDL 40 12/24/2019 0619    LDL 75 12/24/2019 0619       Lab Results   Component Value Date/Time    MALBCRT see below 07/14/2020 06:40 AM    MICROALBUR <1.2 07/14/2020 06:40 AM        Lab Results   Component Value Date/Time    TSHULTRASEN 3.320 02/14/2019 1142     No results found for: FREEDIR  No results found for: FREET3  No results found for: THYSTIMIG        ASSESSMENT/PLAN:     1. Controlled type 2 diabetes mellitus without complication, without long-term current use of insulin (HCC)  Well-controlled  Recommend increasing metformin to 500 mg twice a day  Recommend increasing Ozempic 1 mg once a week  Recommend that he watch his carb intake   recommend regular exercise  We will plan for follow-up in 3 months with repeat of A1c    2. Hypertension due to  endocrine disorder  Well-controlled  Continue current medications    3. PCOS (polycystic ovarian syndrome)  Controlled  Free testosterone levels are much better  Patient reports regular cycles    4. Long term (current) use of oral hypoglycemic drugs  Patient is on multiple oral agents for diabetes management      Return in about 3 months (around 11/4/2020).      This patient during there office visit today was started on a new medication.  Side effects of the new medication were discussed with the patient today in the office.     Thank you kindly for allowing me to participate in the diabetes care plan for this patient.    Dickson Rivera MD, [facility], [facility]  04/24/20    CC:   Shleby Sparks M.D.

## 2020-08-11 DIAGNOSIS — I15.2 HYPERTENSION DUE TO ENDOCRINE DISORDER: ICD-10-CM

## 2020-08-11 RX ORDER — LISINOPRIL 10 MG/1
10 TABLET ORAL DAILY
Qty: 90 TAB | Refills: 1 | Status: SHIPPED | OUTPATIENT
Start: 2020-08-11 | End: 2021-01-25 | Stop reason: SDUPTHER

## 2020-09-21 ENCOUNTER — NON-PROVIDER VISIT (OUTPATIENT)
Dept: MEDICAL GROUP | Facility: MEDICAL CENTER | Age: 35
End: 2020-09-21
Payer: COMMERCIAL

## 2020-09-21 DIAGNOSIS — Z23 NEED FOR VACCINATION: ICD-10-CM

## 2020-09-21 PROCEDURE — 90686 IIV4 VACC NO PRSV 0.5 ML IM: CPT | Performed by: INTERNAL MEDICINE

## 2020-09-21 PROCEDURE — 90471 IMMUNIZATION ADMIN: CPT | Performed by: INTERNAL MEDICINE

## 2020-10-12 ENCOUNTER — OFFICE VISIT (OUTPATIENT)
Dept: MEDICAL GROUP | Facility: MEDICAL CENTER | Age: 35
End: 2020-10-12
Payer: COMMERCIAL

## 2020-10-12 VITALS
BODY MASS INDEX: 53.92 KG/M2 | DIASTOLIC BLOOD PRESSURE: 88 MMHG | OXYGEN SATURATION: 95 % | SYSTOLIC BLOOD PRESSURE: 116 MMHG | TEMPERATURE: 97.8 F | HEIGHT: 62 IN | WEIGHT: 293 LBS | RESPIRATION RATE: 16 BRPM | HEART RATE: 95 BPM

## 2020-10-12 DIAGNOSIS — E11.9 CONTROLLED TYPE 2 DIABETES MELLITUS WITHOUT COMPLICATION, WITHOUT LONG-TERM CURRENT USE OF INSULIN (HCC): ICD-10-CM

## 2020-10-12 DIAGNOSIS — E66.01 MORBID OBESITY (HCC): ICD-10-CM

## 2020-10-12 DIAGNOSIS — E11.9 TYPE 2 DIABETES MELLITUS WITHOUT COMPLICATION, WITHOUT LONG-TERM CURRENT USE OF INSULIN (HCC): ICD-10-CM

## 2020-10-12 DIAGNOSIS — I15.2 HYPERTENSION DUE TO ENDOCRINE DISORDER: ICD-10-CM

## 2020-10-12 PROCEDURE — 99214 OFFICE O/P EST MOD 30 MIN: CPT | Performed by: INTERNAL MEDICINE

## 2020-10-12 RX ORDER — LANCETS 30 GAUGE
EACH MISCELLANEOUS
Qty: 100 EACH | Refills: 0 | Status: SHIPPED | OUTPATIENT
Start: 2020-10-12 | End: 2021-04-25 | Stop reason: SDUPTHER

## 2020-10-12 RX ORDER — PHENTERMINE HYDROCHLORIDE 37.5 MG/1
37.5 CAPSULE ORAL EVERY MORNING
Qty: 30 CAP | Refills: 1 | Status: SHIPPED | OUTPATIENT
Start: 2020-10-12 | End: 2020-11-11

## 2020-10-12 ASSESSMENT — FIBROSIS 4 INDEX: FIB4 SCORE: 0.39

## 2020-10-12 NOTE — PROGRESS NOTES
CC:  Diagnoses of Type 2 diabetes mellitus without complication, without long-term current use of insulin (HCC), Controlled type 2 diabetes mellitus without complication, without long-term current use of insulin (HCC), Morbidly obese (HCC), and Hypertension due to endocrine disorder were pertinent to this visit.    HISTORY OF THE PRESENT ILLNESS: Patient is a 35 y.o. female. This pleasant patient is here today to follow-up.    Wants to restart phentermine for weight loss.  She says that it worked well in the past and was well-tolerated.  She denies any history of cardiovascular disease or current cardiopulmonary/strokelike symptoms.  Blood pressure remains well controlled.  Started walking at least 25 minutes daily.  Trying to count calories.  She is also on Ozempic 1 mg once weekly and this is tolerated well along with her metformin.  She sees endocrine approximately 1 month with repeat labs at that time.    Allergies: Glimepiride [kdc:yellow dye+brilliant blue fcf+glimepiride]    Current Outpatient Medications Ordered in Epic   Medication Sig Dispense Refill   • phentermine 37.5 MG capsule Take 1 Cap by mouth every morning for 30 days. 30 Cap 1   • Lancets Lancets order: Lancets for One Touch Delica   Sig: use 1x/day and prn ssx high or low sugar. #100 RF x 3 100 Each 0   • lisinopril (PRINIVIL) 10 MG Tab Take 1 Tab by mouth every day. 90 Tab 1   • glucose blood strip 1 Strip by Other route as needed. 100 Strip 0   • Semaglutide, 1 MG/DOSE, (OZEMPIC, 1 MG/DOSE,) 2 MG/1.5ML Solution Pen-injector Inject 1 mg as instructed every 7 days for 90 days. 6 PEN 3   • metFORMIN ER (GLUCOPHAGE XR) 500 MG TABLET SR 24 HR Take 2 Tabs by mouth 2 times a day for 90 days. 180 Tab 2   • hydrOXYzine HCl (ATARAX) 25 MG Tab Take 1 Tab by mouth 3 times a day as needed for Anxiety. 30 Tab 1   • Blood Glucose Test Strips USE 1 STRIP TO CHECK GLUCOSE ONCE DAILY AND  AS  NEEDED  FOR  SYMPTOMS  OF  HIGH  OR  LOW  SUGAR  *E11.9* 50 Each 6  "  • vitamin D (CHOLECALCIFEROL) 1000 UNIT Tab Take 1,000 Units by mouth every day.       No current [facility]-ordered facility-administered medications on file.        Past Medical History:   Diagnosis Date   • Diabetes (HCC)        History reviewed. No pertinent surgical history.    Social History     Tobacco Use   • Smoking status: Never Smoker   • Smokeless tobacco: Never Used   Substance Use Topics   • Alcohol use: No   • Drug use: No       Social History     Social History Narrative   • Not on file       Family History   Problem Relation Age of Onset   • Diabetes Mother    • Hypertension Mother    • Kidney Disease Mother    • Lung Disease Maternal Grandmother         emphysema (smoker)   • Diabetes Maternal Grandmother        ROS:     - Constitutional: Negative for fever, chills    - Eyes:   Negative for eye pain, discharge    - ENT:  Negative for sore throat     - Respiratory: Negative for cough    - Cardiovascular: Negative for chest pain    - Gastrointestinal: Negative for abdominal pain    - Genitourinary: Negative for dysuria    - Musculoskeletal: Negative for new myalgias, back pain, and joint pain.     - Skin: Negative for rash, itching, cyanotic skin color change.     - Neurological: Negative for vertigo    - Endo:Negative for polyuria, heat/cold intolerance, excessive thirst    - Hem/lymphatic: Negative for swollen glands    -Allergic/immun: Negative for allergic rhinitis    - Psychiatric/Behavioral: Negative for depression, suicidal/homicidal ideation and memory loss.      Exam: /88 (BP Location: Right arm, Patient Position: Sitting, BP Cuff Size: Adult)   Pulse 95   Temp 36.6 °C (97.8 °F) (Temporal)   Resp 16   Ht 1.575 m (5' 2\")   Wt (!) 165.8 kg (365 lb 8.4 oz)   SpO2 95%  Body mass index is 66.85 kg/m².    General: Normal appearing. No distress.  EYES: Conjunctiva clear lids without ptosis, pupils equal  EARS: Normal shape and contour   Pulmonary: Clear to ausculation.  Normal effort. No " rales or wheezing.  Cardiovascular: Regular rate and rhythm without significant murmur.   Abdomen: Soft, nontender, nondistended. Normal bowel sounds.  Neurologic: Cranial nerves grossly nonfocal  Skin: Warm and dry.  No obvious lesions.  Musculoskeletal: Normal gait. No extremity cyanosis, clubbing, or edema.  Psych: Normal mood and affect. Alert and oriented x3. Judgment and insight is normal.      Assessment/Plan  1. Type 2 diabetes mellitus without complication, without long-term current use of insulin ([facility])  Stable, chronic, plan to continue current management she has follow-up endocrine 1 month.  We did discuss that as she loses weight we may need to cut back on medications, we can be in touch by chart email.  - Lancets; Lancets order: Lancets for One Touch Delica   Sig: use 1x/day and prn ssx high or low sugar. #100 RF x 3  Dispense: 100 Each; Refill: 0    2. Controlled type 2 diabetes mellitus without complication, without long-term current use of insulin ([facility])  See #1 above  - MICROALBUMIN CREAT RATIO URINE; Future  - Basic Metabolic Panel; Future    3. Morbidly obese (HCC)  No contraindication to restarting phentermine which has been well-tolerated and effective in the past.  She is on Ozempic which may also additionally help augment weight loss.  Plans to exercise daily, count calories, etc. follow-up in clinic.  At this time she is not interested in bariatric surgery consult.  - phentermine 37.5 MG capsule; Take 1 Cap by mouth every morning for 30 days.  Dispense: 30 Cap; Refill: 1    4. Hypertension due to endocrine disorder  Stable, chronic well-controlled continue lisinopril 10 mg daily.  As she reaches her weight loss goals she knows that we may need to titrate blood pressure medications.        RTC roughly 2 months or as needed      Please note that this dictation was created using voice recognition software. I have made every reasonable attempt to correct obvious errors, but I expect that there are  errors of grammar and possibly content that I did not discover before finalizing the note.

## 2020-11-03 ENCOUNTER — OFFICE VISIT (OUTPATIENT)
Dept: ENDOCRINOLOGY | Facility: MEDICAL CENTER | Age: 35
End: 2020-11-03
Attending: INTERNAL MEDICINE
Payer: COMMERCIAL

## 2020-11-03 VITALS
SYSTOLIC BLOOD PRESSURE: 122 MMHG | BODY MASS INDEX: 53.92 KG/M2 | DIASTOLIC BLOOD PRESSURE: 76 MMHG | HEIGHT: 62 IN | OXYGEN SATURATION: 97 % | HEART RATE: 109 BPM | WEIGHT: 293 LBS

## 2020-11-03 DIAGNOSIS — E11.9 CONTROLLED TYPE 2 DIABETES MELLITUS WITHOUT COMPLICATION, WITHOUT LONG-TERM CURRENT USE OF INSULIN (HCC): ICD-10-CM

## 2020-11-03 DIAGNOSIS — E28.2 PCOS (POLYCYSTIC OVARIAN SYNDROME): ICD-10-CM

## 2020-11-03 DIAGNOSIS — I15.2 HYPERTENSION DUE TO ENDOCRINE DISORDER: ICD-10-CM

## 2020-11-03 DIAGNOSIS — Z79.84 LONG TERM (CURRENT) USE OF ORAL HYPOGLYCEMIC DRUGS: ICD-10-CM

## 2020-11-03 LAB
HBA1C MFR BLD: 5.8 % (ref 0–5.6)
INT CON NEG: NEGATIVE
INT CON POS: POSITIVE

## 2020-11-03 PROCEDURE — 99214 OFFICE O/P EST MOD 30 MIN: CPT | Performed by: INTERNAL MEDICINE

## 2020-11-03 PROCEDURE — 83036 HEMOGLOBIN GLYCOSYLATED A1C: CPT | Performed by: INTERNAL MEDICINE

## 2020-11-03 PROCEDURE — 99212 OFFICE O/P EST SF 10 MIN: CPT | Performed by: INTERNAL MEDICINE

## 2020-11-03 RX ORDER — METFORMIN HYDROCHLORIDE 500 MG/1
500 TABLET, EXTENDED RELEASE ORAL 2 TIMES DAILY
Qty: 180 TAB | Refills: 3 | Status: SHIPPED | OUTPATIENT
Start: 2020-11-03 | End: 2021-02-01

## 2020-11-03 RX ORDER — SEMAGLUTIDE 1.34 MG/ML
1 INJECTION, SOLUTION SUBCUTANEOUS
Qty: 2 EACH | Refills: 11 | Status: SHIPPED | OUTPATIENT
Start: 2020-11-03 | End: 2021-05-04 | Stop reason: SDUPTHER

## 2020-11-03 ASSESSMENT — FIBROSIS 4 INDEX: FIB4 SCORE: 0.39

## 2020-11-03 NOTE — PROGRESS NOTES
CHIEF COMPLAINT: Patient is here for follow up of Type 2 Diabetes Mellitus .    HPI:     Priti Begum is a 35 y.o. female with Type 2 Diabetes Mellitus here for follow up.    Labs from 11/3/2020 show Hba1c is better at 5.8%  Labs from 4/13/2020 show HbA1c is good at 6.1%    She is on Metformin 500mg ER twice daily and  Ozempic 1.0mg weekly.        She has a history of morbid obesity and other obesity related complications such as PCOS and fatty liver disease with a previous baseline weight of over 400 pounds.  She has work diligently on trying to lose weight since her mother got sick.     She restarted phentermine last month and has lost 5 lbs her weight went back down to 360    She denies hyperglycemia   She denies hypoglycemia      With regards to her PCOS  Her most recent free testosterone level is normal at 3.8  Sex hormone binding protein levels are normal  She reports that her cycles are more regular since she is lost weight      She also has HTN which is well controlled  She is on lisinopril and is tolerating this well  She does not have diabetic kidney disease and her last urine microalbumin creatinine ratio was less than 1.2 on July 14, 2020      BG Diary:  Breakfast: 98, 100, 110    Weight has been stable    Diabetes Complications   Retinopathy: No known retinopathy.  Last eye exam: Jan 17 2020 with [facility]  Neuropathy: Denies paresthesias or numbness in hands or feet. Denies any foot wounds.  Exercise: Minimal.  Diet: Fair.  Patient's medications, allergies, and social histories were reviewed and updated as appropriate.    ROS:     CONS:     No fever, no chills   EYES:     No diplopia, no blurry vision   CV:           No chest pain, no palpitations   PULM:     No SOB, no cough, no hemoptysis.   GI:            No nausea, no vomiting, no diarrhea, no constipation   ENDO:     No polyuria, no polydipsia, no heat intolerance, no cold intolerance       Past Medical History:  Problem  "List:  2020-08: Long term (current) use of oral hypoglycemic drugs  2020-01: Metabolic syndrome  2019-06: Controlled type 2 diabetes mellitus without complication,   without long-term current use of insulin ([facility])  2019-06: Vitamin D deficiency  2019-04: Preventative health care  2019-03: Hypertension due to endocrine disorder  2019-02: Uncontrolled type 2 diabetes mellitus with hyperglycemia   ([facility])  2019-02: Candidiasis of skin  2016-01: Fatty liver disease, nonalcoholic  2016-01: Hypomenorrhea  2016-01: Family history of diabetes mellitus (DM)  2013-10: Morbidly obese ([facility])  2013-10: PCOS (polycystic ovarian syndrome)      Past Surgical History:  No past surgical history on file.     Allergies:  Glimepiride [kdc:yellow dye+brilliant blue fcf+glimepiride]     Social History:  Social History     Tobacco Use   • Smoking status: Never Smoker   • Smokeless tobacco: Never Used   Substance Use Topics   • Alcohol use: No   • Drug use: No        Family History:   family history includes Diabetes in her maternal grandmother and mother; Hypertension in her mother; Kidney Disease in her mother; Lung Disease in her maternal grandmother.      PHYSICAL EXAM:   OBJECTIVE:  Vital signs: /76 (BP Location: Left arm, Patient Position: Sitting, BP Cuff Size: Large adult)   Pulse (!) 109   Ht 1.575 m (5' 2\")   Wt (!) 163.5 kg (360 lb 6.4 oz)   SpO2 97%   BMI 65.92 kg/m²   GENERAL: Morbidly obese female in no apparent distress.   EYE:  No ocular asymmetry, PERRLA  HENT: Pink, moist mucous membranes.    NECK: No thyromegaly.   CARDIOVASCULAR:  No murmurs  LUNGS: Clear breath sounds  ABDOMEN: Soft, nontender   EXTREMITIES: No clubbing, cyanosis, or edema.   NEUROLOGICAL: No gross focal motor abnormalities   LYMPH: No cervical adenopathy palpated.   SKIN: No rashes, lesions.   Monofilament testing with a 10 gram force: sensation: intact bilaterally  Visual Inspection: Feet without maceration, ulcers, or fissures.  Pedal pulses: " intact bilaterally      Labs:  Lab Results   Component Value Date/Time    HBA1C 5.8 (A) 11/03/2020 07:52 AM        Lab Results   Component Value Date/Time    WBC 9.4 12/24/2019 06:19 AM    RBC 5.25 12/24/2019 06:19 AM    HEMOGLOBIN 14.3 12/24/2019 06:19 AM    MCV 85.5 12/24/2019 06:19 AM    MCH 27.2 12/24/2019 06:19 AM    MCHC 31.8 (L) 12/24/2019 06:19 AM    RDW 40.8 12/24/2019 06:19 AM    MPV 10.6 12/24/2019 06:19 AM       Lab Results   Component Value Date/Time    SODIUM 136 07/14/2020 06:40 AM    POTASSIUM 3.9 07/14/2020 06:40 AM    CHLORIDE 103 07/14/2020 06:40 AM    CO2 23 07/14/2020 06:40 AM    ANION 10.0 07/14/2020 06:40 AM    GLUCOSE 108 (H) 07/14/2020 06:54 AM    BUN 13 07/14/2020 06:40 AM    CREATININE 0.51 07/14/2020 06:40 AM    CALCIUM 8.9 07/14/2020 06:40 AM    ASTSGOT 12 07/14/2020 06:40 AM    ALTSGPT 15 07/14/2020 06:40 AM    TBILIRUBIN 0.3 07/14/2020 06:40 AM    ALBUMIN 4.0 07/14/2020 06:40 AM    TOTPROTEIN 7.3 07/14/2020 06:40 AM    GLOBULIN 3.3 07/14/2020 06:40 AM    AGRATIO 1.2 07/14/2020 06:40 AM       Lab Results   Component Value Date/Time    CHOLSTRLTOT 140 12/24/2019 0619    TRIGLYCERIDE 123 12/24/2019 0619    HDL 40 12/24/2019 0619    LDL 75 12/24/2019 0619       Lab Results   Component Value Date/Time    MALBCRT see below 07/14/2020 06:40 AM    MICROALBUR <1.2 07/14/2020 06:40 AM        Lab Results   Component Value Date/Time    TSHULTRASEN 3.320 02/14/2019 1142     No results found for: FREEDIR  No results found for: FREET3  No results found for: THYSTIMIG        ASSESSMENT/PLAN:     1. Controlled type 2 diabetes mellitus without complication, without long-term current use of insulin (HCC)  Well-controlled  Continue metformin  500 mg twice a day  Continue Ozempic 1 mg once a week  I am okay with taking phentermine to assist with weight loss  I did recommend again a low-carb diet for assistance with weight loss  I also recommend caloric restriction and behavioral modification with regular  exercise  We will plan for follow-up in 6 months with repeat of A1c    2. Hypertension   Well-controlled  Continue current medications    3. PCOS (polycystic ovarian syndrome)  Controlled  Free testosterone levels are much better  Patient reports regular cycles  I am going to repeat her free testosterone levels in 6 months along with LH FSH and SHBG levels    4. Long term (current) use of oral hypoglycemic drugs  Patient is on multiple oral agents for diabetes management      Return in about 6 months (around 5/3/2021).      Thank you kindly for allowing me to participate in the diabetes care plan for this patient.    Dickson Rivera MD, [facility], [facility]  04/24/20    CC:   Shelby Sparks M.D.

## 2020-12-20 DIAGNOSIS — Z23 NEED FOR VACCINATION: ICD-10-CM

## 2020-12-21 ENCOUNTER — IMMUNIZATION (OUTPATIENT)
Dept: FAMILY PLANNING/WOMEN'S HEALTH CLINIC | Facility: IMMUNIZATION CENTER | Age: 35
End: 2020-12-21
Payer: COMMERCIAL

## 2020-12-21 DIAGNOSIS — Z23 ENCOUNTER FOR VACCINATION: Primary | ICD-10-CM

## 2020-12-21 PROCEDURE — 91300 PFIZER SARS-COV-2 VACCINE: CPT

## 2020-12-21 PROCEDURE — 0001A PFIZER SARS-COV-2 VACCINE: CPT

## 2021-01-11 ENCOUNTER — IMMUNIZATION (OUTPATIENT)
Dept: FAMILY PLANNING/WOMEN'S HEALTH CLINIC | Facility: IMMUNIZATION CENTER | Age: 36
End: 2021-01-11
Attending: FAMILY MEDICINE
Payer: COMMERCIAL

## 2021-01-11 DIAGNOSIS — Z23 ENCOUNTER FOR VACCINATION: Primary | ICD-10-CM

## 2021-01-11 PROCEDURE — 0002A PFIZER SARS-COV-2 VACCINE: CPT

## 2021-01-11 PROCEDURE — 91300 PFIZER SARS-COV-2 VACCINE: CPT

## 2021-01-18 ENCOUNTER — TELEPHONE (OUTPATIENT)
Dept: ENDOCRINOLOGY | Facility: MEDICAL CENTER | Age: 36
End: 2021-01-18

## 2021-01-18 NOTE — TELEPHONE ENCOUNTER
Pt. Came in asking for samples of ozempic. Stated her insurance is asking $800 for a month supply. I gave pt a month worth of samples and a coupon savings for ozempic as well.

## 2021-01-25 DIAGNOSIS — I15.2 HYPERTENSION DUE TO ENDOCRINE DISORDER: ICD-10-CM

## 2021-01-25 RX ORDER — LISINOPRIL 10 MG/1
10 TABLET ORAL DAILY
Qty: 90 TAB | Refills: 0 | Status: SHIPPED | OUTPATIENT
Start: 2021-01-25 | End: 2021-07-03 | Stop reason: SDUPTHER

## 2021-04-25 DIAGNOSIS — E11.9 TYPE 2 DIABETES MELLITUS WITHOUT COMPLICATION, WITHOUT LONG-TERM CURRENT USE OF INSULIN (HCC): ICD-10-CM

## 2021-04-26 RX ORDER — LANCETS 30 GAUGE
EACH MISCELLANEOUS
Qty: 100 EACH | Refills: 0 | Status: SHIPPED | OUTPATIENT
Start: 2021-04-26 | End: 2022-03-15 | Stop reason: SDUPTHER

## 2021-04-28 ENCOUNTER — HOSPITAL ENCOUNTER (OUTPATIENT)
Dept: LAB | Facility: MEDICAL CENTER | Age: 36
End: 2021-04-28
Attending: INTERNAL MEDICINE
Payer: COMMERCIAL

## 2021-04-28 DIAGNOSIS — Z79.84 LONG TERM (CURRENT) USE OF ORAL HYPOGLYCEMIC DRUGS: ICD-10-CM

## 2021-04-28 DIAGNOSIS — I15.2 HYPERTENSION DUE TO ENDOCRINE DISORDER: ICD-10-CM

## 2021-04-28 DIAGNOSIS — E11.9 CONTROLLED TYPE 2 DIABETES MELLITUS WITHOUT COMPLICATION, WITHOUT LONG-TERM CURRENT USE OF INSULIN (HCC): ICD-10-CM

## 2021-04-28 DIAGNOSIS — E28.2 PCOS (POLYCYSTIC OVARIAN SYNDROME): ICD-10-CM

## 2021-04-28 LAB
ALBUMIN SERPL BCP-MCNC: 4 G/DL (ref 3.2–4.9)
ALBUMIN/GLOB SERPL: 1.1 G/DL
ALP SERPL-CCNC: 102 U/L (ref 30–99)
ALT SERPL-CCNC: 22 U/L (ref 2–50)
ANION GAP SERPL CALC-SCNC: 8 MMOL/L (ref 7–16)
ANION GAP SERPL CALC-SCNC: 9 MMOL/L (ref 7–16)
AST SERPL-CCNC: 16 U/L (ref 12–45)
BILIRUB SERPL-MCNC: 0.5 MG/DL (ref 0.1–1.5)
BUN SERPL-MCNC: 10 MG/DL (ref 8–22)
BUN SERPL-MCNC: 11 MG/DL (ref 8–22)
CALCIUM SERPL-MCNC: 9.3 MG/DL (ref 8.5–10.5)
CALCIUM SERPL-MCNC: 9.3 MG/DL (ref 8.5–10.5)
CHLORIDE SERPL-SCNC: 101 MMOL/L (ref 96–112)
CHLORIDE SERPL-SCNC: 102 MMOL/L (ref 96–112)
CHOLEST SERPL-MCNC: 143 MG/DL (ref 100–199)
CO2 SERPL-SCNC: 26 MMOL/L (ref 20–33)
CO2 SERPL-SCNC: 26 MMOL/L (ref 20–33)
CREAT SERPL-MCNC: 0.52 MG/DL (ref 0.5–1.4)
CREAT SERPL-MCNC: 0.53 MG/DL (ref 0.5–1.4)
CREAT UR-MCNC: 188.06 MG/DL
CREAT UR-MCNC: 188.35 MG/DL
EST. AVERAGE GLUCOSE BLD GHB EST-MCNC: 128 MG/DL
FASTING STATUS PATIENT QL REPORTED: NORMAL
FASTING STATUS PATIENT QL REPORTED: NORMAL
FSH SERPL-ACNC: 4.4 MIU/ML
GLOBULIN SER CALC-MCNC: 3.7 G/DL (ref 1.9–3.5)
GLUCOSE SERPL-MCNC: 113 MG/DL (ref 65–99)
GLUCOSE SERPL-MCNC: 114 MG/DL (ref 65–99)
HBA1C MFR BLD: 6.1 % (ref 4–5.6)
HDLC SERPL-MCNC: 37 MG/DL
LDLC SERPL CALC-MCNC: 85 MG/DL
LH SERPL-ACNC: 7 IU/L
MICROALBUMIN UR-MCNC: <1.2 MG/DL
MICROALBUMIN UR-MCNC: <1.2 MG/DL
MICROALBUMIN/CREAT UR: NORMAL MG/G (ref 0–30)
MICROALBUMIN/CREAT UR: NORMAL MG/G (ref 0–30)
POTASSIUM SERPL-SCNC: 4.1 MMOL/L (ref 3.6–5.5)
POTASSIUM SERPL-SCNC: 4.1 MMOL/L (ref 3.6–5.5)
PROT SERPL-MCNC: 7.7 G/DL (ref 6–8.2)
SODIUM SERPL-SCNC: 136 MMOL/L (ref 135–145)
SODIUM SERPL-SCNC: 136 MMOL/L (ref 135–145)
T4 FREE SERPL-MCNC: 1.19 NG/DL (ref 0.93–1.7)
TRIGL SERPL-MCNC: 103 MG/DL (ref 0–149)
TSH SERPL DL<=0.005 MIU/L-ACNC: 4.26 UIU/ML (ref 0.38–5.33)

## 2021-04-28 PROCEDURE — 82043 UR ALBUMIN QUANTITATIVE: CPT | Mod: 91

## 2021-04-28 PROCEDURE — 83001 ASSAY OF GONADOTROPIN (FSH): CPT

## 2021-04-28 PROCEDURE — 84402 ASSAY OF FREE TESTOSTERONE: CPT

## 2021-04-28 PROCEDURE — 82570 ASSAY OF URINE CREATININE: CPT | Mod: 91

## 2021-04-28 PROCEDURE — 84270 ASSAY OF SEX HORMONE GLOBUL: CPT

## 2021-04-28 PROCEDURE — 82570 ASSAY OF URINE CREATININE: CPT

## 2021-04-28 PROCEDURE — 36415 COLL VENOUS BLD VENIPUNCTURE: CPT

## 2021-04-28 PROCEDURE — 83036 HEMOGLOBIN GLYCOSYLATED A1C: CPT

## 2021-04-28 PROCEDURE — 80048 BASIC METABOLIC PNL TOTAL CA: CPT

## 2021-04-28 PROCEDURE — 84439 ASSAY OF FREE THYROXINE: CPT

## 2021-04-28 PROCEDURE — 83002 ASSAY OF GONADOTROPIN (LH): CPT

## 2021-04-28 PROCEDURE — 84443 ASSAY THYROID STIM HORMONE: CPT

## 2021-04-28 PROCEDURE — 80061 LIPID PANEL: CPT

## 2021-04-28 PROCEDURE — 82043 UR ALBUMIN QUANTITATIVE: CPT

## 2021-04-28 PROCEDURE — 84403 ASSAY OF TOTAL TESTOSTERONE: CPT

## 2021-04-28 PROCEDURE — 80053 COMPREHEN METABOLIC PANEL: CPT

## 2021-05-04 ENCOUNTER — OFFICE VISIT (OUTPATIENT)
Dept: ENDOCRINOLOGY | Facility: MEDICAL CENTER | Age: 36
End: 2021-05-04
Attending: INTERNAL MEDICINE
Payer: COMMERCIAL

## 2021-05-04 VITALS
RESPIRATION RATE: 15 BRPM | WEIGHT: 293 LBS | HEART RATE: 92 BPM | BODY MASS INDEX: 53.92 KG/M2 | SYSTOLIC BLOOD PRESSURE: 126 MMHG | OXYGEN SATURATION: 95 % | HEIGHT: 62 IN | DIASTOLIC BLOOD PRESSURE: 84 MMHG

## 2021-05-04 DIAGNOSIS — I15.2 HYPERTENSION DUE TO ENDOCRINE DISORDER: ICD-10-CM

## 2021-05-04 DIAGNOSIS — E28.2 PCOS (POLYCYSTIC OVARIAN SYNDROME): ICD-10-CM

## 2021-05-04 DIAGNOSIS — Z79.84 LONG TERM (CURRENT) USE OF ORAL HYPOGLYCEMIC DRUGS: ICD-10-CM

## 2021-05-04 DIAGNOSIS — E66.01 MORBID OBESITY (HCC): ICD-10-CM

## 2021-05-04 DIAGNOSIS — E11.9 CONTROLLED TYPE 2 DIABETES MELLITUS WITHOUT COMPLICATION, WITHOUT LONG-TERM CURRENT USE OF INSULIN (HCC): ICD-10-CM

## 2021-05-04 PROCEDURE — 99211 OFF/OP EST MAY X REQ PHY/QHP: CPT | Performed by: INTERNAL MEDICINE

## 2021-05-04 PROCEDURE — 99214 OFFICE O/P EST MOD 30 MIN: CPT | Performed by: INTERNAL MEDICINE

## 2021-05-04 RX ORDER — PHENTERMINE HYDROCHLORIDE 37.5 MG/1
37.5 CAPSULE ORAL EVERY MORNING
Qty: 30 CAPSULE | Refills: 2 | Status: SHIPPED | OUTPATIENT
Start: 2021-05-04 | End: 2021-06-03

## 2021-05-04 RX ORDER — SEMAGLUTIDE 1.34 MG/ML
1 INJECTION, SOLUTION SUBCUTANEOUS
Qty: 2 EACH | Refills: 11 | Status: SHIPPED | OUTPATIENT
Start: 2021-05-04 | End: 2021-11-05

## 2021-05-04 RX ORDER — METFORMIN HYDROCHLORIDE 500 MG/1
1000 TABLET, EXTENDED RELEASE ORAL 2 TIMES DAILY
Qty: 360 TABLET | Refills: 3 | Status: SHIPPED | OUTPATIENT
Start: 2021-05-04 | End: 2022-02-23 | Stop reason: SDUPTHER

## 2021-05-04 ASSESSMENT — FIBROSIS 4 INDEX: FIB4 SCORE: 0.45

## 2021-05-04 NOTE — PROGRESS NOTES
CHIEF COMPLAINT: Patient is here for follow up of Type 2 Diabetes Mellitus .    HPI:     Priti Begum is a 35 y.o. female with Type 2 Diabetes Mellitus here for follow up.    Labs from 4/28/2021 show Hba1c is 6.1%  Labs from 11/3/2020 show Hba1c was 5.8%  Labs from 4/13/2020 show HbA1c was 6.1%    She is on Metformin 500mg ER twice daily and  Ozempic 1.0mg weekly.        She has a history of morbid obesity and other obesity related complications such as PCOS and fatty liver disease with a previous baseline weight of over 400 pounds.      She reports weight gain since stopping phentermine          With regards to her PCOS  Her most recent free testosterone level is pending   Sex hormone binding protein levels are normal  She reports regular periods      She also has HTN which is well controlled  She is on lisinopril and is tolerating this well  She does not have diabetic kidney disease and her last urine microalbumin creatinine ratio was normal on April 2021      BG Diary:  BG     Weight has increased 15 pounds over last 6 months    Diabetes Complications   Retinopathy: No known retinopathy.  Last eye exam: Jan 17 2020 with [facility]  Neuropathy: Denies paresthesias or numbness in hands or feet. Denies any foot wounds.  Exercise: Minimal.  Diet: Fair.  Patient's medications, allergies, and social histories were reviewed and updated as appropriate.    ROS:     CONS:     No fever, no chills   EYES:     No diplopia, no blurry vision   CV:           No chest pain, no palpitations   PULM:     No SOB, no cough, no hemoptysis.   GI:            No nausea, no vomiting, no diarrhea, no constipation   ENDO:     No polyuria, no polydipsia, no heat intolerance, no cold intolerance       Past Medical History:  Problem List:  2020-08: Long term (current) use of oral hypoglycemic drugs  2020-01: Metabolic syndrome  2019-06: Controlled type 2 diabetes mellitus without complication,   without long-term  "current use of insulin ([facility])  2019-06: Vitamin D deficiency  2019-04: Preventative health care  2019-03: Hypertension due to endocrine disorder  2019-02: Uncontrolled type 2 diabetes mellitus with hyperglycemia   ([facility])  2019-02: Candidiasis of skin  2016-01: Fatty liver disease, nonalcoholic  2016-01: Hypomenorrhea  2016-01: Family history of diabetes mellitus (DM)  2013-10: Morbidly obese ([facility])  2013-10: PCOS (polycystic ovarian syndrome)      Past Surgical History:  No past surgical history on file.     Allergies:  Glimepiride [kdc:yellow dye+brilliant blue fcf+glimepiride]     Social History:  Social History     Tobacco Use   • Smoking status: Never Smoker   • Smokeless tobacco: Never Used   Substance Use Topics   • Alcohol use: No   • Drug use: No        Family History:   family history includes Diabetes in her maternal grandmother and mother; Hypertension in her mother; Kidney Disease in her mother; Lung Disease in her maternal grandmother.      PHYSICAL EXAM:   OBJECTIVE:  Vital signs: /84 (BP Location: Left arm, Patient Position: Sitting, BP Cuff Size: Large adult)   Pulse 92   Resp 15   Ht 1.575 m (5' 2\")   Wt (!) 170 kg (375 lb)   SpO2 95%   BMI 68.59 kg/m²   GENERAL: Morbidly obese female in no apparent distress.   EYE:  No ocular asymmetry, PERRLA  HENT: Pink, moist mucous membranes.    NECK: No thyromegaly.   CARDIOVASCULAR:  No murmurs  LUNGS: Clear breath sounds  ABDOMEN: Soft, nontender   EXTREMITIES: No clubbing, cyanosis, or edema.   NEUROLOGICAL: No gross focal motor abnormalities   LYMPH: No cervical adenopathy palpated.   SKIN: No rashes, lesions.   Monofilament testing with a 10 gram force: sensation: intact bilaterally  Visual Inspection: Feet without maceration, ulcers, or fissures.  Pedal pulses: intact bilaterally      Labs:  Lab Results   Component Value Date/Time    HBA1C 6.1 (H) 04/28/2021 06:22 AM        Lab Results   Component Value Date/Time    WBC 9.4 12/24/2019 06:19 AM "    RBC 5.25 12/24/2019 06:19 AM    HEMOGLOBIN 14.3 12/24/2019 06:19 AM    MCV 85.5 12/24/2019 06:19 AM    MCH 27.2 12/24/2019 06:19 AM    MCHC 31.8 (L) 12/24/2019 06:19 AM    RDW 40.8 12/24/2019 06:19 AM    MPV 10.6 12/24/2019 06:19 AM       Lab Results   Component Value Date/Time    SODIUM 136 04/28/2021 06:22 AM    POTASSIUM 4.1 04/28/2021 06:22 AM    CHLORIDE 102 04/28/2021 06:22 AM    CO2 26 04/28/2021 06:22 AM    ANION 8.0 04/28/2021 06:22 AM    GLUCOSE 114 (H) 04/28/2021 06:22 AM    BUN 10 04/28/2021 06:22 AM    CREATININE 0.53 04/28/2021 06:22 AM    CALCIUM 9.3 04/28/2021 06:22 AM    ASTSGOT 16 04/28/2021 06:22 AM    ALTSGPT 22 04/28/2021 06:22 AM    TBILIRUBIN 0.5 04/28/2021 06:22 AM    ALBUMIN 4.0 04/28/2021 06:22 AM    TOTPROTEIN 7.7 04/28/2021 06:22 AM    GLOBULIN 3.7 (H) 04/28/2021 06:22 AM    AGRATIO 1.1 04/28/2021 06:22 AM       Lab Results   Component Value Date/Time    CHOLSTRLTOT 140 12/24/2019 0619    TRIGLYCERIDE 123 12/24/2019 0619    HDL 40 12/24/2019 0619    LDL 75 12/24/2019 0619       Lab Results   Component Value Date/Time    MALBCRT see below 04/28/2021 06:21 AM    MICROALBUR <1.2 04/28/2021 06:21 AM        Lab Results   Component Value Date/Time    TSHULTRASEN 3.320 02/14/2019 1142     No results found for: FREEDIR  No results found for: FREET3  No results found for: THYSTIMIG        ASSESSMENT/PLAN:     1. Controlled type 2 diabetes mellitus without complication, without long-term current use of insulin (HCC)  Well-controlled  Increase Metformin extended release to 1000 mg twice a day  Continue Ozempic 1 mg once a week  I gave her phentermine again for short course however reviewed the side effects of this medication  We discussed that Ozempic will have a higher dose in the future but we have to wait for FDA approval  There is an associated 15 kg weight loss with a higher dose  I did recommend again a low-carb diet for assistance with weight loss  I also recommend caloric restriction and  behavioral modification with regular exercise  We will plan for follow-up in 6 months with repeat of A1c    2. Hypertension   Well-controlled  Continue current medications    3. PCOS (polycystic ovarian syndrome)  Controlled  Continue Metformin  Free testosterone levels are pending  Patient reports regular cycles      4. Long term (current) use of oral hypoglycemic drugs  Patient is on multiple oral agents for diabetes management      Return in about 6 months (around 11/4/2021).      Thank you kindly for allowing me to participate in the diabetes care plan for this patient.    Dickson Rivera MD, [facility], [facility]  04/24/20    CC:   Shelby Sparks M.D.

## 2021-05-07 LAB
SHBG SERPL-SCNC: 33 NMOL/L (ref 30–135)
TESTOST FREE SERPL-MCNC: 4.5 PG/ML (ref 1.3–9.2)
TESTOST SERPL-MCNC: 27 NG/DL (ref 9–55)

## 2021-05-11 ENCOUNTER — OFFICE VISIT (OUTPATIENT)
Dept: MEDICAL GROUP | Facility: MEDICAL CENTER | Age: 36
End: 2021-05-11
Payer: COMMERCIAL

## 2021-05-11 VITALS
TEMPERATURE: 97.5 F | WEIGHT: 293 LBS | DIASTOLIC BLOOD PRESSURE: 78 MMHG | HEIGHT: 62 IN | HEART RATE: 84 BPM | RESPIRATION RATE: 16 BRPM | BODY MASS INDEX: 53.92 KG/M2 | OXYGEN SATURATION: 97 % | SYSTOLIC BLOOD PRESSURE: 122 MMHG

## 2021-05-11 DIAGNOSIS — E11.9 CONTROLLED TYPE 2 DIABETES MELLITUS WITHOUT COMPLICATION, WITHOUT LONG-TERM CURRENT USE OF INSULIN (HCC): ICD-10-CM

## 2021-05-11 DIAGNOSIS — L30.9 DERMATITIS: ICD-10-CM

## 2021-05-11 DIAGNOSIS — I87.2 VENOUS INSUFFICIENCY: ICD-10-CM

## 2021-05-11 DIAGNOSIS — I15.2 HYPERTENSION DUE TO ENDOCRINE DISORDER: ICD-10-CM

## 2021-05-11 PROCEDURE — 99214 OFFICE O/P EST MOD 30 MIN: CPT | Performed by: INTERNAL MEDICINE

## 2021-05-11 RX ORDER — TRIAMCINOLONE ACETONIDE 1 MG/G
CREAM TOPICAL
Qty: 15 G | Refills: 1 | Status: SHIPPED | OUTPATIENT
Start: 2021-05-11 | End: 2022-02-24 | Stop reason: SDUPTHER

## 2021-05-11 ASSESSMENT — FIBROSIS 4 INDEX: FIB4 SCORE: 0.45

## 2021-05-11 ASSESSMENT — PATIENT HEALTH QUESTIONNAIRE - PHQ9: CLINICAL INTERPRETATION OF PHQ2 SCORE: 0

## 2021-05-11 NOTE — PROGRESS NOTES
CC:  Diagnoses of Controlled type 2 diabetes mellitus without complication, without long-term current use of insulin (HCC), Venous insufficiency, and Dermatitis were pertinent to this visit.    HISTORY OF THE PRESENT ILLNESS: Patient is a 36 y.o. female. This pleasant patient is here today for follow-up    Regarding diabetes endocrine note 5/4/21 was briefly reviewed her medications were titrated including Metformin and continued on phentermine.  Margarita tells me that Ozempic can likely be further titrated soon as well.  She is trying very hard to work on weight loss.  Blood pressure is controlled.  She does get her retinal eye exam today.  Feet have been okay.  No new cardiopulmonary or strokelike symptoms.  She has noticed some venous insufficiency lower legs and at the end of the day with pitting edema.  The swelling goes away in the morning.  Does work long hours sitting as a PAR. Also note of itchy rash left medial lower leg region.    Labs reviewed 4/28/21 GFR, A1c 6.1, minimal elevation alkaline phosphatase 102 (no bone pain or GI symptoms), total cholesterol 143, triglycerides controlled 103, HDL could be improved at 37, LDL 85.  She will consider incorporating more good omega fatty acids in her diet or fish oil to improve HDL.    Allergies: Glimepiride [kdc:yellow dye+brilliant blue fcf+glimepiride]    Current Outpatient Medications Ordered in Epic   Medication Sig Dispense Refill   • triamcinolone acetonide (KENALOG) 0.1 % Cream Apply once or twice daily to affected area of leg rash for 2 weeks 15 g 1   • phentermine 37.5 MG capsule Take 1 capsule by mouth every morning for 30 days. 30 capsule 2   • metFORMIN ER (GLUCOPHAGE XR) 500 MG TABLET SR 24 HR Take 2 Tablets by mouth 2 times a day. 360 tablet 3   • Semaglutide, 1 MG/DOSE, (OZEMPIC, 1 MG/DOSE,) 2 MG/1.5ML Solution Pen-injector Inject 1 mg under the skin every 7 days. 2 Each 11   • Lancets Lancets order: Lancets for One Touch Delica   Sig: use 1x/day  "and prn ssx high or low sugar. #100 RF x 3 100 Each 0   • lisinopril (PRINIVIL) 10 MG Tab Take 1 Tab by mouth every day. 90 Tab 0   • glucose blood strip 1 Strip by Other route as needed. 100 Strip 0   • hydrOXYzine HCl (ATARAX) 25 MG Tab Take 1 Tab by mouth 3 times a day as needed for Anxiety. 30 Tab 1   • Blood Glucose Test Strips USE 1 STRIP TO CHECK GLUCOSE ONCE DAILY AND  AS  NEEDED  FOR  SYMPTOMS  OF  HIGH  OR  LOW  SUGAR  *E11.9* 50 Each 6   • vitamin D (CHOLECALCIFEROL) 1000 UNIT Tab Take 1,000 Units by mouth every day.       No current [facility]-ordered facility-administered medications on file.       Past Medical History:   Diagnosis Date   • Diabetes (HCC)        History reviewed. No pertinent surgical history.    Social History     Tobacco Use   • Smoking status: Never Smoker   • Smokeless tobacco: Never Used   Substance Use Topics   • Alcohol use: No   • Drug use: No       Social History     Social History Narrative   • Not on file       Family History   Problem Relation Age of Onset   • Diabetes Mother    • Hypertension Mother    • Kidney Disease Mother    • Lung Disease Maternal Grandmother         emphysema (smoker)   • Diabetes Maternal Grandmother        Exam: /78 (BP Location: Left arm, Patient Position: Sitting)   Pulse 84   Temp 36.4 °C (97.5 °F) (Temporal)   Resp 16   Ht 1.575 m (5' 2\")   Wt (!) 171 kg (378 lb)   SpO2 97%  Body mass index is 69.14 kg/m².    General: Normal appearing. No distress.  EYES: Conjunctiva clear lids without ptosis, pupils equal  EARS: Normal shape and contour   Pulmonary: Clear to ausculation.  Normal effort. No rales or wheezing.  Cardiovascular: Regular rate and rhythm without significant murmur.   Abdomen: Soft, nontender, nondistended. Normal bowel sounds.  Neurologic: Cranial nerves grossly nonfocal  Skin: Warm and dry.  No obvious lesions.  Musculoskeletal: Normal gait.  There is no current pitting edema of the lower extremities.  There is fine dermatitis " rash left medial lower leg approximately 3 x 5 cm distribution without any open sores/weeping, no heat, no erythema, no induration, no fluctuance or mass.  Psych: Normal mood and affect. Alert and oriented x3. Judgment and insight is normal.      Assessment/Plan  1. Controlled type 2 diabetes mellitus without complication, without long-term current use of insulin ([facility])  Followed by endocrinology and is on good medication management with active titration of her pharmacotherapy.  Hopefully further titration of her diabetes medications can further result in weight loss.  Retinal exam today.    2. Venous insufficiency  Referral to vascular surgeon she may benefit from venous studies to evaluate for reflux and also be fitted for compression socks to relieve her symptoms and prevent the chronic venous insufficiency/leg edema. Work on wt loss, leg elevation, etc conservative tx.  - REFERRAL TO VASCULAR SURGERY    3. Dermatitis  Mild dermatitis, should be steroid responsive.  She will let me know if this changes significantly such as erythema, etc.  Prevent swelling as above.  - triamcinolone acetonide (KENALOG) 0.1 % Cream; Apply once or twice daily to affected area of leg rash for 2 weeks  Dispense: 15 g; Refill: 1        rtc 4m      Please note that this dictation was created using voice recognition software. I have made every reasonable attempt to correct obvious errors, but I expect that there are errors of grammar and possibly content that I did not discover before finalizing the note.

## 2021-07-03 DIAGNOSIS — I15.2 HYPERTENSION DUE TO ENDOCRINE DISORDER: ICD-10-CM

## 2021-07-04 RX ORDER — LISINOPRIL 10 MG/1
10 TABLET ORAL DAILY
Qty: 90 TABLET | Refills: 0 | Status: SHIPPED | OUTPATIENT
Start: 2021-07-04 | End: 2021-10-23 | Stop reason: SDUPTHER

## 2021-07-09 ENCOUNTER — EH NON-PROVIDER (OUTPATIENT)
Dept: OCCUPATIONAL MEDICINE | Facility: CLINIC | Age: 36
End: 2021-07-09
Payer: COMMERCIAL

## 2021-07-09 DIAGNOSIS — Z02.89 ENCOUNTER FOR OCCUPATIONAL HEALTH EXAMINATION INVOLVING RESPIRATOR: ICD-10-CM

## 2021-07-09 PROCEDURE — 94375 RESPIRATORY FLOW VOLUME LOOP: CPT | Performed by: PREVENTIVE MEDICINE

## 2021-08-12 DIAGNOSIS — E11.9 TYPE 2 DIABETES MELLITUS WITHOUT COMPLICATION, WITHOUT LONG-TERM CURRENT USE OF INSULIN (HCC): ICD-10-CM

## 2021-08-12 NOTE — TELEPHONE ENCOUNTER
Received request via: Patient    Was the patient seen in the last year in this department? Yes    Does the patient have an active prescription (recently filled or refills available) for medication(s) requested? No    Requested Prescriptions     Pending Prescriptions Disp Refills   • Blood Glucose Test Strips       Sig: USE 1 STRIP TO CHECK GLUCOSE ONCE DAILY AND  AS  NEEDED  FOR  SYMPTOMS  OF  HIGH  OR  LOW  SUGAR  *E11.9*

## 2021-09-24 ENCOUNTER — NON-PROVIDER VISIT (OUTPATIENT)
Dept: MEDICAL GROUP | Facility: MEDICAL CENTER | Age: 36
End: 2021-09-24
Payer: COMMERCIAL

## 2021-09-24 DIAGNOSIS — Z23 IMMUNIZATION DUE: ICD-10-CM

## 2021-09-24 PROCEDURE — 90471 IMMUNIZATION ADMIN: CPT | Performed by: FAMILY MEDICINE

## 2021-09-24 PROCEDURE — 90686 IIV4 VACC NO PRSV 0.5 ML IM: CPT | Performed by: FAMILY MEDICINE

## 2021-09-24 NOTE — PROGRESS NOTES
"Margarita Begum is a 36 y.o. female here for a non-provider visit for:   FLU    Reason for immunization: Annual Flu Vaccine  Immunization records indicate need for vaccine: Yes, confirmed with Epic  Minimum interval has been met for this vaccine: Yes  ABN completed: Yes    VIS Dated  8/6/21 was given to patient: Yes  All IAC Questionnaire questions were answered \"No.\"    Patient tolerated injection and no adverse effects were observed or reported: Yes    Pt scheduled for next dose in series: No  "

## 2021-09-30 ENCOUNTER — IMMUNIZATION (OUTPATIENT)
Dept: OCCUPATIONAL MEDICINE | Facility: CLINIC | Age: 36
End: 2021-09-30
Payer: COMMERCIAL

## 2021-09-30 DIAGNOSIS — Z23 ENCOUNTER FOR VACCINATION: Primary | ICD-10-CM

## 2021-09-30 PROCEDURE — 0003A PFIZER SARS-COV-2 VACCINE: CPT | Performed by: INTERNAL MEDICINE

## 2021-09-30 PROCEDURE — 91300 PFIZER SARS-COV-2 VACCINE: CPT | Performed by: INTERNAL MEDICINE

## 2021-10-23 DIAGNOSIS — I15.2 HYPERTENSION DUE TO ENDOCRINE DISORDER: ICD-10-CM

## 2021-10-25 RX ORDER — LISINOPRIL 10 MG/1
10 TABLET ORAL DAILY
Qty: 90 TABLET | Refills: 0 | Status: SHIPPED | OUTPATIENT
Start: 2021-10-25 | End: 2022-02-11 | Stop reason: SDUPTHER

## 2021-11-05 ENCOUNTER — OFFICE VISIT (OUTPATIENT)
Dept: ENDOCRINOLOGY | Facility: MEDICAL CENTER | Age: 36
End: 2021-11-05
Attending: INTERNAL MEDICINE
Payer: COMMERCIAL

## 2021-11-05 VITALS
OXYGEN SATURATION: 96 % | DIASTOLIC BLOOD PRESSURE: 70 MMHG | BODY MASS INDEX: 53.92 KG/M2 | HEIGHT: 62 IN | SYSTOLIC BLOOD PRESSURE: 112 MMHG | HEART RATE: 84 BPM | WEIGHT: 293 LBS

## 2021-11-05 DIAGNOSIS — E66.9 OBESITY (BMI 30-39.9): ICD-10-CM

## 2021-11-05 DIAGNOSIS — E11.9 CONTROLLED TYPE 2 DIABETES MELLITUS WITHOUT COMPLICATION, WITHOUT LONG-TERM CURRENT USE OF INSULIN (HCC): ICD-10-CM

## 2021-11-05 DIAGNOSIS — Z79.84 LONG TERM (CURRENT) USE OF ORAL HYPOGLYCEMIC DRUGS: ICD-10-CM

## 2021-11-05 DIAGNOSIS — I15.2 HYPERTENSION DUE TO ENDOCRINE DISORDER: ICD-10-CM

## 2021-11-05 DIAGNOSIS — E28.2 PCOS (POLYCYSTIC OVARIAN SYNDROME): ICD-10-CM

## 2021-11-05 LAB
HBA1C MFR BLD: 5.9 % (ref 0–5.6)
INT CON NEG: ABNORMAL
INT CON POS: ABNORMAL

## 2021-11-05 PROCEDURE — 99212 OFFICE O/P EST SF 10 MIN: CPT | Performed by: INTERNAL MEDICINE

## 2021-11-05 PROCEDURE — 99214 OFFICE O/P EST MOD 30 MIN: CPT | Performed by: INTERNAL MEDICINE

## 2021-11-05 PROCEDURE — 83036 HEMOGLOBIN GLYCOSYLATED A1C: CPT | Performed by: INTERNAL MEDICINE

## 2021-11-05 RX ORDER — SEMAGLUTIDE 1.34 MG/ML
1 INJECTION, SOLUTION SUBCUTANEOUS
COMMUNITY
End: 2021-11-08 | Stop reason: SDUPTHER

## 2021-11-05 RX ORDER — PHENTERMINE HYDROCHLORIDE 37.5 MG/1
37.5 CAPSULE ORAL EVERY MORNING
Qty: 30 CAPSULE | Refills: 5 | Status: SHIPPED | OUTPATIENT
Start: 2021-11-05 | End: 2021-12-05

## 2021-11-05 ASSESSMENT — FIBROSIS 4 INDEX: FIB4 SCORE: 0.45

## 2021-11-05 NOTE — PROGRESS NOTES
RN-CDE Note    Subjective:   Endocrinology Clinic Progress Note  PCP: BEN Fernández    HPI:  Priti Begum is a 36 y.o. old patient who is seen today for review of Type 2 Diabetes.   Recent changes in health: Concerned about her weight.  DM:   Last A1c:   Lab Results   Component Value Date/Time    HBA1C 5.9 (A) 11/05/2021 03:34 PM      Previous A1c was 6.1 on 4/28/21  A1C GOAL: < 7    Diabetes Medications:   Metformin  mg 2 BID  Ozempic 1 mg weekly      Exercise: Walking  Diet: States had more control of her diet when on Phentermine.  Patient's body mass index is 68.59 kg/m². Exercise and nutrition counseling were performed at this visit.    Glucose monitoring frequency: Three times daily  Breakfast: 105-120, Lunch:  and Dinner:   Hypoglycemic episodes: no  Last Retinal Exam: on file and up-to-date  Daily Foot Exam: Yes   Foot Exam:  Monofilament: done  Monofilament testing with a 10 gram force: sensation intact: intact bilaterally  Visual Inspection: Feet without maceration, ulcers, fissures.  Pedal pulses: intact bilaterally   Lab Results   Component Value Date/Time    MALBCRT see below 04/28/2021 06:21 AM    MICROALBUR <1.2 04/28/2021 06:21 AM     She  reports that she has never smoked. She has never used smokeless tobacco.      Plan:     Discussed and educated on:   - All medications, side effects and compliance (discussed carefully)  - Annual eye examinations at Ophthalmology  - Home glucose monitoring emphasized  - Weight control and daily exercise    Recommended medication changes: No changes at this time.  She would like help with losing weight and did better with Phentermine.

## 2021-11-05 NOTE — PROGRESS NOTES
CHIEF COMPLAINT: Patient is here for follow up of Type 2 Diabetes Mellitus .    HPI:     Priti Begum is a 36 y.o. female with Type 2 Diabetes Mellitus here for follow up.    Labs from 11/5/2021 show a1c is 5.9%  Labs from 4/28/2021 show Hba1c is 6.1%  Labs from 11/3/2020 show Hba1c was 5.8%  Labs from 4/13/2020 show HbA1c was 6.1%    She is on Metformin 500mg ER 2 pills  twice daily and  Ozempic 1.0mg weekly.        Her LDL cholesterol was 85 on April 2021  She is currently not on a statin for primary prevention because she she is of reproductive age and she is still young and low risk for cardiovascular disease      She also has HTN which is well controlled  She is on lisinopril and is tolerating this well  She does not have diabetic kidney disease and her last urine microalbumin creatinine ratio was normal on April 2021        She has a history of morbid obesity and other obesity related complications such as PCOS and fatty liver disease with a previous baseline weight of over 400 pounds.      She reports weight gain since stopping phentermine  She wants to get back on phentermine       With regards to her PCOS  Her testosterone levels are controlled  Total testosterone was 27 on April 2021 she denies problems with her periods and denies severe hirsutism            BG Diary:  She reports that her sugars range from     Weight has been stable    Diabetes Complications   Retinopathy: No known retinopathy.  Last eye exam: Jan 17 2020 with [facility]  Neuropathy: Denies paresthesias or numbness in hands or feet. Denies any foot wounds.  Exercise: Minimal.  Diet: Fair.  Patient's medications, allergies, and social histories were reviewed and updated as appropriate.    ROS:     CONS:     No fever, no chills   EYES:     No diplopia, no blurry vision   CV:           No chest pain, no palpitations   PULM:     No SOB, no cough, no hemoptysis.   GI:            No nausea, no vomiting, no  "diarrhea, no constipation   ENDO:     No polyuria, no polydipsia, no heat intolerance, no cold intolerance       Past Medical History:  Problem List:  2020-08: Long term (current) use of oral hypoglycemic drugs  2020-01: Metabolic syndrome  2019-06: Controlled type 2 diabetes mellitus without complication,   without long-term current use of insulin ([facility])  2019-06: Vitamin D deficiency  2019-04: Preventative health care  2019-03: Hypertension due to endocrine disorder  2019-02: Uncontrolled type 2 diabetes mellitus with hyperglycemia   ([facility])  2019-02: Candidiasis of skin  2016-01: Fatty liver disease, nonalcoholic  2016-01: Hypomenorrhea  2016-01: Family history of diabetes mellitus (DM)  2013-10: Morbidly obese ([facility])  2013-10: PCOS (polycystic ovarian syndrome)      Past Surgical History:  No past surgical history on file.     Allergies:  Glimepiride [kdc:yellow dye+brilliant blue fcf+glimepiride]     Social History:  Social History     Tobacco Use   • Smoking status: Never Smoker   • Smokeless tobacco: Never Used   Vaping Use   • Vaping Use: Never used   Substance Use Topics   • Alcohol use: No   • Drug use: No        Family History:   family history includes Diabetes in her maternal grandmother and mother; Hypertension in her mother; Kidney Disease in her mother; Lung Disease in her maternal grandmother.      PHYSICAL EXAM:   OBJECTIVE:  Vital signs: /70   Pulse 84   Ht 1.575 m (5' 2\")   Wt (!) 170 kg (375 lb)   SpO2 96%   BMI 68.59 kg/m²   GENERAL: Morbidly obese female in no apparent distress.   EYE:  No ocular asymmetry, PERRLA  HENT: Pink, moist mucous membranes.    NECK: No thyromegaly.   CARDIOVASCULAR:  No murmurs  LUNGS: Clear breath sounds  ABDOMEN: Soft, nontender   EXTREMITIES: No clubbing, cyanosis, or edema.   NEUROLOGICAL: No gross focal motor abnormalities   LYMPH: No cervical adenopathy palpated.   SKIN: No rashes, lesions.   Monofilament testing with a 10 gram force: sensation: intact " bilaterally  Visual Inspection: Feet without maceration, ulcers, or fissures.  Pedal pulses: intact bilaterally      Labs:  Lab Results   Component Value Date/Time    HBA1C 6.1 (H) 04/28/2021 06:22 AM        Lab Results   Component Value Date/Time    WBC 9.4 12/24/2019 06:19 AM    RBC 5.25 12/24/2019 06:19 AM    HEMOGLOBIN 14.3 12/24/2019 06:19 AM    MCV 85.5 12/24/2019 06:19 AM    MCH 27.2 12/24/2019 06:19 AM    MCHC 31.8 (L) 12/24/2019 06:19 AM    RDW 40.8 12/24/2019 06:19 AM    MPV 10.6 12/24/2019 06:19 AM       Lab Results   Component Value Date/Time    SODIUM 136 04/28/2021 06:22 AM    POTASSIUM 4.1 04/28/2021 06:22 AM    CHLORIDE 102 04/28/2021 06:22 AM    CO2 26 04/28/2021 06:22 AM    ANION 8.0 04/28/2021 06:22 AM    GLUCOSE 114 (H) 04/28/2021 06:22 AM    BUN 10 04/28/2021 06:22 AM    CREATININE 0.53 04/28/2021 06:22 AM    CALCIUM 9.3 04/28/2021 06:22 AM    ASTSGOT 16 04/28/2021 06:22 AM    ALTSGPT 22 04/28/2021 06:22 AM    TBILIRUBIN 0.5 04/28/2021 06:22 AM    ALBUMIN 4.0 04/28/2021 06:22 AM    TOTPROTEIN 7.7 04/28/2021 06:22 AM    GLOBULIN 3.7 (H) 04/28/2021 06:22 AM    AGRATIO 1.1 04/28/2021 06:22 AM       Lab Results   Component Value Date/Time    CHOLSTRLTOT 140 12/24/2019 0619    TRIGLYCERIDE 123 12/24/2019 0619    HDL 40 12/24/2019 0619    LDL 75 12/24/2019 0619       Lab Results   Component Value Date/Time    MALBCRT see below 04/28/2021 06:21 AM    MICROALBUR <1.2 04/28/2021 06:21 AM        Lab Results   Component Value Date/Time    TSHULTRASEN 3.320 02/14/2019 1142     No results found for: FREEDIR  No results found for: FREET3  No results found for: THYSTIMIG        ASSESSMENT/PLAN:     1. Controlled type 2 diabetes mellitus without complication, without long-term current use of insulin (HCC)  Well-controlled  Continue Metformin extended release to 1000 mg twice a day  Continue Ozempic 1 mg once a week  She is up-to-date with her labs  I am reordering phentermine to help with weight loss  We will  plan for follow-up in 6 months with repeat of A1c    2. Hypertension   Well-controlled  Continue current medications  Repeat urine albumin with next labs    3. PCOS (polycystic ovarian syndrome)  Controlled  Continue Metformin  Continue with efforts with exercise and weight loss      4. Long term (current) use of oral hypoglycemic drugs  Patient is on multiple oral agents for diabetes management      Return in about 6 months (around 5/5/2022).      Thank you kindly for allowing me to participate in the diabetes care plan for this patient.    Dickson Rivera MD, [facility], [facility]  04/24/20    CC:   Shelby Sparks M.D.

## 2021-11-08 DIAGNOSIS — E11.9 CONTROLLED TYPE 2 DIABETES MELLITUS WITHOUT COMPLICATION, WITHOUT LONG-TERM CURRENT USE OF INSULIN (HCC): ICD-10-CM

## 2021-11-08 RX ORDER — SEMAGLUTIDE 1.34 MG/ML
1 INJECTION, SOLUTION SUBCUTANEOUS
Qty: 9 ML | Refills: 3 | Status: SHIPPED | OUTPATIENT
Start: 2021-11-08 | End: 2022-02-22

## 2021-11-12 ENCOUNTER — OFFICE VISIT (OUTPATIENT)
Dept: MEDICAL GROUP | Facility: MEDICAL CENTER | Age: 36
End: 2021-11-12
Payer: COMMERCIAL

## 2021-11-12 VITALS
TEMPERATURE: 98.1 F | SYSTOLIC BLOOD PRESSURE: 116 MMHG | HEART RATE: 86 BPM | HEIGHT: 62 IN | OXYGEN SATURATION: 96 % | WEIGHT: 293 LBS | DIASTOLIC BLOOD PRESSURE: 72 MMHG | BODY MASS INDEX: 53.92 KG/M2

## 2021-11-12 DIAGNOSIS — I15.2 HYPERTENSION DUE TO ENDOCRINE DISORDER: ICD-10-CM

## 2021-11-12 DIAGNOSIS — E66.01 MORBID OBESITY (HCC): ICD-10-CM

## 2021-11-12 DIAGNOSIS — E55.9 VITAMIN D DEFICIENCY: ICD-10-CM

## 2021-11-12 DIAGNOSIS — K76.0 FATTY LIVER DISEASE, NONALCOHOLIC: ICD-10-CM

## 2021-11-12 DIAGNOSIS — E11.9 CONTROLLED TYPE 2 DIABETES MELLITUS WITHOUT COMPLICATION, WITHOUT LONG-TERM CURRENT USE OF INSULIN (HCC): ICD-10-CM

## 2021-11-12 PROBLEM — Z00.00 PREVENTATIVE HEALTH CARE: Status: RESOLVED | Noted: 2019-04-09 | Resolved: 2021-11-12

## 2021-11-12 PROCEDURE — 99214 OFFICE O/P EST MOD 30 MIN: CPT | Performed by: FAMILY MEDICINE

## 2021-11-12 ASSESSMENT — ENCOUNTER SYMPTOMS
ABDOMINAL PAIN: 0
NERVOUS/ANXIOUS: 0
COUGH: 0
FEVER: 0
CHILLS: 0
TINGLING: 0
SORE THROAT: 0
DIZZINESS: 0
WEAKNESS: 0
DOUBLE VISION: 0
CONSTIPATION: 0
WEIGHT LOSS: 0
BLURRED VISION: 0
MYALGIAS: 0
BRUISES/BLEEDS EASILY: 0
PALPITATIONS: 0
NAUSEA: 0
DEPRESSION: 0
VOMITING: 0
DIARRHEA: 0
SHORTNESS OF BREATH: 0

## 2021-11-12 ASSESSMENT — FIBROSIS 4 INDEX: FIB4 SCORE: 0.45

## 2021-11-12 NOTE — ASSESSMENT & PLAN NOTE
Patient reports that she follows with endocrinology Dr. Briceño manages her diabetes.  She is currently taking Metformin 1000 mg 2 times a day and Ozempic 1 mg every 7 days.  Reports that her last A1c was 5.9.

## 2021-11-12 NOTE — ASSESSMENT & PLAN NOTE
Liver enzymes have been in a normal range.  She has not had repeat imaging completed in regards to her fatty liver.

## 2021-11-12 NOTE — PROGRESS NOTES
Priti Begum is a pleasant 36 y.o. female here to establish care.    HPI:   Fatty liver disease, nonalcoholic  Liver enzymes have been in a normal range.  She has not had repeat imaging completed in regards to her fatty liver.    Hypertension due to endocrine disorder  Patient reports that she takes lisinopril 10 mg daily which is very beneficial for her blood pressure.  Denies any elevations to her blood pressure while on this medication.  Returns her pressures regularly.  Nuys any chest pain, shortness of breath, difficulty, cough.    Controlled type 2 diabetes mellitus without complication, without long-term current use of insulin (HCC)  Patient reports that she follows with endocrinology Dr. Briceño manages her diabetes.  She is currently taking Metformin 1000 mg 2 times a day and Ozempic 1 mg every 7 days.  Reports that her last A1c was 5.9.    Vitamin D deficiency  Patient reports that she currently takes vitamin D 1000 units daily top of the remedy deficiency.    Morbidly obese (HCC)  Recently started on phentermine 37.5 mg every morning by her endocrinologist.      Health Maintenance  Patient is up-to-date with all of her health maintenance.    Current medicines (including changes today)  Current Outpatient Medications   Medication Sig Dispense Refill   • Semaglutide, 1 MG/DOSE, (OZEMPIC, 1 MG/DOSE,) 4 MG/3ML Solution Pen-injector Inject 1 mg under the skin every 7 days. 9 mL 3   • phentermine 37.5 MG capsule Take 1 Capsule by mouth every morning for 30 days. 30 Capsule 5   • lisinopril (PRINIVIL) 10 MG Tab Take 1 Tablet by mouth every day. 90 Tablet 0   • Blood Glucose Test Strips USE 1 STRIP TO CHECK GLUCOSE ONCE DAILY AND  AS  NEEDED  FOR  SYMPTOMS  OF  HIGH  OR  LOW  SUGAR  *E11.9* 50 Strip 3   • triamcinolone acetonide (KENALOG) 0.1 % Cream Apply once or twice daily to affected area of leg rash for 2 weeks 15 g 1   • metFORMIN ER (GLUCOPHAGE XR) 500 MG TABLET SR 24 HR Take 2 Tablets  by mouth 2 times a day. 360 tablet 3   • Lancets Lancets order: Lancets for One Touch Delica   Sig: use 1x/day and prn ssx high or low sugar. #100 RF x 3 100 Each 0   • hydrOXYzine HCl (ATARAX) 25 MG Tab Take 1 Tab by mouth 3 times a day as needed for Anxiety. 30 Tab 1   • vitamin D (CHOLECALCIFEROL) 1000 UNIT Tab Take 1,000 Units by mouth every day.       No current facility-administered medications for this visit.       Past Medical/ Surgical History  She  has a past medical history of Diabetes ([facility]). She also has no past medical history of Allergy, Arrhythmia, Asthma, or Cancer ([facility]).  She  has no past surgical history on file.    Social History  Social History     Tobacco Use   • Smoking status: Never Smoker   • Smokeless tobacco: Never Used   Vaping Use   • Vaping Use: Never used   Substance Use Topics   • Alcohol use: No   • Drug use: No     Social History     Social History Narrative   • Not on file        Family History  Family History   Problem Relation Age of Onset   • Diabetes Mother    • Hypertension Mother    • Kidney Disease Mother    • Hyperlipidemia Mother    • Lung Disease Maternal Grandmother         emphysema (smoker)   • Diabetes Maternal Grandmother    • No Known Problems Sister    • No Known Problems Brother    • No Known Problems Brother    • No Known Problems Father      Family Status   Relation Name Status   • Mo  Alive   • MGMo  Alive   • Sis  Alive   • Bro  Alive   • MGFa   at age 50        heart failure   • Bro  Alive   • Fa  (Not Specified)         Review of Systems   Constitutional: Negative for chills, fever, malaise/fatigue and weight loss.   HENT: Negative for hearing loss and sore throat.    Eyes: Negative for blurred vision and double vision.   Respiratory: Negative for cough and shortness of breath.    Cardiovascular: Negative for chest pain, palpitations and leg swelling.   Gastrointestinal: Negative for abdominal pain, constipation, diarrhea, nausea and vomiting.  "  Musculoskeletal: Negative for myalgias.   Skin: Negative for rash.   Neurological: Negative for dizziness, tingling and weakness.   Endo/Heme/Allergies: Does not bruise/bleed easily.   Psychiatric/Behavioral: Negative for depression. The patient is not nervous/anxious.          Objective:     /72 (BP Location: Right arm, Patient Position: Sitting, BP Cuff Size: Adult)   Pulse 86   Temp 36.7 °C (98.1 °F) (Temporal)   Ht 1.575 m (5' 2\")   Wt (!) 167 kg (368 lb 13.3 oz)   SpO2 96%  Body mass index is 67.46 kg/m².    Physical Exam  Constitutional:       General: She is not in acute distress.  HENT:      Head: Normocephalic and atraumatic.      Right Ear: External ear normal.      Left Ear: External ear normal.   Eyes:      Conjunctiva/sclera: Conjunctivae normal.      Pupils: Pupils are equal, round, and reactive to light.   Cardiovascular:      Rate and Rhythm: Normal rate and regular rhythm.      Heart sounds: No murmur heard.  No friction rub. No gallop.    Pulmonary:      Effort: Pulmonary effort is normal.      Breath sounds: Normal breath sounds. No wheezing or rales.   Abdominal:      General: Bowel sounds are normal.      Palpations: Abdomen is soft.      Tenderness: There is no abdominal tenderness.   Musculoskeletal:      Cervical back: Normal range of motion and neck supple.      Right lower leg: No edema.      Left lower leg: No edema.   Skin:     General: Skin is warm and dry.      Findings: No rash.   Neurological:      Mental Status: She is alert and oriented to person, place, and time.      Gait: Gait is intact.   Psychiatric:         Mood and Affect: Affect normal.        Imaging:  No recent imaging to review    Labs:  Most recent labs from 04/20/2021, 11/05/2021 reviewed with patient.  Assessment and Plan:   The following treatment plan was discussed    1. Controlled type 2 diabetes mellitus without complication, without long-term current use of insulin (HCC)  Chronic, stable.  Recommend " that the patient continue taking her Metformin and her Ozempic as prescribed by her endocrinologist.  I also recommended to continue follow-up with her endocrinologist regarding her sugars.    2. Hypertension due to endocrine disorder  Chronic, stable.  Recommend that the patient continue taking the lisinopril to help control her elevated blood pressure.    3. Morbidly obese (HCC)  Chronic, unstable.  I recommended that the patient continue with the phentermine as prescribed by her endocrinologist to help reduce her overall weight.    4. Vitamin D deficiency  Chronic, stable.  Recommend the patient continue taking vitamin D supplementation to help with her low vitamin D levels.    Records requested.  Followup: Return in about 1 year (around 11/12/2022), or if symptoms worsen or fail to improve, for annual.      Please note that this dictation was created using voice recognition software. I have made every reasonable attempt to correct obvious errors, but I expect that there are errors of grammar and possibly content that I did not discover before finalizing the note.

## 2021-11-12 NOTE — ASSESSMENT & PLAN NOTE
Patient reports that she takes lisinopril 10 mg daily which is very beneficial for her blood pressure.  Denies any elevations to her blood pressure while on this medication.  Returns her pressures regularly.  Nuys any chest pain, shortness of breath, difficulty, cough.

## 2021-11-15 DIAGNOSIS — M25.562 CHRONIC PAIN OF LEFT KNEE: ICD-10-CM

## 2021-11-15 DIAGNOSIS — G89.29 CHRONIC PAIN OF LEFT KNEE: ICD-10-CM

## 2021-11-15 NOTE — PROGRESS NOTES
Knee pain off-and-on for the last several years.  Worsens when she bends her left knee.  Referral placed to physical therapy initially.  Physical therapy is unable to provide relief for the patient we will go ahead and and refer to sports medicine.  Did recommend that the patient use a knee brace to help support the knee.

## 2022-02-14 ENCOUNTER — OFFICE VISIT (OUTPATIENT)
Dept: MEDICAL GROUP | Facility: MEDICAL CENTER | Age: 37
End: 2022-02-14
Payer: COMMERCIAL

## 2022-02-14 ENCOUNTER — HOSPITAL ENCOUNTER (OUTPATIENT)
Facility: MEDICAL CENTER | Age: 37
End: 2022-02-14
Attending: FAMILY MEDICINE
Payer: COMMERCIAL

## 2022-02-14 VITALS
BODY MASS INDEX: 67.46 KG/M2 | DIASTOLIC BLOOD PRESSURE: 62 MMHG | HEART RATE: 89 BPM | HEIGHT: 62 IN | TEMPERATURE: 97.1 F | SYSTOLIC BLOOD PRESSURE: 114 MMHG | OXYGEN SATURATION: 95 %

## 2022-02-14 DIAGNOSIS — B97.89 SORE THROAT (VIRAL): ICD-10-CM

## 2022-02-14 DIAGNOSIS — J02.8 SORE THROAT (VIRAL): ICD-10-CM

## 2022-02-14 PROBLEM — J02.9 SORE THROAT: Status: ACTIVE | Noted: 2022-02-14

## 2022-02-14 LAB
EXTERNAL QUALITY CONTROL: NORMAL
SARS-COV+SARS-COV-2 AG RESP QL IA.RAPID: NORMAL

## 2022-02-14 PROCEDURE — 0240U HCHG SARS-COV-2 COVID-19 NFCT DS RESP RNA 3 TRGT MIC: CPT

## 2022-02-14 PROCEDURE — 87426 SARSCOV CORONAVIRUS AG IA: CPT | Performed by: FAMILY MEDICINE

## 2022-02-14 PROCEDURE — 99213 OFFICE O/P EST LOW 20 MIN: CPT | Performed by: FAMILY MEDICINE

## 2022-02-14 RX ORDER — PHENTERMINE HYDROCHLORIDE 37.5 MG/1
CAPSULE ORAL
COMMUNITY
Start: 2022-01-18 | End: 2022-02-22

## 2022-02-14 RX ORDER — BLOOD SUGAR DIAGNOSTIC
STRIP MISCELLANEOUS
COMMUNITY
Start: 2022-01-17 | End: 2022-02-23 | Stop reason: SDUPTHER

## 2022-02-14 ASSESSMENT — ENCOUNTER SYMPTOMS
SORE THROAT: 1
DEPRESSION: 0
DIARRHEA: 0
SHORTNESS OF BREATH: 0
MYALGIAS: 0
WHEEZING: 0
CHILLS: 1
CONSTIPATION: 0
HEADACHES: 0
COUGH: 1
WEAKNESS: 0
ABDOMINAL PAIN: 0
NAUSEA: 0
DIZZINESS: 0
PALPITATIONS: 0
FEVER: 0
BLURRED VISION: 0

## 2022-02-14 NOTE — PROGRESS NOTES
Priti Sarmienot is a pleasant 36 y.o. female here for   Chief Complaint   Patient presents with   • Coronavirus Screening      HPI:   Problem   Sore Throat (Viral)    Started Friday am with a scratchy throat, developed a cough in the evening.  Worsening of her sore throat on Saturday and increase in fatigue.  Headaches off/ on for the last week.  She was with her mother on Saturday who developed similar symptoms on Friday.  + nasal drainage that is green/ yellow, and body aches.  Denies fevers, but reports chills  Denies sneezing, but her mother is have frequent episodes of sneezing.          Health Maintenance  A1c: Patient is due, has an appointment with endocrinology and will complete a POCT A1c at that time.    Current Medicines (including changes today)  Current Outpatient Medications   Medication Sig Dispense Refill   • lisinopril (PRINIVIL) 10 MG Tab Take 1 Tablet by mouth every day. 90 Tablet 3   • Semaglutide, 1 MG/DOSE, (OZEMPIC, 1 MG/DOSE,) 4 MG/3ML Solution Pen-injector Inject 1 mg under the skin every 7 days. 9 mL 3   • Blood Glucose Test Strips USE 1 STRIP TO CHECK GLUCOSE ONCE DAILY AND  AS  NEEDED  FOR  SYMPTOMS  OF  HIGH  OR  LOW  SUGAR  *E11.9* 50 Strip 3   • triamcinolone acetonide (KENALOG) 0.1 % Cream Apply once or twice daily to affected area of leg rash for 2 weeks 15 g 1   • metFORMIN ER (GLUCOPHAGE XR) 500 MG TABLET SR 24 HR Take 2 Tablets by mouth 2 times a day. 360 tablet 3   • Lancets Lancets order: Lancets for One Touch Delica   Sig: use 1x/day and prn ssx high or low sugar. #100 RF x 3 100 Each 0   • hydrOXYzine HCl (ATARAX) 25 MG Tab Take 1 Tab by mouth 3 times a day as needed for Anxiety. 30 Tab 1   • vitamin D (CHOLECALCIFEROL) 1000 UNIT Tab Take 1,000 Units by mouth every day.       No current facility-administered medications for this visit.     Past Medical/ Surgical History  She  has a past medical history of Diabetes (HCC). She also has no past medical history of  "Allergy, Arrhythmia, Asthma, or Cancer ([facility]).  She  has no past surgical history on file.    Review of Systems   Constitutional: Positive for chills and malaise/fatigue. Negative for fever.   HENT: Positive for congestion and sore throat. Negative for hearing loss.    Eyes: Negative for blurred vision.   Respiratory: Positive for cough. Negative for shortness of breath and wheezing.    Cardiovascular: Negative for chest pain and palpitations.   Gastrointestinal: Negative for abdominal pain, constipation, diarrhea and nausea.   Genitourinary: Negative.    Musculoskeletal: Negative for myalgias.   Skin: Negative for rash.   Neurological: Negative for dizziness, weakness and headaches.   Psychiatric/Behavioral: Negative for depression.         Objective:     /62 (BP Location: Left arm, Patient Position: Sitting, BP Cuff Size: Large adult)   Pulse 89   Temp 36.2 °C (97.1 °F) (Temporal)   Ht 1.575 m (5' 2\")   SpO2 95%  Body mass index is 67.46 kg/m².    Physical Exam  Constitutional:       General: She is not in acute distress.  HENT:      Head: Normocephalic and atraumatic.      Right Ear: External ear normal.      Left Ear: External ear normal.      Nose: Mucosal edema present.      Mouth/Throat:      Pharynx: Posterior oropharyngeal erythema (Mild) present. No pharyngeal swelling or oropharyngeal exudate.   Eyes:      Conjunctiva/sclera: Conjunctivae normal.      Pupils: Pupils are equal, round, and reactive to light.   Cardiovascular:      Rate and Rhythm: Normal rate and regular rhythm.      Heart sounds: No murmur heard.  No friction rub. No gallop.    Pulmonary:      Effort: Pulmonary effort is normal.      Breath sounds: Normal breath sounds. No wheezing or rales.   Abdominal:      General: Bowel sounds are normal.      Palpations: Abdomen is soft.      Tenderness: There is no abdominal tenderness.   Musculoskeletal:      Cervical back: Normal range of motion and neck supple.   Skin:     General: Skin " is warm and dry.      Findings: No rash.   Neurological:      Mental Status: She is alert and oriented to person, place, and time.      Gait: Gait is intact.   Psychiatric:         Mood and Affect: Affect normal.        Imaging:  No recent imaging to review    Labs  02/14/2022 POCT COVID: Negative  Assessment and Plan:   The following treatment plan was discussed    Proper PPE such as gloves, N95 mask, eye protection and gown worn during the full duration of the exam.  Problem List Items Addressed This Visit     Sore throat (viral)     Acute, with a negative rapid Covid test we will go ahead and obtain a Covid PCR as well as influenza.  I recommended that the patient drink plenty of fluids and the use of over-the-counter remedies such as ibuprofen, Tylenol, NyQuil, DayQuil, decongestions to help with any discomfort that she is experiencing.  I recommended that she continue to isolate at home until she is able to get the results of her test back.         Relevant Orders    POCT SARS-COV Antigen ELIEL (Symptomatic Only) (Completed)    CoV-2 and Flu A/B by PCR (24 hour In-House): Collect NP swab in VTM           Followup: Return if symptoms worsen or fail to improve.    I have placed Covid swab orders.  The MA is preforming Covid swab orders under the direction of Dr. Webber    Please note that this dictation was created using voice recognition software. I have made every reasonable attempt to correct obvious errors, but I expect that there are errors of grammar and possibly content that I did not discover before finalizing the note.

## 2022-02-14 NOTE — ASSESSMENT & PLAN NOTE
Acute, with a negative rapid Covid test we will go ahead and obtain a Covid PCR as well as influenza.  I recommended that the patient drink plenty of fluids and the use of over-the-counter remedies such as ibuprofen, Tylenol, NyQuil, DayQuil, decongestions to help with any discomfort that she is experiencing.  I recommended that she continue to isolate at home until she is able to get the results of her test back.

## 2022-02-15 DIAGNOSIS — B97.89 SORE THROAT (VIRAL): ICD-10-CM

## 2022-02-15 DIAGNOSIS — J02.8 SORE THROAT (VIRAL): ICD-10-CM

## 2022-02-15 LAB
FLUAV RNA SPEC QL NAA+PROBE: NEGATIVE
FLUBV RNA SPEC QL NAA+PROBE: NEGATIVE
SARS-COV-2 RNA RESP QL NAA+PROBE: NOTDETECTED
SPECIMEN SOURCE: NORMAL

## 2022-02-22 ENCOUNTER — OFFICE VISIT (OUTPATIENT)
Dept: ENDOCRINOLOGY | Facility: MEDICAL CENTER | Age: 37
End: 2022-02-22
Attending: INTERNAL MEDICINE
Payer: COMMERCIAL

## 2022-02-22 ENCOUNTER — OFFICE VISIT (OUTPATIENT)
Dept: MEDICAL GROUP | Facility: MEDICAL CENTER | Age: 37
End: 2022-02-22
Payer: COMMERCIAL

## 2022-02-22 ENCOUNTER — HOSPITAL ENCOUNTER (OUTPATIENT)
Dept: LAB | Facility: MEDICAL CENTER | Age: 37
End: 2022-02-22
Attending: INTERNAL MEDICINE
Payer: COMMERCIAL

## 2022-02-22 ENCOUNTER — HOSPITAL ENCOUNTER (OUTPATIENT)
Dept: LAB | Facility: MEDICAL CENTER | Age: 37
End: 2022-02-22
Attending: FAMILY MEDICINE
Payer: COMMERCIAL

## 2022-02-22 VITALS
HEIGHT: 62 IN | BODY MASS INDEX: 67.46 KG/M2 | SYSTOLIC BLOOD PRESSURE: 124 MMHG | OXYGEN SATURATION: 97 % | DIASTOLIC BLOOD PRESSURE: 64 MMHG | HEART RATE: 87 BPM | TEMPERATURE: 97.3 F

## 2022-02-22 VITALS
BODY MASS INDEX: 53.92 KG/M2 | OXYGEN SATURATION: 98 % | SYSTOLIC BLOOD PRESSURE: 108 MMHG | HEART RATE: 116 BPM | WEIGHT: 293 LBS | DIASTOLIC BLOOD PRESSURE: 72 MMHG | HEIGHT: 62 IN

## 2022-02-22 DIAGNOSIS — Z79.84 LONG TERM (CURRENT) USE OF ORAL HYPOGLYCEMIC DRUGS: ICD-10-CM

## 2022-02-22 DIAGNOSIS — Z32.01 POSITIVE URINE PREGNANCY TEST: ICD-10-CM

## 2022-02-22 DIAGNOSIS — Z3A.12 12 WEEKS GESTATION OF PREGNANCY: ICD-10-CM

## 2022-02-22 DIAGNOSIS — E11.9 CONTROLLED TYPE 2 DIABETES MELLITUS WITHOUT COMPLICATION, WITHOUT LONG-TERM CURRENT USE OF INSULIN (HCC): ICD-10-CM

## 2022-02-22 DIAGNOSIS — I15.2 HYPERTENSION DUE TO ENDOCRINE DISORDER: ICD-10-CM

## 2022-02-22 DIAGNOSIS — E28.2 PCOS (POLYCYSTIC OVARIAN SYNDROME): ICD-10-CM

## 2022-02-22 DIAGNOSIS — E66.9 OBESITY (BMI 30-39.9): ICD-10-CM

## 2022-02-22 DIAGNOSIS — E55.9 VITAMIN D DEFICIENCY: ICD-10-CM

## 2022-02-22 LAB
B-HCG SERPL-ACNC: ABNORMAL MIU/ML (ref 0–10)
HBA1C MFR BLD: 5.6 % (ref 0–5.6)
INT CON NEG: NORMAL
INT CON POS: NORMAL
T4 FREE SERPL-MCNC: 1.37 NG/DL (ref 0.93–1.7)
TSH SERPL DL<=0.005 MIU/L-ACNC: 1.96 UIU/ML (ref 0.38–5.33)

## 2022-02-22 PROCEDURE — 84702 CHORIONIC GONADOTROPIN TEST: CPT

## 2022-02-22 PROCEDURE — 36415 COLL VENOUS BLD VENIPUNCTURE: CPT

## 2022-02-22 PROCEDURE — 99214 OFFICE O/P EST MOD 30 MIN: CPT | Performed by: INTERNAL MEDICINE

## 2022-02-22 PROCEDURE — 83036 HEMOGLOBIN GLYCOSYLATED A1C: CPT | Performed by: INTERNAL MEDICINE

## 2022-02-22 PROCEDURE — 99214 OFFICE O/P EST MOD 30 MIN: CPT | Performed by: FAMILY MEDICINE

## 2022-02-22 PROCEDURE — 84439 ASSAY OF FREE THYROXINE: CPT

## 2022-02-22 PROCEDURE — 99212 OFFICE O/P EST SF 10 MIN: CPT | Performed by: INTERNAL MEDICINE

## 2022-02-22 PROCEDURE — 84443 ASSAY THYROID STIM HORMONE: CPT

## 2022-02-22 RX ORDER — INSULIN ASPART 100 [IU]/ML
4 INJECTION, SOLUTION INTRAVENOUS; SUBCUTANEOUS
Qty: 15 ML | Refills: 6 | Status: SHIPPED | OUTPATIENT
Start: 2022-02-22 | End: 2022-02-23 | Stop reason: SDUPTHER

## 2022-02-22 RX ORDER — LABETALOL 100 MG/1
100 TABLET, FILM COATED ORAL 2 TIMES DAILY
Qty: 60 TABLET | Refills: 2 | Status: SHIPPED | OUTPATIENT
Start: 2022-02-22 | End: 2022-03-23

## 2022-02-22 RX ORDER — INSULIN DEGLUDEC INJECTION 100 U/ML
30 INJECTION, SOLUTION SUBCUTANEOUS
Qty: 15 ML | Refills: 6 | Status: SHIPPED | OUTPATIENT
Start: 2022-02-22 | End: 2022-02-23 | Stop reason: SDUPTHER

## 2022-02-22 ASSESSMENT — ENCOUNTER SYMPTOMS
SHORTNESS OF BREATH: 0
COUGH: 0
MYALGIAS: 0
ABDOMINAL PAIN: 0
DIZZINESS: 0
PALPITATIONS: 0
WEAKNESS: 0
CHILLS: 0
DEPRESSION: 0
FEVER: 0
WEIGHT LOSS: 0

## 2022-02-22 ASSESSMENT — PATIENT HEALTH QUESTIONNAIRE - PHQ9: CLINICAL INTERPRETATION OF PHQ2 SCORE: 0

## 2022-02-22 NOTE — ASSESSMENT & PLAN NOTE
Acute.  I recommended that the patient stop taking her lisinopril, labetalol was sent to her preferred pharmacy for blood pressure control.  I did review the rest of her medications such as her medic meds.  It is recommend that she can continue those as hyperglycemia with diabetes can be more harmful to the fetus and the medications.  I did recommend that she reach out to endocrinology to inform them of her pregnancy status.  I did recommend that she contact her gynecologist to also let them know about her potential 12-week gestation time.  Referral was placed to high risk pregnancy center for further management of her pregnancy.  I also placed an ultrasound to confirm gestation of pregnancy.  hCG ordered for the patient, will order a second test for follow-up in 48 hours to confirm viability of pregnancy.

## 2022-02-22 NOTE — PATIENT INSTRUCTIONS
Try taking vitamin B6 to help with any morning sickness that you do have.  Get yourself started on a prenatal vitamin that she will take every day, if it causes you to feel nauseated make sure that you take it before bed.  Stop the lisinopril and change over to the labetalol and continue to monitor your blood pressures.    How a Baby Grows During Pregnancy    Pregnancy begins when a male's sperm enters a female's egg (fertilization). Fertilization usually happens in one of the tubes (fallopian tubes) that connect the ovaries to the womb (uterus). The fertilized egg moves down the fallopian tube to the uterus. Once it reaches the uterus, it implants into the lining of the uterus and begins to grow.  For the first 10 weeks, the fertilized egg is called an embryo. After 10 weeks, it is called a fetus. As the fetus continues to grow, it receives oxygen and nutrients through tissue (placenta) that grows to support the developing baby. The placenta is the life support system for the baby. It provides oxygen and nutrition and removes waste.  Learning as much as you can about your pregnancy and how your baby is developing can help you enjoy the experience. It can also make you aware of when there might be a problem and when to ask questions.  How long does a typical pregnancy last?  A pregnancy usually lasts 280 days, or about 40 weeks. Pregnancy is divided into three periods of growth, also called trimesters:  · First trimester: 0-12 weeks.  · Second trimester: 13-27 weeks.  · Third trimester: 28-40 weeks.  The day when your baby is ready to be born (full term) is your estimated date of delivery.  How does my baby develop month by month?  First month  · The fertilized egg attaches to the inside of the uterus.  · Some cells will form the placenta. Others will form the fetus.  · The arms, legs, brain, spinal cord, lungs, and heart begin to develop.  · At the end of the first month, the heart begins to beat.  Second  month  · The bones, inner ear, eyelids, hands, and feet form.  · The genitals develop.  · By the end of 8 weeks, all major organs are developing.  Third month  · All of the internal organs are forming.  · Teeth develop below the gums.  · Bones and muscles begin to grow. The spine can flex.  · The skin is transparent.  · Fingernails and toenails begin to form.  · Arms and legs continue to grow longer, and hands and feet develop.  · The fetus is about 3 inches (7.6 cm) long.  Fourth month  · The placenta is completely formed.  · The external sex organs, neck, outer ear, eyebrows, eyelids, and fingernails are formed.  · The fetus can hear, swallow, and move its arms and legs.  · The kidneys begin to produce urine.  · The skin is covered with a white, waxy coating (vernix) and very fine hair (lanugo).  Fifth month  · The fetus moves around more and can be felt for the first time (quickening).  · The fetus starts to sleep and wake up and may begin to suck its finger.  · The nails grow to the end of the fingers.  · The organ in the digestive system that makes bile (gallbladder) functions and helps to digest nutrients.  · If your baby is a girl, eggs are present in her ovaries. If your baby is a boy, testicles start to move down into his scrotum.  Sixth month  · The lungs are formed.  · The eyes open. The brain continues to develop.  · Your baby has fingerprints and toe prints. Your baby's hair grows thicker.  · At the end of the second trimester, the fetus is about 9 inches (22.9 cm) long.  Seventh month  · The fetus kicks and stretches.  · The eyes are developed enough to sense changes in light.  · The hands can make a grasping motion.  · The fetus responds to sound.  Eighth month  · All organs and body systems are fully developed and functioning.  · Bones harden, and taste buds develop. The fetus may hiccup.  · Certain areas of the brain are still developing. The skull remains soft.  Ninth month  · The fetus gains about  ½ lb (0.23 kg) each week.  · The lungs are fully developed.  · Patterns of sleep develop.  · The fetus's head typically moves into a head-down position (vertex) in the uterus to prepare for birth.  · The fetus weighs 6-9 lb (2.72-4.08 kg) and is 19-20 inches (48.26-50.8 cm) long.  What can I do to have a healthy pregnancy and help my baby develop?  General instructions  · Take prenatal vitamins as directed by your health care provider. These include vitamins such as folic acid, iron, calcium, and vitamin D. They are important for healthy development.  · Take medicines only as directed by your health care provider. Read labels and ask a pharmacist or your health care provider whether over-the-counter medicines, supplements, and prescription drugs are safe to take during pregnancy.  · Keep all follow-up visits as directed by your health care provider. This is important. Follow-up visits include prenatal care and screening tests.  How do I know if my baby is developing well?  At each prenatal visit, your health care provider will do several different tests to check on your health and keep track of your baby's development. These include:  · Fundal height and position.  ? Your health care provider will measure your growing belly from your pubic bone to the top of the uterus using a tape measure.  ? Your health care provider will also feel your belly to determine your baby's position.  · Heartbeat.  ? An ultrasound in the first trimester can confirm pregnancy and show a heartbeat, depending on how far along you are.  ? Your health care provider will check your baby's heart rate at every prenatal visit.  · Second trimester ultrasound.  ? This ultrasound checks your baby's development. It also may show your baby's gender.  What should I do if I have concerns about my baby's development?  Always talk with your health care provider about any concerns that you may have about your pregnancy and your baby.  Summary  · A  pregnancy usually lasts 280 days, or about 40 weeks. Pregnancy is divided into three periods of growth, also called trimesters.  · Your health care provider will monitor your baby's growth and development throughout your pregnancy.  · Follow your health care provider's recommendations about taking prenatal vitamins and medicines during your pregnancy.  · Talk with your health care provider if you have any concerns about your pregnancy or your developing baby.  This information is not intended to replace advice given to you by your health care provider. Make sure you discuss any questions you have with your health care provider.  Document Released: 06/05/2009 Document Revised: 04/09/2020 Document Reviewed: 10/31/2018  [employer] Patient Education © 2020 [employer]    First Trimester of Pregnancy  The first trimester of pregnancy is from week 1 until the end of week 13 (months 1 through 3). A week after a sperm fertilizes an egg, the egg will implant on the wall of the uterus. This embryo will begin to develop into a baby. Genes from you and your partner will form the baby. The male genes will determine whether the baby will be a boy or a girl. At 6-8 weeks, the eyes and face will be formed, and the heartbeat can be seen on ultrasound. At the end of 12 weeks, all the baby's organs will be formed.  Now that you are pregnant, you will want to do everything you can to have a healthy baby. Two of the most important things are to get good prenatal care and to follow your health care provider's instructions. Prenatal care is all the medical care you receive before the baby's birth. This care will help prevent, find, and treat any problems during the pregnancy and childbirth.  Body changes during your first trimester  Your body goes through many changes during pregnancy. The changes vary from woman to woman.  · You may gain or lose a couple of pounds at first.  · You may feel sick to your stomach (nauseous) and you may  throw up (vomit). If the vomiting is uncontrollable, call your health care provider.  · You may tire easily.  · You may develop headaches that can be relieved by medicines. All medicines should be approved by your health care provider.  · You may urinate more often. Painful urination may mean you have a bladder infection.  · You may develop heartburn as a result of your pregnancy.  · You may develop constipation because certain hormones are causing the muscles that push stool through your intestines to slow down.  · You may develop hemorrhoids or swollen veins (varicose veins).  · Your breasts may begin to grow larger and become tender. Your nipples may stick out more, and the tissue that surrounds them (areola) may become darker.  · Your gums may bleed and may be sensitive to brushing and flossing.  · Dark spots or blotches (chloasma, mask of pregnancy) may develop on your face. This will likely fade after the baby is born.  · Your menstrual periods will stop.  · You may have a loss of appetite.  · You may develop cravings for certain kinds of food.  · You may have changes in your emotions from day to day, such as being excited to be pregnant or being concerned that something may go wrong with the pregnancy and baby.  · You may have more vivid and strange dreams.  · You may have changes in your hair. These can include thickening of your hair, rapid growth, and changes in texture. Some women also have hair loss during or after pregnancy, or hair that feels dry or thin. Your hair will most likely return to normal after your baby is born.  What to expect at prenatal visits  During a routine prenatal visit:  · You will be weighed to make sure you and the baby are growing normally.  · Your blood pressure will be taken.  · Your abdomen will be measured to track your baby's growth.  · The fetal heartbeat will be listened to between weeks 10 and 14 of your pregnancy.  · Test results from any previous visits will be  discussed.  Your health care provider may ask you:  · How you are feeling.  · If you are feeling the baby move.  · If you have had any abnormal symptoms, such as leaking fluid, bleeding, severe headaches, or abdominal cramping.  · If you are using any tobacco products, including cigarettes, chewing tobacco, and electronic cigarettes.  · If you have any questions.  Other tests that may be performed during your first trimester include:  · Blood tests to find your blood type and to check for the presence of any previous infections. The tests will also be used to check for low iron levels (anemia) and protein on red blood cells (Rh antibodies). Depending on your risk factors, or if you previously had diabetes during pregnancy, you may have tests to check for high blood sugar that affects pregnant women (gestational diabetes).  · Urine tests to check for infections, diabetes, or protein in the urine.  · An ultrasound to confirm the proper growth and development of the baby.  · Fetal screens for spinal cord problems (spina bifida) and Down syndrome.  · HIV (human immunodeficiency virus) testing. Routine prenatal testing includes screening for HIV, unless you choose not to have this test.  · You may need other tests to make sure you and the baby are doing well.  Follow these instructions at home:  Medicines  · Follow your health care provider's instructions regarding medicine use. Specific medicines may be either safe or unsafe to take during pregnancy.  · Take a prenatal vitamin that contains at least 600 micrograms (mcg) of folic acid.  · If you develop constipation, try taking a stool softener if your health care provider approves.  Eating and drinking    · Eat a balanced diet that includes fresh fruits and vegetables, whole grains, good sources of protein such as meat, eggs, or tofu, and low-fat dairy. Your health care provider will help you determine the amount of weight gain that is right for you.  · Avoid raw meat  and uncooked cheese. These carry germs that can cause birth defects in the baby.  · Eating four or five small meals rather than three large meals a day may help relieve nausea and vomiting. If you start to feel nauseous, eating a few soda crackers can be helpful. Drinking liquids between meals, instead of during meals, also seems to help ease nausea and vomiting.  · Limit foods that are high in fat and processed sugars, such as fried and sweet foods.  · To prevent constipation:  ? Eat foods that are high in fiber, such as fresh fruits and vegetables, whole grains, and beans.  ? Drink enough fluid to keep your urine clear or pale yellow.  Activity  · Exercise only as directed by your health care provider. Most women can continue their usual exercise routine during pregnancy. Try to exercise for 30 minutes at least 5 days a week. Exercising will help you:  ? Control your weight.  ? Stay in shape.  ? Be prepared for labor and delivery.  · Experiencing pain or cramping in the lower abdomen or lower back is a good sign that you should stop exercising. Check with your health care provider before continuing with normal exercises.  · Try to avoid standing for long periods of time. Move your legs often if you must  one place for a long time.  · Avoid heavy lifting.  · Wear low-heeled shoes and practice good posture.  · You may continue to have sex unless your health care provider tells you not to.  Relieving pain and discomfort  · Wear a good support bra to relieve breast tenderness.  · Take warm sitz baths to soothe any pain or discomfort caused by hemorrhoids. Use hemorrhoid cream if your health care provider approves.  · Rest with your legs elevated if you have leg cramps or low back pain.  · If you develop varicose veins in your legs, wear support hose. Elevate your feet for 15 minutes, 3-4 times a day. Limit salt in your diet.  Prenatal care  · Schedule your prenatal visits by the twelfth week of pregnancy. They  are usually scheduled monthly at first, then more often in the last 2 months before delivery.  · Write down your questions. Take them to your prenatal visits.  · Keep all your prenatal visits as told by your health care provider. This is important.  Safety  · Wear your seat belt at all times when driving.  · Make a list of emergency phone numbers, including numbers for family, friends, the hospital, and police and fire departments.  General instructions  · Ask your health care provider for a referral to a local prenatal education class. Begin classes no later than the beginning of month 6 of your pregnancy.  · Ask for help if you have counseling or nutritional needs during pregnancy. Your health care provider can offer advice or refer you to specialists for help with various needs.  · Do not use hot tubs, steam rooms, or saunas.  · Do not douche or use tampons or scented sanitary pads.  · Do not cross your legs for long periods of time.  · Avoid cat litter boxes and soil used by cats. These carry germs that can cause birth defects in the baby and possibly loss of the fetus by miscarriage or stillbirth.  · Avoid all smoking, herbs, alcohol, and medicines not prescribed by your health care provider. Chemicals in these products affect the formation and growth of the baby.  · Do not use any products that contain nicotine or tobacco, such as cigarettes and e-cigarettes. If you need help quitting, ask your health care provider. You may receive counseling support and other resources to help you quit.  · Schedule a dentist appointment. At home, brush your teeth with a soft toothbrush and be gentle when you floss.  Contact a health care provider if:  · You have dizziness.  · You have mild pelvic cramps, pelvic pressure, or nagging pain in the abdominal area.  · You have persistent nausea, vomiting, or diarrhea.  · You have a bad smelling vaginal discharge.  · You have pain when you urinate.  · You notice increased swelling  in your face, hands, legs, or ankles.  · You are exposed to fifth disease or chickenpox.  · You are exposed to [place] measles (rubella) and have never had it.  Get help right away if:  · You have a fever.  · You are leaking fluid from your vagina.  · You have spotting or bleeding from your vagina.  · You have severe abdominal cramping or pain.  · You have rapid weight gain or loss.  · You vomit blood or material that looks like coffee grounds.  · You develop a severe headache.  · You have shortness of breath.  · You have any kind of trauma, such as from a fall or a car accident.  Summary  · The first trimester of pregnancy is from week 1 until the end of week 13 (months 1 through 3).  · Your body goes through many changes during pregnancy. The changes vary from woman to woman.  · You will have routine prenatal visits. During those visits, your health care provider will examine you, discuss any test results you may have, and talk with you about how you are feeling.  This information is not intended to replace advice given to you by your health care provider. Make sure you discuss any questions you have with your health care provider.  Document Released: 12/12/2002 Document Revised: 11/30/2018 Document Reviewed: 11/29/2017  [employer] Patient Education © 2020 [employer]    Second Trimester of Pregnancy  The second trimester is from week 14 through week 27 (months 4 through 6). The second trimester is often a time when you feel your best. Your body has adjusted to being pregnant, and you begin to feel better physically. Usually, morning sickness has lessened or quit completely, you may have more energy, and you may have an increase in appetite. The second trimester is also a time when the fetus is growing rapidly. At the end of the sixth month, the fetus is about 9 inches long and weighs about 1½ pounds. You will likely begin to feel the baby move (quickening) between 16 and 20 weeks of pregnancy.  Body changes during  your second trimester  Your body continues to go through many changes during your second trimester. The changes vary from woman to woman.  · Your weight will continue to increase. You will notice your lower abdomen bulging out.  · You may begin to get stretch marks on your hips, abdomen, and breasts.  · You may develop headaches that can be relieved by medicines. The medicines should be approved by your health care provider.  · You may urinate more often because the fetus is pressing on your bladder.  · You may develop or continue to have heartburn as a result of your pregnancy.  · You may develop constipation because certain hormones are causing the muscles that push waste through your intestines to slow down.  · You may develop hemorrhoids or swollen, bulging veins (varicose veins).  · You may have back pain. This is caused by:  ? Weight gain.  ? Pregnancy hormones that are relaxing the joints in your pelvis.  ? A shift in weight and the muscles that support your balance.  · Your breasts will continue to grow and they will continue to become tender.  · Your gums may bleed and may be sensitive to brushing and flossing.  · Dark spots or blotches (chloasma, mask of pregnancy) may develop on your face. This will likely fade after the baby is born.  · A dark line from your belly button to the pubic area (linea nigra) may appear. This will likely fade after the baby is born.  · You may have changes in your hair. These can include thickening of your hair, rapid growth, and changes in texture. Some women also have hair loss during or after pregnancy, or hair that feels dry or thin. Your hair will most likely return to normal after your baby is born.  What to expect at prenatal visits  During a routine prenatal visit:  · You will be weighed to make sure you and the fetus are growing normally.  · Your blood pressure will be taken.  · Your abdomen will be measured to track your baby's growth.  · The fetal heartbeat will be  listened to.  · Any test results from the previous visit will be discussed.  Your health care provider may ask you:  · How you are feeling.  · If you are feeling the baby move.  · If you have had any abnormal symptoms, such as leaking fluid, bleeding, severe headaches, or abdominal cramping.  · If you are using any tobacco products, including cigarettes, chewing tobacco, and electronic cigarettes.  · If you have any questions.  Other tests that may be performed during your second trimester include:  · Blood tests that check for:  ? Low iron levels (anemia).  ? High blood sugar that affects pregnant women (gestational diabetes) between 24 and 28 weeks.  ? Rh antibodies. This is to check for a protein on red blood cells (Rh factor).  · Urine tests to check for infections, diabetes, or protein in the urine.  · An ultrasound to confirm the proper growth and development of the baby.  · An amniocentesis to check for possible genetic problems.  · Fetal screens for spina bifida and Down syndrome.  · HIV (human immunodeficiency virus) testing. Routine prenatal testing includes screening for HIV, unless you choose not to have this test.  Follow these instructions at home:  Medicines  · Follow your health care provider's instructions regarding medicine use. Specific medicines may be either safe or unsafe to take during pregnancy.  · Take a prenatal vitamin that contains at least 600 micrograms (mcg) of folic acid.  · If you develop constipation, try taking a stool softener if your health care provider approves.  Eating and drinking    · Eat a balanced diet that includes fresh fruits and vegetables, whole grains, good sources of protein such as meat, eggs, or tofu, and low-fat dairy. Your health care provider will help you determine the amount of weight gain that is right for you.  · Avoid raw meat and uncooked cheese. These carry germs that can cause birth defects in the baby.  · If you have low calcium intake from food, talk  to your health care provider about whether you should take a daily calcium supplement.  · Limit foods that are high in fat and processed sugars, such as fried and sweet foods.  · To prevent constipation:  ? Drink enough fluid to keep your urine clear or pale yellow.  ? Eat foods that are high in fiber, such as fresh fruits and vegetables, whole grains, and beans.  Activity  · Exercise only as directed by your health care provider. Most women can continue their usual exercise routine during pregnancy. Try to exercise for 30 minutes at least 5 days a week. Stop exercising if you experience uterine contractions.  · Avoid heavy lifting, wear low heel shoes, and practice good posture.  · A sexual relationship may be continued unless your health care provider directs you otherwise.  Relieving pain and discomfort  · Wear a good support bra to prevent discomfort from breast tenderness.  · Take warm sitz baths to soothe any pain or discomfort caused by hemorrhoids. Use hemorrhoid cream if your health care provider approves.  · Rest with your legs elevated if you have leg cramps or low back pain.  · If you develop varicose veins, wear support hose. Elevate your feet for 15 minutes, 3-4 times a day. Limit salt in your diet.  Prenatal Care  · Write down your questions. Take them to your prenatal visits.  · Keep all your prenatal visits as told by your health care provider. This is important.  Safety  · Wear your seat belt at all times when driving.  · Make a list of emergency phone numbers, including numbers for family, friends, the hospital, and police and fire departments.  General instructions  · Ask your health care provider for a referral to a local prenatal education class. Begin classes no later than the beginning of month 6 of your pregnancy.  · Ask for help if you have counseling or nutritional needs during pregnancy. Your health care provider can offer advice or refer you to specialists for help with various  needs.  · Do not use hot tubs, steam rooms, or saunas.  · Do not douche or use tampons or scented sanitary pads.  · Do not cross your legs for long periods of time.  · Avoid cat litter boxes and soil used by cats. These carry germs that can cause birth defects in the baby and possibly loss of the fetus by miscarriage or stillbirth.  · Avoid all smoking, herbs, alcohol, and unprescribed drugs. Chemicals in these products can affect the formation and growth of the baby.  · Do not use any products that contain nicotine or tobacco, such as cigarettes and e-cigarettes. If you need help quitting, ask your health care provider.  · Visit your dentist if you have not gone yet during your pregnancy. Use a soft toothbrush to brush your teeth and be gentle when you floss.  Contact a health care provider if:  · You have dizziness.  · You have mild pelvic cramps, pelvic pressure, or nagging pain in the abdominal area.  · You have persistent nausea, vomiting, or diarrhea.  · You have a bad smelling vaginal discharge.  · You have pain when you urinate.  Get help right away if:  · You have a fever.  · You are leaking fluid from your vagina.  · You have spotting or bleeding from your vagina.  · You have severe abdominal cramping or pain.  · You have rapid weight gain or weight loss.  · You have shortness of breath with chest pain.  · You notice sudden or extreme swelling of your face, hands, ankles, feet, or legs.  · You have not felt your baby move in over an hour.  · You have severe headaches that do not go away when you take medicine.  · You have vision changes.  Summary  · The second trimester is from week 14 through week 27 (months 4 through 6). It is also a time when the fetus is growing rapidly.  · Your body goes through many changes during pregnancy. The changes vary from woman to woman.  · Avoid all smoking, herbs, alcohol, and unprescribed drugs. These chemicals affect the formation and growth your baby.  · Do not use any  tobacco products, such as cigarettes, chewing tobacco, and e-cigarettes. If you need help quitting, ask your health care provider.  · Contact your health care provider if you have any questions. Keep all prenatal visits as told by your health care provider. This is important.  This information is not intended to replace advice given to you by your health care provider. Make sure you discuss any questions you have with your health care provider.  Document Released: 12/12/2002 Document Revised: 04/10/2020 Document Reviewed: 01/23/2018  Elsevier Patient Education © 2020 [employer]

## 2022-02-22 NOTE — PROGRESS NOTES
Patient was completed a positive hCG which is elevated, we will go ahead and repeat hCG in 48 hours to confirm viability of pregnancy.

## 2022-02-23 DIAGNOSIS — Z3A.12 12 WEEKS GESTATION OF PREGNANCY: ICD-10-CM

## 2022-02-23 DIAGNOSIS — E11.9 CONTROLLED TYPE 2 DIABETES MELLITUS WITHOUT COMPLICATION, WITHOUT LONG-TERM CURRENT USE OF INSULIN (HCC): ICD-10-CM

## 2022-02-23 RX ORDER — BLOOD SUGAR DIAGNOSTIC
1 STRIP MISCELLANEOUS
Qty: 200 STRIP | Refills: 6 | Status: SHIPPED | OUTPATIENT
Start: 2022-02-23 | End: 2024-03-22 | Stop reason: SDUPTHER

## 2022-02-23 RX ORDER — INSULIN ASPART 100 [IU]/ML
4 INJECTION, SOLUTION INTRAVENOUS; SUBCUTANEOUS
Qty: 15 ML | Refills: 6 | Status: SHIPPED | OUTPATIENT
Start: 2022-02-23 | End: 2022-02-23 | Stop reason: SDUPTHER

## 2022-02-23 RX ORDER — METFORMIN HYDROCHLORIDE 500 MG/1
1000 TABLET, EXTENDED RELEASE ORAL 2 TIMES DAILY
Qty: 360 TABLET | Refills: 3 | Status: SHIPPED | OUTPATIENT
Start: 2022-02-23 | End: 2023-04-06 | Stop reason: SDUPTHER

## 2022-02-23 NOTE — PROGRESS NOTES
CHIEF COMPLAINT: Patient is here for follow up of Type 2 Diabetes Mellitus .    HPI:     Priti Begum is a 36 y.o. female with Type 2 Diabetes Mellitus here for follow up.    Labs from February 2, 2022 show A1c is 5.6%  Labs from 11/5/2021 show a1c was 5.9%  Labs from 4/28/2021 show Hba1c was 6.1%  Labs from 11/3/2020 show Hba1c was 5.8%  Labs from 4/13/2020 show HbA1c was 6.1%    She was on Metformin 500mg ER 2 pills  twice daily and  Ozempic 1.0mg weekly.      Patient comes in due to concerns of recent diagnosis of pregnancy as confirmed by beta hCG testing  This was not expected as she has a history of PCOS  This is her first pregnancy  She has proactively discontinued her ACE inhibitor      Her LDL cholesterol was 85 on April 2021  She is currently not on a statin for primary prevention because she she is of reproductive age and she is still young and low risk for cardiovascular disease      She also has HTN which is well controlled  She is on lisinopril and is tolerating this well  She does not have diabetic kidney disease and her last urine microalbumin creatinine ratio was normal on April 2021  We don't have updated labs on hand      She has a history of morbid obesity and other obesity related complications such as PCOS and fatty liver disease with a previous baseline weight of over 400 pounds.      She has stopped phentermine since a diagnosis of pregnancy      With regards to her PCOS  Her testosterone levels are controlled  Total testosterone was 27 on April 2021            BG Diary:  She reports that her sugars range from     Weight has been stable    Diabetes Complications   Retinopathy: No known retinopathy.  Last eye exam: Jan 17 2020 with [facility]  Neuropathy: Denies paresthesias or numbness in hands or feet. Denies any foot wounds.  Exercise: Minimal.  Diet: Fair.  Patient's medications, allergies, and social histories were reviewed and updated as  "appropriate.    ROS:     CONS:     No fever, no chills   EYES:     No diplopia, no blurry vision   CV:           No chest pain, no palpitations   PULM:     No SOB, no cough, no hemoptysis.   GI:            No nausea, no vomiting, no diarrhea, no constipation   ENDO:     No polyuria, no polydipsia, no heat intolerance, no cold intolerance       Past Medical History:  Problem List:  2022-02: Positive urine pregnancy test  2022-02: Sore throat (viral)  2020-08: Long term (current) use of oral hypoglycemic drugs  2020-01: Metabolic syndrome  2019-06: Controlled type 2 diabetes mellitus without complication,   without long-term current use of insulin (HCC)  2019-06: Vitamin D deficiency  2019-04: Preventative health care  2019-03: Hypertension due to endocrine disorder  2019-02: Uncontrolled type 2 diabetes mellitus with hyperglycemia   (HCC)  2019-02: Candidiasis of skin  2016-01: Fatty liver disease, nonalcoholic  2016-01: Hypomenorrhea  2016-01: Family history of diabetes mellitus (DM)  2013-10: Morbidly obese ([facility])  2013-10: PCOS (polycystic ovarian syndrome)      Past Surgical History:  No past surgical history on file.     Allergies:  Glimepiride [kdc:yellow dye+brilliant blue fcf+glimepiride]     Social History:  Social History     Tobacco Use   • Smoking status: Never Smoker   • Smokeless tobacco: Never Used   Vaping Use   • Vaping Use: Never used   Substance Use Topics   • Alcohol use: No   • Drug use: No        Family History:   family history includes Diabetes in her maternal grandmother and mother; Hyperlipidemia in her mother; Hypertension in her mother; Kidney Disease in her mother; Lung Disease in her maternal grandmother; No Known Problems in her brother, brother, father, and sister.      PHYSICAL EXAM:   OBJECTIVE:  Vital signs: /72 (BP Location: Left arm, Patient Position: Sitting, BP Cuff Size: Adult)   Pulse (!) 116   Ht 1.575 m (5' 2\")   Wt (!) 163 kg (360 lb 1.6 oz)   LMP 11/25/2021 (Exact " Date)   SpO2 98%   Breastfeeding No   BMI 65.86 kg/m²   GENERAL: Morbidly obese female in no apparent distress.   EYE:  No ocular asymmetry, PERRLA  HENT: Pink, moist mucous membranes.    NECK: No thyromegaly.   CARDIOVASCULAR:  No murmurs  LUNGS: Clear breath sounds  ABDOMEN: Soft, nontender   EXTREMITIES: No clubbing, cyanosis, or edema.   NEUROLOGICAL: No gross focal motor abnormalities   LYMPH: No cervical adenopathy palpated.   SKIN: No rashes, lesions.   Monofilament testing with a 10 gram force: sensation: intact bilaterally  Visual Inspection: Feet without maceration, ulcers, or fissures.  Pedal pulses: intact bilaterally      Labs:  Lab Results   Component Value Date/Time    HBA1C 5.6 02/22/2022 05:06 AM        Lab Results   Component Value Date/Time    WBC 9.4 12/24/2019 06:19 AM    RBC 5.25 12/24/2019 06:19 AM    HEMOGLOBIN 14.3 12/24/2019 06:19 AM    MCV 85.5 12/24/2019 06:19 AM    MCH 27.2 12/24/2019 06:19 AM    MCHC 31.8 (L) 12/24/2019 06:19 AM    RDW 40.8 12/24/2019 06:19 AM    MPV 10.6 12/24/2019 06:19 AM       Lab Results   Component Value Date/Time    SODIUM 136 04/28/2021 06:22 AM    POTASSIUM 4.1 04/28/2021 06:22 AM    CHLORIDE 102 04/28/2021 06:22 AM    CO2 26 04/28/2021 06:22 AM    ANION 8.0 04/28/2021 06:22 AM    GLUCOSE 114 (H) 04/28/2021 06:22 AM    BUN 10 04/28/2021 06:22 AM    CREATININE 0.53 04/28/2021 06:22 AM    CALCIUM 9.3 04/28/2021 06:22 AM    ASTSGOT 16 04/28/2021 06:22 AM    ALTSGPT 22 04/28/2021 06:22 AM    TBILIRUBIN 0.5 04/28/2021 06:22 AM    ALBUMIN 4.0 04/28/2021 06:22 AM    TOTPROTEIN 7.7 04/28/2021 06:22 AM    GLOBULIN 3.7 (H) 04/28/2021 06:22 AM    AGRATIO 1.1 04/28/2021 06:22 AM       Lab Results   Component Value Date/Time    CHOLSTRLTOT 140 12/24/2019 0619    TRIGLYCERIDE 123 12/24/2019 0619    HDL 40 12/24/2019 0619    LDL 75 12/24/2019 0619       Lab Results   Component Value Date/Time    MALBCRT see below 04/28/2021 06:21 AM    MICROALBUR <1.2 04/28/2021 06:21 AM         Lab Results   Component Value Date/Time    INGE 3.320 02/14/2019 1142     No results found for: FREEDIR  No results found for: FREET3  No results found for: THYSTIMIG        ASSESSMENT/PLAN:     1. Controlled type 2 diabetes mellitus without complication, without long-term current use of insulin (HCC)  Controlled  A1c is less than 6%  Because she is pregnant she needs to stop Ozempic  Okay to continue Metformin because of insulin resistance secondary to obesity  Recommend she start Tresiba 10 units every night and target a fasting sugar of less than 90  Postprandial blood sugar should be less than 130,  1-hour after meal  If post prandial glucose levels are high or greater than 130   I recommend that she start mealtime or NovoLog insulin 4 units with each meal  Recommend follow-up with nurse practitioner in 2 weeks to make sure that sugars are at goal  I'm also giving her samples of freestyle sarah two CGM I recommend she download the kevin        2. 12 weeks gestation of pregnancy  Patient is currently pregnant    3. Hypertension due to endocrine disorder  Unstable  Because of pregnancy lisinopril should be discontinued  I agree with labetalol    4. Vitamin D deficiency  Unstable we'll check labs this week    5. Long term (current) use of oral hypoglycemic drugs  Patient was on oral agents and a GLP-1 for type 2 diabetes management because of pregnancy we are switching her to basal bolus therapy      Return in about 2 weeks (around 3/8/2022).      Thank you kindly for allowing me to participate in the diabetes care plan for this patient.    Dickson Rivera MD, FACE, ECNU  04/24/20    CC:   Shelby Sparks M.D.

## 2022-02-24 ENCOUNTER — HOSPITAL ENCOUNTER (OUTPATIENT)
Dept: LAB | Facility: MEDICAL CENTER | Age: 37
End: 2022-02-24
Attending: INTERNAL MEDICINE
Payer: COMMERCIAL

## 2022-02-24 ENCOUNTER — HOSPITAL ENCOUNTER (OUTPATIENT)
Dept: LAB | Facility: MEDICAL CENTER | Age: 37
End: 2022-02-24
Attending: FAMILY MEDICINE
Payer: COMMERCIAL

## 2022-02-24 DIAGNOSIS — F41.9 ANXIETY: ICD-10-CM

## 2022-02-24 DIAGNOSIS — L30.9 DERMATITIS: ICD-10-CM

## 2022-02-24 DIAGNOSIS — Z32.01 POSITIVE URINE PREGNANCY TEST: ICD-10-CM

## 2022-02-24 DIAGNOSIS — Z79.84 LONG TERM (CURRENT) USE OF ORAL HYPOGLYCEMIC DRUGS: ICD-10-CM

## 2022-02-24 DIAGNOSIS — I15.2 HYPERTENSION DUE TO ENDOCRINE DISORDER: ICD-10-CM

## 2022-02-24 DIAGNOSIS — E55.9 VITAMIN D DEFICIENCY: ICD-10-CM

## 2022-02-24 DIAGNOSIS — Z3A.12 12 WEEKS GESTATION OF PREGNANCY: ICD-10-CM

## 2022-02-24 DIAGNOSIS — E11.9 CONTROLLED TYPE 2 DIABETES MELLITUS WITHOUT COMPLICATION, WITHOUT LONG-TERM CURRENT USE OF INSULIN (HCC): ICD-10-CM

## 2022-02-24 LAB
25(OH)D3 SERPL-MCNC: 36 NG/ML (ref 30–100)
ALBUMIN SERPL BCP-MCNC: 4.2 G/DL (ref 3.2–4.9)
ALBUMIN/GLOB SERPL: 1.4 G/DL
ALP SERPL-CCNC: 86 U/L (ref 30–99)
ALT SERPL-CCNC: 20 U/L (ref 2–50)
ANION GAP SERPL CALC-SCNC: 9 MMOL/L (ref 7–16)
AST SERPL-CCNC: 15 U/L (ref 12–45)
B-HCG SERPL-ACNC: ABNORMAL MIU/ML (ref 0–5)
BASOPHILS # BLD AUTO: 0.5 % (ref 0–1.8)
BASOPHILS # BLD: 0.04 K/UL (ref 0–0.12)
BILIRUB SERPL-MCNC: 0.3 MG/DL (ref 0.1–1.5)
BUN SERPL-MCNC: 10 MG/DL (ref 8–22)
CALCIUM SERPL-MCNC: 9.1 MG/DL (ref 8.5–10.5)
CHLORIDE SERPL-SCNC: 103 MMOL/L (ref 96–112)
CHOLEST SERPL-MCNC: 136 MG/DL (ref 100–199)
CO2 SERPL-SCNC: 23 MMOL/L (ref 20–33)
CREAT SERPL-MCNC: 0.44 MG/DL (ref 0.5–1.4)
CREAT UR-MCNC: 240.76 MG/DL
EOSINOPHIL # BLD AUTO: 0.18 K/UL (ref 0–0.51)
EOSINOPHIL NFR BLD: 2.4 % (ref 0–6.9)
ERYTHROCYTE [DISTWIDTH] IN BLOOD BY AUTOMATED COUNT: 42.3 FL (ref 35.9–50)
FASTING STATUS PATIENT QL REPORTED: NORMAL
GLOBULIN SER CALC-MCNC: 3 G/DL (ref 1.9–3.5)
GLUCOSE SERPL-MCNC: 121 MG/DL (ref 65–99)
HCT VFR BLD AUTO: 41.3 % (ref 37–47)
HDLC SERPL-MCNC: 55 MG/DL
HGB BLD-MCNC: 13.4 G/DL (ref 12–16)
IMM GRANULOCYTES # BLD AUTO: 0.03 K/UL (ref 0–0.11)
IMM GRANULOCYTES NFR BLD AUTO: 0.4 % (ref 0–0.9)
LDLC SERPL CALC-MCNC: 62 MG/DL
LYMPHOCYTES # BLD AUTO: 1.88 K/UL (ref 1–4.8)
LYMPHOCYTES NFR BLD: 25.4 % (ref 22–41)
MCH RBC QN AUTO: 27 PG (ref 27–33)
MCHC RBC AUTO-ENTMCNC: 32.4 G/DL (ref 33.6–35)
MCV RBC AUTO: 83.1 FL (ref 81.4–97.8)
MICROALBUMIN UR-MCNC: 7.1 MG/DL
MICROALBUMIN/CREAT UR: 29 MG/G (ref 0–30)
MONOCYTES # BLD AUTO: 0.35 K/UL (ref 0–0.85)
MONOCYTES NFR BLD AUTO: 4.7 % (ref 0–13.4)
NEUTROPHILS # BLD AUTO: 4.93 K/UL (ref 2–7.15)
NEUTROPHILS NFR BLD: 66.6 % (ref 44–72)
NRBC # BLD AUTO: 0 K/UL
NRBC BLD-RTO: 0 /100 WBC
PLATELET # BLD AUTO: 244 K/UL (ref 164–446)
PMV BLD AUTO: 10.4 FL (ref 9–12.9)
POTASSIUM SERPL-SCNC: 4 MMOL/L (ref 3.6–5.5)
PROT SERPL-MCNC: 7.2 G/DL (ref 6–8.2)
RBC # BLD AUTO: 4.97 M/UL (ref 4.2–5.4)
SODIUM SERPL-SCNC: 135 MMOL/L (ref 135–145)
TRIGL SERPL-MCNC: 96 MG/DL (ref 0–149)
WBC # BLD AUTO: 7.4 K/UL (ref 4.8–10.8)

## 2022-02-24 PROCEDURE — 85025 COMPLETE CBC W/AUTO DIFF WBC: CPT

## 2022-02-24 PROCEDURE — 80061 LIPID PANEL: CPT

## 2022-02-24 PROCEDURE — 82306 VITAMIN D 25 HYDROXY: CPT

## 2022-02-24 PROCEDURE — 82570 ASSAY OF URINE CREATININE: CPT

## 2022-02-24 PROCEDURE — 80053 COMPREHEN METABOLIC PANEL: CPT

## 2022-02-24 PROCEDURE — 82043 UR ALBUMIN QUANTITATIVE: CPT

## 2022-02-24 PROCEDURE — 84702 CHORIONIC GONADOTROPIN TEST: CPT

## 2022-02-24 PROCEDURE — 36415 COLL VENOUS BLD VENIPUNCTURE: CPT

## 2022-02-24 RX ORDER — INSULIN ASPART 100 [IU]/ML
4 INJECTION, SOLUTION INTRAVENOUS; SUBCUTANEOUS
Qty: 15 ML | Refills: 6 | Status: SHIPPED | OUTPATIENT
Start: 2022-02-24 | End: 2022-05-17

## 2022-02-24 RX ORDER — TRIAMCINOLONE ACETONIDE 1 MG/G
CREAM TOPICAL
Qty: 15 G | Refills: 1 | Status: SHIPPED | OUTPATIENT
Start: 2022-02-24 | End: 2023-12-21

## 2022-02-24 RX ORDER — HYDROXYZINE HYDROCHLORIDE 25 MG/1
25 TABLET, FILM COATED ORAL 3 TIMES DAILY PRN
Qty: 30 TABLET | Refills: 1 | Status: SHIPPED | OUTPATIENT
Start: 2022-02-24 | End: 2023-12-21

## 2022-02-25 ENCOUNTER — HOSPITAL ENCOUNTER (OUTPATIENT)
Dept: RADIOLOGY | Facility: MEDICAL CENTER | Age: 37
End: 2022-02-25
Attending: FAMILY MEDICINE
Payer: COMMERCIAL

## 2022-02-25 DIAGNOSIS — E11.9 CONTROLLED TYPE 2 DIABETES MELLITUS WITHOUT COMPLICATION, WITHOUT LONG-TERM CURRENT USE OF INSULIN (HCC): ICD-10-CM

## 2022-02-25 DIAGNOSIS — Z3A.12 12 WEEKS GESTATION OF PREGNANCY: ICD-10-CM

## 2022-02-25 DIAGNOSIS — Z32.01 POSITIVE URINE PREGNANCY TEST: ICD-10-CM

## 2022-02-25 PROCEDURE — 76801 OB US < 14 WKS SINGLE FETUS: CPT

## 2022-02-25 RX ORDER — INSULIN LISPRO 100 [IU]/ML
4-10 INJECTION, SOLUTION INTRAVENOUS; SUBCUTANEOUS
Qty: 15 ML | Refills: 6 | Status: ON HOLD | OUTPATIENT
Start: 2022-02-25 | End: 2022-09-05

## 2022-03-01 DIAGNOSIS — E11.9 CONTROLLED TYPE 2 DIABETES MELLITUS WITHOUT COMPLICATION, WITHOUT LONG-TERM CURRENT USE OF INSULIN (HCC): ICD-10-CM

## 2022-03-15 ENCOUNTER — NON-PROVIDER VISIT (OUTPATIENT)
Dept: ENDOCRINOLOGY | Facility: MEDICAL CENTER | Age: 37
End: 2022-03-15
Attending: INTERNAL MEDICINE
Payer: COMMERCIAL

## 2022-03-15 VITALS
DIASTOLIC BLOOD PRESSURE: 80 MMHG | HEIGHT: 62 IN | OXYGEN SATURATION: 97 % | SYSTOLIC BLOOD PRESSURE: 118 MMHG | WEIGHT: 293 LBS | HEART RATE: 84 BPM | BODY MASS INDEX: 53.92 KG/M2

## 2022-03-15 DIAGNOSIS — E11.9 TYPE 2 DIABETES MELLITUS WITHOUT COMPLICATION, WITHOUT LONG-TERM CURRENT USE OF INSULIN (HCC): ICD-10-CM

## 2022-03-15 PROCEDURE — 99213 OFFICE O/P EST LOW 20 MIN: CPT | Performed by: INTERNAL MEDICINE

## 2022-03-15 RX ORDER — LANCETS 30 GAUGE
EACH MISCELLANEOUS
Qty: 200 EACH | Refills: 11 | Status: SHIPPED | OUTPATIENT
Start: 2022-03-15

## 2022-03-15 ASSESSMENT — FIBROSIS 4 INDEX: FIB4 SCORE: 0.48

## 2022-03-15 NOTE — PROGRESS NOTES
RN-CDE Note    Subjective:   Endocrinology Clinic Progress Note  PCP: BEN Fernández    HPI:  Priti Sarmiento is a 36 y.o. old patient who is seen today for review of Type 2 Diabetes.   Recent changes in health: Pregnant with due date of 9/14/22.  DM:   Last A1c:   Lab Results   Component Value Date/Time    HBA1C 5.6 02/22/2022 05:06 AM      A1C GOAL: < 6    Diabetes Medications:   Humalog sliding scale  Tresiba 26 units 26 units  Metformin  mg 2 BID      Exercise: Walking  Diet: Gestational Diabetes meal plan reviewed   Patient's body mass index is unknown because there is no height or weight on file. Exercise and nutrition counseling were performed at this visit.    Glucose monitoring frequency: Fasting and 1 hour after meals  Fasting blood sugars  and most 1 hour after meals < 130.  Hypoglycemic episodes: no  Last Retinal Exam: on file and up-to-date  Daily Foot Exam: Yes   Foot Exam:  Monofilament: done  Monofilament testing with a 10 gram force: sensation intact: intact bilaterally  Visual Inspection: Feet without maceration, ulcers, fissures.  Pedal pulses: intact bilaterally   Lab Results   Component Value Date/Time    MALBCRT 29 02/24/2022 06:21 AM    MICROALBUR 7.1 02/24/2022 06:21 AM     She  reports that she has never smoked. She has never used smokeless tobacco.      Plan:     Discussed and educated on:   - All medications, side effects and compliance (discussed carefully)  - Annual eye examinations at Ophthalmology  - Home glucose monitoring emphasized  - Weight control and daily exercise    Recommended medication changes:  Gestational Diabetes education provided and educational packet given. She will follow the Gestational Diabetes meal plan.  We will adjust insulin as needed.  She will call or text her blood sugars. She will follow up in May with me and Dr. Rivera in 6 months.

## 2022-03-23 ENCOUNTER — INITIAL PRENATAL (OUTPATIENT)
Dept: OBGYN | Facility: CLINIC | Age: 37
End: 2022-03-23
Payer: COMMERCIAL

## 2022-03-23 VITALS — WEIGHT: 293 LBS | DIASTOLIC BLOOD PRESSURE: 85 MMHG | SYSTOLIC BLOOD PRESSURE: 148 MMHG | BODY MASS INDEX: 66.58 KG/M2

## 2022-03-23 DIAGNOSIS — I15.2 HYPERTENSION DUE TO ENDOCRINE DISORDER: ICD-10-CM

## 2022-03-23 DIAGNOSIS — O09.519 PREGNANCY, HIGH-RISK, MATERNAL AGE 35+ PRIMIGRAVIDA: ICD-10-CM

## 2022-03-23 DIAGNOSIS — Z79.84 LONG TERM (CURRENT) USE OF ORAL HYPOGLYCEMIC DRUGS: ICD-10-CM

## 2022-03-23 DIAGNOSIS — E11.9 CONTROLLED TYPE 2 DIABETES MELLITUS WITHOUT COMPLICATION, WITHOUT LONG-TERM CURRENT USE OF INSULIN (HCC): ICD-10-CM

## 2022-03-23 DIAGNOSIS — E66.01 MORBID OBESITY (HCC): ICD-10-CM

## 2022-03-23 PROBLEM — Z34.82: Status: ACTIVE | Noted: 2022-03-23

## 2022-03-23 PROCEDURE — 59401 PR NEW OB VISIT: CPT | Performed by: OBSTETRICS & GYNECOLOGY

## 2022-03-23 RX ORDER — LABETALOL 200 MG/1
200 TABLET, FILM COATED ORAL 2 TIMES DAILY
Qty: 60 TABLET | Refills: 13 | Status: SHIPPED | OUTPATIENT
Start: 2022-03-23 | End: 2022-11-15

## 2022-03-23 ASSESSMENT — FIBROSIS 4 INDEX: FIB4 SCORE: 0.49

## 2022-03-23 NOTE — NON-PROVIDER
Pt here today for OB follow up  Pt states no complaints   Reports +   Good # [phone]   Pharmacy Confirmed.

## 2022-03-23 NOTE — PROGRESS NOTES
Cc: New OB visit    HPI:  The patient is a 37 y.o.  16w6d based upon early ultrasound  Patient's last menstrual period was 2021 (exact date)..  Pregnancy complicated by AMA, hypertension, diabetes x2 years and morbid obesity.    She presents for her new obstetric visit.    She denies fetal movement, denies vaginal bleeding, denies leakage of fluid, denies contractions.     She denies nausea/vomiting, headaches, or urinary symptoms.      OB History    Para Term  AB Living   1 0 0 0 0 0   SAB IAB Ectopic Molar Multiple Live Births   0 0 0   0        # Outcome Date GA Lbr Jaison/2nd Weight Sex Delivery Anes PTL Lv   1 Current              Past Medical History:   Diagnosis Date   • Diabetes (HCC)      History reviewed. No pertinent surgical history.  Social History     Socioeconomic History   • Marital status:      Spouse name: Not on file   • Number of children: Not on file   • Years of education: Not on file   • Highest education level: Not on file   Occupational History   • Not on file   Tobacco Use   • Smoking status: Never Smoker   • Smokeless tobacco: Never Used   Vaping Use   • Vaping Use: Never used   Substance and Sexual Activity   • Alcohol use: No   • Drug use: No   • Sexual activity: Yes     Partners: Male     Comment:    Other Topics Concern   • Not on file   Social History Narrative   • Not on file     Social Determinants of Health     Financial Resource Strain: Not on file   Food Insecurity: Not on file   Transportation Needs: Not on file   Physical Activity: Not on file   Stress: Not on file   Social Connections: Not on file   Intimate Partner Violence: Not on file   Housing Stability: Not on file     Family History   Problem Relation Age of Onset   • Diabetes Mother    • Hypertension Mother    • Kidney Disease Mother    • Hyperlipidemia Mother    • Lung Disease Maternal Grandmother         emphysema (smoker)   • Diabetes Maternal Grandmother    • No Known Problems  Sister    • No Known Problems Brother    • No Known Problems Brother    • No Known Problems Father      Allergies:   Allergies as of 2022 - Reviewed 2022   Allergen Reaction Noted   • Glimepiride [kdc:yellow dye+brilliant blue fcf+glimepiride] Unspecified 2019       PE:    /85   Wt (!) 165 kg (364 lb)     General: no apparent distress, is well developed and well nourished  Head: normocephalic, atraumatic  Abdomen: Bowel sounds positive, nondistended, soft, nontender x4, no rebound or guarding.  Female GYN: exam deferred  Skin: No rashes, or ulcers or lesions seen  Psychiatric: Patient shows appropriate affect, is alert and oriented x3, intact judgment and insight.        A/P:  37 y.o.  16w6d based upon  Patient's last menstrual period was 2021 (exact date)..  She is here for her new obstetric visit.    1. Long term (current) use of oral hypoglycemic drugs  URINE PROTEIN    PRENATAL PANEL 3+HIV+UACXI    Comp Metabolic Panel    Referral to Perinatology    CHLAMYDIA/GC PCR URINE   2. Controlled type 2 diabetes mellitus without complication, without long-term current use of insulin (HCC)  URINE PROTEIN    PRENATAL PANEL 3+HIV+UACXI    Comp Metabolic Panel    Referral to Perinatology    CHLAMYDIA/GC PCR URINE   3. Hypertension due to endocrine disorder  URINE PROTEIN    PRENATAL PANEL 3+HIV+UACXI    Comp Metabolic Panel    Referral to Perinatology    CHLAMYDIA/GC PCR URINE   4. Morbidly obese (HCC)  URINE PROTEIN    PRENATAL PANEL 3+HIV+UACXI    Comp Metabolic Panel    Referral to Perinatology    CHLAMYDIA/GC PCR URINE   5. Pregnancy, high-risk, maternal age 35+ primigravida  URINE PROTEIN    PRENATAL PANEL 3+HIV+UACXI    Comp Metabolic Panel    Referral to Perinatology    CHLAMYDIA/GC PCR URINE       1. Ultrasound for dates and anatomy to be scheduled with maternal-fetal medicine.  2.  Discuss second trimester screening for Down Syndrome and neural tube defects versus NIPT.  Patient  would like NIPT.  Order given  3.  Ordered prenatal labs.  4.  NOB packet given with ACOG prenatal book.  5.  Increase water intake and encouraged healthy nutrition.  6.  Referral to [facility] made.  7.  Continue prenatal vitamins.  8.  Follow-up in 2 weeks.   9.  SAB precautions educated.    Blood sugars reviewed.  Postprandial levels are within normal limits.  Most fasting levels greater than 95.  Ranging from .    We will increase Tresiba from 30 to 35 units nightly.  Continue Metformin.  Also using Humalog 4 to 10 units per meal if levels are greater than 130.  She has not needed to use Humalog in the last few weeks.  We will hold off on that for now.  Follow-up 2 weeks.    Latest A1c 5.6%    Blood pressure slightly elevated.  Currently on labetalol 100 g p.o. twice daily.  Will increase to 200 mg p.o. twice daily.  New prescription given    Last Pap smear in 2020.  Normal with negative HPV.  Repeat in 5 years    Given AMA, elevated BMI, hypertension and diabetes, discussed with patient importance of ASA 81 mg prophylaxis to decrease risk of preeclampsia.  We will begin immediately    Plan for serial ultrasounds during pregnancy due to diabetes and hypertension.  Delivery at 38-39 weeks gestation given diabetes and hypertension.   testing starting at 32 weeks gestation    Ultrasound reviewed.  Final due date 2022    All questions answered

## 2022-03-24 ENCOUNTER — PHARMACY VISIT (OUTPATIENT)
Dept: PHARMACY | Facility: MEDICAL CENTER | Age: 37
End: 2022-03-24
Payer: COMMERCIAL

## 2022-03-24 PROCEDURE — RXMED WILLOW AMBULATORY MEDICATION CHARGE: Performed by: INTERNAL MEDICINE

## 2022-03-24 PROCEDURE — RXMED WILLOW AMBULATORY MEDICATION CHARGE: Performed by: OBSTETRICS & GYNECOLOGY

## 2022-03-25 ENCOUNTER — PATIENT MESSAGE (OUTPATIENT)
Dept: OBGYN | Facility: CLINIC | Age: 37
End: 2022-03-25

## 2022-03-25 ENCOUNTER — HOSPITAL ENCOUNTER (OUTPATIENT)
Dept: LAB | Facility: MEDICAL CENTER | Age: 37
End: 2022-03-25
Attending: OBSTETRICS & GYNECOLOGY
Payer: COMMERCIAL

## 2022-03-25 DIAGNOSIS — I15.2 HYPERTENSION DUE TO ENDOCRINE DISORDER: ICD-10-CM

## 2022-03-25 DIAGNOSIS — Z79.84 LONG TERM (CURRENT) USE OF ORAL HYPOGLYCEMIC DRUGS: ICD-10-CM

## 2022-03-25 DIAGNOSIS — E11.9 CONTROLLED TYPE 2 DIABETES MELLITUS WITHOUT COMPLICATION, WITHOUT LONG-TERM CURRENT USE OF INSULIN (HCC): ICD-10-CM

## 2022-03-25 DIAGNOSIS — O09.519 PREGNANCY, HIGH-RISK, MATERNAL AGE 35+ PRIMIGRAVIDA: ICD-10-CM

## 2022-03-25 DIAGNOSIS — E66.01 MORBID OBESITY (HCC): ICD-10-CM

## 2022-03-25 LAB
ABO GROUP BLD: NORMAL
ALBUMIN SERPL BCP-MCNC: 3.8 G/DL (ref 3.2–4.9)
ALBUMIN/GLOB SERPL: 1.5 G/DL
ALP SERPL-CCNC: 79 U/L (ref 30–99)
ALT SERPL-CCNC: 26 U/L (ref 2–50)
ANION GAP SERPL CALC-SCNC: 11 MMOL/L (ref 7–16)
APPEARANCE UR: CLEAR
AST SERPL-CCNC: 16 U/L (ref 12–45)
BASOPHILS # BLD AUTO: 0.4 % (ref 0–1.8)
BASOPHILS # BLD: 0.03 K/UL (ref 0–0.12)
BILIRUB SERPL-MCNC: 0.2 MG/DL (ref 0.1–1.5)
BILIRUB UR QL STRIP.AUTO: NEGATIVE
BLD GP AB SCN SERPL QL: NORMAL
BUN SERPL-MCNC: 9 MG/DL (ref 8–22)
CALCIUM SERPL-MCNC: 9.1 MG/DL (ref 8.4–10.2)
CHLORIDE SERPL-SCNC: 104 MMOL/L (ref 96–112)
CO2 SERPL-SCNC: 22 MMOL/L (ref 20–33)
COLOR UR: YELLOW
CREAT SERPL-MCNC: 0.38 MG/DL (ref 0.5–1.4)
EOSINOPHIL # BLD AUTO: 0.28 K/UL (ref 0–0.51)
EOSINOPHIL NFR BLD: 3.7 % (ref 0–6.9)
ERYTHROCYTE [DISTWIDTH] IN BLOOD BY AUTOMATED COUNT: 41.8 FL (ref 35.9–50)
FASTING STATUS PATIENT QL REPORTED: NORMAL
GFR SERPLBLD CREATININE-BSD FMLA CKD-EPI: 133 ML/MIN/1.73 M 2
GLOBULIN SER CALC-MCNC: 2.6 G/DL (ref 1.9–3.5)
GLUCOSE SERPL-MCNC: 108 MG/DL (ref 65–99)
GLUCOSE UR STRIP.AUTO-MCNC: NEGATIVE MG/DL
HBV SURFACE AG SER QL: ABNORMAL
HCT VFR BLD AUTO: 38 % (ref 37–47)
HGB BLD-MCNC: 12.3 G/DL (ref 12–16)
HIV 1+2 AB+HIV1 P24 AG SERPL QL IA: NORMAL
IMM GRANULOCYTES # BLD AUTO: 0.03 K/UL (ref 0–0.11)
IMM GRANULOCYTES NFR BLD AUTO: 0.4 % (ref 0–0.9)
KETONES UR STRIP.AUTO-MCNC: NEGATIVE MG/DL
LEUKOCYTE ESTERASE UR QL STRIP.AUTO: NEGATIVE
LYMPHOCYTES # BLD AUTO: 2.12 K/UL (ref 1–4.8)
LYMPHOCYTES NFR BLD: 28 % (ref 22–41)
MCH RBC QN AUTO: 27.2 PG (ref 27–33)
MCHC RBC AUTO-ENTMCNC: 32.4 G/DL (ref 33.6–35)
MCV RBC AUTO: 84.1 FL (ref 81.4–97.8)
MICRO URNS: ABNORMAL
MONOCYTES # BLD AUTO: 0.41 K/UL (ref 0–0.85)
MONOCYTES NFR BLD AUTO: 5.4 % (ref 0–13.4)
NEUTROPHILS # BLD AUTO: 4.71 K/UL (ref 2–7.15)
NEUTROPHILS NFR BLD: 62.1 % (ref 44–72)
NITRITE UR QL STRIP.AUTO: NEGATIVE
NRBC # BLD AUTO: 0 K/UL
NRBC BLD-RTO: 0 /100 WBC
PH UR STRIP.AUTO: 7 [PH] (ref 5–8)
PLATELET # BLD AUTO: 237 K/UL (ref 164–446)
PMV BLD AUTO: 10.3 FL (ref 9–12.9)
POTASSIUM SERPL-SCNC: 4.4 MMOL/L (ref 3.6–5.5)
PROT SERPL-MCNC: 6.4 G/DL (ref 6–8.2)
PROT UR QL STRIP: NEGATIVE MG/DL
RBC # BLD AUTO: 4.52 M/UL (ref 4.2–5.4)
RBC UR QL AUTO: NEGATIVE
RH BLD: NORMAL
RUBV AB SER QL: 16.6 IU/ML
SODIUM SERPL-SCNC: 137 MMOL/L (ref 135–145)
SP GR UR STRIP.AUTO: 1.02
T PALLIDUM AB SER QL IA: ABNORMAL
WBC # BLD AUTO: 7.6 K/UL (ref 4.8–10.8)

## 2022-03-25 PROCEDURE — 86901 BLOOD TYPING SEROLOGIC RH(D): CPT

## 2022-03-25 PROCEDURE — 86850 RBC ANTIBODY SCREEN: CPT

## 2022-03-25 PROCEDURE — 86592 SYPHILIS TEST NON-TREP QUAL: CPT

## 2022-03-25 PROCEDURE — 87491 CHLMYD TRACH DNA AMP PROBE: CPT

## 2022-03-25 PROCEDURE — 87389 HIV-1 AG W/HIV-1&-2 AB AG IA: CPT

## 2022-03-25 PROCEDURE — 87591 N.GONORRHOEAE DNA AMP PROB: CPT

## 2022-03-25 PROCEDURE — 81003 URINALYSIS AUTO W/O SCOPE: CPT

## 2022-03-25 PROCEDURE — 87340 HEPATITIS B SURFACE AG IA: CPT

## 2022-03-25 PROCEDURE — 80053 COMPREHEN METABOLIC PANEL: CPT

## 2022-03-25 PROCEDURE — 36415 COLL VENOUS BLD VENIPUNCTURE: CPT

## 2022-03-25 PROCEDURE — 85025 COMPLETE CBC W/AUTO DIFF WBC: CPT

## 2022-03-25 PROCEDURE — 86900 BLOOD TYPING SEROLOGIC ABO: CPT

## 2022-03-25 PROCEDURE — 86780 TREPONEMA PALLIDUM: CPT

## 2022-03-25 PROCEDURE — 86762 RUBELLA ANTIBODY: CPT

## 2022-03-25 NOTE — TELEPHONE ENCOUNTER
1036 03/25/2022  Pt reached out via [facility] to ask further questions about baby aspirin. Pt was informed that baby aspirin should be taken one time a day.    1049 03/25/2022  Pt reached out via [facility] to ask why she needs to take baby aspirin. The pt was informed that per Dr. Arita Given AMA, elevated BMI, hypertension and diabetes, discussed with patient importance of ASA 81 mg prophylaxis to decrease risk of preeclampsia.  Kandi NICHOLAS

## 2022-03-26 LAB
C TRACH DNA SPEC QL NAA+PROBE: NEGATIVE
N GONORRHOEA DNA SPEC QL NAA+PROBE: NEGATIVE
SPECIMEN SOURCE: NORMAL

## 2022-03-28 ENCOUNTER — HOSPITAL ENCOUNTER (OUTPATIENT)
Facility: MEDICAL CENTER | Age: 37
End: 2022-03-28
Attending: OBSTETRICS & GYNECOLOGY
Payer: COMMERCIAL

## 2022-03-28 LAB
PROT 24H UR-MCNC: 88 MG/24 HR (ref 30–150)
PROT 24H UR-MRATE: 8 MG/DL (ref 0–15)
SPECIMEN VOL UR: 1100 ML

## 2022-03-28 PROCEDURE — 84156 ASSAY OF PROTEIN URINE: CPT

## 2022-03-28 PROCEDURE — 81050 URINALYSIS VOLUME MEASURE: CPT

## 2022-04-07 ENCOUNTER — TELEPHONE (OUTPATIENT)
Dept: OBGYN | Facility: CLINIC | Age: 37
End: 2022-04-07

## 2022-04-07 ENCOUNTER — ROUTINE PRENATAL (OUTPATIENT)
Dept: OBGYN | Facility: CLINIC | Age: 37
End: 2022-04-07
Payer: COMMERCIAL

## 2022-04-07 VITALS — SYSTOLIC BLOOD PRESSURE: 144 MMHG | BODY MASS INDEX: 67.49 KG/M2 | WEIGHT: 293 LBS | DIASTOLIC BLOOD PRESSURE: 85 MMHG

## 2022-04-07 DIAGNOSIS — E66.01 MORBID OBESITY (HCC): ICD-10-CM

## 2022-04-07 DIAGNOSIS — Z34.82 PREGNANCY IN PATIENT 16 TO 19 YEARS OF AGE WITH HISTORY OF PREVIOUS PREGNANCY IN SECOND TRIMESTER: ICD-10-CM

## 2022-04-07 DIAGNOSIS — E11.9 CONTROLLED TYPE 2 DIABETES MELLITUS WITHOUT COMPLICATION, WITHOUT LONG-TERM CURRENT USE OF INSULIN (HCC): ICD-10-CM

## 2022-04-07 PROCEDURE — 0502F SUBSEQUENT PRENATAL CARE: CPT | Performed by: OBSTETRICS & GYNECOLOGY

## 2022-04-07 PROCEDURE — RXMED WILLOW AMBULATORY MEDICATION CHARGE: Performed by: OBSTETRICS & GYNECOLOGY

## 2022-04-07 ASSESSMENT — FIBROSIS 4 INDEX: FIB4 SCORE: 0.48

## 2022-04-07 NOTE — TELEPHONE ENCOUNTER
Called pt and let her know she forgot her NIPT box to have labs drawn. Pt states she will try to make it today or she will come tomorrow. I stated a box will be left upfront.

## 2022-04-07 NOTE — PROGRESS NOTES
Chief complaint: Return OB visit    S: Pt presents for routine OB follow up.  No contractions, vaginal bleeding, or leaking fluids. Good fetal movement.    Questions answered.    O: /85   Wt (!) 167 kg (369 lb)   LMP 11/25/2021 (Exact Date)   BMI 67.49 kg/m²   Patients' weight gain, fluid intake and exercise level discussed.  Vitals, fundal height , fetal position, and FHR reviewed on flowsheet    Lab:  Recent Results (from the past 336 hour(s))   Comp Metabolic Panel    Collection Time: 03/25/22  8:52 AM   Result Value Ref Range    Sodium 137 135 - 145 mmol/L    Potassium 4.4 3.6 - 5.5 mmol/L    Chloride 104 96 - 112 mmol/L    Co2 22 20 - 33 mmol/L    Anion Gap 11.0 7.0 - 16.0    Glucose 108 (H) 65 - 99 mg/dL    Bun 9 8 - 22 mg/dL    Creatinine 0.38 (L) 0.50 - 1.40 mg/dL    Calcium 9.1 8.4 - 10.2 mg/dL    AST(SGOT) 16 12 - 45 U/L    ALT(SGPT) 26 2 - 50 U/L    Alkaline Phosphatase 79 30 - 99 U/L    Total Bilirubin 0.2 0.1 - 1.5 mg/dL    Albumin 3.8 3.2 - 4.9 g/dL    Total Protein 6.4 6.0 - 8.2 g/dL    Globulin 2.6 1.9 - 3.5 g/dL    A-G Ratio 1.5 g/dL   FASTING STATUS    Collection Time: 03/25/22  8:52 AM   Result Value Ref Range    Fasting Status Fasting    ESTIMATED GFR    Collection Time: 03/25/22  8:52 AM   Result Value Ref Range    GFR (CKD-EPI) 133 >60 mL/min/1.73 m 2   CHLAMYDIA/GC PCR URINE    Collection Time: 03/25/22  8:53 AM    Specimen: Urine   Result Value Ref Range    C. trachomatis by PCR Negative Negative    N. gonorrhoeae by PCR Negative Negative    Source Urine    PRENATAL PANEL 3+HIV+UACXI    Collection Time: 03/25/22  8:53 AM   Result Value Ref Range    Color Yellow     Character Clear     Specific Gravity 1.025 <1.035    Ph 7.0 5.0 - 8.0    Glucose Negative Negative mg/dL    Ketones Negative Negative mg/dL    Protein Negative Negative mg/dL    Bilirubin Negative Negative    Nitrite Negative Negative    Leukocyte Esterase Negative Negative    Occult Blood Negative Negative    Micro Urine  Req see below     WBC 7.6 4.8 - 10.8 K/uL    RBC 4.52 4.20 - 5.40 M/uL    Hemoglobin 12.3 12.0 - 16.0 g/dL    Hematocrit 38.0 37.0 - 47.0 %    MCV 84.1 81.4 - 97.8 fL    MCH 27.2 27.0 - 33.0 pg    MCHC 32.4 (L) 33.6 - 35.0 g/dL    RDW 41.8 35.9 - 50.0 fL    Platelet Count 237 164 - 446 K/uL    MPV 10.3 9.0 - 12.9 fL    Neutrophils-Polys 62.10 44.00 - 72.00 %    Lymphocytes 28.00 22.00 - 41.00 %    Monocytes 5.40 0.00 - 13.40 %    Eosinophils 3.70 0.00 - 6.90 %    Basophils 0.40 0.00 - 1.80 %    Immature Granulocytes 0.40 0.00 - 0.90 %    Nucleated RBC 0.00 /100 WBC    Neutrophils (Absolute) 4.71 2.00 - 7.15 K/uL    Lymphs (Absolute) 2.12 1.00 - 4.80 K/uL    Monos (Absolute) 0.41 0.00 - 0.85 K/uL    Eos (Absolute) 0.28 0.00 - 0.51 K/uL    Baso (Absolute) 0.03 0.00 - 0.12 K/uL    Immature Granulocytes (abs) 0.03 0.00 - 0.11 K/uL    NRBC (Absolute) 0.00 K/uL    Rubella IgG Antibody 16.60 IU/mL    Hepatitis B Surface Antigen Non-Reactive Non-Reactive    Syphilis, Treponemal Qual Non-Reactive Non-Reactive   HIV AG/AB COMBO ASSAY SCREENING    Collection Time: 22  8:53 AM   Result Value Ref Range    HIV Ag/Ab Combo Assay Non-Reactive Non Reactive   OP Prenatal Panel-Blood Bank    Collection Time: 22  8:53 AM   Result Value Ref Range    ABO Grouping Only O     Rh Grouping Only POS     Antibody Screen Scrn NEG    URINETOTAL PROTEIN 24 HR    Collection Time: 22  8:30 AM   Result Value Ref Range    Total Volume, Urine 1100 mL    Total Protein, Urine 8.0 0.0 - 15.0 mg/dL    Total Protein, 24 Hour Urine 88.0 30.0 - 150.0 mg/24 Hr     Review of logs shows elevated fasting levels anywhere from , with majority in the high 90s and low 100s    Normal postprandial levels after breakfast lunch and dinner      A/P:  37 y.o.  at 19w0d presents for routine obstetric follow-up.  Size equals dates and/or scan    1.  Continue prenatal vitamins.  2.  Begin kick counts at 28 weeks.  3.  Exercise at least 30  minutes daily.  4.  Drink at least 2 Liters of water daily  5.  Follow-up in 2 weeks.    We will add NPH at night.  Prescription given.    Continue Tresiba and Metformin as prescribed.  Humalog as needed if increased postprandial levels    Patient has not been contacted by [facility] yet.  Asked patient to call Dr. Ridley soon as possible    Labs reviewed.

## 2022-04-11 ENCOUNTER — PHARMACY VISIT (OUTPATIENT)
Dept: PHARMACY | Facility: MEDICAL CENTER | Age: 37
End: 2022-04-11
Payer: COMMERCIAL

## 2022-04-11 PROCEDURE — RXMED WILLOW AMBULATORY MEDICATION CHARGE: Performed by: FAMILY MEDICINE

## 2022-04-13 ENCOUNTER — APPOINTMENT (OUTPATIENT)
Dept: RADIOLOGY | Facility: MEDICAL CENTER | Age: 37
End: 2022-04-13
Attending: NURSE PRACTITIONER
Payer: COMMERCIAL

## 2022-04-13 ENCOUNTER — HOSPITAL ENCOUNTER (EMERGENCY)
Facility: MEDICAL CENTER | Age: 37
End: 2022-04-13
Attending: OBSTETRICS & GYNECOLOGY | Admitting: OBSTETRICS & GYNECOLOGY
Payer: COMMERCIAL

## 2022-04-13 VITALS
RESPIRATION RATE: 24 BRPM | OXYGEN SATURATION: 97 % | DIASTOLIC BLOOD PRESSURE: 68 MMHG | BODY MASS INDEX: 53.92 KG/M2 | HEIGHT: 62 IN | SYSTOLIC BLOOD PRESSURE: 116 MMHG | HEART RATE: 77 BPM | WEIGHT: 293 LBS | TEMPERATURE: 97.8 F

## 2022-04-13 PROCEDURE — 99284 EMERGENCY DEPT VISIT MOD MDM: CPT

## 2022-04-13 PROCEDURE — 99282 EMERGENCY DEPT VISIT SF MDM: CPT | Mod: 25 | Performed by: NURSE PRACTITIONER

## 2022-04-13 PROCEDURE — 76805 OB US >/= 14 WKS SNGL FETUS: CPT | Mod: TC | Performed by: NURSE PRACTITIONER

## 2022-04-13 PROCEDURE — 76815 OB US LIMITED FETUS(S): CPT

## 2022-04-13 ASSESSMENT — PAIN SCALES - GENERAL: PAINLEVEL: 0 - NO PAIN

## 2022-04-13 ASSESSMENT — FIBROSIS 4 INDEX: FIB4 SCORE: 0.48
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
## 2022-04-20 ENCOUNTER — ROUTINE PRENATAL (OUTPATIENT)
Dept: OBGYN | Facility: CLINIC | Age: 37
End: 2022-04-20
Payer: COMMERCIAL

## 2022-04-20 VITALS — WEIGHT: 293 LBS | DIASTOLIC BLOOD PRESSURE: 83 MMHG | BODY MASS INDEX: 67.49 KG/M2 | SYSTOLIC BLOOD PRESSURE: 149 MMHG

## 2022-04-20 DIAGNOSIS — O09.522 MULTIGRAVIDA OF ADVANCED MATERNAL AGE IN SECOND TRIMESTER: ICD-10-CM

## 2022-04-20 DIAGNOSIS — E11.9 CONTROLLED TYPE 2 DIABETES MELLITUS WITHOUT COMPLICATION, WITHOUT LONG-TERM CURRENT USE OF INSULIN (HCC): ICD-10-CM

## 2022-04-20 DIAGNOSIS — I15.2 HYPERTENSION DUE TO ENDOCRINE DISORDER: ICD-10-CM

## 2022-04-20 PROCEDURE — 0502F SUBSEQUENT PRENATAL CARE: CPT | Performed by: OBSTETRICS & GYNECOLOGY

## 2022-04-20 ASSESSMENT — FIBROSIS 4 INDEX: FIB4 SCORE: 0.48

## 2022-04-20 NOTE — PROGRESS NOTES
Chief complaint: Return visit    S: Pt presents for routine OB follow up.  No contractions, vaginal bleeding, or leaking fluids. Good fetal movement.    Questions answered.    O: /83   Wt (!) 167 kg (369 lb)   LMP 2021 (Exact Date)   BMI 67.49 kg/m²   Patients' weight gain, fluid intake and exercise level discussed.  Vitals, fundal height , fetal position, and FHR reviewed on flowsheet    Lab:No results found for this or any previous visit (from the past 336 hour(s)).    A/P:  37 y.o.  at 20w6d presents for routine obstetric follow-up.  Size equals dates and/or scan    1.  Continue prenatal vitamins.  2.  Begin kick counts at 28 weeks.  3.  Exercise at least 30 minutes daily.  4.  Drink at least 2 Liters of water daily  5.  Follow-up in 2 weeks.    Review of sugar logs show elevated fasting levels.  We will increase NPH to 10 units.  Continue on same Tresiba and metformin dosing.  Follow-up in 2 weeks    Patient was given orders for noninvasive prenatal screening.    Referral to high risk pregnancy center sent today given BMI, type 2 diabetes and hypertension as well as advanced maternal age    Monitor blood pressure.  On low-dose aspirin    All questions answered

## 2022-04-20 NOTE — NON-PROVIDER
Pt here today for OB follow up  Pt states no complaints   Reports +FM is not sure if it is everyday- she believes it is only 1 or 2 times a week.   Good # [phone] (home)   Pharmacy Confirmed.

## 2022-05-02 LAB — HBA1C MFR BLD: 5.7 % (ref 0–5.6)

## 2022-05-02 PROCEDURE — RXMED WILLOW AMBULATORY MEDICATION CHARGE: Performed by: NURSE PRACTITIONER

## 2022-05-04 ENCOUNTER — ROUTINE PRENATAL (OUTPATIENT)
Dept: OBGYN | Facility: CLINIC | Age: 37
End: 2022-05-04
Payer: COMMERCIAL

## 2022-05-04 VITALS — DIASTOLIC BLOOD PRESSURE: 84 MMHG | BODY MASS INDEX: 68.04 KG/M2 | WEIGHT: 293 LBS | SYSTOLIC BLOOD PRESSURE: 138 MMHG

## 2022-05-04 DIAGNOSIS — E11.9 CONTROLLED TYPE 2 DIABETES MELLITUS WITHOUT COMPLICATION, WITHOUT LONG-TERM CURRENT USE OF INSULIN (HCC): ICD-10-CM

## 2022-05-04 DIAGNOSIS — O09.90 HIGH RISK PREGNANCY, ANTEPARTUM: ICD-10-CM

## 2022-05-04 PROCEDURE — 0502F SUBSEQUENT PRENATAL CARE: CPT | Performed by: OBSTETRICS & GYNECOLOGY

## 2022-05-04 ASSESSMENT — FIBROSIS 4 INDEX: FIB4 SCORE: 0.48

## 2022-05-04 NOTE — PROGRESS NOTES
Patient following up for prenatal visit  She has chronic hypertension as well as pregestational diabetes.  She has been taking labetalol 200 mg twice daily.  She is being managed by [facility] nutrition program for her diabetes.  She is currently taking NPH 10 units at bedtime along with 37 units of Tresiba.  Glucose logs reviewed today  Fastin-114.  Less than 50% out of range  Postprandial: All in range      AP:  37 y.o.  at 22w6d   #Chronic hypertension in pregnancy  Third trimester labs given along with repeat CMP  Continue baby aspirin daily  Continue labetalol 200 mg twice daily    #Diabetes in pregnancy  Continue NPH 10 units and Tresiba 37 units at bedtime  Continue submitting sugars to high risk pregnancy center  Patient states that she had A1c drawn with high risk pregnancy center and value was 5.7%.     Follow-up 4 weeks

## 2022-05-09 DIAGNOSIS — E11.9 CONTROLLED TYPE 2 DIABETES MELLITUS WITHOUT COMPLICATION, WITHOUT LONG-TERM CURRENT USE OF INSULIN (HCC): ICD-10-CM

## 2022-05-12 ENCOUNTER — NON-PROVIDER VISIT (OUTPATIENT)
Dept: MEDICAL GROUP | Facility: MEDICAL CENTER | Age: 37
End: 2022-05-12
Payer: COMMERCIAL

## 2022-05-12 DIAGNOSIS — Z23 IMMUNIZATION DUE: ICD-10-CM

## 2022-05-12 DIAGNOSIS — E11.9 CONTROLLED TYPE 2 DIABETES MELLITUS WITHOUT COMPLICATION, WITHOUT LONG-TERM CURRENT USE OF INSULIN (HCC): ICD-10-CM

## 2022-05-12 PROCEDURE — 90670 PCV13 VACCINE IM: CPT | Performed by: FAMILY MEDICINE

## 2022-05-12 PROCEDURE — RXMED WILLOW AMBULATORY MEDICATION CHARGE: Performed by: INTERNAL MEDICINE

## 2022-05-12 PROCEDURE — 90471 IMMUNIZATION ADMIN: CPT | Performed by: FAMILY MEDICINE

## 2022-05-13 ENCOUNTER — PHARMACY VISIT (OUTPATIENT)
Dept: PHARMACY | Facility: MEDICAL CENTER | Age: 37
End: 2022-05-13
Payer: COMMERCIAL

## 2022-05-17 ENCOUNTER — NON-PROVIDER VISIT (OUTPATIENT)
Dept: ENDOCRINOLOGY | Facility: MEDICAL CENTER | Age: 37
End: 2022-05-17
Attending: INTERNAL MEDICINE
Payer: COMMERCIAL

## 2022-05-17 VITALS
HEART RATE: 76 BPM | SYSTOLIC BLOOD PRESSURE: 118 MMHG | DIASTOLIC BLOOD PRESSURE: 80 MMHG | OXYGEN SATURATION: 95 % | WEIGHT: 293 LBS | BODY MASS INDEX: 53.92 KG/M2 | HEIGHT: 62 IN

## 2022-05-17 DIAGNOSIS — E11.9 CONTROLLED TYPE 2 DIABETES MELLITUS WITHOUT COMPLICATION, WITHOUT LONG-TERM CURRENT USE OF INSULIN (HCC): ICD-10-CM

## 2022-05-17 DIAGNOSIS — E55.9 VITAMIN D DEFICIENCY: ICD-10-CM

## 2022-05-17 DIAGNOSIS — E88.810 METABOLIC SYNDROME: ICD-10-CM

## 2022-05-17 PROCEDURE — 99212 OFFICE O/P EST SF 10 MIN: CPT | Performed by: INTERNAL MEDICINE

## 2022-05-17 ASSESSMENT — FIBROSIS 4 INDEX: FIB4 SCORE: 0.48

## 2022-05-17 NOTE — PROGRESS NOTES
RN-CDE Note    Subjective:   Endocrinology Clinic Progress Note  PCP: BEN Fernández    HPI:  Priti Begum is a 37 y.o. old patient who is seen today for review of Type 2 Diabetes.  Recent changes in health: Pregnant with baby boy due on 9/17/22.  DM:   Last A1c:   Lab Results   Component Value Date/Time    HBA1C 5.6 02/22/2022 05:06 AM      She states her HBA1C at the high risk pregnancy was 5.7 on 5/2/22.    A1C GOAL: < 6    Diabetes Medications:   NPH 10 units daily  Tresiba 37 units daily  Not using Humalog at this time  Metformin  mg 2 BID        Exercise: Walking daily  Diet: Gestational Diabetes meal plan  Patient's body mass index is 68.41 kg/m². Exercise and nutrition counseling were performed at this visit.    Glucose monitoring frequency: Testing 4 times daily.      Hypoglycemic episodes: no  Last Retinal Exam: [facility] at [facility]  Daily Foot Exam: Yes   Foot Exam:  Monofilament: done  Monofilament testing with a 10 gram force: sensation intact: intact bilaterally  Visual Inspection: Feet without maceration, ulcers, fissures.  Pedal pulses: intact bilaterally   Lab Results   Component Value Date/Time    MALBCRT 29 02/24/2022 06:21 AM    MICROALBUR 7.1 02/24/2022 06:21 AM     She  reports that she has never smoked. She has never used smokeless tobacco.      Plan:     Discussed and educated on:   - All medications, side effects and compliance (discussed carefully)  - Annual eye examinations at Ophthalmology  - Home glucose monitoring emphasized  - Weight control and daily exercise    Recommended medication changes: She is following with the high risk pregnancy group who are adjusting her insulin. She will follow up in 3 months with Dr. Rivera.

## 2022-05-25 ENCOUNTER — HOSPITAL ENCOUNTER (OUTPATIENT)
Dept: LAB | Facility: MEDICAL CENTER | Age: 37
End: 2022-05-25
Attending: OBSTETRICS & GYNECOLOGY
Payer: COMMERCIAL

## 2022-05-25 DIAGNOSIS — E11.9 CONTROLLED TYPE 2 DIABETES MELLITUS WITHOUT COMPLICATION, WITHOUT LONG-TERM CURRENT USE OF INSULIN (HCC): ICD-10-CM

## 2022-05-25 DIAGNOSIS — O09.90 HIGH RISK PREGNANCY, ANTEPARTUM: ICD-10-CM

## 2022-05-25 LAB
ALBUMIN SERPL BCP-MCNC: 3.5 G/DL (ref 3.2–4.9)
ALBUMIN/GLOB SERPL: 1.2 G/DL
ALP SERPL-CCNC: 99 U/L (ref 30–99)
ALT SERPL-CCNC: 24 U/L (ref 2–50)
ANION GAP SERPL CALC-SCNC: 9 MMOL/L (ref 7–16)
AST SERPL-CCNC: 20 U/L (ref 12–45)
BILIRUB SERPL-MCNC: 0.2 MG/DL (ref 0.1–1.5)
BUN SERPL-MCNC: 8 MG/DL (ref 8–22)
CALCIUM SERPL-MCNC: 8.2 MG/DL (ref 8.5–10.5)
CHLORIDE SERPL-SCNC: 105 MMOL/L (ref 96–112)
CO2 SERPL-SCNC: 23 MMOL/L (ref 20–33)
CREAT SERPL-MCNC: 0.4 MG/DL (ref 0.5–1.4)
ERYTHROCYTE [DISTWIDTH] IN BLOOD BY AUTOMATED COUNT: 41.1 FL (ref 35.9–50)
GFR SERPLBLD CREATININE-BSD FMLA CKD-EPI: 131 ML/MIN/1.73 M 2
GLOBULIN SER CALC-MCNC: 2.9 G/DL (ref 1.9–3.5)
GLUCOSE SERPL-MCNC: 101 MG/DL (ref 65–99)
HCT VFR BLD AUTO: 36.6 % (ref 37–47)
HGB BLD-MCNC: 11.8 G/DL (ref 12–16)
MCH RBC QN AUTO: 27 PG (ref 27–33)
MCHC RBC AUTO-ENTMCNC: 32.2 G/DL (ref 33.6–35)
MCV RBC AUTO: 83.8 FL (ref 81.4–97.8)
PLATELET # BLD AUTO: 264 K/UL (ref 164–446)
PMV BLD AUTO: 10.3 FL (ref 9–12.9)
POTASSIUM SERPL-SCNC: 4.3 MMOL/L (ref 3.6–5.5)
PROT SERPL-MCNC: 6.4 G/DL (ref 6–8.2)
RBC # BLD AUTO: 4.37 M/UL (ref 4.2–5.4)
SODIUM SERPL-SCNC: 137 MMOL/L (ref 135–145)
T PALLIDUM AB SER QL IA: NORMAL
WBC # BLD AUTO: 8.5 K/UL (ref 4.8–10.8)

## 2022-05-25 PROCEDURE — 80053 COMPREHEN METABOLIC PANEL: CPT

## 2022-05-25 PROCEDURE — 36415 COLL VENOUS BLD VENIPUNCTURE: CPT

## 2022-05-25 PROCEDURE — 86780 TREPONEMA PALLIDUM: CPT

## 2022-05-25 PROCEDURE — 85027 COMPLETE CBC AUTOMATED: CPT

## 2022-05-26 ENCOUNTER — PHARMACY VISIT (OUTPATIENT)
Dept: PHARMACY | Facility: MEDICAL CENTER | Age: 37
End: 2022-05-26
Payer: COMMERCIAL

## 2022-05-26 PROCEDURE — RXMED WILLOW AMBULATORY MEDICATION CHARGE: Performed by: INTERNAL MEDICINE

## 2022-06-05 ENCOUNTER — HOSPITAL ENCOUNTER (EMERGENCY)
Facility: MEDICAL CENTER | Age: 37
End: 2022-06-05
Payer: COMMERCIAL

## 2022-06-05 ENCOUNTER — HOSPITAL ENCOUNTER (EMERGENCY)
Facility: MEDICAL CENTER | Age: 37
End: 2022-06-05
Attending: OBSTETRICS & GYNECOLOGY | Admitting: OBSTETRICS & GYNECOLOGY
Payer: COMMERCIAL

## 2022-06-05 VITALS
RESPIRATION RATE: 18 BRPM | HEIGHT: 62 IN | SYSTOLIC BLOOD PRESSURE: 118 MMHG | HEART RATE: 89 BPM | DIASTOLIC BLOOD PRESSURE: 66 MMHG | WEIGHT: 293 LBS | TEMPERATURE: 97.6 F | OXYGEN SATURATION: 92 % | BODY MASS INDEX: 53.92 KG/M2

## 2022-06-05 VITALS
DIASTOLIC BLOOD PRESSURE: 85 MMHG | OXYGEN SATURATION: 96 % | BODY MASS INDEX: 53.92 KG/M2 | RESPIRATION RATE: 18 BRPM | TEMPERATURE: 96.3 F | HEIGHT: 62 IN | WEIGHT: 293 LBS | HEART RATE: 91 BPM | SYSTOLIC BLOOD PRESSURE: 138 MMHG

## 2022-06-05 LAB
FLUAV RNA SPEC QL NAA+PROBE: NEGATIVE
FLUBV RNA SPEC QL NAA+PROBE: NEGATIVE
RSV RNA SPEC QL NAA+PROBE: NEGATIVE
SARS-COV-2 RNA RESP QL NAA+PROBE: NOTDETECTED
SPECIMEN SOURCE: NORMAL

## 2022-06-05 PROCEDURE — 99282 EMERGENCY DEPT VISIT SF MDM: CPT | Mod: GC | Performed by: OBSTETRICS & GYNECOLOGY

## 2022-06-05 PROCEDURE — 99283 EMERGENCY DEPT VISIT LOW MDM: CPT

## 2022-06-05 PROCEDURE — 0241U HCHG SARS-COV-2 COVID-19 NFCT DS RESP RNA 4 TRGT MIC: CPT

## 2022-06-05 PROCEDURE — 302449 STATCHG TRIAGE ONLY (STATISTIC)

## 2022-06-05 PROCEDURE — C9803 HOPD COVID-19 SPEC COLLECT: HCPCS | Performed by: HEALTH CARE PROVIDER

## 2022-06-05 ASSESSMENT — FIBROSIS 4 INDEX
FIB4 SCORE: 0.56
FIB4 SCORE: 0.56

## 2022-06-05 ASSESSMENT — PAIN SCALES - GENERAL: PAINLEVEL: 6

## 2022-06-05 NOTE — PROGRESS NOTES
37 y.o.  EDC  (27.3 wks)     Pt presents to L&D c/o headache, cough and congestion x4 days. She came in today because she started feeling a sharp abdominal cramp every time she coughed since midnight last night. Reports +FM and denies LOF/VB/UCs. Dr Cantu notified. MD at bedside to assess pt. Order received for PO hydration and COVID/flu swab.  FHTs difficult to obtain due to maternal habitus. Audible FM noted by RN. Dr Mendoza reviewed tracing.     0925-Dr Mendoza at bedside to see pt and reviewed tracing.Tests are negative. D/c order received. D/c instructions reviewed with pt. All questions answered.

## 2022-06-05 NOTE — ED PROVIDER NOTES
LABOR AND DELIVERY TRIAGE NOTE    PATIENT ID:  NAME:  Priti Begum  MRN:               [id number]  YOB: 1985     37 y.o. female  at 27w3d.    Subjective: Pt presents for evaluation of recent respiratory illness. She noted onset of mild headache, nasal congestion, and sore throat and cough beginning 5 days ago (2022). Cough has evolved and has become more productive in the past several days. She has been experiencing mild R sided abdominal pain with coughing. No fever or chills. 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     Pregnancy complicated by chronic HTN and DM2. She receives care with [facility] for these concerns.     negative for contractions  negative pain   negative for LOF  negative for vaginal bleeding  positive for fetal movement    PNL:  Blood Type O+, Rubella immune, HIV neg, TrepAb neg, HBsAg NR, GC/CT neg/neg  GBS - Not yet performed      ROS: Patient denies any fever chills, nausea, vomiting, chest pain, shortness of breath, or dysuria or unusual swelling of hands or feet.     PMHx:   Past Medical History:   Diagnosis Date   • Diabetes (HCC)         OB History    Para Term  AB Living   1 0 0 0 0 0   SAB IAB Ectopic Molar Multiple Live Births   0 0 0   0        # Outcome Date GA Lbr Jaison/2nd Weight Sex Delivery Anes PTL Lv   1 Current                GYN: denies STIs, no cervical procedures    PSHx:  No past surgical history on file.    Social Hx:  Social History     Tobacco Use   • Smoking status: Never Smoker   • Smokeless tobacco: Never Used   Vaping Use   • Vaping Use: Never used   Substance Use Topics   • Alcohol use: No   • Drug use: No         Medications:  No current facility-administered medications on file prior to encounter.     Current Outpatient Medications on File Prior to Encounter   Medication Sig Dispense Refill   • labetalol (NORMODYNE) 200 MG Tab Take 1 Tablet by mouth 2 times a day. 60 Tablet 13   • Prenatal MV  "& Min w/FA-DHA (PRENATAL ADULT GUMMY/DHA/FA) 0.4-25 MG Chew Tab Chew.     • Blood Glucose Test Strips Testing before and 1 hour after meals for insulin adjustment during pregnancy. One Touch Verio test strips 500 Strip 3   • Lancets Lancets order: Lancets for One Touch Delica.  Use 6 times daily for blood sugar testing. 200 Each 11   • insulin lispro (HUMALOG KWIKPEN) 100 UNIT/ML Solution Pen-injector injection PEN Inject 4-10 Units under the skin 3 times a day before meals. (Patient not taking: Reported on 2022) 15 mL 6   • hydrOXYzine HCl (ATARAX) 25 MG Tab Take 1 Tab by mouth 3 times a day as needed for Anxiety. 30 Tablet 1   • triamcinolone acetonide (KENALOG) 0.1 % Cream Apply once or twice daily to affected area of leg rash for 2 weeks 15 g 1   • Insulin Degludec 100 UNIT/ML Solution Pen-injector Inject 30 Units under the skin at bedtime. (Patient taking differently: Inject 37 Units under the skin at bedtime.) 15 mL 6   • Continuous Blood Gluc Sensor (FREESTYLE CORA 2 SENSOR) [facility] Use 1 sensor as directed every 14 days. (Patient not taking: Reported on 2022) 2 Each 11   • ONETOUCH VERIO strip Use to test blood sugar 5 Times a Day. 200 Strip 6   • metFORMIN ER (GLUCOPHAGE XR) 500 MG TABLET SR 24 HR Take 2 Tablets by mouth 2 times a day. 360 Tablet 3   • vitamin D (CHOLECALCIFEROL) 1000 UNIT Tab Take 1,000 Units by mouth every day.         Allergies:  NKDA      Objective:    Vitals:    22 0755   BP: 118/66   Pulse: 87   Resp: 18   Temp: 36.4 °C (97.6 °F)   TempSrc: Temporal   Weight: (!) 169 kg (372 lb)   Height: 1.575 m (5' 2\")     Temp (24hrs), Av.4 °C (97.6 °F), Min:36.4 °C (97.6 °F), Max:36.4 °C (97.6 °F)    General: No acute distress, resting comfortably in bed.  HEENT: normocephalic, nontraumatic, PERRLA, EOMI  Cardiovascular: Heart RRR with no murmurs, rubs or gallops. Distal Pulses 2+  Respiratory: symmetric chest expansion, lungs CTAB, with no wheezes, rales, rhonci  Abdomen: " gravid, nontender  Musculoskeletal: MCRAE spontaneously  Neuro: non focal with no numbness, tingling or changes in sensation    Cervix:  Not performed  [place]: Uterine Contractions - None  FHRM: Baseline 150, moderate variability, + accels, no decels    Labs:     Component Ref Range & Units  8:10 AM 3 mo ago   Influenza virus A RNA Negative Negative  Negative    Influenza virus B, PCR Negative Negative  Negative    RSV, PCR Negative Negative     SARS-CoV-2 by PCR  NotDetected  NotDetected CM          Assessment: 37 y.o. female  at 27w3d presents with respiratory symptoms most consistent with viral illness    Plan:   -  COVID/FLU neg  - Recommend supportive measures including tylenol and PO hydration  - Patient is cleared to return home with family. Encouraged to see MD/DO for increased painful uterine contractions @ 3-5, vaginal bleeding, loss of fluid, or other serious symptoms.      Discussed case with Dr. Mendoza, [facility] Attending. Case was discussed and attending agreed with plan prior to discharge of patient.        Eduard Cantu M.D.  [facility]  PGY-1

## 2022-06-05 NOTE — ED TRIAGE NOTES
Amb to triage w/ c/o headache x 5 days, followed by nasal congestion and productive cough.  Pt reports abd cramping started 2 days ago.  Pt is 25 weeks pregnant.  Denies vaginal bleeding/discharge. + fetal movement.  Spoke with L&D charge, patient will be evaluated in L&D for her cramping.  Pt ambulatory to that unit.  No distress noted.

## 2022-06-09 ENCOUNTER — ROUTINE PRENATAL (OUTPATIENT)
Dept: OBGYN | Facility: CLINIC | Age: 37
End: 2022-06-09
Payer: COMMERCIAL

## 2022-06-09 VITALS — DIASTOLIC BLOOD PRESSURE: 94 MMHG | BODY MASS INDEX: 67.45 KG/M2 | WEIGHT: 293 LBS | SYSTOLIC BLOOD PRESSURE: 147 MMHG

## 2022-06-09 DIAGNOSIS — I15.2 HYPERTENSION DUE TO ENDOCRINE DISORDER: ICD-10-CM

## 2022-06-09 DIAGNOSIS — E11.9 CONTROLLED TYPE 2 DIABETES MELLITUS WITHOUT COMPLICATION, WITHOUT LONG-TERM CURRENT USE OF INSULIN (HCC): ICD-10-CM

## 2022-06-09 DIAGNOSIS — O09.522 MULTIGRAVIDA OF ADVANCED MATERNAL AGE IN SECOND TRIMESTER: ICD-10-CM

## 2022-06-09 PROCEDURE — 0502F SUBSEQUENT PRENATAL CARE: CPT | Performed by: OBSTETRICS & GYNECOLOGY

## 2022-06-09 ASSESSMENT — FIBROSIS 4 INDEX: FIB4 SCORE: 0.56

## 2022-06-09 NOTE — LETTER
"Count Your Baby's Movements  Another step to a healthy delivery    Priti Begum              Dept: [phone]    How Many Weeks Pregnant? 28W0D    Date to Begin Countin2022              How to use this chart    One way for your physician to keep track of your baby's health is by knowing how often the baby moves (or \"kicks\") in your womb.  You can help your physician to do this by using this chart every day.    Every day, you should see how many hours it takes for your baby to move 10 times.  Start in the morning, as soon as you get up.    · First, write down the time your baby moves until you get to 10.  · Check off one box every time your baby moves until you get to 10.  · Write down the time you finished counting in the last column.  · Total how long it took to count up all 10 movements.  · Finally, fill in the box that shows how long this took.  After counting 10 movements, you no longer have to count any more that day.  The next morning, just start counting again as soon as you get up.    What should you call a \"movement\"?  It is hard to say, because it will feel different from one mother to another and from one pregnancy to the next.  The important thing is that you count the movements the same way throughout your pregnancy.  If you have more questions, you should ask your physician.    Count carefully every day!  SAMPLE:  Week 28    How many hours did it take to feel 10 movements?       Start  Time     1     2     3     4     5     6     7     8     9     10   Finish Time   Mon 8:20 ·  ·  ·  ·  ·  ·  ·  ·  ·  ·  11:40                  Sat               Sun                 IMPORTANT: You should contact your physician if it takes more than two hours for you to feel 10 movements.  Each morning, write down the time and start to count the movements of your baby.  Keep track by checking off one box every time you feel one movement.  When " "you have felt 10 \"kicks\", write down the time you finished counting in the last column.  Then fill in the   box (over the check tom) for the number of hours it took.  Be sure to read the complete instructions on the previous page.            "

## 2022-06-09 NOTE — PROGRESS NOTES
Chief complaint: Return visit    S: Pt presents for routine OB follow up.  No contractions, vaginal bleeding, or leaking fluids. Good fetal movement.    Questions answered.    O: BP (!) 147/94   Wt (!) 167 kg (368 lb 12.8 oz)   LMP 2021 (Exact Date)   BMI 67.45 kg/m²   Patients' weight gain, fluid intake and exercise level discussed.  Vitals, fundal height , fetal position, and FHR reviewed on flowsheet    Lab:  Recent Results (from the past 336 hour(s))   COV-2, FLU A/B, AND RSV BY PCR (2-4 HOURS [employer]): Collect NP swab in [facility]    Collection Time: 22  8:10 AM    Specimen: Nasopharyngeal; Respirate   Result Value Ref Range    Influenza virus A RNA Negative Negative    Influenza virus B, PCR Negative Negative    RSV, PCR Negative Negative    SARS-CoV-2 by PCR NotDetected     SARS-CoV-2 Source NP Swab        A/P:  37 y.o.  at 26w1d presents for routine obstetric follow-up.    Blood sugar shows majority within normal limits with exception of an occasional fasting level    1.  Continue prenatal vitamins.  2.  Begin kick counts at 28 weeks.  3.  Exercise at least 30 minutes daily.  4.  Drink at least 2 Liters of water daily  5.  Follow-up in 2 weeks.    Patient being followed by maternal-fetal medicine.  Has appointment with high risk pregnancy center tomorrow.    Continue labetalol 200 mg p.o. twice daily, increase as needed    All questions answered

## 2022-06-10 ENCOUNTER — PHARMACY VISIT (OUTPATIENT)
Dept: PHARMACY | Facility: MEDICAL CENTER | Age: 37
End: 2022-06-10
Payer: COMMERCIAL

## 2022-06-10 DIAGNOSIS — Z34.93 PREGNANT AND NOT YET DELIVERED IN THIRD TRIMESTER: ICD-10-CM

## 2022-06-10 DIAGNOSIS — E11.9 CONTROLLED TYPE 2 DIABETES MELLITUS WITHOUT COMPLICATION, WITHOUT LONG-TERM CURRENT USE OF INSULIN (HCC): ICD-10-CM

## 2022-06-10 PROCEDURE — RXMED WILLOW AMBULATORY MEDICATION CHARGE: Performed by: INTERNAL MEDICINE

## 2022-06-10 RX ORDER — INSULIN HUMAN 100 [IU]/ML
15 INJECTION, SUSPENSION SUBCUTANEOUS DAILY
Qty: 15 ML | Refills: 0 | Status: SHIPPED | OUTPATIENT
Start: 2022-06-10 | End: 2022-08-23 | Stop reason: SDUPTHER

## 2022-06-21 ENCOUNTER — PHARMACY VISIT (OUTPATIENT)
Dept: PHARMACY | Facility: MEDICAL CENTER | Age: 37
End: 2022-06-21
Payer: COMMERCIAL

## 2022-06-21 PROCEDURE — RXMED WILLOW AMBULATORY MEDICATION CHARGE: Performed by: OBSTETRICS & GYNECOLOGY

## 2022-06-28 ENCOUNTER — HOSPITAL ENCOUNTER (OUTPATIENT)
Facility: MEDICAL CENTER | Age: 37
End: 2022-06-28
Attending: NURSE PRACTITIONER
Payer: COMMERCIAL

## 2022-06-28 LAB
EST. AVERAGE GLUCOSE BLD GHB EST-MCNC: 123 MG/DL
HBA1C MFR BLD: 5.9 % (ref 4–5.6)

## 2022-06-28 PROCEDURE — 83036 HEMOGLOBIN GLYCOSYLATED A1C: CPT

## 2022-07-05 ENCOUNTER — ROUTINE PRENATAL (OUTPATIENT)
Dept: OBGYN | Facility: CLINIC | Age: 37
End: 2022-07-05
Payer: COMMERCIAL

## 2022-07-05 ENCOUNTER — PHARMACY VISIT (OUTPATIENT)
Dept: PHARMACY | Facility: MEDICAL CENTER | Age: 37
End: 2022-07-05
Payer: COMMERCIAL

## 2022-07-05 VITALS — BODY MASS INDEX: 68.04 KG/M2 | SYSTOLIC BLOOD PRESSURE: 142 MMHG | DIASTOLIC BLOOD PRESSURE: 79 MMHG | WEIGHT: 293 LBS

## 2022-07-05 DIAGNOSIS — Z3A.29 29 WEEKS GESTATION OF PREGNANCY: ICD-10-CM

## 2022-07-05 DIAGNOSIS — I15.2 HYPERTENSION DUE TO ENDOCRINE DISORDER: ICD-10-CM

## 2022-07-05 DIAGNOSIS — O24.113 PRE-EXISTING TYPE 2 DIABETES MELLITUS DURING PREGNANCY IN THIRD TRIMESTER: ICD-10-CM

## 2022-07-05 PROCEDURE — 90715 TDAP VACCINE 7 YRS/> IM: CPT | Performed by: OBSTETRICS & GYNECOLOGY

## 2022-07-05 PROCEDURE — 90471 IMMUNIZATION ADMIN: CPT | Performed by: OBSTETRICS & GYNECOLOGY

## 2022-07-05 PROCEDURE — 0502F SUBSEQUENT PRENATAL CARE: CPT | Performed by: OBSTETRICS & GYNECOLOGY

## 2022-07-05 PROCEDURE — RXMED WILLOW AMBULATORY MEDICATION CHARGE: Performed by: NURSE PRACTITIONER

## 2022-07-05 RX ORDER — BLOOD-GLUCOSE METER
EACH MISCELLANEOUS
Qty: 1 KIT | Refills: 0 | OUTPATIENT
Start: 2022-07-05

## 2022-07-05 ASSESSMENT — FIBROSIS 4 INDEX: FIB4 SCORE: 0.56

## 2022-07-05 NOTE — PROGRESS NOTES
Chief complaint: Return OB visit    S: Pt here for OB follow up. Doing well.  Patient has appointment with maternal-fetal medicine later this week for follow-up ultrasound and diabetic education.  positive fetal movement.    negative vaginal bleeding.    negative leakage of fluid.    negative contractions.    Reviewed blood sugar log with patient.  Reviewed diet.  Questions answered.    O: VSS Afeb      See prenatal vitals, chart for today's exam    FBS range: Within normal limits  1hr post prandial range: Within normal limits    Insulin regimen  NPH a.m. 0 , regular a.m. 0  Regular with dinner 0, NPH at bedtime 10  Tresiba 37 units at bedtime  Metformin 500 mg p.o. twice daily      A/P:  37 y.o.  29w6d with pre-existing diabetes who presents for obstetric follow-up.    1.  Labor precautions and fetal kick counts educated  2.  Will not change insulin dosage at this time.  Has appointment with MFM later this week  3.  NSTs: 2x/week to begin at 32 weeks.  4.  Continue prenatal vitamins.  5.  Watch weight gain.  Exercise at least 30 minutes daily  6.  Drink at least 2 liters of water daily.  7.  Encouraged prenatal classes.  8.  Follow-up in 2weeks for obstetric follow-up.    CBC, CMP and 24-hour urine collection for total protein ordered today    Begin  testing    Discussed with patient recommendations for delivery at 39 weeks gestation given AMA and diabetes.  May need earlier delivery if blood pressure becomes more of an issue    Continue labetalol 200 mg p.o. twice daily.  Monitor blood pressure    All questions answered    Tdap given today

## 2022-07-05 NOTE — NON-PROVIDER
Pt here today for OB follow up  Pt states no complaints. Sometimes feels baby move and she sometimes feels baby not move but is hard to tell.   Reports +ISHA James # [phone]   Last [facility]: 7/1/22  Pharmacy Confirmed.

## 2022-07-05 NOTE — LETTER
"Count Your Baby's Movements  Another step to a healthy delivery    Priti Begum             Dept: [phone]    How Many Weeks Pregnant? 29W6D    Date to Begin Countin2022              How to use this chart    One way for your physician to keep track of your baby's health is by knowing how often the baby moves (or \"kicks\") in your womb.  You can help your physician to do this by using this chart every day.    Every day, you should see how many hours it takes for your baby to move 10 times.  Start in the morning, as soon as you get up.    · First, write down the time your baby moves until you get to 10.  · Check off one box every time your baby moves until you get to 10.  · Write down the time you finished counting in the last column.  · Total how long it took to count up all 10 movements.  · Finally, fill in the box that shows how long this took.  After counting 10 movements, you no longer have to count any more that day.  The next morning, just start counting again as soon as you get up.    What should you call a \"movement\"?  It is hard to say, because it will feel different from one mother to another and from one pregnancy to the next.  The important thing is that you count the movements the same way throughout your pregnancy.  If you have more questions, you should ask your physician.    Count carefully every day!  SAMPLE:  Week 28    How many hours did it take to feel 10 movements?       Start  Time     1     2     3     4     5     6     7     8     9     10   Finish Time   Mon 8:20 ·  ·  ·  ·  ·  ·  ·  ·  ·  ·  11:40                  Sat               Sun                 IMPORTANT: You should contact your physician if it takes more than two hours for you to feel 10 movements.  Each morning, write down the time and start to count the movements of your baby.  Keep track by checking off one box every time you feel one movement.  When you " "have felt 10 \"kicks\", write down the time you finished counting in the last column.  Then fill in the   box (over the check tom) for the number of hours it took.  Be sure to read the complete instructions on the previous page.            "

## 2022-07-14 ENCOUNTER — PHARMACY VISIT (OUTPATIENT)
Dept: PHARMACY | Facility: MEDICAL CENTER | Age: 37
End: 2022-07-14
Payer: COMMERCIAL

## 2022-07-14 PROCEDURE — RXMED WILLOW AMBULATORY MEDICATION CHARGE: Performed by: FAMILY MEDICINE

## 2022-07-14 PROCEDURE — RXMED WILLOW AMBULATORY MEDICATION CHARGE: Performed by: NURSE PRACTITIONER

## 2022-07-16 ENCOUNTER — HOSPITAL ENCOUNTER (OUTPATIENT)
Dept: LAB | Facility: MEDICAL CENTER | Age: 37
End: 2022-07-16
Attending: OBSTETRICS & GYNECOLOGY
Payer: COMMERCIAL

## 2022-07-16 DIAGNOSIS — O24.113 PRE-EXISTING TYPE 2 DIABETES MELLITUS DURING PREGNANCY IN THIRD TRIMESTER: ICD-10-CM

## 2022-07-16 LAB
ALBUMIN SERPL BCP-MCNC: 3.5 G/DL (ref 3.2–4.9)
ALBUMIN/GLOB SERPL: 1.1 G/DL
ALP SERPL-CCNC: 129 U/L (ref 30–99)
ALT SERPL-CCNC: 21 U/L (ref 2–50)
ANION GAP SERPL CALC-SCNC: 9 MMOL/L (ref 7–16)
AST SERPL-CCNC: 14 U/L (ref 12–45)
BILIRUB SERPL-MCNC: 0.3 MG/DL (ref 0.1–1.5)
BUN SERPL-MCNC: 8 MG/DL (ref 8–22)
CALCIUM SERPL-MCNC: 8.7 MG/DL (ref 8.5–10.5)
CHLORIDE SERPL-SCNC: 105 MMOL/L (ref 96–112)
CO2 SERPL-SCNC: 21 MMOL/L (ref 20–33)
CREAT SERPL-MCNC: 0.41 MG/DL (ref 0.5–1.4)
ERYTHROCYTE [DISTWIDTH] IN BLOOD BY AUTOMATED COUNT: 41.5 FL (ref 35.9–50)
FASTING STATUS PATIENT QL REPORTED: NORMAL
GFR SERPLBLD CREATININE-BSD FMLA CKD-EPI: 130 ML/MIN/1.73 M 2
GLOBULIN SER CALC-MCNC: 3.2 G/DL (ref 1.9–3.5)
GLUCOSE SERPL-MCNC: 75 MG/DL (ref 65–99)
HCT VFR BLD AUTO: 34.6 % (ref 37–47)
HGB BLD-MCNC: 11 G/DL (ref 12–16)
MCH RBC QN AUTO: 26.1 PG (ref 27–33)
MCHC RBC AUTO-ENTMCNC: 31.8 G/DL (ref 33.6–35)
MCV RBC AUTO: 82 FL (ref 81.4–97.8)
PLATELET # BLD AUTO: 268 K/UL (ref 164–446)
PMV BLD AUTO: 10.1 FL (ref 9–12.9)
POTASSIUM SERPL-SCNC: 4.1 MMOL/L (ref 3.6–5.5)
PROT 24H UR-MCNC: 180 MG/24 HR (ref 30–150)
PROT 24H UR-MRATE: 12 MG/DL (ref 0–15)
PROT SERPL-MCNC: 6.7 G/DL (ref 6–8.2)
RBC # BLD AUTO: 4.22 M/UL (ref 4.2–5.4)
SODIUM SERPL-SCNC: 135 MMOL/L (ref 135–145)
SPECIMEN VOL UR: ABNORMAL ML
WBC # BLD AUTO: 8.4 K/UL (ref 4.8–10.8)

## 2022-07-16 PROCEDURE — 81050 URINALYSIS VOLUME MEASURE: CPT

## 2022-07-16 PROCEDURE — 36415 COLL VENOUS BLD VENIPUNCTURE: CPT

## 2022-07-16 PROCEDURE — 84156 ASSAY OF PROTEIN URINE: CPT

## 2022-07-16 PROCEDURE — 85027 COMPLETE CBC AUTOMATED: CPT

## 2022-07-16 PROCEDURE — 80053 COMPREHEN METABOLIC PANEL: CPT

## 2022-07-28 ENCOUNTER — ROUTINE PRENATAL (OUTPATIENT)
Dept: OBGYN | Facility: CLINIC | Age: 37
End: 2022-07-28
Payer: COMMERCIAL

## 2022-07-28 VITALS — SYSTOLIC BLOOD PRESSURE: 140 MMHG | DIASTOLIC BLOOD PRESSURE: 82 MMHG | BODY MASS INDEX: 68.22 KG/M2 | WEIGHT: 293 LBS

## 2022-07-28 DIAGNOSIS — O24.113 PRE-EXISTING TYPE 2 DIABETES MELLITUS DURING PREGNANCY IN THIRD TRIMESTER: ICD-10-CM

## 2022-07-28 DIAGNOSIS — E66.01 MORBID OBESITY (HCC): ICD-10-CM

## 2022-07-28 DIAGNOSIS — Z3A.33 33 WEEKS GESTATION OF PREGNANCY: ICD-10-CM

## 2022-07-28 PROCEDURE — 0502F SUBSEQUENT PRENATAL CARE: CPT | Performed by: OBSTETRICS & GYNECOLOGY

## 2022-07-28 RX ORDER — ASPIRIN 81 MG/1
81 TABLET, CHEWABLE ORAL DAILY
Status: ON HOLD | COMMUNITY
End: 2022-09-05

## 2022-07-28 ASSESSMENT — FIBROSIS 4 INDEX: FIB4 SCORE: 0.41

## 2022-07-28 NOTE — NON-PROVIDER
Pt here today for OB follow up  Pt states no complaints   Reports +   Good # [phone]   Pharmacy Confirmed.

## 2022-07-28 NOTE — PROGRESS NOTES
Chief complaint: Return visit    S: Pt here for OB follow up. Doing well.   positive fetal movement.    negative vaginal bleeding.    negative leakage of fluid.    negative contractions.    Reviewed blood sugar log with patient.  Reviewed diet.  Questions answered.    O: VSS Afeb      See prenatal vitals, chart for today's exam    FBS range: Within normal limits  1hr post prandial range: Within normal limits    Insulin regimen  NPH a.m. 10 , regular a.m. 0  Regular with dinner 0, NPH at bedtime 0  Tresiba 37 units at dinner  Metformin 500 twice daily      A/P:  37 y.o.  33w1d with pre-existing diabetes who presents for obstetric follow-up.    1.  Labor precautions and fetal kick counts educated  2.  No changes to insulin at this time  3.  NSTs: 2x/week to begin at 32 weeks.  4.  Continue prenatal vitamins.  5.  Watch weight gain.  Exercise at least 30 minutes daily  6.  Drink at least 2 liters of water daily.  7.  Encouraged prenatal classes.  8.  Follow-up in 1weeks for obstetric follow-up.    Has appointment with high risk pregnancy center tomorrow for growth ultrasound    Plan for induction labor 38 weeks gestation unless indicated otherwise    Labs reviewed    All questions answered

## 2022-07-29 ENCOUNTER — HOSPITAL ENCOUNTER (OUTPATIENT)
Facility: MEDICAL CENTER | Age: 37
End: 2022-07-29
Attending: NURSE PRACTITIONER
Payer: COMMERCIAL

## 2022-07-29 LAB
EST. AVERAGE GLUCOSE BLD GHB EST-MCNC: 108 MG/DL
HBA1C MFR BLD: 5.4 % (ref 4–5.6)

## 2022-07-29 PROCEDURE — 82985 ASSAY OF GLYCATED PROTEIN: CPT

## 2022-07-29 PROCEDURE — 83036 HEMOGLOBIN GLYCOSYLATED A1C: CPT

## 2022-08-01 LAB — FRUCTOSAMINE SERPL-SCNC: 188 UMOL/L (ref 205–285)

## 2022-08-02 ENCOUNTER — ROUTINE PRENATAL (OUTPATIENT)
Dept: OBGYN | Facility: CLINIC | Age: 37
End: 2022-08-02
Payer: COMMERCIAL

## 2022-08-02 DIAGNOSIS — E11.9 CONTROLLED TYPE 2 DIABETES MELLITUS WITHOUT COMPLICATION, WITHOUT LONG-TERM CURRENT USE OF INSULIN (HCC): Primary | ICD-10-CM

## 2022-08-02 PROCEDURE — 59025 FETAL NON-STRESS TEST: CPT | Performed by: OBSTETRICS & GYNECOLOGY

## 2022-08-05 ENCOUNTER — APPOINTMENT (OUTPATIENT)
Dept: RADIOLOGY | Facility: MEDICAL CENTER | Age: 37
End: 2022-08-05
Attending: OBSTETRICS & GYNECOLOGY
Payer: COMMERCIAL

## 2022-08-05 ENCOUNTER — APPOINTMENT (OUTPATIENT)
Dept: OBGYN | Facility: CLINIC | Age: 37
End: 2022-08-05
Payer: COMMERCIAL

## 2022-08-05 ENCOUNTER — HOSPITAL ENCOUNTER (EMERGENCY)
Facility: MEDICAL CENTER | Age: 37
End: 2022-08-05
Attending: OBSTETRICS & GYNECOLOGY | Admitting: OBSTETRICS & GYNECOLOGY
Payer: COMMERCIAL

## 2022-08-05 VITALS
DIASTOLIC BLOOD PRESSURE: 72 MMHG | HEIGHT: 62 IN | TEMPERATURE: 97.4 F | WEIGHT: 293 LBS | OXYGEN SATURATION: 98 % | BODY MASS INDEX: 53.92 KG/M2 | SYSTOLIC BLOOD PRESSURE: 131 MMHG | HEART RATE: 96 BPM | RESPIRATION RATE: 16 BRPM

## 2022-08-05 DIAGNOSIS — E11.9 CONTROLLED TYPE 2 DIABETES MELLITUS WITHOUT COMPLICATION, WITHOUT LONG-TERM CURRENT USE OF INSULIN (HCC): Primary | ICD-10-CM

## 2022-08-05 PROCEDURE — 99284 EMERGENCY DEPT VISIT MOD MDM: CPT

## 2022-08-05 PROCEDURE — 76819 FETAL BIOPHYS PROFIL W/O NST: CPT

## 2022-08-05 ASSESSMENT — FIBROSIS 4 INDEX: FIB4 SCORE: 0.41

## 2022-08-05 NOTE — DISCHARGE INSTRUCTIONS
Pre-term Labor (<37 weeks):  Call your physician or return to the hospital if:  You have painless regular contractions more than 4 in one hour.  Your water breaks (remember time and color).  You have menstrual-like cramps, a low dull backache or pressure in your pelvis or back.  Your baby does not move enough to complete the daily kick count (10 movements in 2 hours).  Your baby moves much less often than on the days before or you have not felt your baby move all day.  Please review the MEDICATION LIST section of your AFTER VISIT SUMMARY document.  Take your medication as prescribed

## 2022-08-05 NOTE — PROGRESS NOTES
, EDC , GA 34w2d. Pt presents to L&D stating she was seen in the office and sent over because they could not find her baby's heart tones, pt crying. Pt denies LOF, VB, or UCs and reports + fetal movement. Pt states she has felt only occasional movement throughout her pregnancy due to weight and anterior placenta, but she has felt movement today normal for her. Pt escorted to labor room, oriented to room and unit. POC discussed with pt and encouraged to state needs or questions at any time.  Dr. Galdamez given report.    1405 External monitor applied. FHT audible within 1 minute at 140-150bpm and frequent fetal movement heard. Dr. Galdamez updated. Unable to obtain continuous monitoring due to angle of monitor and fetal movement, attempted hand-holding for 17 minutes, accels and movement heard throughout.     1440 Dr. Galdamez at bedside, POC discussed with pt. Order received for BPP.    1550 Dr. Galdamez updated, D/C order received.    1612 L&D D/C instructions reviewed with pt who states understanding. Pt d/c to self with family.

## 2022-08-08 ENCOUNTER — APPOINTMENT (OUTPATIENT)
Dept: OBGYN | Facility: CLINIC | Age: 37
End: 2022-08-08
Payer: COMMERCIAL

## 2022-08-11 ENCOUNTER — ROUTINE PRENATAL (OUTPATIENT)
Dept: OBGYN | Facility: CLINIC | Age: 37
End: 2022-08-11
Payer: COMMERCIAL

## 2022-08-11 ENCOUNTER — APPOINTMENT (OUTPATIENT)
Dept: OBGYN | Facility: CLINIC | Age: 37
End: 2022-08-11
Payer: COMMERCIAL

## 2022-08-11 VITALS — BODY MASS INDEX: 68.41 KG/M2 | WEIGHT: 293 LBS

## 2022-08-11 DIAGNOSIS — O09.519 PREGNANCY, HIGH-RISK, MATERNAL AGE 35+ PRIMIGRAVIDA: ICD-10-CM

## 2022-08-11 DIAGNOSIS — E66.01 MORBID OBESITY (HCC): ICD-10-CM

## 2022-08-11 DIAGNOSIS — Z3A.35 35 WEEKS GESTATION OF PREGNANCY: ICD-10-CM

## 2022-08-11 DIAGNOSIS — O09.93 SUPERVISION OF HIGH RISK PREGNANCY IN THIRD TRIMESTER: Primary | ICD-10-CM

## 2022-08-11 DIAGNOSIS — O24.113 PRE-EXISTING TYPE 2 DIABETES MELLITUS DURING PREGNANCY IN THIRD TRIMESTER: ICD-10-CM

## 2022-08-11 LAB
NST ACOUSTIC STIMULATION: NORMAL
NST ACTION NECESSARY: NORMAL
NST ASSESSMENT: NORMAL
NST BASELINE: NORMAL
NST INDICATIONS: NORMAL
NST OTHER DATA: NORMAL
NST READ BY: NORMAL
NST RETURN: NORMAL
NST UTERINE ACTIVITY: NORMAL

## 2022-08-11 PROCEDURE — 0502F SUBSEQUENT PRENATAL CARE: CPT | Performed by: OBSTETRICS & GYNECOLOGY

## 2022-08-11 ASSESSMENT — FIBROSIS 4 INDEX: FIB4 SCORE: 0.41

## 2022-08-11 NOTE — NON-PROVIDER
Pt here today for OB follow up  Pt states pelvic pressure and lower back pain   Reports +FM  Good # [phone] (home)   Pharmacy Confirmed.

## 2022-08-12 ENCOUNTER — TELEPHONE (OUTPATIENT)
Dept: OBGYN | Facility: CLINIC | Age: 37
End: 2022-08-12
Payer: COMMERCIAL

## 2022-08-12 NOTE — TELEPHONE ENCOUNTER
Pt called and stated she tested positive for COVID last night pt was transferred to [facility] to reschedule for the end of the day.

## 2022-08-15 PROCEDURE — RXMED WILLOW AMBULATORY MEDICATION CHARGE: Performed by: INTERNAL MEDICINE

## 2022-08-16 ENCOUNTER — PHARMACY VISIT (OUTPATIENT)
Dept: PHARMACY | Facility: MEDICAL CENTER | Age: 37
End: 2022-08-16
Payer: COMMERCIAL

## 2022-08-16 ENCOUNTER — ROUTINE PRENATAL (OUTPATIENT)
Dept: OBGYN | Facility: CLINIC | Age: 37
End: 2022-08-16
Payer: COMMERCIAL

## 2022-08-16 ENCOUNTER — HOSPITAL ENCOUNTER (OUTPATIENT)
Facility: MEDICAL CENTER | Age: 37
End: 2022-08-16
Attending: OBSTETRICS & GYNECOLOGY
Payer: COMMERCIAL

## 2022-08-16 ENCOUNTER — APPOINTMENT (OUTPATIENT)
Dept: OBGYN | Facility: CLINIC | Age: 37
End: 2022-08-16
Payer: COMMERCIAL

## 2022-08-16 VITALS — SYSTOLIC BLOOD PRESSURE: 131 MMHG | DIASTOLIC BLOOD PRESSURE: 68 MMHG

## 2022-08-16 DIAGNOSIS — E11.9 CONTROLLED TYPE 2 DIABETES MELLITUS WITHOUT COMPLICATION, WITHOUT LONG-TERM CURRENT USE OF INSULIN (HCC): ICD-10-CM

## 2022-08-16 DIAGNOSIS — O09.519 PREGNANCY, HIGH-RISK, MATERNAL AGE 35+ PRIMIGRAVIDA: ICD-10-CM

## 2022-08-16 DIAGNOSIS — O24.113 PRE-EXISTING TYPE 2 DIABETES MELLITUS DURING PREGNANCY IN THIRD TRIMESTER: ICD-10-CM

## 2022-08-16 DIAGNOSIS — E66.01 MORBID OBESITY (HCC): ICD-10-CM

## 2022-08-16 PROCEDURE — 0502F SUBSEQUENT PRENATAL CARE: CPT | Performed by: OBSTETRICS & GYNECOLOGY

## 2022-08-16 PROCEDURE — 87081 CULTURE SCREEN ONLY: CPT

## 2022-08-16 PROCEDURE — 87150 DNA/RNA AMPLIFIED PROBE: CPT

## 2022-08-16 NOTE — PROGRESS NOTES
Chief complaint: Return visit    S: Pt presents for routine OB follow up. Good fetal movement.  No contractions, vaginal bleeding, or leakage of fluid.    Questions answered.    O: /68   LMP 2021 (Exact Date)   Patients' weight gain, fluid intake and exercise level discussed.  Vitals, fundal height , fetal position, and FHR reviewed on flowsheet    Lab:  Recent Results (from the past 336 hour(s))   POCT Fetal Nonstress Test    Collection Time: 22 12:00 AM   Result Value Ref Range    NST Indications      NST Baseline      NST Uterine Activity      NST Acoustic Stimulation      NST Assessment      NST Action Necessary      NST Other Data      NST Return      NST Read By         A/P:  37 y.o.  at 35w6d presents for routine obstetric follow-up.  Size equals dates and/or scan    1.  Continue prenatal vitamins.  2.  Fetal kick counts.  3.  Exercise at least 30 minutes daily.  4.  Drink at least 2L of water daily  5.  Labor precautions educated.  6.  Follow-up in 1 weeks.  7.  GBS today    Plan for induction of labor at 38 weeks gestation on 2022.  Request sent    Has follow-up appointment with maternal-fetal medicine next week

## 2022-08-17 ENCOUNTER — OFFICE VISIT (OUTPATIENT)
Dept: ENDOCRINOLOGY | Facility: MEDICAL CENTER | Age: 37
End: 2022-08-17
Attending: INTERNAL MEDICINE
Payer: COMMERCIAL

## 2022-08-17 VITALS
BODY MASS INDEX: 51.91 KG/M2 | HEIGHT: 63 IN | HEART RATE: 119 BPM | OXYGEN SATURATION: 95 % | DIASTOLIC BLOOD PRESSURE: 72 MMHG | WEIGHT: 293 LBS | SYSTOLIC BLOOD PRESSURE: 126 MMHG

## 2022-08-17 DIAGNOSIS — E11.9 CONTROLLED TYPE 2 DIABETES MELLITUS WITHOUT COMPLICATION, WITHOUT LONG-TERM CURRENT USE OF INSULIN (HCC): ICD-10-CM

## 2022-08-17 DIAGNOSIS — I10 ESSENTIAL HYPERTENSION: ICD-10-CM

## 2022-08-17 DIAGNOSIS — E55.9 VITAMIN D DEFICIENCY: ICD-10-CM

## 2022-08-17 DIAGNOSIS — Z34.93 PREGNANT AND NOT YET DELIVERED IN THIRD TRIMESTER: ICD-10-CM

## 2022-08-17 DIAGNOSIS — Z79.4 LONG-TERM INSULIN USE (HCC): ICD-10-CM

## 2022-08-17 PROCEDURE — 92250 FUNDUS PHOTOGRAPHY W/I&R: CPT | Performed by: INTERNAL MEDICINE

## 2022-08-17 PROCEDURE — 99212 OFFICE O/P EST SF 10 MIN: CPT | Performed by: INTERNAL MEDICINE

## 2022-08-17 PROCEDURE — 99214 OFFICE O/P EST MOD 30 MIN: CPT | Performed by: INTERNAL MEDICINE

## 2022-08-17 ASSESSMENT — FIBROSIS 4 INDEX: FIB4 SCORE: 0.41

## 2022-08-17 NOTE — PROGRESS NOTES
Chief complaint: Return OB visit    S: Pt presents for routine OB follow up. Good fetal movement.  No contractions, vaginal bleeding, or leakage of fluid.    Questions answered.    O: /68   LMP 2021 (Exact Date)   Patients' weight gain, fluid intake and exercise level discussed.  Vitals, fundal height , fetal position, and FHR reviewed on flowsheet    Lab:  Recent Results (from the past 336 hour(s))   POCT Fetal Nonstress Test    Collection Time: 22 12:00 AM   Result Value Ref Range    NST Indications      NST Baseline      NST Uterine Activity      NST Acoustic Stimulation      NST Assessment      NST Action Necessary      NST Other Data      NST Return      NST Read By         A/P:  37 y.o.  at 35w6d presents for routine obstetric follow-up.  Size equals dates and/or scan    1.  Continue prenatal vitamins.  2.  Fetal kick counts.  3.  Exercise at least 30 minutes daily.  4.  Drink at least 2L of water daily  5.  Labor precautions educated.  6.  Follow-up in 1 weeks.  7.  [facility] today    Has appointment with MFM next week    All questions answered    Plan for induction of labor on .  Request sent

## 2022-08-17 NOTE — PROGRESS NOTES
CHIEF COMPLAINT: Patient is here for follow up of Type 2 Diabetes Mellitus .    HPI:     Priti Begum is a 37 y.o. female with Type 2 Diabetes Mellitus here for follow up.    Labs from 7/29/2022 show a1c is 5.4%  Labs from 6/28/2022 show a1c was 5.9%  Labs from  2/2/2022 show A1c was 5.6%  Labs from 11/5/2021 show a1c was 5.9%  Labs from 4/28/2021 show Hba1c was 6.1%  Labs from 11/3/2020 show Hba1c was 5.8%  Labs from 4/13/2020 show HbA1c was 6.1%    She was on Metformin 500mg ER 2 pills  twice daily and  Ozempic 1.0mg weekly prepregnancy      On follow-up she is currently on  She is on Tresiba 37u daily  NHP 10u daily    She is now 36 weeks AOG  EDC 8/31/2022    Overall pregnancy has been uneventful  Her fasting BG < 90  BG 1 hour after meals are less than 130  Her blood sugars at goal  She denies severe hypoglycemia      Her LDL cholesterol was 62 on Feb 2022  She is currently not on a statin for primary prevention because she she is of reproductive age and she is still young and low risk for cardiovascular disease      She also has HTN which is well controlled  She was on lisinopril but is currently on labetalol  She does not have diabetic kidney disease   UACR was < 30 on Feb 2022      She had an eye exam with [facility] last month but we dont have records  Eye exam was attempted in the office today      She has a history of morbid obesity and other obesity related complications such as PCOS and fatty liver disease with a previous baseline weight of over 400 pounds.    She now weighs 374 pounds  Current BMI 66  She has stopped phentermine since confirmation of pregnancy      With regards to her PCOS  Her testosterone levels are controlled  Total testosterone was 27 on April 2021            BG Diary:  Patient brought blood sugar logs    Weight has been stable    Diabetes Complications   Retinopathy: No known retinopathy.  Last eye exam: We are getting an eye exam today  Neuropathy:  Denies paresthesias or numbness in hands or feet. Denies any foot wounds.  Exercise: Minimal.  Diet: Fair.  Patient's medications, allergies, and social histories were reviewed and updated as appropriate.    ROS:     CONS:     No fever, no chills   EYES:     No diplopia, no blurry vision   CV:           No chest pain, no palpitations   PULM:     No SOB, no cough, no hemoptysis.   GI:            No nausea, no vomiting, no diarrhea, no constipation   ENDO:     No polyuria, no polydipsia, no heat intolerance, no cold intolerance       Past Medical History:  Problem List:  2022-07: Pre-existing type 2 diabetes mellitus during pregnancy in   third trimester  2022-04: Multigravida of advanced maternal age in second trimester  2022-04: Pregnancy in patient 16 to 19 years of age with history of   previous pregnancy in second trimester  2022-03: Adolescent multigravida 16 years of age or older in second   trimester  2022-02: Positive urine pregnancy test  2022-02: Sore throat (viral)  2020-08: Long term (current) use of oral hypoglycemic drugs  2020-01: Metabolic syndrome  2019-06: Controlled type 2 diabetes mellitus without complication,   without long-term current use of insulin (HCC)  2019-06: Vitamin D deficiency  2019-04: Preventative health care  2019-03: Hypertension due to endocrine disorder  2019-02: Uncontrolled type 2 diabetes mellitus with hyperglycemia   (HCC)  2019-02: Candidiasis of skin  2016-01: Fatty liver disease, nonalcoholic  2016-01: Hypomenorrhea  2016-01: Family history of diabetes mellitus (DM)  2013-10: Morbidly obese (HCC)  2013-10: PCOS (polycystic ovarian syndrome)    Past Surgical History:  History reviewed. No pertinent surgical history.     Allergies:  Glimepiride [kdc:yellow dye+brilliant blue fcf+glimepiride]     Social History:  Social History     Tobacco Use    Smoking status: Never    Smokeless tobacco: Never   Vaping Use    Vaping Use: Never used   Substance Use Topics    Alcohol use: No  "   Drug use: No        Family History:   family history includes Diabetes in her maternal grandmother and mother; Hyperlipidemia in her mother; Hypertension in her mother; Kidney Disease in her mother; Lung Disease in her maternal grandmother; No Known Problems in her brother, brother, father, and sister.      PHYSICAL EXAM:   OBJECTIVE:  Vital signs: /72 (BP Location: Left arm, Patient Position: Sitting, BP Cuff Size: Large adult)   Pulse (!) 119   Ht 1.6 m (5' 3\")   Wt (!) 170 kg (374 lb)   LMP 11/25/2021 (Exact Date)   SpO2 95%   BMI 66.25 kg/m²   GENERAL: Morbidly obese female in no apparent distress.   EYE:  No ocular asymmetry, PERRLA  HENT: Pink, moist mucous membranes.    NECK: No thyromegaly.   CARDIOVASCULAR:  No murmurs  LUNGS: Clear breath sounds  ABDOMEN: Soft, nontender gravid abdomen  EXTREMITIES: No clubbing, cyanosis, or edema.   NEUROLOGICAL: No gross focal motor abnormalities   LYMPH: No cervical adenopathy palpated.   SKIN: No rashes, lesions.         Labs:  Lab Results   Component Value Date/Time    HBA1C 5.4 07/29/2022 02:35 PM        Lab Results   Component Value Date/Time    WBC 8.4 07/16/2022 08:33 AM    RBC 4.22 07/16/2022 08:33 AM    HEMOGLOBIN 11.0 (L) 07/16/2022 08:33 AM    MCV 82.0 07/16/2022 08:33 AM    MCH 26.1 (L) 07/16/2022 08:33 AM    MCHC 31.8 (L) 07/16/2022 08:33 AM    RDW 41.5 07/16/2022 08:33 AM    MPV 10.1 07/16/2022 08:33 AM       Lab Results   Component Value Date/Time    SODIUM 135 07/16/2022 08:33 AM    POTASSIUM 4.1 07/16/2022 08:33 AM    CHLORIDE 105 07/16/2022 08:33 AM    CO2 21 07/16/2022 08:33 AM    ANION 9.0 07/16/2022 08:33 AM    GLUCOSE 75 07/16/2022 08:33 AM    BUN 8 07/16/2022 08:33 AM    CREATININE 0.41 (L) 07/16/2022 08:33 AM    CALCIUM 8.7 07/16/2022 08:33 AM    ASTSGOT 14 07/16/2022 08:33 AM    ALTSGPT 21 07/16/2022 08:33 AM    TBILIRUBIN 0.3 07/16/2022 08:33 AM    ALBUMIN 3.5 07/16/2022 08:33 AM    TOTPROTEIN 6.7 07/16/2022 08:33 AM    GLOBULIN " 3.2 07/16/2022 08:33 AM    AGRATIO 1.1 07/16/2022 08:33 AM       Lab Results   Component Value Date/Time    CHOLSTRLTOT 140 12/24/2019 0619    TRIGLYCERIDE 123 12/24/2019 0619    HDL 40 12/24/2019 0619    LDL 75 12/24/2019 0619       Lab Results   Component Value Date/Time    MALBCRT 29 02/24/2022 06:21 AM    MICROALBUR 7.1 02/24/2022 06:21 AM        Lab Results   Component Value Date/Time    TSHULTRASEN 3.320 02/14/2019 1142     No results found for: FREEDIR  No results found for: FREET3  No results found for: THYSTIMIG        ASSESSMENT/PLAN:     1. Controlled type 2 diabetes mellitus without complication, without long-term current use of insulin (HCC)  Controlled  A1c is less than 6%  Continue current meds  She is updated with her labs  Eye exam done today  Follow up with Marie in 3 mos      2. 36 weeks gestation of pregnancy  Patient is currently pregnant    3. Hypertension due to endocrine disorder  Stable  Continue labetalol  Repeat urine albumin next year    4. Vitamin D deficiency  Stable   Continue monitoring     5. Long-term insulin use (HCC)  Patient is currently on multiple daily insulin injections due to pregnancy and diabetes      Return in about 3 months (around 11/17/2022).      Thank you kindly for allowing me to participate in the diabetes care plan for this patient.    Dickson Rivera MD, [facility], [facility]  04/24/20    CC:   Shelby Sparks M.D.

## 2022-08-18 LAB — GP B STREP DNA SPEC QL NAA+PROBE: NEGATIVE

## 2022-08-19 ENCOUNTER — ROUTINE PRENATAL (OUTPATIENT)
Dept: OBGYN | Facility: CLINIC | Age: 37
End: 2022-08-19
Payer: COMMERCIAL

## 2022-08-19 ENCOUNTER — APPOINTMENT (OUTPATIENT)
Dept: OBGYN | Facility: CLINIC | Age: 37
End: 2022-08-19
Payer: COMMERCIAL

## 2022-08-19 VITALS — WEIGHT: 293 LBS | SYSTOLIC BLOOD PRESSURE: 125 MMHG | DIASTOLIC BLOOD PRESSURE: 59 MMHG | BODY MASS INDEX: 66.61 KG/M2

## 2022-08-19 DIAGNOSIS — E66.01 MORBID OBESITY (HCC): ICD-10-CM

## 2022-08-19 DIAGNOSIS — O09.519 PREGNANCY, HIGH-RISK, MATERNAL AGE 35+ PRIMIGRAVIDA: ICD-10-CM

## 2022-08-19 DIAGNOSIS — O24.113 PRE-EXISTING TYPE 2 DIABETES MELLITUS DURING PREGNANCY IN THIRD TRIMESTER: ICD-10-CM

## 2022-08-19 PROCEDURE — 59025 FETAL NON-STRESS TEST: CPT | Performed by: OBSTETRICS & GYNECOLOGY

## 2022-08-19 PROCEDURE — 0502F SUBSEQUENT PRENATAL CARE: CPT | Performed by: OBSTETRICS & GYNECOLOGY

## 2022-08-19 ASSESSMENT — FIBROSIS 4 INDEX: FIB4 SCORE: 0.41

## 2022-08-19 NOTE — PROGRESS NOTES
Pt doing well, no complaints. Reports FS are well controlled. [facility] is managing these. She is aware of date time of IOL scheduled for 8/31 @ 9 pm. Discussed the IOL process. All questions answered  -Pt aware she needs 2x weekly NST. Will RTC early next week for NST only appt  -RTC in 1 week for GUILLERMINA+NST  -GBS neg  -PTL and FKC reviewed

## 2022-08-23 DIAGNOSIS — E11.9 CONTROLLED TYPE 2 DIABETES MELLITUS WITHOUT COMPLICATION, WITHOUT LONG-TERM CURRENT USE OF INSULIN (HCC): ICD-10-CM

## 2022-08-23 DIAGNOSIS — Z34.93 PREGNANT AND NOT YET DELIVERED IN THIRD TRIMESTER: ICD-10-CM

## 2022-08-23 RX ORDER — INSULIN HUMAN 100 [IU]/ML
15 INJECTION, SUSPENSION SUBCUTANEOUS DAILY
Qty: 15 ML | Refills: 0 | Status: ON HOLD | OUTPATIENT
Start: 2022-08-23 | End: 2022-09-05

## 2022-08-23 NOTE — ED PROVIDER NOTES
"S: Pt is a 37 y.o.  at 36w6d with Estimated Date of Delivery: 22 who presents to triage c/o not being able to monitor baby at [facility].    O: /72   Pulse 96   Temp 36.3 °C (97.4 °F) (Temporal)   Resp 16   Ht 1.575 m (5' 2\")   Wt (!) 169 kg (373 lb)   LMP 2021 (Exact Date)   SpO2 98%   BMI 68.22 kg/m²          NST: Done and read by me- readtive and reassuring. BPP .              A/P  Patient Active Problem List    Diagnosis Date Noted    Pre-existing type 2 diabetes mellitus during pregnancy in third trimester 2022    Multigravida of advanced maternal age in second trimester 2022    Pregnancy in patient 16 to 19 years of age with history of previous pregnancy in second trimester 2022    Adolescent multigravida 16 years of age or older in second trimester 2022    Positive urine pregnancy test 2022    Sore throat (viral) 2022    Long term (current) use of oral hypoglycemic drugs 2020    Metabolic syndrome 2020    Controlled type 2 diabetes mellitus without complication, without long-term current use of insulin (HCC) 2019    Vitamin D deficiency 2019    Hypertension due to endocrine disorder 2019    Fatty liver disease, nonalcoholic 2016    Morbidly obese (HCC) 10/31/2013    PCOS (polycystic ovarian syndrome) 10/31/2013       1.  IUP @ 36w6d  2.  BPP. FU as routinely scheduled.       Evaluation and clinical decision making , including analysis of Fetal data and maternal lab work completed over a 1 hour period.       Elysia White DO        "

## 2022-08-25 ENCOUNTER — ROUTINE PRENATAL (OUTPATIENT)
Dept: OBGYN | Facility: CLINIC | Age: 37
End: 2022-08-25
Payer: COMMERCIAL

## 2022-08-25 ENCOUNTER — APPOINTMENT (OUTPATIENT)
Dept: OBGYN | Facility: CLINIC | Age: 37
End: 2022-08-25
Payer: COMMERCIAL

## 2022-08-25 VITALS — WEIGHT: 293 LBS | BODY MASS INDEX: 66.78 KG/M2 | SYSTOLIC BLOOD PRESSURE: 130 MMHG | DIASTOLIC BLOOD PRESSURE: 74 MMHG

## 2022-08-25 DIAGNOSIS — Z3A.37 PREGNANCY WITH 37 WEEKS COMPLETED GESTATION: ICD-10-CM

## 2022-08-25 DIAGNOSIS — O09.522 MULTIGRAVIDA OF ADVANCED MATERNAL AGE IN SECOND TRIMESTER: ICD-10-CM

## 2022-08-25 DIAGNOSIS — I15.2 HYPERTENSION DUE TO ENDOCRINE DISORDER: ICD-10-CM

## 2022-08-25 DIAGNOSIS — E11.9 CONTROLLED TYPE 2 DIABETES MELLITUS WITHOUT COMPLICATION, WITHOUT LONG-TERM CURRENT USE OF INSULIN (HCC): Primary | ICD-10-CM

## 2022-08-25 PROCEDURE — 99213 OFFICE O/P EST LOW 20 MIN: CPT | Performed by: OBSTETRICS & GYNECOLOGY

## 2022-08-25 ASSESSMENT — FIBROSIS 4 INDEX: FIB4 SCORE: 0.41

## 2022-08-25 NOTE — PROGRESS NOTES
Chief complaint: Return visit    S: Pt presents for routine OB follow up. Good fetal movement.  No contractions, vaginal bleeding, or leakage of fluid.    Questions answered.    O: /74   Wt (!) 171 kg (377 lb)   LMP 2021 (Exact Date)   BMI 66.78 kg/m²   Patients' weight gain, fluid intake and exercise level discussed.  Vitals, fundal height , fetal position, and FHR reviewed on flowsheet    Lab:  Recent Results (from the past 336 hour(s))   GRP B STREP, BY PCR (DAVIS BROTH)    Collection Time: 22  3:59 PM    Specimen: Genital   Result Value Ref Range    Strep Gp B DNA PCR Negative Negative     Diabetes [facility].  Has appointment with them tomorrow    A/P:  37 y.o.  at 37w1d presents for routine obstetric follow-up.      1.  Continue prenatal vitamins.  2.  Fetal kick counts.  3.  Exercise at least 30 minutes daily.  4.  Drink at least 2L of water daily  5.  Labor precautions educated.  6.  Follow-up in 1 weeks.  7.  GBS negative    Cervix fingertip/thick/high.  Plan for induction of labor on     All questions answered

## 2022-08-26 LAB — RETINAL SCREEN: NEGATIVE

## 2022-08-31 ENCOUNTER — APPOINTMENT (OUTPATIENT)
Dept: OBGYN | Facility: MEDICAL CENTER | Age: 37
End: 2022-08-31
Attending: OBSTETRICS & GYNECOLOGY
Payer: COMMERCIAL

## 2022-08-31 ENCOUNTER — HOSPITAL ENCOUNTER (INPATIENT)
Facility: MEDICAL CENTER | Age: 37
LOS: 5 days | End: 2022-09-05
Attending: OBSTETRICS & GYNECOLOGY | Admitting: OBSTETRICS & GYNECOLOGY
Payer: COMMERCIAL

## 2022-08-31 DIAGNOSIS — G89.18 ACUTE POST-OPERATIVE PAIN: ICD-10-CM

## 2022-08-31 DIAGNOSIS — Z34.90 ENCOUNTER FOR INDUCTION OF LABOR: ICD-10-CM

## 2022-08-31 LAB
ALBUMIN SERPL BCP-MCNC: 3.3 G/DL (ref 3.2–4.9)
ALBUMIN/GLOB SERPL: 1 G/DL
ALP SERPL-CCNC: 155 U/L (ref 30–99)
ALT SERPL-CCNC: 26 U/L (ref 2–50)
ANION GAP SERPL CALC-SCNC: 13 MMOL/L (ref 7–16)
AST SERPL-CCNC: 18 U/L (ref 12–45)
BASOPHILS # BLD AUTO: 0.3 % (ref 0–1.8)
BASOPHILS # BLD: 0.03 K/UL (ref 0–0.12)
BILIRUB SERPL-MCNC: 0.2 MG/DL (ref 0.1–1.5)
BUN SERPL-MCNC: 10 MG/DL (ref 8–22)
CALCIUM SERPL-MCNC: 9 MG/DL (ref 8.5–10.5)
CHLORIDE SERPL-SCNC: 104 MMOL/L (ref 96–112)
CO2 SERPL-SCNC: 20 MMOL/L (ref 20–33)
CREAT SERPL-MCNC: 0.41 MG/DL (ref 0.5–1.4)
EOSINOPHIL # BLD AUTO: 0.17 K/UL (ref 0–0.51)
EOSINOPHIL NFR BLD: 1.8 % (ref 0–6.9)
ERYTHROCYTE [DISTWIDTH] IN BLOOD BY AUTOMATED COUNT: 43.5 FL (ref 35.9–50)
GFR SERPLBLD CREATININE-BSD FMLA CKD-EPI: 130 ML/MIN/1.73 M 2
GLOBULIN SER CALC-MCNC: 3.2 G/DL (ref 1.9–3.5)
GLUCOSE BLD STRIP.AUTO-MCNC: 84 MG/DL (ref 65–99)
GLUCOSE SERPL-MCNC: 96 MG/DL (ref 65–99)
HCT VFR BLD AUTO: 34.4 % (ref 37–47)
HGB BLD-MCNC: 11.9 G/DL (ref 12–16)
HOLDING TUBE BB 8507: NORMAL
IMM GRANULOCYTES # BLD AUTO: 0.03 K/UL (ref 0–0.11)
IMM GRANULOCYTES NFR BLD AUTO: 0.3 % (ref 0–0.9)
LYMPHOCYTES # BLD AUTO: 2.36 K/UL (ref 1–4.8)
LYMPHOCYTES NFR BLD: 25.1 % (ref 22–41)
MCH RBC QN AUTO: 27.4 PG (ref 27–33)
MCHC RBC AUTO-ENTMCNC: 34.6 G/DL (ref 33.6–35)
MCV RBC AUTO: 79.3 FL (ref 81.4–97.8)
MONOCYTES # BLD AUTO: 0.52 K/UL (ref 0–0.85)
MONOCYTES NFR BLD AUTO: 5.5 % (ref 0–13.4)
NEUTROPHILS # BLD AUTO: 6.29 K/UL (ref 2–7.15)
NEUTROPHILS NFR BLD: 67 % (ref 44–72)
NRBC # BLD AUTO: 0 K/UL
NRBC BLD-RTO: 0 /100 WBC
PLATELET # BLD AUTO: 280 K/UL (ref 164–446)
PMV BLD AUTO: 10.7 FL (ref 9–12.9)
POTASSIUM SERPL-SCNC: 4.3 MMOL/L (ref 3.6–5.5)
PROT SERPL-MCNC: 6.5 G/DL (ref 6–8.2)
RBC # BLD AUTO: 4.34 M/UL (ref 4.2–5.4)
SODIUM SERPL-SCNC: 137 MMOL/L (ref 135–145)
URATE SERPL-MCNC: 4.1 MG/DL (ref 1.9–8.2)
WBC # BLD AUTO: 9.4 K/UL (ref 4.8–10.8)

## 2022-08-31 PROCEDURE — 84550 ASSAY OF BLOOD/URIC ACID: CPT

## 2022-08-31 PROCEDURE — 3E0P7VZ INTRODUCTION OF HORMONE INTO FEMALE REPRODUCTIVE, VIA NATURAL OR ARTIFICIAL OPENING: ICD-10-PCS | Performed by: NURSE PRACTITIONER

## 2022-08-31 PROCEDURE — 770002 HCHG ROOM/CARE - OB PRIVATE (112)

## 2022-08-31 PROCEDURE — 700105 HCHG RX REV CODE 258: Performed by: NURSE PRACTITIONER

## 2022-08-31 PROCEDURE — 36415 COLL VENOUS BLD VENIPUNCTURE: CPT

## 2022-08-31 PROCEDURE — 700102 HCHG RX REV CODE 250 W/ 637 OVERRIDE(OP): Performed by: NURSE PRACTITIONER

## 2022-08-31 PROCEDURE — 85025 COMPLETE CBC W/AUTO DIFF WBC: CPT

## 2022-08-31 PROCEDURE — A9270 NON-COVERED ITEM OR SERVICE: HCPCS | Performed by: NURSE PRACTITIONER

## 2022-08-31 PROCEDURE — 82962 GLUCOSE BLOOD TEST: CPT

## 2022-08-31 PROCEDURE — 80053 COMPREHEN METABOLIC PANEL: CPT

## 2022-08-31 RX ORDER — SODIUM CHLORIDE, SODIUM LACTATE, POTASSIUM CHLORIDE, CALCIUM CHLORIDE 600; 310; 30; 20 MG/100ML; MG/100ML; MG/100ML; MG/100ML
INJECTION, SOLUTION INTRAVENOUS CONTINUOUS
Status: DISCONTINUED | OUTPATIENT
Start: 2022-08-31 | End: 2022-09-03

## 2022-08-31 RX ORDER — TERBUTALINE SULFATE 1 MG/ML
0.25 INJECTION, SOLUTION SUBCUTANEOUS
Status: DISCONTINUED | OUTPATIENT
Start: 2022-08-31 | End: 2022-09-03

## 2022-08-31 RX ADMIN — DINOPROSTONE 10 MG: 10 INSERT VAGINAL at 23:39

## 2022-08-31 RX ADMIN — SODIUM CHLORIDE, POTASSIUM CHLORIDE, SODIUM LACTATE AND CALCIUM CHLORIDE: 600; 310; 30; 20 INJECTION, SOLUTION INTRAVENOUS at 21:45

## 2022-08-31 ASSESSMENT — PATIENT HEALTH QUESTIONNAIRE - PHQ9
2. FEELING DOWN, DEPRESSED, IRRITABLE, OR HOPELESS: NOT AT ALL
1. LITTLE INTEREST OR PLEASURE IN DOING THINGS: NOT AT ALL
SUM OF ALL RESPONSES TO PHQ9 QUESTIONS 1 AND 2: 0

## 2022-08-31 ASSESSMENT — LIFESTYLE VARIABLES
DOES PATIENT WANT TO STOP DRINKING: NO
ALCOHOL_USE: NO
EVER_SMOKED: NEVER

## 2022-08-31 ASSESSMENT — PAIN SCALES - GENERAL: PAINLEVEL: 0 - NO PAIN

## 2022-08-31 ASSESSMENT — FIBROSIS 4 INDEX: FIB4 SCORE: 0.41

## 2022-09-01 ENCOUNTER — ANESTHESIA (OUTPATIENT)
Dept: OBGYN | Facility: MEDICAL CENTER | Age: 37
End: 2022-09-01
Payer: COMMERCIAL

## 2022-09-01 ENCOUNTER — ANESTHESIA EVENT (OUTPATIENT)
Dept: OBGYN | Facility: MEDICAL CENTER | Age: 37
End: 2022-09-01
Payer: COMMERCIAL

## 2022-09-01 LAB
CREAT UR-MCNC: 197.71 MG/DL
GLUCOSE BLD STRIP.AUTO-MCNC: 105 MG/DL (ref 65–99)
GLUCOSE BLD STRIP.AUTO-MCNC: 66 MG/DL (ref 65–99)
GLUCOSE BLD STRIP.AUTO-MCNC: 77 MG/DL (ref 65–99)
GLUCOSE BLD STRIP.AUTO-MCNC: 80 MG/DL (ref 65–99)
GLUCOSE BLD STRIP.AUTO-MCNC: 97 MG/DL (ref 65–99)
PROT UR-MCNC: 20 MG/DL (ref 0–15)
PROT/CREAT UR: 101 MG/G (ref 10–107)

## 2022-09-01 PROCEDURE — 3E033VJ INTRODUCTION OF OTHER HORMONE INTO PERIPHERAL VEIN, PERCUTANEOUS APPROACH: ICD-10-PCS | Performed by: OBSTETRICS & GYNECOLOGY

## 2022-09-01 PROCEDURE — 700102 HCHG RX REV CODE 250 W/ 637 OVERRIDE(OP)

## 2022-09-01 PROCEDURE — A9270 NON-COVERED ITEM OR SERVICE: HCPCS | Performed by: OBSTETRICS & GYNECOLOGY

## 2022-09-01 PROCEDURE — 700102 HCHG RX REV CODE 250 W/ 637 OVERRIDE(OP): Performed by: OBSTETRICS & GYNECOLOGY

## 2022-09-01 PROCEDURE — 700105 HCHG RX REV CODE 258: Performed by: ANESTHESIOLOGY

## 2022-09-01 PROCEDURE — 82570 ASSAY OF URINE CREATININE: CPT

## 2022-09-01 PROCEDURE — 84156 ASSAY OF PROTEIN URINE: CPT

## 2022-09-01 PROCEDURE — 700111 HCHG RX REV CODE 636 W/ 250 OVERRIDE (IP): Performed by: OBSTETRICS & GYNECOLOGY

## 2022-09-01 PROCEDURE — 700101 HCHG RX REV CODE 250: Performed by: ANESTHESIOLOGY

## 2022-09-01 PROCEDURE — 700111 HCHG RX REV CODE 636 W/ 250 OVERRIDE (IP): Performed by: NURSE PRACTITIONER

## 2022-09-01 PROCEDURE — 01967 NEURAXL LBR ANES VAG DLVR: CPT | Performed by: ANESTHESIOLOGY

## 2022-09-01 PROCEDURE — 700111 HCHG RX REV CODE 636 W/ 250 OVERRIDE (IP): Performed by: ANESTHESIOLOGY

## 2022-09-01 PROCEDURE — 01968 ANES/ANALG CS DLVR NEURAXIAL: CPT | Performed by: ANESTHESIOLOGY

## 2022-09-01 PROCEDURE — 700111 HCHG RX REV CODE 636 W/ 250 OVERRIDE (IP)

## 2022-09-01 PROCEDURE — 770002 HCHG ROOM/CARE - OB PRIVATE (112)

## 2022-09-01 PROCEDURE — 82962 GLUCOSE BLOOD TEST: CPT | Mod: 91

## 2022-09-01 PROCEDURE — 99140 ANES COMP EMERGENCY COND: CPT | Performed by: ANESTHESIOLOGY

## 2022-09-01 PROCEDURE — 700105 HCHG RX REV CODE 258: Performed by: OBSTETRICS & GYNECOLOGY

## 2022-09-01 RX ORDER — MISOPROSTOL 200 UG/1
800 TABLET ORAL
Status: DISCONTINUED | OUTPATIENT
Start: 2022-09-01 | End: 2022-09-03 | Stop reason: HOSPADM

## 2022-09-01 RX ORDER — ROPIVACAINE HYDROCHLORIDE 2 MG/ML
INJECTION, SOLUTION EPIDURAL; INFILTRATION; PERINEURAL CONTINUOUS
Status: DISCONTINUED | OUTPATIENT
Start: 2022-09-01 | End: 2022-09-03

## 2022-09-01 RX ORDER — SODIUM CHLORIDE, SODIUM LACTATE, POTASSIUM CHLORIDE, AND CALCIUM CHLORIDE .6; .31; .03; .02 G/100ML; G/100ML; G/100ML; G/100ML
250 INJECTION, SOLUTION INTRAVENOUS PRN
Status: DISCONTINUED | OUTPATIENT
Start: 2022-09-01 | End: 2022-09-03

## 2022-09-01 RX ORDER — LABETALOL 100 MG/1
200 TABLET, FILM COATED ORAL TWICE DAILY
Status: DISCONTINUED | OUTPATIENT
Start: 2022-09-01 | End: 2022-09-03

## 2022-09-01 RX ORDER — LIDOCAINE HYDROCHLORIDE AND EPINEPHRINE 15; 5 MG/ML; UG/ML
INJECTION, SOLUTION EPIDURAL
Status: COMPLETED | OUTPATIENT
Start: 2022-09-01 | End: 2022-09-01

## 2022-09-01 RX ORDER — ACETAMINOPHEN 500 MG
1000 TABLET ORAL
Status: COMPLETED | OUTPATIENT
Start: 2022-09-01 | End: 2022-09-02

## 2022-09-01 RX ORDER — OXYTOCIN 10 [USP'U]/ML
10 INJECTION, SOLUTION INTRAMUSCULAR; INTRAVENOUS
Status: DISCONTINUED | OUTPATIENT
Start: 2022-09-01 | End: 2022-09-03

## 2022-09-01 RX ORDER — IBUPROFEN 800 MG/1
800 TABLET ORAL
Status: DISCONTINUED | OUTPATIENT
Start: 2022-09-01 | End: 2022-09-03

## 2022-09-01 RX ORDER — ROPIVACAINE HYDROCHLORIDE 2 MG/ML
INJECTION, SOLUTION EPIDURAL; INFILTRATION; PERINEURAL
Status: COMPLETED
Start: 2022-09-01 | End: 2022-09-01

## 2022-09-01 RX ORDER — SODIUM CHLORIDE, SODIUM LACTATE, POTASSIUM CHLORIDE, AND CALCIUM CHLORIDE .6; .31; .03; .02 G/100ML; G/100ML; G/100ML; G/100ML
1000 INJECTION, SOLUTION INTRAVENOUS
Status: DISCONTINUED | OUTPATIENT
Start: 2022-09-01 | End: 2022-09-03

## 2022-09-01 RX ADMIN — SODIUM CHLORIDE, SODIUM GLUCONATE, SODIUM ACETATE, POTASSIUM CHLORIDE AND MAGNESIUM CHLORIDE: 526; 502; 368; 37; 30 INJECTION, SOLUTION INTRAVENOUS at 20:31

## 2022-09-01 RX ADMIN — METFORMIN HYDROCHLORIDE 1000 MG: 500 TABLET ORAL at 10:45

## 2022-09-01 RX ADMIN — BUPIVACAINE HYDROCHLORIDE 8 ML: 2.5 INJECTION, SOLUTION EPIDURAL; INFILTRATION; INTRACAUDAL; PERINEURAL at 20:42

## 2022-09-01 RX ADMIN — ROPIVACAINE HYDROCHLORIDE 200 MG: 2 INJECTION, SOLUTION EPIDURAL; INFILTRATION at 21:07

## 2022-09-01 RX ADMIN — INSULIN GLARGINE-YFGN 19 UNITS: 100 INJECTION, SOLUTION SUBCUTANEOUS at 22:47

## 2022-09-01 RX ADMIN — LIDOCAINE HYDROCHLORIDE,EPINEPHRINE BITARTRATE 3 ML: 15; .005 INJECTION, SOLUTION EPIDURAL; INFILTRATION; INTRACAUDAL; PERINEURAL at 20:42

## 2022-09-01 RX ADMIN — METFORMIN HYDROCHLORIDE 1000 MG: 500 TABLET ORAL at 19:11

## 2022-09-01 RX ADMIN — OXYTOCIN 2 MILLI-UNITS/MIN: 10 INJECTION INTRAVENOUS at 18:54

## 2022-09-01 RX ADMIN — INSULIN GLARGINE-YFGN 19 UNITS: 100 INJECTION, SOLUTION SUBCUTANEOUS at 00:45

## 2022-09-01 RX ADMIN — FENTANYL CITRATE 100 MCG: 50 INJECTION, SOLUTION INTRAMUSCULAR; INTRAVENOUS at 13:21

## 2022-09-01 RX ADMIN — INSULIN HUMAN 10 UNITS: 100 INJECTION, SUSPENSION SUBCUTANEOUS at 00:53

## 2022-09-01 RX ADMIN — INSULIN HUMAN 10 UNITS: 100 INJECTION, SUSPENSION SUBCUTANEOUS at 22:30

## 2022-09-01 RX ADMIN — LABETALOL HYDROCHLORIDE 200 MG: 100 TABLET, FILM COATED ORAL at 19:12

## 2022-09-01 NOTE — CARE PLAN
The patient is Stable - Low risk of patient condition declining or worsening         Progress made toward(s) clinical / shift goals:  control BS and BP, progress in IOL    Patient is not progressing towards the following goals:

## 2022-09-01 NOTE — PROGRESS NOTES
1415: Received report from Fartun ALEJANDRA. Assumed care of patient. Adjusted EFM to best of abilities but d/t pt body habitus difficult to do continuous fetal monitoring.   1645: BG 80. Food ordered for pt.   1745: Pulled on balloon and got maximum resistance. Balloon firmly in place.   1900: Report given to Leighann GREENBERG RN.

## 2022-09-01 NOTE — PROGRESS NOTES
0700- Report from Hilda ALEJANDRA. Care assumed.     0955- POC discussed with Dr White. Orders received.     1245- Balloon placed 60/40

## 2022-09-01 NOTE — PROGRESS NOTES
0405: Report received from Brissa COLLINS RN. POC discussed    0415: Pt resting in room, FOB at bedside. Pt uncomfortable, c/o back pain. Denies feeling cramping, contractions, states her back is hurting from the position she is in. Pt flat in bed. Pt encouraged to move up into the bed and left tilt. Assisted by 1 RN. Assessment done. Monitors adjusted.    0700: Report given to day shift RN. All care of pt relinquished

## 2022-09-01 NOTE — CARE PLAN
The patient is Stable - Low risk of patient condition declining or worsening    Shift Goals  Clinical Goals: cervical dilation and effacement  Patient Goals: healthy mom, healthy baby  Family Goals: FOB will show appropriate support    Progress made toward(s) clinical / shift goals:    Problem: Knowledge Deficit - L&D  Goal: Patient and family/caregivers will demonstrate understanding of plan of care, disease process/condition, diagnostic tests and medications  Outcome: Progressing  Note: Pt updated on POC. No questions at this time     Problem: Risk for Excess Fluid Volume  Goal: Patient will demonstrate pulse, blood pressure and neurologic signs within expected ranges and without any respiratory complications  Outcome: Progressing  Note: VSS and assessment WDL

## 2022-09-01 NOTE — PROGRESS NOTES
2100_ Pt presents for IOL. Pt denies any VB, LOF or UC's. Pt reports +FM. Pt has a hx DM type II and gestational hypertension. US and TOCO applied. VSS    Report to JAVIER Mai    4933_ JAVIER Mai @ bedside. Vertex presentation confirmed by US. Cervadil placed.    Dr Silva verified insulin regimen with [facility]. Plan is to give half her triseba/lantus dose (19 units) and her full NPH dose (10 units). Pt also takes metformin 1000mg BID. Tomorrow MD will reassess and order a sliding scale.    Insulin dosage checked with pharmacy    0400_ Report to NY Stevens

## 2022-09-02 LAB
GLUCOSE BLD STRIP.AUTO-MCNC: 103 MG/DL (ref 65–99)
GLUCOSE BLD STRIP.AUTO-MCNC: 104 MG/DL (ref 65–99)
GLUCOSE BLD STRIP.AUTO-MCNC: 114 MG/DL (ref 65–99)
GLUCOSE BLD STRIP.AUTO-MCNC: 68 MG/DL (ref 65–99)
GLUCOSE BLD STRIP.AUTO-MCNC: 68 MG/DL (ref 65–99)
GLUCOSE BLD STRIP.AUTO-MCNC: 74 MG/DL (ref 65–99)
GLUCOSE BLD STRIP.AUTO-MCNC: 79 MG/DL (ref 65–99)
GLUCOSE BLD STRIP.AUTO-MCNC: 79 MG/DL (ref 65–99)
GLUCOSE BLD STRIP.AUTO-MCNC: 80 MG/DL (ref 65–99)
GLUCOSE BLD STRIP.AUTO-MCNC: 81 MG/DL (ref 65–99)
GLUCOSE BLD STRIP.AUTO-MCNC: 91 MG/DL (ref 65–99)

## 2022-09-02 PROCEDURE — 700111 HCHG RX REV CODE 636 W/ 250 OVERRIDE (IP): Performed by: ANESTHESIOLOGY

## 2022-09-02 PROCEDURE — 160002 HCHG RECOVERY MINUTES (STAT): Performed by: OBSTETRICS & GYNECOLOGY

## 2022-09-02 PROCEDURE — 10H073Z INSERTION OF MONITORING ELECTRODE INTO PRODUCTS OF CONCEPTION, VIA NATURAL OR ARTIFICIAL OPENING: ICD-10-PCS | Performed by: OBSTETRICS & GYNECOLOGY

## 2022-09-02 PROCEDURE — 700111 HCHG RX REV CODE 636 W/ 250 OVERRIDE (IP)

## 2022-09-02 PROCEDURE — 700105 HCHG RX REV CODE 258: Performed by: OBSTETRICS & GYNECOLOGY

## 2022-09-02 PROCEDURE — 87070 CULTURE OTHR SPECIMN AEROBIC: CPT

## 2022-09-02 PROCEDURE — 700105 HCHG RX REV CODE 258: Performed by: NURSE PRACTITIONER

## 2022-09-02 PROCEDURE — 700102 HCHG RX REV CODE 250 W/ 637 OVERRIDE(OP): Performed by: OBSTETRICS & GYNECOLOGY

## 2022-09-02 PROCEDURE — 700111 HCHG RX REV CODE 636 W/ 250 OVERRIDE (IP): Performed by: OBSTETRICS & GYNECOLOGY

## 2022-09-02 PROCEDURE — 700101 HCHG RX REV CODE 250: Performed by: ANESTHESIOLOGY

## 2022-09-02 PROCEDURE — 160048 HCHG OR STATISTICAL LEVEL 1-5: Performed by: OBSTETRICS & GYNECOLOGY

## 2022-09-02 PROCEDURE — 82962 GLUCOSE BLOOD TEST: CPT | Mod: 91

## 2022-09-02 PROCEDURE — A9270 NON-COVERED ITEM OR SERVICE: HCPCS | Performed by: NURSE PRACTITIONER

## 2022-09-02 PROCEDURE — 10H07YZ INSERTION OF OTHER DEVICE INTO PRODUCTS OF CONCEPTION, VIA NATURAL OR ARTIFICIAL OPENING: ICD-10-PCS | Performed by: OBSTETRICS & GYNECOLOGY

## 2022-09-02 PROCEDURE — 87077 CULTURE AEROBIC IDENTIFY: CPT | Mod: 91

## 2022-09-02 PROCEDURE — 770002 HCHG ROOM/CARE - OB PRIVATE (112)

## 2022-09-02 PROCEDURE — 160029 HCHG SURGERY MINUTES - 1ST 30 MINS LEVEL 4: Performed by: OBSTETRICS & GYNECOLOGY

## 2022-09-02 PROCEDURE — 700105 HCHG RX REV CODE 258: Performed by: ANESTHESIOLOGY

## 2022-09-02 PROCEDURE — 700102 HCHG RX REV CODE 250 W/ 637 OVERRIDE(OP): Performed by: NURSE PRACTITIONER

## 2022-09-02 PROCEDURE — 160035 HCHG PACU - 1ST 60 MINS PHASE I: Performed by: OBSTETRICS & GYNECOLOGY

## 2022-09-02 PROCEDURE — C1755 CATHETER, INTRASPINAL: HCPCS | Performed by: OBSTETRICS & GYNECOLOGY

## 2022-09-02 PROCEDURE — 10907ZC DRAINAGE OF AMNIOTIC FLUID, THERAPEUTIC FROM PRODUCTS OF CONCEPTION, VIA NATURAL OR ARTIFICIAL OPENING: ICD-10-PCS | Performed by: OBSTETRICS & GYNECOLOGY

## 2022-09-02 PROCEDURE — 87205 SMEAR GRAM STAIN: CPT

## 2022-09-02 PROCEDURE — 160041 HCHG SURGERY MINUTES - EA ADDL 1 MIN LEVEL 4: Performed by: OBSTETRICS & GYNECOLOGY

## 2022-09-02 PROCEDURE — A9270 NON-COVERED ITEM OR SERVICE: HCPCS | Performed by: OBSTETRICS & GYNECOLOGY

## 2022-09-02 PROCEDURE — 87075 CULTR BACTERIA EXCEPT BLOOD: CPT

## 2022-09-02 PROCEDURE — 160009 HCHG ANES TIME/MIN: Performed by: OBSTETRICS & GYNECOLOGY

## 2022-09-02 PROCEDURE — 700101 HCHG RX REV CODE 250: Performed by: OBSTETRICS & GYNECOLOGY

## 2022-09-02 PROCEDURE — 87186 SC STD MICRODIL/AGAR DIL: CPT | Mod: 91

## 2022-09-02 PROCEDURE — 700105 HCHG RX REV CODE 258

## 2022-09-02 PROCEDURE — 4A1H7CZ MONITORING OF PRODUCTS OF CONCEPTION, CARDIAC RATE, VIA NATURAL OR ARTIFICIAL OPENING: ICD-10-PCS | Performed by: OBSTETRICS & GYNECOLOGY

## 2022-09-02 RX ORDER — BUPIVACAINE HYDROCHLORIDE 5 MG/ML
INJECTION, SOLUTION EPIDURAL; INTRACAUDAL PRN
Status: DISCONTINUED | OUTPATIENT
Start: 2022-09-02 | End: 2022-09-03 | Stop reason: SURG

## 2022-09-02 RX ORDER — MORPHINE SULFATE 0.5 MG/ML
INJECTION, SOLUTION EPIDURAL; INTRATHECAL; INTRAVENOUS PRN
Status: DISCONTINUED | OUTPATIENT
Start: 2022-09-02 | End: 2022-09-03 | Stop reason: SURG

## 2022-09-02 RX ORDER — SODIUM CHLORIDE, SODIUM GLUCONATE, SODIUM ACETATE, POTASSIUM CHLORIDE AND MAGNESIUM CHLORIDE 526; 502; 368; 37; 30 MG/100ML; MG/100ML; MG/100ML; MG/100ML; MG/100ML
INJECTION, SOLUTION INTRAVENOUS
Status: DISCONTINUED | OUTPATIENT
Start: 2022-09-01 | End: 2022-09-03 | Stop reason: SURG

## 2022-09-02 RX ORDER — CITRIC ACID/SODIUM CITRATE 334-500MG
30 SOLUTION, ORAL ORAL ONCE
Status: COMPLETED | OUTPATIENT
Start: 2022-09-02 | End: 2022-09-02

## 2022-09-02 RX ORDER — METOCLOPRAMIDE HYDROCHLORIDE 5 MG/ML
10 INJECTION INTRAMUSCULAR; INTRAVENOUS ONCE
Status: COMPLETED | OUTPATIENT
Start: 2022-09-02 | End: 2022-09-02

## 2022-09-02 RX ORDER — ACETAMINOPHEN 500 MG
1000 TABLET ORAL EVERY 6 HOURS PRN
Status: COMPLETED | OUTPATIENT
Start: 2022-09-02 | End: 2022-09-03

## 2022-09-02 RX ORDER — BUPIVACAINE HYDROCHLORIDE 5 MG/ML
INJECTION, SOLUTION EPIDURAL; INTRACAUDAL
Status: COMPLETED
Start: 2022-09-02 | End: 2022-09-02

## 2022-09-02 RX ORDER — DEXAMETHASONE SODIUM PHOSPHATE 4 MG/ML
INJECTION, SOLUTION INTRA-ARTICULAR; INTRALESIONAL; INTRAMUSCULAR; INTRAVENOUS; SOFT TISSUE PRN
Status: DISCONTINUED | OUTPATIENT
Start: 2022-09-02 | End: 2022-09-03 | Stop reason: SURG

## 2022-09-02 RX ORDER — SODIUM CHLORIDE, SODIUM LACTATE, POTASSIUM CHLORIDE, AND CALCIUM CHLORIDE .6; .31; .03; .02 G/100ML; G/100ML; G/100ML; G/100ML
250 INJECTION, SOLUTION INTRAVENOUS PRN
Status: DISCONTINUED | OUTPATIENT
Start: 2022-09-02 | End: 2022-09-03

## 2022-09-02 RX ORDER — AZITHROMYCIN 500 MG/5ML
500 INJECTION, POWDER, LYOPHILIZED, FOR SOLUTION INTRAVENOUS ONCE
Status: COMPLETED | OUTPATIENT
Start: 2022-09-02 | End: 2022-09-03

## 2022-09-02 RX ORDER — DEXTROSE AND SODIUM CHLORIDE 5; .9 G/100ML; G/100ML
INJECTION, SOLUTION INTRAVENOUS CONTINUOUS
Status: DISCONTINUED | OUTPATIENT
Start: 2022-09-02 | End: 2022-09-03

## 2022-09-02 RX ORDER — SODIUM CHLORIDE, SODIUM GLUCONATE, SODIUM ACETATE, POTASSIUM CHLORIDE AND MAGNESIUM CHLORIDE 526; 502; 368; 37; 30 MG/100ML; MG/100ML; MG/100ML; MG/100ML; MG/100ML
1000 INJECTION, SOLUTION INTRAVENOUS ONCE
Status: COMPLETED | OUTPATIENT
Start: 2022-09-02 | End: 2022-09-03

## 2022-09-02 RX ORDER — OXYTOCIN 10 [USP'U]/ML
INJECTION, SOLUTION INTRAMUSCULAR; INTRAVENOUS PRN
Status: DISCONTINUED | OUTPATIENT
Start: 2022-09-02 | End: 2022-09-03 | Stop reason: SURG

## 2022-09-02 RX ORDER — SODIUM CHLORIDE, SODIUM LACTATE, POTASSIUM CHLORIDE, AND CALCIUM CHLORIDE .6; .31; .03; .02 G/100ML; G/100ML; G/100ML; G/100ML
1000 INJECTION, SOLUTION INTRAVENOUS
Status: DISCONTINUED | OUTPATIENT
Start: 2022-09-02 | End: 2022-09-03

## 2022-09-02 RX ORDER — ROPIVACAINE HYDROCHLORIDE 2 MG/ML
INJECTION, SOLUTION EPIDURAL; INFILTRATION; PERINEURAL CONTINUOUS
Status: DISCONTINUED | OUTPATIENT
Start: 2022-09-02 | End: 2022-09-03

## 2022-09-02 RX ORDER — ONDANSETRON 2 MG/ML
INJECTION INTRAMUSCULAR; INTRAVENOUS PRN
Status: DISCONTINUED | OUTPATIENT
Start: 2022-09-02 | End: 2022-09-03 | Stop reason: SURG

## 2022-09-02 RX ORDER — CEFAZOLIN SODIUM 1 G/3ML
INJECTION, POWDER, FOR SOLUTION INTRAMUSCULAR; INTRAVENOUS PRN
Status: DISCONTINUED | OUTPATIENT
Start: 2022-09-02 | End: 2022-09-03 | Stop reason: SURG

## 2022-09-02 RX ORDER — BUPIVACAINE HYDROCHLORIDE 5 MG/ML
INJECTION, SOLUTION EPIDURAL; INTRACAUDAL
Status: DISCONTINUED
Start: 2022-09-02 | End: 2022-09-03

## 2022-09-02 RX ORDER — LIDOCAINE HCL/EPINEPHRINE/PF 2%-1:200K
VIAL (ML) INJECTION PRN
Status: DISCONTINUED | OUTPATIENT
Start: 2022-09-02 | End: 2022-09-03 | Stop reason: SURG

## 2022-09-02 RX ADMIN — ACETAMINOPHEN 1000 MG: 500 TABLET ORAL at 20:15

## 2022-09-02 RX ADMIN — AZITHROMYCIN MONOHYDRATE 500 MG: 500 INJECTION, POWDER, LYOPHILIZED, FOR SOLUTION INTRAVENOUS at 23:02

## 2022-09-02 RX ADMIN — METFORMIN HYDROCHLORIDE 1000 MG: 500 TABLET ORAL at 09:33

## 2022-09-02 RX ADMIN — ROPIVACAINE HYDROCHLORIDE: 2 INJECTION, SOLUTION EPIDURAL; INFILTRATION at 05:01

## 2022-09-02 RX ADMIN — SODIUM CHLORIDE 1 UNITS/HR: 9 INJECTION, SOLUTION INTRAVENOUS at 21:17

## 2022-09-02 RX ADMIN — METOCLOPRAMIDE 10 MG: 5 INJECTION, SOLUTION INTRAMUSCULAR; INTRAVENOUS at 23:03

## 2022-09-02 RX ADMIN — FENTANYL CITRATE 100 MCG: 50 INJECTION, SOLUTION INTRAMUSCULAR; INTRAVENOUS at 17:31

## 2022-09-02 RX ADMIN — LIDOCAINE HYDROCHLORIDE AND EPINEPHRINE 20 ML: 20; 5 INJECTION, SOLUTION EPIDURAL; INFILTRATION; INTRACAUDAL; PERINEURAL at 23:00

## 2022-09-02 RX ADMIN — SODIUM CITRATE AND CITRIC ACID MONOHYDRATE 30 ML: 500; 334 SOLUTION ORAL at 23:02

## 2022-09-02 RX ADMIN — AZITHROMYCIN MONOHYDRATE 500 MG: 500 INJECTION, POWDER, LYOPHILIZED, FOR SOLUTION INTRAVENOUS at 23:15

## 2022-09-02 RX ADMIN — OXYTOCIN 23 MILLI-UNITS/MIN: 10 INJECTION INTRAVENOUS at 21:15

## 2022-09-02 RX ADMIN — DEXAMETHASONE SODIUM PHOSPHATE 4 MG: 4 INJECTION, SOLUTION INTRA-ARTICULAR; INTRALESIONAL; INTRAMUSCULAR; INTRAVENOUS; SOFT TISSUE at 23:42

## 2022-09-02 RX ADMIN — MORPHINE SULFATE 1.5 MG: 0.5 INJECTION, SOLUTION EPIDURAL; INTRATHECAL; INTRAVENOUS at 23:42

## 2022-09-02 RX ADMIN — ROPIVACAINE HYDROCHLORIDE: 2 INJECTION, SOLUTION EPIDURAL; INFILTRATION at 21:16

## 2022-09-02 RX ADMIN — GENTAMICIN SULFATE 490 MG: 40 INJECTION, SOLUTION INTRAMUSCULAR; INTRAVENOUS at 21:16

## 2022-09-02 RX ADMIN — LABETALOL HYDROCHLORIDE 200 MG: 100 TABLET, FILM COATED ORAL at 06:00

## 2022-09-02 RX ADMIN — METFORMIN HYDROCHLORIDE 1000 MG: 500 TABLET ORAL at 18:17

## 2022-09-02 RX ADMIN — SODIUM CHLORIDE 0.5 UNITS/HR: 9 INJECTION, SOLUTION INTRAVENOUS at 18:52

## 2022-09-02 RX ADMIN — LABETALOL HYDROCHLORIDE 200 MG: 100 TABLET, FILM COATED ORAL at 18:17

## 2022-09-02 RX ADMIN — CEFAZOLIN 3 G: 330 INJECTION, POWDER, FOR SOLUTION INTRAMUSCULAR; INTRAVENOUS at 23:16

## 2022-09-02 RX ADMIN — SODIUM CHLORIDE, SODIUM GLUCONATE, SODIUM ACETATE, POTASSIUM CHLORIDE AND MAGNESIUM CHLORIDE 1000 ML: 526; 502; 368; 37; 30 INJECTION, SOLUTION INTRAVENOUS at 23:02

## 2022-09-02 RX ADMIN — SODIUM CHLORIDE, POTASSIUM CHLORIDE, SODIUM LACTATE AND CALCIUM CHLORIDE: 600; 310; 30; 20 INJECTION, SOLUTION INTRAVENOUS at 14:22

## 2022-09-02 RX ADMIN — BUPIVACAINE HYDROCHLORIDE 7 ML: 5 INJECTION, SOLUTION EPIDURAL; INTRACAUDAL; PERINEURAL at 17:31

## 2022-09-02 RX ADMIN — ROPIVACAINE HYDROCHLORIDE: 2 INJECTION, SOLUTION EPIDURAL; INFILTRATION at 14:19

## 2022-09-02 RX ADMIN — FAMOTIDINE 20 MG: 10 INJECTION, SOLUTION INTRAVENOUS at 23:03

## 2022-09-02 RX ADMIN — ONDANSETRON 4 MG: 2 INJECTION INTRAMUSCULAR; INTRAVENOUS at 23:42

## 2022-09-02 RX ADMIN — OXYTOCIN 20 UNITS: 10 INJECTION, SOLUTION INTRAMUSCULAR; INTRAVENOUS at 23:39

## 2022-09-02 RX ADMIN — AMPICILLIN SODIUM 2000 MG: 2 INJECTION, POWDER, FOR SOLUTION INTRAMUSCULAR; INTRAVENOUS at 20:58

## 2022-09-02 ASSESSMENT — PAIN DESCRIPTION - PAIN TYPE
TYPE: ACUTE PAIN

## 2022-09-02 NOTE — CARE PLAN
The patient is Stable - Low risk of patient condition declining or worsening    Shift Goals  Clinical Goals: cervical dilation and effacement  Patient Goals: healthy mom, healthy baby  Family Goals: FOB will show appropriate support    Progress made toward(s) clinical / shift goals:    Problem: Psychosocial - L&D  Goal: Patient's level of anxiety will decrease  Outcome: Progressing  Goal: Patient's ability to re-evaluate and adapt role responsibilities will improve  Outcome: Progressing     Problem: Risk for Infection and Impaired Wound Healing  Goal: Patient will remain free from infection  Outcome: Progressing     Problem: Risk for Fluid Imbalance  Goal: Patient's fluid volume balance will be maintained or improve  Outcome: Progressing     Problem: Risk for Injury  Goal: Patient and fetus will be free of preventable injury/complications  Outcome: Progressing     Problem: Pain  Goal: Patient's pain will be alleviated or reduced to the patient’s comfort goal  Outcome: Progressing       Patient is not progressing towards the following goals:

## 2022-09-02 NOTE — PROGRESS NOTES
1900 - Report received from Yolie ALEJANDRA. POC discussed, assumed care of patient at this time.     2030 - Patient requesting epidural for pain management.     2037 -  at bedside.     2048 - Test dose administered by physician at this time. VSS, patient reports no adverse effects at this time.     0700 - Report given to Ivon ALEJANDRA.

## 2022-09-02 NOTE — ANESTHESIA PREPROCEDURE EVALUATION
Date: 09/01/22  Procedure: Labor Epidural         Relevant Problems   CARDIAC   (positive) Hypertension due to endocrine disorder         (positive) Fatty liver disease, nonalcoholic      ENDO   (positive) Controlled type 2 diabetes mellitus without complication, without long-term current use of insulin (HCC)   (positive) Pre-existing type 2 diabetes mellitus during pregnancy in third trimester       Physical Exam    Airway   Mallampati: II  TM distance: >3 FB  Neck ROM: full       Cardiovascular - normal exam     Dental - normal exam           Pulmonary   Breath sounds clear to auscultation     Abdominal   (+) obese     Neurological - normal exam                 Anesthesia Plan    ASA 3- EMERGENT   ASA physical status 3 criteria: morbid obesity - BMI greater than or equal to 40    Plan - epidural               Induction: intravenous      Pertinent diagnostic labs and testing reviewed    Informed Consent:    Anesthetic plan and risks discussed with patient.

## 2022-09-02 NOTE — ANESTHESIA PROCEDURE NOTES
Epidural Block    Date/Time: 9/1/2022 8:42 PM  Performed by: Geovanna Arreola M.D.  Authorized by: Geovanna Arreola M.D.     Patient Location:  OB  Start Time:  9/1/2022 8:42 PM  Reason for Block: labor analgesia    patient identified, IV checked, site marked, risks and benefits discussed, surgical consent, monitors and equipment checked, pre-op evaluation and timeout performed    Patient Position:  Sitting  Prep: ChloraPrep, patient draped and sterile technique    Monitoring:  Blood pressure, continuous pulse oximetry and heart rate  Approach:  Midline  Location:  L3-L4  Injection Technique:  HILDA saline  Skin infiltration:  Lidocaine  Strength:  1%  Dose:  3ml  Needle Type:  Tuohy  Needle Gauge:  17 G  Needle Length:  3.5 in  Loss of resistance::  7.5  Catheter Size:  19 G  Catheter at Skin Depth:  12.5  Test Dose Result:  Negative

## 2022-09-02 NOTE — PROGRESS NOTES
0700- Bedside report received from NY Lawton. , 38 & 2/7, presenting for IOL due to CHTN. Pt denies VB, LOF, ctx, or pain at this time. Positive FM. FOB at bedside. POC discussed.       0745- Dr. Galdamez given report including glucose management. Orders received including breakfast tray for pt.   0930- Pt finished eating food  1030- FSBG, See labs.     1100- Dr. Tanner at bedside. SVE: /-2.  Orders received. SCDs placed on Pt. IUPC placed and functioning. 2 attempts at FSE placement by MD unsuccessful, external EFM replaced.    1330- FSBG taken, see labs. Low reading noted, Dr. Tanner contacted. Orders received. Pt given 4 oz of juice. SVE by Jaylene: unchanged.     1345- FSBG taken, See labs. Low reading noted. 4 oz of juice given    1405- FSBG taken, See labs. Glucose WNL    1510- Dr. Tanner contacted, q2h FSBG ordered, nighttime doses of insulin ordered to be held, nighttime metformin still okayed.      1700- Dr. Tanner at bedside, SVE: unchanged. Orders received for insulin drip. Blood in urine and small clots in urine. MD given report and updated.        1730- PT FSBG 79, insulin drip held per SS in MAR.     1840- Pt FSBG 80,  insulin drip started, see MAR.    190- Report given to NY Lawton.    Throughout the entirety of today's shift, EFM was utilized and frequent attempts were made to keep fetal heart tones on the monitor. Audible FM and tones picked up during readjustment periods. infant was difficult to trace due to maternal habitus.

## 2022-09-02 NOTE — PROGRESS NOTES
Pt feeling pressure  CX 8/100/-2  CTXs q 3 minutes (IUPC)  FHTS reactive, reassuring, category I, moderate variability, no decels  Pitocin @ 19 mu  Will start insulin drip as in active labor and note documented by admitting physician states per [facility] to transition to insulin drip in active labor  Will follow    awestfall

## 2022-09-03 LAB
ERYTHROCYTE [DISTWIDTH] IN BLOOD BY AUTOMATED COUNT: 43.5 FL (ref 35.9–50)
GLUCOSE BLD STRIP.AUTO-MCNC: 126 MG/DL (ref 65–99)
GLUCOSE BLD STRIP.AUTO-MCNC: 73 MG/DL (ref 65–99)
GLUCOSE BLD STRIP.AUTO-MCNC: 98 MG/DL (ref 65–99)
GRAM STN SPEC: NORMAL
GRAM STN SPEC: NORMAL
HCT VFR BLD AUTO: 36.8 % (ref 37–47)
HGB BLD-MCNC: 11.9 G/DL (ref 12–16)
MCH RBC QN AUTO: 26 PG (ref 27–33)
MCHC RBC AUTO-ENTMCNC: 32.3 G/DL (ref 33.6–35)
MCV RBC AUTO: 80.3 FL (ref 81.4–97.8)
PLATELET # BLD AUTO: 252 K/UL (ref 164–446)
PMV BLD AUTO: 10.3 FL (ref 9–12.9)
RBC # BLD AUTO: 4.58 M/UL (ref 4.2–5.4)
SIGNIFICANT IND 70042: NORMAL
SIGNIFICANT IND 70042: NORMAL
SITE SITE: NORMAL
SITE SITE: NORMAL
SOURCE SOURCE: NORMAL
SOURCE SOURCE: NORMAL
WBC # BLD AUTO: 22.5 K/UL (ref 4.8–10.8)

## 2022-09-03 PROCEDURE — 82962 GLUCOSE BLOOD TEST: CPT | Mod: 91

## 2022-09-03 PROCEDURE — A9270 NON-COVERED ITEM OR SERVICE: HCPCS | Performed by: OBSTETRICS & GYNECOLOGY

## 2022-09-03 PROCEDURE — 700111 HCHG RX REV CODE 636 W/ 250 OVERRIDE (IP): Performed by: ANESTHESIOLOGY

## 2022-09-03 PROCEDURE — 770002 HCHG ROOM/CARE - OB PRIVATE (112)

## 2022-09-03 PROCEDURE — 700111 HCHG RX REV CODE 636 W/ 250 OVERRIDE (IP): Performed by: NURSE PRACTITIONER

## 2022-09-03 PROCEDURE — 85027 COMPLETE CBC AUTOMATED: CPT

## 2022-09-03 PROCEDURE — 59510 CESAREAN DELIVERY: CPT | Performed by: OBSTETRICS & GYNECOLOGY

## 2022-09-03 PROCEDURE — A9270 NON-COVERED ITEM OR SERVICE: HCPCS | Performed by: ANESTHESIOLOGY

## 2022-09-03 PROCEDURE — 700102 HCHG RX REV CODE 250 W/ 637 OVERRIDE(OP): Performed by: OBSTETRICS & GYNECOLOGY

## 2022-09-03 PROCEDURE — 36415 COLL VENOUS BLD VENIPUNCTURE: CPT

## 2022-09-03 PROCEDURE — 700101 HCHG RX REV CODE 250: Performed by: OBSTETRICS & GYNECOLOGY

## 2022-09-03 PROCEDURE — 700111 HCHG RX REV CODE 636 W/ 250 OVERRIDE (IP)

## 2022-09-03 PROCEDURE — 700102 HCHG RX REV CODE 250 W/ 637 OVERRIDE(OP): Performed by: ANESTHESIOLOGY

## 2022-09-03 PROCEDURE — 700105 HCHG RX REV CODE 258

## 2022-09-03 RX ORDER — DIPHENHYDRAMINE HYDROCHLORIDE 50 MG/ML
25 INJECTION INTRAMUSCULAR; INTRAVENOUS EVERY 6 HOURS PRN
Status: DISCONTINUED | OUTPATIENT
Start: 2022-09-04 | End: 2022-09-05 | Stop reason: HOSPADM

## 2022-09-03 RX ORDER — DIPHENHYDRAMINE HYDROCHLORIDE 50 MG/ML
12.5 INJECTION INTRAMUSCULAR; INTRAVENOUS EVERY 6 HOURS PRN
Status: ACTIVE | OUTPATIENT
Start: 2022-09-03 | End: 2022-09-04

## 2022-09-03 RX ORDER — KETOROLAC TROMETHAMINE 30 MG/ML
30 INJECTION, SOLUTION INTRAMUSCULAR; INTRAVENOUS EVERY 6 HOURS
Status: COMPLETED | OUTPATIENT
Start: 2022-09-03 | End: 2022-09-04

## 2022-09-03 RX ORDER — ALBUTEROL SULFATE 2.5 MG/3ML
2.5 SOLUTION RESPIRATORY (INHALATION)
Status: DISCONTINUED | OUTPATIENT
Start: 2022-09-03 | End: 2022-09-03

## 2022-09-03 RX ORDER — MEPERIDINE HYDROCHLORIDE 25 MG/ML
12.5 INJECTION INTRAMUSCULAR; INTRAVENOUS; SUBCUTANEOUS
Status: DISCONTINUED | OUTPATIENT
Start: 2022-09-03 | End: 2022-09-03

## 2022-09-03 RX ORDER — LABETALOL 100 MG/1
200 TABLET, FILM COATED ORAL TWICE DAILY
Status: DISCONTINUED | OUTPATIENT
Start: 2022-09-03 | End: 2022-09-05 | Stop reason: HOSPADM

## 2022-09-03 RX ORDER — HYDROMORPHONE HYDROCHLORIDE 1 MG/ML
0.2 INJECTION, SOLUTION INTRAMUSCULAR; INTRAVENOUS; SUBCUTANEOUS
Status: DISCONTINUED | OUTPATIENT
Start: 2022-09-03 | End: 2022-09-03

## 2022-09-03 RX ORDER — HYDROMORPHONE HYDROCHLORIDE 1 MG/ML
0.4 INJECTION, SOLUTION INTRAMUSCULAR; INTRAVENOUS; SUBCUTANEOUS
Status: DISCONTINUED | OUTPATIENT
Start: 2022-09-03 | End: 2022-09-03

## 2022-09-03 RX ORDER — ACETAMINOPHEN 500 MG
1000 TABLET ORAL EVERY 6 HOURS
Status: DISPENSED | OUTPATIENT
Start: 2022-09-03 | End: 2022-09-04

## 2022-09-03 RX ORDER — METOCLOPRAMIDE HYDROCHLORIDE 5 MG/ML
10 INJECTION INTRAMUSCULAR; INTRAVENOUS EVERY 6 HOURS PRN
Status: DISCONTINUED | OUTPATIENT
Start: 2022-09-03 | End: 2022-09-05 | Stop reason: HOSPADM

## 2022-09-03 RX ORDER — IBUPROFEN 800 MG/1
800 TABLET ORAL EVERY 8 HOURS PRN
Status: DISCONTINUED | OUTPATIENT
Start: 2022-09-07 | End: 2022-09-05 | Stop reason: HOSPADM

## 2022-09-03 RX ORDER — KETOROLAC TROMETHAMINE 30 MG/ML
INJECTION, SOLUTION INTRAMUSCULAR; INTRAVENOUS PRN
Status: DISCONTINUED | OUTPATIENT
Start: 2022-09-03 | End: 2022-09-03 | Stop reason: SURG

## 2022-09-03 RX ORDER — OXYCODONE HCL 5 MG/5 ML
5 SOLUTION, ORAL ORAL
Status: COMPLETED | OUTPATIENT
Start: 2022-09-03 | End: 2022-09-03

## 2022-09-03 RX ORDER — OXYCODONE HYDROCHLORIDE 5 MG/1
10 TABLET ORAL EVERY 4 HOURS PRN
Status: ACTIVE | OUTPATIENT
Start: 2022-09-03 | End: 2022-09-04

## 2022-09-03 RX ORDER — OXYCODONE HYDROCHLORIDE 5 MG/1
10 TABLET ORAL EVERY 4 HOURS PRN
Status: DISCONTINUED | OUTPATIENT
Start: 2022-09-04 | End: 2022-09-05 | Stop reason: HOSPADM

## 2022-09-03 RX ORDER — SODIUM CHLORIDE, SODIUM GLUCONATE, SODIUM ACETATE, POTASSIUM CHLORIDE AND MAGNESIUM CHLORIDE 526; 502; 368; 37; 30 MG/100ML; MG/100ML; MG/100ML; MG/100ML; MG/100ML
500 INJECTION, SOLUTION INTRAVENOUS CONTINUOUS
Status: DISCONTINUED | OUTPATIENT
Start: 2022-09-03 | End: 2022-09-03

## 2022-09-03 RX ORDER — HALOPERIDOL 5 MG/ML
1 INJECTION INTRAMUSCULAR
Status: DISCONTINUED | OUTPATIENT
Start: 2022-09-03 | End: 2022-09-03

## 2022-09-03 RX ORDER — DIPHENHYDRAMINE HYDROCHLORIDE 50 MG/ML
12.5 INJECTION INTRAMUSCULAR; INTRAVENOUS
Status: DISCONTINUED | OUTPATIENT
Start: 2022-09-03 | End: 2022-09-03

## 2022-09-03 RX ORDER — DOCUSATE SODIUM 100 MG/1
100 CAPSULE, LIQUID FILLED ORAL 2 TIMES DAILY PRN
Status: DISCONTINUED | OUTPATIENT
Start: 2022-09-03 | End: 2022-09-05 | Stop reason: HOSPADM

## 2022-09-03 RX ORDER — DIPHENHYDRAMINE HCL 25 MG
25 TABLET ORAL EVERY 6 HOURS PRN
Status: DISCONTINUED | OUTPATIENT
Start: 2022-09-04 | End: 2022-09-05 | Stop reason: HOSPADM

## 2022-09-03 RX ORDER — HYDROMORPHONE HYDROCHLORIDE 1 MG/ML
0.2 INJECTION, SOLUTION INTRAMUSCULAR; INTRAVENOUS; SUBCUTANEOUS
Status: ACTIVE | OUTPATIENT
Start: 2022-09-03 | End: 2022-09-04

## 2022-09-03 RX ORDER — ONDANSETRON 4 MG/1
4 TABLET, ORALLY DISINTEGRATING ORAL EVERY 6 HOURS PRN
Status: DISCONTINUED | OUTPATIENT
Start: 2022-09-04 | End: 2022-09-05 | Stop reason: HOSPADM

## 2022-09-03 RX ORDER — OXYCODONE HYDROCHLORIDE 5 MG/1
5 TABLET ORAL EVERY 4 HOURS PRN
Status: ACTIVE | OUTPATIENT
Start: 2022-09-03 | End: 2022-09-04

## 2022-09-03 RX ORDER — ENOXAPARIN SODIUM 100 MG/ML
40 INJECTION SUBCUTANEOUS EVERY 12 HOURS
Status: DISCONTINUED | OUTPATIENT
Start: 2022-09-03 | End: 2022-09-05 | Stop reason: HOSPADM

## 2022-09-03 RX ORDER — SODIUM CHLORIDE, SODIUM LACTATE, POTASSIUM CHLORIDE, CALCIUM CHLORIDE 600; 310; 30; 20 MG/100ML; MG/100ML; MG/100ML; MG/100ML
INJECTION, SOLUTION INTRAVENOUS PRN
Status: DISCONTINUED | OUTPATIENT
Start: 2022-09-03 | End: 2022-09-05 | Stop reason: HOSPADM

## 2022-09-03 RX ORDER — HYDROMORPHONE HYDROCHLORIDE 1 MG/ML
0.6 INJECTION, SOLUTION INTRAMUSCULAR; INTRAVENOUS; SUBCUTANEOUS
Status: DISCONTINUED | OUTPATIENT
Start: 2022-09-03 | End: 2022-09-03

## 2022-09-03 RX ORDER — OXYCODONE HCL 5 MG/5 ML
10 SOLUTION, ORAL ORAL
Status: COMPLETED | OUTPATIENT
Start: 2022-09-03 | End: 2022-09-03

## 2022-09-03 RX ORDER — OXYCODONE HYDROCHLORIDE 5 MG/1
5 TABLET ORAL EVERY 4 HOURS PRN
Status: DISCONTINUED | OUTPATIENT
Start: 2022-09-04 | End: 2022-09-05 | Stop reason: HOSPADM

## 2022-09-03 RX ORDER — CLINDAMYCIN PHOSPHATE 900 MG/50ML
900 INJECTION, SOLUTION INTRAVENOUS EVERY 8 HOURS
Status: DISCONTINUED | OUTPATIENT
Start: 2022-09-03 | End: 2022-09-05

## 2022-09-03 RX ORDER — ONDANSETRON 2 MG/ML
4 INJECTION INTRAMUSCULAR; INTRAVENOUS EVERY 6 HOURS PRN
Status: DISPENSED | OUTPATIENT
Start: 2022-09-03 | End: 2022-09-04

## 2022-09-03 RX ORDER — ONDANSETRON 2 MG/ML
4 INJECTION INTRAMUSCULAR; INTRAVENOUS
Status: DISCONTINUED | OUTPATIENT
Start: 2022-09-03 | End: 2022-09-03

## 2022-09-03 RX ORDER — DIPHENHYDRAMINE HYDROCHLORIDE 50 MG/ML
25 INJECTION INTRAMUSCULAR; INTRAVENOUS EVERY 6 HOURS PRN
Status: ACTIVE | OUTPATIENT
Start: 2022-09-03 | End: 2022-09-04

## 2022-09-03 RX ORDER — ONDANSETRON 2 MG/ML
4 INJECTION INTRAMUSCULAR; INTRAVENOUS EVERY 6 HOURS PRN
Status: DISCONTINUED | OUTPATIENT
Start: 2022-09-04 | End: 2022-09-05 | Stop reason: HOSPADM

## 2022-09-03 RX ORDER — METOCLOPRAMIDE HYDROCHLORIDE 5 MG/ML
10 INJECTION INTRAMUSCULAR; INTRAVENOUS EVERY 6 HOURS
Status: DISCONTINUED | OUTPATIENT
Start: 2022-09-03 | End: 2022-09-03

## 2022-09-03 RX ORDER — IBUPROFEN 800 MG/1
800 TABLET ORAL EVERY 8 HOURS
Status: DISCONTINUED | OUTPATIENT
Start: 2022-09-04 | End: 2022-09-05 | Stop reason: HOSPADM

## 2022-09-03 RX ORDER — HYDRALAZINE HYDROCHLORIDE 20 MG/ML
5 INJECTION INTRAMUSCULAR; INTRAVENOUS
Status: DISCONTINUED | OUTPATIENT
Start: 2022-09-03 | End: 2022-09-03

## 2022-09-03 RX ORDER — LABETALOL HYDROCHLORIDE 5 MG/ML
5 INJECTION, SOLUTION INTRAVENOUS
Status: DISCONTINUED | OUTPATIENT
Start: 2022-09-03 | End: 2022-09-03

## 2022-09-03 RX ORDER — HYDROMORPHONE HYDROCHLORIDE 1 MG/ML
0.4 INJECTION, SOLUTION INTRAMUSCULAR; INTRAVENOUS; SUBCUTANEOUS
Status: ACTIVE | OUTPATIENT
Start: 2022-09-03 | End: 2022-09-04

## 2022-09-03 RX ADMIN — METFORMIN HYDROCHLORIDE 1000 MG: 500 TABLET ORAL at 19:06

## 2022-09-03 RX ADMIN — METOCLOPRAMIDE 10 MG: 5 INJECTION, SOLUTION INTRAMUSCULAR; INTRAVENOUS at 08:31

## 2022-09-03 RX ADMIN — KETOROLAC TROMETHAMINE 30 MG: 30 INJECTION, SOLUTION INTRAMUSCULAR; INTRAVENOUS at 05:48

## 2022-09-03 RX ADMIN — KETOROLAC TROMETHAMINE 30 MG: 30 INJECTION, SOLUTION INTRAMUSCULAR at 00:16

## 2022-09-03 RX ADMIN — CLINDAMYCIN IN 5 PERCENT DEXTROSE 900 MG: 18 INJECTION, SOLUTION INTRAVENOUS at 22:47

## 2022-09-03 RX ADMIN — ACETAMINOPHEN 1000 MG: 500 TABLET ORAL at 16:21

## 2022-09-03 RX ADMIN — OXYCODONE HYDROCHLORIDE 10 MG: 5 SOLUTION ORAL at 01:25

## 2022-09-03 RX ADMIN — ACETAMINOPHEN 1000 MG: 500 TABLET ORAL at 10:24

## 2022-09-03 RX ADMIN — ONDANSETRON 4 MG: 2 INJECTION INTRAMUSCULAR; INTRAVENOUS at 04:58

## 2022-09-03 RX ADMIN — AMPICILLIN SODIUM 2000 MG: 2 INJECTION, POWDER, FOR SOLUTION INTRAMUSCULAR; INTRAVENOUS at 20:56

## 2022-09-03 RX ADMIN — ENOXAPARIN SODIUM 40 MG: 40 INJECTION SUBCUTANEOUS at 11:15

## 2022-09-03 RX ADMIN — AMPICILLIN SODIUM 2000 MG: 2 INJECTION, POWDER, FOR SOLUTION INTRAMUSCULAR; INTRAVENOUS at 09:31

## 2022-09-03 RX ADMIN — AMPICILLIN SODIUM 2000 MG: 2 INJECTION, POWDER, FOR SOLUTION INTRAMUSCULAR; INTRAVENOUS at 05:21

## 2022-09-03 RX ADMIN — ENOXAPARIN SODIUM 40 MG: 40 INJECTION SUBCUTANEOUS at 22:48

## 2022-09-03 RX ADMIN — CLINDAMYCIN IN 5 PERCENT DEXTROSE 900 MG: 18 INJECTION, SOLUTION INTRAVENOUS at 14:15

## 2022-09-03 RX ADMIN — FENTANYL CITRATE 50 MCG: 50 INJECTION, SOLUTION INTRAMUSCULAR; INTRAVENOUS at 01:26

## 2022-09-03 RX ADMIN — LABETALOL HYDROCHLORIDE 200 MG: 100 TABLET, FILM COATED ORAL at 19:06

## 2022-09-03 RX ADMIN — ACETAMINOPHEN 1000 MG: 500 TABLET ORAL at 20:55

## 2022-09-03 RX ADMIN — GENTAMICIN SULFATE 490 MG: 40 INJECTION, SOLUTION INTRAMUSCULAR; INTRAVENOUS at 23:50

## 2022-09-03 RX ADMIN — Medication 125 ML/HR: at 00:10

## 2022-09-03 RX ADMIN — METFORMIN HYDROCHLORIDE 1000 MG: 500 TABLET ORAL at 10:24

## 2022-09-03 RX ADMIN — KETOROLAC TROMETHAMINE 30 MG: 30 INJECTION, SOLUTION INTRAMUSCULAR; INTRAVENOUS at 12:18

## 2022-09-03 RX ADMIN — KETOROLAC TROMETHAMINE 30 MG: 30 INJECTION, SOLUTION INTRAMUSCULAR; INTRAVENOUS at 18:22

## 2022-09-03 RX ADMIN — AMPICILLIN SODIUM 2000 MG: 2 INJECTION, POWDER, FOR SOLUTION INTRAMUSCULAR; INTRAVENOUS at 12:19

## 2022-09-03 RX ADMIN — CLINDAMYCIN IN 5 PERCENT DEXTROSE 900 MG: 18 INJECTION, SOLUTION INTRAVENOUS at 06:22

## 2022-09-03 RX ADMIN — AMPICILLIN SODIUM 2000 MG: 2 INJECTION, POWDER, FOR SOLUTION INTRAMUSCULAR; INTRAVENOUS at 16:21

## 2022-09-03 ASSESSMENT — EDINBURGH POSTNATAL DEPRESSION SCALE (EPDS)
I HAVE BEEN SO UNHAPPY THAT I HAVE BEEN CRYING: NO, NEVER
I HAVE FELT SCARED OR PANICKY FOR NO GOOD REASON: NO, NOT MUCH
I HAVE BEEN ANXIOUS OR WORRIED FOR NO GOOD REASON: NO, NOT AT ALL
I HAVE FELT SAD OR MISERABLE: NOT VERY OFTEN
THE THOUGHT OF HARMING MYSELF HAS OCCURRED TO ME: NEVER
I HAVE BEEN SO UNHAPPY THAT I HAVE HAD DIFFICULTY SLEEPING: NOT AT ALL
I HAVE BLAMED MYSELF UNNECESSARILY WHEN THINGS WENT WRONG: YES, SOME OF THE TIME
I HAVE LOOKED FORWARD WITH ENJOYMENT TO THINGS: AS MUCH AS I EVER DID
I HAVE BEEN ABLE TO LAUGH AND SEE THE FUNNY SIDE OF THINGS: AS MUCH AS I ALWAYS COULD
THINGS HAVE BEEN GETTING ON TOP OF ME: NO, MOST OF THE TIME I HAVE COPED QUITE WELL

## 2022-09-03 ASSESSMENT — PAIN DESCRIPTION - PAIN TYPE
TYPE: SURGICAL PAIN
TYPE: ACUTE PAIN
TYPE: ACUTE PAIN

## 2022-09-03 ASSESSMENT — PAIN SCALES - GENERAL: PAIN_LEVEL: 0

## 2022-09-03 NOTE — PROGRESS NOTES
"S: Pt is shaky and uncomfortable but not feeling painful contractions. FOB Nito present and supportive at bedside. Expecting XY \"Nito Jr\". Agrees to VE and attempt at FSE.     O:    Vitals:    09/02/22 1815 09/02/22 1830 09/02/22 1850 09/02/22 1910   BP: 138/76 (!) 149/72 (!) 146/85 119/63   Pulse: 80 73 96 100   Resp: 18      Temp: 36.3 °C (97.4 °F)      TempSrc: Temporal      SpO2:       Weight:       Height:               FHTs via FSE:  Baseline 160, + accels, no decels, mod variability        CTX via IUPC: Contractions q 2-5 minutes, very inadequate when pitocin was off due to infiltrated IV, but now improving with peak mmHg 40-50, pitocin @ 23 milliunits        SVE: 8/100/-1 to -2, very warm internally, some mild molding noted   Labs: Reviewed    After assessing the patient, RN reported temp of 100.3. Given the setting of developing fetal tachycardia and internal temp high, decision made to initiate tx for intraamniotic infection      A/P:  1.  IUP @ 38w2d            2.  Reactive, Cat 2 FHTs             3.  Labor progress: active labor, minimal change since ~1600           4.  Plan: Restart pitocin now that we have adequate IV access, titrate to effect, initiate treatment for suspected intraamniotic infection           5. Reevaluate at ~2200 and PRN      Andie Marquez CNM  Co-management with Dr. Tanner who present and involved in the discussion and plan of care  "

## 2022-09-03 NOTE — OR SURGEON
Immediate Post OP Note    PreOp Diagnosis: IUP @ 39 weeks, morbid obesity, chronic HTN, insulin requiring diabetes, arrest of labor, maternal fever- suspect chorioamnionitis      PostOp Diagnosis: same      Procedure(s):   SECTION, PRIMARY    Surgeon(s):  Starr Tanner M.D.  Assist: Dr Browne Working    Anesthesiologist/Type of Anesthesia:  Anesthesiologist: Geovanna Arreola M.D.; Chong Walker M.D./Spinal EPIDURAL_ Denise    Surgical Staff:  * No surgical staff found *    Specimens removed if any:  Cord gases, aerobic and anaerobic cultures of placenta    Estimated Blood Loss: 700cc    Findings: male, vertex, thick meconium, 8 and 9,   Nito- 3180g  Normal uterus, tubes, ovaries    Complications: no additional        9/3/2022 12:17 AM Starr Tanner M.D.

## 2022-09-03 NOTE — ADDENDUM NOTE
Addendum  created 09/03/22 0057 by Chong Walker M.D.    Order list changed, Order sets accessed, Pharmacy for encounter modified

## 2022-09-03 NOTE — PROGRESS NOTES
"Pharmacy Gentamicin Kinetics Note for 2022     37 y.o. female on Gentamicin day # 1    Gentamicin Indication: Maternal fever     Provider specified end date: 22    Active Antibiotics (From admission, onward)      Ordered     Ordering Provider       Fri Sep 2, 2022  8:22 PM    22  gentamicin (GARAMYCIN) 490 mg in  mL IVPB  EVERY 24 HOURS         Andie Marquez C.N.M.       Fri Sep 2, 2022  8:18 PM    22  ampicillin (Omnipen) 2,000 mg in  mL IVPB  EVERY 4 HOURS         Andie Marquez C.N.M.    22 2018  MD Alert...Gentamicin per Pharmacy  PHARMACY TO DOSE        Question:  Indication(s) for aminoglycoside?  Answer:  Other (comment)  Comment:  suspected intraamniotic infection    Andie Marquez C.N.M.            Dosing Weight: 98.5 kg (217 lb 2.5 oz)    Admission History: Admitted on 2022 for Encounter for induction of labor [Z34.90]   Pertinent history: reported temp of 100.3. Given the setting of developing fetal tachycardia and internal temp high, antibiotics initiated for tx for intraamniotic infection    Allergies:     Glimepiride [kdc:yellow dye+brilliant blue fcf+glimepiride]     Pertinent cultures to date:      Results       ** No results found for the last 336 hours. **            Labs:    Estimated Creatinine Clearance: 295 mL/min (A) (by C-G formula based on SCr of 0.41 mg/dL (L)).  Recent Labs     22  2145   WBC 9.4   NEUTSPOLYS 67.00     Recent Labs     22  2145   BUN 10   CREATININE 0.41*   ALBUMIN 3.3     No results for input(s): GENTTROUGH, GENTPEAK, GENTRANDOM in the last 72 hours.  No intake or output data in the 24 hours ending 22  /63   Pulse 100   Temp 36.3 °C (97.4 °F) (Temporal)   Resp 18   Ht 1.575 m (5' 2\")   Wt (!) 171 kg (377 lb)   SpO2 94%  Temp (24hrs), Av.4 °C (97.5 °F), Min:36.2 °C (97.2 °F), Max:36.7 °C (98.1 °F)      List concerns for Gentamicin clearance:     Obesity    A/P:     - " Gentamicin dose: 490mg IV q24h x 2 doses    - Next gentamicin level: tbd    - Goal trough: Undetectable    - Comments: Pharmacy to continue to monitor and adjust gentamicin per protocol.    Arina Salinas, PharmD

## 2022-09-03 NOTE — PROGRESS NOTES
Pt comfortable  Cx 8/100/-2 unchanged  CTXs adequate by IUPC  FHTS with moderate variability and occasional late decels although not repetitive, category II tracing  Discussed with patient and family the lack of cervical change for multiple hours and the recommendation to proceed with primary c/section at this time.      Discussed with the patient indications for  delivery. The patient voiced understanding of indications for  section at this time.    Discussed with the patient the risks of  delivery. The risks include bleeding, infection, transfusion, emergency hysterectomy to control bleeding, damage to surrounding organs (bowel, bladder, ureters, nerves, vessels), need for repair or future surgery, fetal injury, unexpected pathology, anesthesia risks, and rarely death.  I also discussed with the patient the risk of wound infection, wound breakdown, and scarring as well as post op thromboembolic events. We discussed that these risks are greater in people that have diabetes or obesity. She is very high risk for these complications secondary to morbid obesity and type 2 insulin requiring diabetes.     The patient had the opportunity to ask questions regarding the procedure. All questions answered to the patient's satisfaction. Plan to proceed with  delivery.

## 2022-09-03 NOTE — LACTATION NOTE
This note was copied from a baby's chart.  Primip Mom has DM2, GHTN, Chorio is on abx, and elevated BMI. She has been giving baby bottles of ABM due to his having low BG values. She was too tired to initiate pumping. Baby has been resistant to latch. Showed her how to do HE to aid with latching and let down, or to do spoon feeding. Mom has expressible milk drops. Assisted Mom to latch baby on the left side using cross cradle positioning. Baby did struggle but latched and maintained latch for 8 minutes before falling back asleep. Discussed how to recognize feeding cues. Do lots of STS and  BF baby ad etienne per feeding cues. Minimum of 10 times daily. Wake to feed if greater than 2-3 hours during the day or 3-4 hours during the night since previous feeding. Reviewed feeding volume GL for q 3 hours. Provied written educational materials. FOB is bedside. Encouraged to call for latching assistance as needed. Also encouraged Mom to sit as far back on the couch/bed as possible and not on the edge while nursing, to prevent baby falling, and to use good pillow supports.

## 2022-09-03 NOTE — PROGRESS NOTES
0455 Patient c/o dizziness and vomited approximately 300ml clear, yellow, pink (from medication) liquid. Pt also had an episode of diarrhea. Pt able to sit self on edge of bed.     0458- Zofran administered per orders.  Patient cleaned up by RN and CNA.  Lochia light with no clots.      0521- Ampicillin started in right upper PIV.      0536- Pt vomited approximately 300 ml clear, yellow liquid. Blood glucose 126.  Ba with 300ml christine urine.      0600- Andie Marquez updated on events. Verbal orders received to check blood glucose AC/HS and ok to wait to ambulate patient.

## 2022-09-03 NOTE — ANESTHESIA TIME REPORT
Anesthesia Start and Stop Event Times     Date Time Event    9/1/2022 2031 Ready for Procedure     2031 Anesthesia Start    9/3/2022 0024 Anesthesia Stop        Responsible Staff  09/01/22 to 09/03/22    Name Role Begin End    Geovanna Arreola M.D. Anesth 2031 0700    Chong Walker M.D. Anesth 0700 0024        Overtime Reason:  no overtime (within assigned shift)    Comments: OB CALL ANIKA / BLOSSOM

## 2022-09-03 NOTE — OP REPORT
PREOPERATIVE DIAGNOSIS:   IUP @ 39 weeks   Failure to progress  Chronic hypertension  Insulin requiring diabetes mellitus  MAternal fever- suspect chorioamnionitis    POSTOPERATIVE DIAGNOSIS:  Same    PROCEDURE: Primary Low Transverse  Section via Pfannensteil    SURGEON: Starr Tanner MD    ASSISTANT: Hollie Steiner MD    ANESTHESIA: Chong Walker MD    COMPLICATIONS: none    EBL:  700 cc    FLUIDS: 1000 cc     URINE OUTPUT: 150cc bloody urine (was bloody during labor prior to OR)     FINDINGS: Viable male infant in vertex presentation with thick meconium stained fluid, apgars 8 and 9, weight 3180g. Normal uterus, tubes, ovaries.     PROCEDURE IN DETAIL: The patient was taken to the operating room where  epidural anesthesia was found to be adequate.  Her abdomen was taped up to allow access to the lower part of the abdomen. She was then prepped and draped in the normal sterile fashion in the dorsal supine position with a leftward hip tilt.  A Pfannenstiel skin incision was then made with the scalpel and carried through to the underlying layer of fascia, which was nicked in the midline and the incision was extended laterally with the Simon scissors.  The inferior aspect of the fascial incision was then grasped with Kocher clamps, elevated, and the underlying rectus muscles dissected off sharply.  Attention was then turned to the superior aspect of this incision which, in a similar fashion, was grasped, tented up with Kocher clamps, and the rectus muscle was dissected off sharply.  The rectus muscles were then  in the midline, and the peritoneum was identified, tented up, and entered sharply with the Metzenbaum scissors.  The peritoneal incision was then extended superiorly and inferiorly with good visualization of the bladder.  The Sky-O retractor was placed, checked for safe positioning and tightened into place. Two moist laparotomy sponges  was then inserted and used to pack the bowel out of  the operative field.and the vesicouterine peritoneum identified, grasped with pickups, and entered sharply with the Metzenbaum scissors.  This incision was then extended laterally and the bladder flap was created digitally.  The lower uterine segment incised in a transverse fashion with the scalpel.  The uterine incision was then extended with fingers.  And the infant's head was delivered atraumatically.  The remainder of the infant was delivered without difficulty.  The infant's nose and mouth were bulb suctioned on the field.  The cord was clamped and cut after approximately 30 seconds.  The infant was then handed off to the awaiting attendant.  A segment of umbilical cord is then obtained for cord gases.   The placenta was then removed via gentle traction on the cord and exterior uterine massage.  the uterus is cleared of all clot and debris with a dry laparotomy sponge.  The uterine incision was repaired with 1-0 chromic in a running, locked fashion.  A second layer of the same suture was used to obtain excellent hemostasis.  The gutters were cleared of all clot and debris.  The peritoneum was closed with 3-0 Vicryl.  The fascia was approximated with 0 Vicryl in a running fashion.  The subcuticular fat was irrigated.  Armand's fascia was closed with interrupted sutures of 2-0 Vicryl.  The skin was closed with 4-0 monocryl on a Alon needle. A Prevena wound vacuum dressing is applied.   The patient tolerated the procedure well.  Sponge lap and needle counts were correct x3.  The patient was taken to the recovery room in stable condition.

## 2022-09-03 NOTE — PROGRESS NOTES
Pharmacy Gentamicin Kinetics Note for 9/3/2022     37 y.o. female on Gentamicin day # 2    Gentamicin Indication: Maternal fever     Provider specified end date: 22    Active Antibiotics (From admission, onward)      Ordered     Ordering Provider       Sat Sep 3, 2022  5:30 AM    22 0530  gentamicin (GARAMYCIN) 490 mg in  mL IVPB  EVERY 24 HOURS         Andie Marquez C.N.M.       Sat Sep 3, 2022  3:13 AM    22 0313  ampicillin (Omnipen) 2,000 mg in  mL IVPB  EVERY 4 HOURS         Andie Marquez C.N.M.    22 0313  MD Alert...Gentamicin per Pharmacy  PHARMACY TO DOSE        Question:  Indication(s) for aminoglycoside?  Answer:  Other (comment)  Comment:  suspected intraamniotic infection    Andie Marquez C.N.M.       Sat Sep 3, 2022  2:30 AM    22 0230  clindamycin (Cleocin) IVPB premix 900 mg  EVERY 8 HOURS         Starr Tanner M.D.            Dosing Weight: 98.5 kg (217 lb 2.5 oz)    Admission History: Admitted on 2022 for Encounter for induction of labor [Z34.90]   Pertinent history: Abx initiated for tx for intraamniotic infection. Continuing post .    Allergies:     Glimepiride [kdc:yellow dye+brilliant blue fcf+glimepiride]     Pertinent cultures to date:      Results       Procedure Component Value Units Date/Time    GRAM STAIN [[id number]] Collected: 22    Order Status: Completed Specimen: Wound Updated: 22     Significant Indicator .     Source WND     Site placenta     Gram Stain Result Few WBCs.  No organisms seen.      CULTURE WOUND W/ GRAM STAIN [[id number]] Collected: 22    Order Status: No result Specimen: Wound Updated: 22     Significant Indicator NEG     Source WND     Site placenta     Culture Result -     Gram Stain Result Few WBCs.  No organisms seen.      Anaerobic Culture [[id number]] Collected: 22    Order Status: No result Specimen: Wound Updated: 22      "Significant Indicator NEG     Source WND     Site placenta     Culture Result -    GRAM STAIN [[id number]] Collected: 22    Order Status: Completed Specimen: Wound Updated: 22     Significant Indicator .     Source WND     Site placenta (fetal)     Gram Stain Result Few WBCs.  No organisms seen.      CULTURE WOUND W/ GRAM STAIN [[id number]] Collected: 22    Order Status: No result Specimen: Wound Updated: 22     Significant Indicator NEG     Source WND     Site placenta (fetal)     Culture Result -     Gram Stain Result Few WBCs.  No organisms seen.      Anaerobic Culture [[id number]] Collected: 22    Order Status: No result Specimen: Wound Updated: 22     Significant Indicator NEG     Source WND     Site placenta (fetal)     Culture Result -    Aerobic/Anaerobic Culture (Surgery) [[id number]]     Order Status: No result Specimen: Other from Placenta     Aerobic/Anaerobic Culture (Surgery) [[id number]]     Order Status: No result Specimen: Other from Placenta     Aerobic/Anaerobic Culture (Surgery) [[id number]]     Order Status: Canceled Specimen: Other from Placenta             Labs:    Estimated Creatinine Clearance: 295 mL/min (A) (by C-G formula based on SCr of 0.41 mg/dL (L)).  Recent Labs     22   WBC 9.4   NEUTSPOLYS 67.00     Recent Labs     22   BUN 10   CREATININE 0.41*   ALBUMIN 3.3     No results for input(s): GENTTROUGH, GENTPEAK, GENTRANDOM in the last 72 hours.    Intake/Output Summary (Last 24 hours) at 9/3/2022 0530  Last data filed at 9/3/2022 0140  Gross per 24 hour   Intake 750 ml   Output 3250 ml   Net -2500 ml     BP (!) 144/82   Pulse 74   Temp 35.9 °C (96.7 °F) (Temporal)   Resp 18   Ht 1.575 m (5' 2\")   Wt (!) 171 kg (377 lb)   SpO2 94%  Temp (24hrs), Av.7 °C (98 °F), Min:35.9 °C (96.7 °F), Max:37.9 °C (100.3 °F)      List concerns for Gentamicin clearance:     Obesity    A/P:     - Gentamicin dose: " 490 mg IV q24h     - Next gentamicin level: TBD    - Goal trough: Undetectable    - Comments: Pharmacy to continue to monitor and adjust gentamicin per protocol.     Mercy Ware, PharmD

## 2022-09-03 NOTE — PROGRESS NOTES
Post Partum Progress Note    Name:   Priti Begum   Date/Time:  9/3/2022 - 6:26 AM  Chief Admitting Dx:  Encounter for induction of labor [Z34.90]  Delivery Type:   for first stage arrest, fetal intolerance, intra amniotic infection  Post-Op/Post Partum Days #:  1    Subjective:  Abdominal pain: no  Ambulating:   No, but has sat on side of the bed  Tolerating liquids:  yes  Tolerating food:  Has not yet tried more than light crackers, tolerating ok  Flatus:   yes  BM:    Yes - had an episode of diarrhea las night  Bleeding:   with a small amount of bleeding  Voiding:   No, soto catheter in place  Dizziness:   Yes - feeling dizzy at rest and when getting up  Feeding:   both breast and bottle    Vitals:    22 0138 22 0139 22 0220 22 0300   BP:   (!) 144/82    Pulse: 87 86 75 74   Resp:   18 18   Temp:   35.9 °C (96.7 °F)    TempSrc:   Temporal    SpO2: 93% (!) 86% 92% 94%   Weight:       Height:           Exam:  Breast: Tenderness no, Engorged no, and Lactating yes  Abdomen: Abdomen obese, soft, non-tender. BS hypoactive. No masses,  No organomegaly, no evidence of periwound infection or compromise; positive findings:  incision dressed with provena wound vac which is in place and functioning  Fundal Tenderness:  no  Fundus Firm: yes  Incision: dry and intact with provena wound vac in place  Below umbilicus: yes  Perineum: perineum intact  Lochia: mild  Extremities: 3+ edema, Superficial Varicosities; no unilateral tenderness or color change  Pulmonary: CTA bilaterally, was on supplemental oxygen due to spontaneous desaturations over night    Meds:  Current Facility-Administered Medications   Medication Dose    diphenhydrAMINE (BENADRYL) injection 12.5 mg  12.5 mg    Or    diphenhydrAMINE (BENADRYL) injection 25 mg  25 mg    Or    naloxone HCl (NARCAN) 20 mg in  mL infusion  0.4 mg/hr    ondansetron (ZOFRAN) syringe/vial injection 4 mg  4 mg    diphenhydrAMINE  (BENADRYL) injection 12.5 mg  12.5 mg    ePHEDrine injection 10 mg  10 mg    HYDROmorphone (Dilaudid) injection 0.4 mg  0.4 mg    HYDROmorphone (Dilaudid) injection 0.2 mg  0.2 mg    oxyCODONE immediate-release (ROXICODONE) tablet 10 mg  10 mg    oxyCODONE immediate-release (ROXICODONE) tablet 5 mg  5 mg    ketorolac (TORADOL) injection 30 mg  30 mg    acetaminophen (TYLENOL) tablet 1,000 mg  1,000 mg    lactated ringers infusion      [START ON 2022] ibuprofen (MOTRIN) tablet 800 mg  800 mg    Followed by    [START ON 2022] ibuprofen (MOTRIN) tablet 800 mg  800 mg    [START ON 2022] oxyCODONE immediate-release (ROXICODONE) tablet 5 mg  5 mg    [START ON 2022] oxyCODONE immediate-release (ROXICODONE) tablet 10 mg  10 mg    [START ON 2022] ondansetron (ZOFRAN) syringe/vial injection 4 mg  4 mg    Or    [START ON 2022] ondansetron (ZOFRAN ODT) dispertab 4 mg  4 mg    [START ON 2022] diphenhydrAMINE (BENADRYL) tablet/capsule 25 mg  25 mg    Or    [START ON 2022] diphenhydrAMINE (BENADRYL) injection 25 mg  25 mg    docusate sodium (COLACE) capsule 100 mg  100 mg    clindamycin (Cleocin) IVPB premix 900 mg  900 mg    insulin GLARGINE (Lantus,Semglee) injection  19 Units    insulin NPH (HumuLIN N,NovoLIN N) injection  10 Units    labetalol (NORMODYNE) tablet 200 mg  200 mg    MD Alert...Gentamicin per Pharmacy      ampicillin (Omnipen) 2,000 mg in  mL IVPB  2,000 mg    gentamicin (GARAMYCIN) 490 mg in  mL IVPB  5 mg/kg (Adjusted)    oxytocin (PITOCIN) infusion (for post delivery)  125 mL/hr    metFORMIN (GLUCOPHAGE) tablet 1,000 mg  1,000 mg       Labs:   Recent Labs     22  2145   WBC 9.4   RBC 4.34   HEMOGLOBIN 11.9*   HEMATOCRIT 34.4*   MCV 79.3*   MCH 27.4   MCHC 34.6   RDW 43.5   PLATELETCT 280   MPV 10.7       Assessment:  Chief Admitting Dx:  Encounter for induction of labor [Z34.90]  Delivery Type:   for first stage arrest, fetal intolerance, intra amniotic  infection  Tubal Ligation:  no    Plan:  Continue routine post partum care.  CBC stat for dizziness  Attempt to get up around 12-16hr post op to ambulate and encourage regular ambulation  Continue with SCDs in place when in bed  Antibiotics for 24hrs postpartum as long as afebrile  Continue with supplemental oxygen PRN to maintain sats >/= 93%      Andie Marquez C.N.M.  Attending MD: Dr. Tanner

## 2022-09-03 NOTE — CARE PLAN
The patient is Stable - Low risk of patient condition declining or worsening    Shift Goals  Clinical Goals: nausea relief, get moving  Patient Goals: Breastfeed and rest  Family Goals: Bond    Progress made toward(s) clinical / shift goals:  PT able to come off of oxygen today, PT also up out of bed today.     Patient is not progressing towards the following goals:

## 2022-09-03 NOTE — PROGRESS NOTES
Late entry due to patient care    1900 - Report received from Ivon ALEJANDRA. POC discussed, assumed care of patient at this time.     2220 -  at bedside. Recommends C/S. Patient and FOB agreeable. Begin prepping at this time.     2310 - Patient transported to OR 1 via bed in stable condition. See OR Charting.    2338 - Delivery of viable male infant. APGARs 8/9. See Delivery summary    0019 - Patient transported to PACU via gurney at this time. See Surgical charting    0140 - Patient transported via gurney with infant in arms in stable condition to S344. Infant taken to NBN due to critical glucose level. Report given to Yuli ALEJANDRA. POC discussed, relinquished care of patient at this time.

## 2022-09-03 NOTE — ANESTHESIA POSTPROCEDURE EVALUATION
Patient: Priti Begum    Procedure Summary     Date: 22 Room / Location: [facility] OR 01 / SURGERY LABOR AND DELIVERY    Anesthesia Start:  Anesthesia Stop: 22    Procedure:  SECTION, PRIMARY (Abdomen) Diagnosis: (FETAL INTOLERANCE; ARREST OF LABOR)    Surgeons: Starr Tanner M.D. Responsible Provider: Chong Walker M.D.    Anesthesia Type: epidural ASA Status: 3 - Emergent          Final Anesthesia Type: epidural  Last vitals  BP   Blood Pressure: 129/70    Temp   36.3 °C (97.4 °F)    Pulse   (!) 105   Resp   18    SpO2   94 %      Anesthesia Post Evaluation    Patient location during evaluation: PACU  Patient participation: complete - patient participated  Level of consciousness: awake and alert  Pain score: 0    Airway patency: patent  Anesthetic complications: no  Cardiovascular status: hemodynamically stable  Respiratory status: acceptable  Hydration status: euvolemic    PONV: none          No notable events documented.     Nurse Pain Score: 9 (NPRS)

## 2022-09-03 NOTE — CARE PLAN
The patient is Watcher - Medium risk of patient condition declining or worsening    Shift Goals  Clinical Goals: Pain control, firm fundus, light lochia  Patient Goals: Breastfeed and rest  Family Goals: Bond    Progress made toward(s) clinical / shift goals:  PIV's patent with antibiotics given.  Pt's nausea starting to resolve s/p vomiting and zofran administration.     Problem: Skin Integrity  Goal: Skin integrity is maintained or improved  Outcome: Progressing  Note: Stephania care provided     Problem: Altered Physiologic Condition  Goal: Patient physiologically stable as evidenced by normal lochia, palpable uterine involution and vitals within normal limits  Outcome: Progressing  Note: Lochia light     Problem: Respiratory/Oxygenation Function Post-Surgical  Goal: Patient will achieve/maintain normal respiratory rate/effort  Outcome: Progressing  Note: Pt placed on 2L O2 while sleeping due to desat down to 84%

## 2022-09-04 LAB
BASOPHILS # BLD AUTO: 0.3 % (ref 0–1.8)
BASOPHILS # BLD: 0.03 K/UL (ref 0–0.12)
EOSINOPHIL # BLD AUTO: 0.14 K/UL (ref 0–0.51)
EOSINOPHIL NFR BLD: 1.3 % (ref 0–6.9)
ERYTHROCYTE [DISTWIDTH] IN BLOOD BY AUTOMATED COUNT: 45.6 FL (ref 35.9–50)
GLUCOSE BLD STRIP.AUTO-MCNC: 61 MG/DL (ref 65–99)
GLUCOSE BLD STRIP.AUTO-MCNC: 75 MG/DL (ref 65–99)
GLUCOSE BLD STRIP.AUTO-MCNC: 86 MG/DL (ref 65–99)
GLUCOSE BLD STRIP.AUTO-MCNC: 93 MG/DL (ref 65–99)
HCT VFR BLD AUTO: 29.6 % (ref 37–47)
HGB BLD-MCNC: 9.5 G/DL (ref 12–16)
IMM GRANULOCYTES # BLD AUTO: 0.05 K/UL (ref 0–0.11)
IMM GRANULOCYTES NFR BLD AUTO: 0.5 % (ref 0–0.9)
LYMPHOCYTES # BLD AUTO: 1.62 K/UL (ref 1–4.8)
LYMPHOCYTES NFR BLD: 15 % (ref 22–41)
MCH RBC QN AUTO: 25.9 PG (ref 27–33)
MCHC RBC AUTO-ENTMCNC: 32.1 G/DL (ref 33.6–35)
MCV RBC AUTO: 80.7 FL (ref 81.4–97.8)
MONOCYTES # BLD AUTO: 0.6 K/UL (ref 0–0.85)
MONOCYTES NFR BLD AUTO: 5.6 % (ref 0–13.4)
NEUTROPHILS # BLD AUTO: 8.35 K/UL (ref 2–7.15)
NEUTROPHILS NFR BLD: 77.3 % (ref 44–72)
NRBC # BLD AUTO: 0 K/UL
NRBC BLD-RTO: 0 /100 WBC
PLATELET # BLD AUTO: 216 K/UL (ref 164–446)
PMV BLD AUTO: 10.2 FL (ref 9–12.9)
RBC # BLD AUTO: 3.67 M/UL (ref 4.2–5.4)
WBC # BLD AUTO: 10.8 K/UL (ref 4.8–10.8)

## 2022-09-04 PROCEDURE — A9270 NON-COVERED ITEM OR SERVICE: HCPCS | Performed by: OBSTETRICS & GYNECOLOGY

## 2022-09-04 PROCEDURE — 700102 HCHG RX REV CODE 250 W/ 637 OVERRIDE(OP): Performed by: OBSTETRICS & GYNECOLOGY

## 2022-09-04 PROCEDURE — 700101 HCHG RX REV CODE 250: Performed by: OBSTETRICS & GYNECOLOGY

## 2022-09-04 PROCEDURE — 700105 HCHG RX REV CODE 258

## 2022-09-04 PROCEDURE — 700111 HCHG RX REV CODE 636 W/ 250 OVERRIDE (IP): Performed by: ANESTHESIOLOGY

## 2022-09-04 PROCEDURE — 85025 COMPLETE CBC W/AUTO DIFF WBC: CPT

## 2022-09-04 PROCEDURE — 82962 GLUCOSE BLOOD TEST: CPT

## 2022-09-04 PROCEDURE — 700111 HCHG RX REV CODE 636 W/ 250 OVERRIDE (IP)

## 2022-09-04 PROCEDURE — 36415 COLL VENOUS BLD VENIPUNCTURE: CPT

## 2022-09-04 PROCEDURE — 770002 HCHG ROOM/CARE - OB PRIVATE (112)

## 2022-09-04 RX ORDER — ACETAMINOPHEN 500 MG
1000 TABLET ORAL EVERY 6 HOURS PRN
Status: DISCONTINUED | OUTPATIENT
Start: 2022-09-04 | End: 2022-09-05 | Stop reason: HOSPADM

## 2022-09-04 RX ADMIN — AMPICILLIN SODIUM 2000 MG: 2 INJECTION, POWDER, FOR SOLUTION INTRAMUSCULAR; INTRAVENOUS at 10:03

## 2022-09-04 RX ADMIN — ENOXAPARIN SODIUM 40 MG: 40 INJECTION SUBCUTANEOUS at 18:08

## 2022-09-04 RX ADMIN — METFORMIN HYDROCHLORIDE 1000 MG: 500 TABLET ORAL at 18:33

## 2022-09-04 RX ADMIN — LABETALOL HYDROCHLORIDE 200 MG: 100 TABLET, FILM COATED ORAL at 10:20

## 2022-09-04 RX ADMIN — AMPICILLIN SODIUM 2000 MG: 2 INJECTION, POWDER, FOR SOLUTION INTRAMUSCULAR; INTRAVENOUS at 02:19

## 2022-09-04 RX ADMIN — METFORMIN HYDROCHLORIDE 1000 MG: 500 TABLET ORAL at 10:01

## 2022-09-04 RX ADMIN — ENOXAPARIN SODIUM 40 MG: 40 INJECTION SUBCUTANEOUS at 05:54

## 2022-09-04 RX ADMIN — AMPICILLIN SODIUM 2000 MG: 2 INJECTION, POWDER, FOR SOLUTION INTRAMUSCULAR; INTRAVENOUS at 05:55

## 2022-09-04 RX ADMIN — LABETALOL HYDROCHLORIDE 200 MG: 100 TABLET, FILM COATED ORAL at 18:33

## 2022-09-04 RX ADMIN — ACETAMINOPHEN 1000 MG: 500 TABLET ORAL at 13:58

## 2022-09-04 RX ADMIN — CLINDAMYCIN IN 5 PERCENT DEXTROSE 900 MG: 18 INJECTION, SOLUTION INTRAVENOUS at 13:58

## 2022-09-04 RX ADMIN — CLINDAMYCIN IN 5 PERCENT DEXTROSE 900 MG: 18 INJECTION, SOLUTION INTRAVENOUS at 21:39

## 2022-09-04 RX ADMIN — ACETAMINOPHEN 1000 MG: 500 TABLET ORAL at 21:08

## 2022-09-04 RX ADMIN — AMPICILLIN SODIUM 2000 MG: 2 INJECTION, POWDER, FOR SOLUTION INTRAMUSCULAR; INTRAVENOUS at 22:54

## 2022-09-04 RX ADMIN — CLINDAMYCIN IN 5 PERCENT DEXTROSE 900 MG: 18 INJECTION, SOLUTION INTRAVENOUS at 06:38

## 2022-09-04 RX ADMIN — KETOROLAC TROMETHAMINE 30 MG: 30 INJECTION, SOLUTION INTRAMUSCULAR; INTRAVENOUS at 01:46

## 2022-09-04 RX ADMIN — GENTAMICIN SULFATE 490 MG: 40 INJECTION, SOLUTION INTRAMUSCULAR; INTRAVENOUS at 23:31

## 2022-09-04 RX ADMIN — AMPICILLIN SODIUM 2000 MG: 2 INJECTION, POWDER, FOR SOLUTION INTRAMUSCULAR; INTRAVENOUS at 15:10

## 2022-09-04 RX ADMIN — AMPICILLIN SODIUM 2000 MG: 2 INJECTION, POWDER, FOR SOLUTION INTRAMUSCULAR; INTRAVENOUS at 19:20

## 2022-09-04 RX ADMIN — IBUPROFEN 800 MG: 800 TABLET, FILM COATED ORAL at 10:01

## 2022-09-04 RX ADMIN — IBUPROFEN 800 MG: 800 TABLET, FILM COATED ORAL at 18:08

## 2022-09-04 ASSESSMENT — PAIN DESCRIPTION - PAIN TYPE
TYPE: ACUTE PAIN
TYPE: ACUTE PAIN
TYPE: SURGICAL PAIN

## 2022-09-04 NOTE — PROGRESS NOTES
Was asked by nurse to assess the Prevena wound vacuum.  Patient seen and examined at bedside.  Prevena wound VAC examined.  After lifting pannus, the entirety of the Mepilex dressing appears to be intact with active suction with the exception of the left lateral corner.  A Tegaderm was placed here for additional coverage and the wound vacuum machine was noted to be operational.  Some teaching was given to the patient.  Advised patient that the wound VAC only has battery for approximately 7 to 10 days.  The battery will run out on its own and the wound vacuum system will shut off.  At that time, she should be seen in the office to transition to a an alternative form of dressing.  Advised patient that she should avoid water on the area however if she has a hand-held shower she may shower and wash other areas.    Patient voices understanding.

## 2022-09-04 NOTE — PROGRESS NOTES
POSTOPERATIVE  SECTION PROGRESS NOTE;        PATIENT ID:  NAME:  Priti Begum  MRN:               [id number]  YOB: 1985         37 y.o. female  at 38w2d POD#1 s/p primary L TCS for arrest of dilatation      Subjective: Doing really well    Objective:    Vitals:    22 1744 22 2200 22 0200 22 0558   BP: 115/70 (!) 99/62 115/71 111/68   Pulse: 82 71 84 89   Resp: 18 19 18 18   Temp: 37 °C (98.6 °F) 36.3 °C (97.4 °F) 36.2 °C (97.1 °F) 36.5 °C (97.7 °F)   TempSrc: Temporal Temporal Temporal    SpO2: 96% 96% 96%    Weight:       Height:         General: No acute distress, resting comfortably in bed.  HEENT: normocephalic, nontraumatic, PERRLA, EOMI  Cardiovascular: Heart RRR with no murmurs, rubs or gallops. Distal Pulses 2+  Respiratory: symmetric chest expansion, lungs CTA bilaterally with no wheezes rales or rhonci  Abdomen: soft, mildly tender around incision which is clean, dry and intact, fundus firm, +BS-wound VAC in place  Genitourinary: lochia light, denies excessive vaginal bleeding  Musculoskeletal: strength 5/5 in four extremities  Neuro: non focal with no numbness, tingling or changes in sensation      Recent Labs     22  0715   WBC 22.5*   RBC 4.58   HEMOGLOBIN 11.9*   HEMATOCRIT 36.8*   MCV 80.3*   MCH 26.0*   RDW 43.5   PLATELETCT 252   MPV 10.3     No results for input(s): SODIUM, POTASSIUM, CHLORIDE, CO2, GLUCOSE, BUN, CPKTOTAL in the last 72 hours.    Current Meds:   Current Facility-Administered Medications   Medication Dose Frequency Provider Last Rate Last Admin    lactated ringers infusion   PRN Starr Tanner M.D.        ibuprofen (MOTRIN) tablet 800 mg  800 mg Q8HRS Starr Tanner M.D.        Followed by    [START ON 2022] ibuprofen (MOTRIN) tablet 800 mg  800 mg Q8HRS PRN Starr Tanner M.D.        oxyCODONE immediate-release (ROXICODONE) tablet 5 mg  5 mg Q4HRS PRN Starr Tanner M.D.        oxyCODONE  immediate-release (ROXICODONE) tablet 10 mg  10 mg Q4HRS PRN Starr Tanner M.D.        ondansetron (ZOFRAN) syringe/vial injection 4 mg  4 mg Q6HRS PRN Starr Tanner M.D.        Or    ondansetron (ZOFRAN ODT) dispertab 4 mg  4 mg Q6HRS PRN Starr Tanner M.D.        diphenhydrAMINE (BENADRYL) tablet/capsule 25 mg  25 mg Q6HRS PRN Starr Tanner M.D.        Or    diphenhydrAMINE (BENADRYL) injection 25 mg  25 mg Q6HRS PRN Starr Tanner M.D.        docusate sodium (COLACE) capsule 100 mg  100 mg BID PRN Starr Tanner M.D.        clindamycin (Cleocin) IVPB premix 900 mg  900 mg Q8HRS Starr Tanner M.D.   Stopped at 22 2347    labetalol (NORMODYNE) tablet 200 mg  200 mg TWICE DAILY Starr Tanner M.D.   200 mg at 22 0555    MD Alert...Gentamicin per Pharmacy   PHARMACY TO DOSE Andie Marquez, [email or web address]        ampicillin (Omnipen) 2,000 mg in  mL IVPB  2,000 mg Q4HRS ADRY Carmen.N.M. 200 mL/hr at 22 0555 2,000 mg at 22 0555    gentamicin (GARAMYCIN) 490 mg in  mL IVPB  5 mg/kg (Adjusted) Q24HR ADRY Carmen.N.M.   Stopped at 22 0020    enoxaparin (Lovenox) inj 40 mg  40 mg Q12HRS ADRY Carmen.N.M.   40 mg at 22 0554    metoclopramide (REGLAN) injection 10 mg  10 mg Q6HRS PRN Reji Castillo M.D.        insulin GLARGINE (Lantus,Semglee) injection  19 Units Q EVENING Reji Castillo M.D.   19 Units at 22    oxytocin (PITOCIN) infusion (for post delivery)  125 mL/hr Continuous BEN Johnson 125 mL/hr at 22 0010 125 mL/hr at 22 0010    metFORMIN (GLUCOPHAGE) tablet 1,000 mg  1,000 mg BID WITH MEALS Elysia White D.O.   1,000 mg at 22 190   Last reviewed on 2022 10:03 PM by Brissa Freed R.N.      Impression;  1.  Status post primary  section-stable  2.  Endometritis-resolving, fundus nontender afebrile currently  3.  pre-existing type type 2 diabetes  mellitus-sugars all stable-currently on 19 units Lantus at at bedtime and metformin 1000 mg p.o. twice daily  4.  Chronic hypertension-stable on labetalol 200 mg p.o. twice daily    Plan:   Continue triple antibiotics for least 48 hours being afebrile, check CBC, continue Lovenox 40 mg twice daily until patient ambulating well currently patient is ambulating significantly  Discharge to home when stable    Reji Castillo MD

## 2022-09-04 NOTE — CARE PLAN
The patient is Stable - Low risk of patient condition declining or worsening    Shift Goals  Clinical Goals: ambulation, pain control  Patient Goals: rest  Family Goals: rest    Progress made toward(s) clinical / shift goals:  PT remains afebrile with IV antibiotics. PT showing effective bonding and parenting behaviors     Patient is not progressing towards the following goals:       (3) slightly limited teaching and training/observation and assessment/medication teaching and assessment

## 2022-09-04 NOTE — CARE PLAN
Problem: Knowledge Deficit - Postpartum  Goal: Patient will verbalize and demonstrate understanding of self and infant care  Outcome: Progressing     Problem: Psychosocial - Postpartum  Goal: Patient will verbalize and demonstrate effective bonding and parenting behavior  Outcome: Progressing     Problem: Altered Physiologic Condition  Goal: Patient physiologically stable as evidenced by normal lochia, palpable uterine involution and vitals within normal limits  Outcome: Progressing   The patient is Stable - Low risk of patient condition declining or worsening    Shift Goals  Clinical Goals: stale VS and pain control  Patient Goals: rest  Family Goals: rest    Progress made toward(s) clinical / shift goals:    Pt reports comfort after pain interventions, Fundus firm and lochia light, VSS, educated on POC, needs met at this time. Questions answered. Will continue to educate.

## 2022-09-04 NOTE — LACTATION NOTE
"This note was copied from a baby's chart.  Lactation follow-up visit:    Baby gained 2%, baby baby over 24 hours old & has not latched. MOB Hx Type II DM- insulin during pregnancy/ now Metformin, HTN- Labetalol, Obesity. Mother working 3 step plan.    3 step plan:  Breastfeed/attempt  2. Supplement according to guideline volumes  Pump & hand express   Every 3-4 hours or sooner if baby cues, feed a minimum 8 or more times in 24 hours.     Pump settings reviewed speed 80/60, suction 35% x 15 minutes, then hand express x 2-3 min each breast, flange 25 mm. Yielding drops on flange.     MOB watched \"Maximizing Milk\" & \"hand expression\" video's. LC reviewed \"Supplemental Guidelines\",, mother voiced understanding. No latch assist at this time, baby asleep in crib.   "

## 2022-09-04 NOTE — PROGRESS NOTES
This RN notified Dr Castillo of low FSBS of 61, pt asymptomatic, MD ok to give pt orange juice, will continue to follow.

## 2022-09-05 ENCOUNTER — PHARMACY VISIT (OUTPATIENT)
Dept: PHARMACY | Facility: MEDICAL CENTER | Age: 37
End: 2022-09-05
Payer: COMMERCIAL

## 2022-09-05 VITALS
HEIGHT: 62 IN | OXYGEN SATURATION: 96 % | DIASTOLIC BLOOD PRESSURE: 73 MMHG | HEART RATE: 84 BPM | BODY MASS INDEX: 53.92 KG/M2 | WEIGHT: 293 LBS | TEMPERATURE: 98.1 F | SYSTOLIC BLOOD PRESSURE: 117 MMHG | RESPIRATION RATE: 18 BRPM

## 2022-09-05 LAB
BACTERIA WND AEROBE CULT: ABNORMAL
GLUCOSE BLD STRIP.AUTO-MCNC: 61 MG/DL (ref 65–99)
GRAM STN SPEC: ABNORMAL
GRAM STN SPEC: ABNORMAL
SIGNIFICANT IND 70042: ABNORMAL
SIGNIFICANT IND 70042: ABNORMAL
SITE SITE: ABNORMAL
SITE SITE: ABNORMAL
SOURCE SOURCE: ABNORMAL
SOURCE SOURCE: ABNORMAL

## 2022-09-05 PROCEDURE — 700102 HCHG RX REV CODE 250 W/ 637 OVERRIDE(OP): Performed by: OBSTETRICS & GYNECOLOGY

## 2022-09-05 PROCEDURE — A9270 NON-COVERED ITEM OR SERVICE: HCPCS | Performed by: OBSTETRICS & GYNECOLOGY

## 2022-09-05 PROCEDURE — RXMED WILLOW AMBULATORY MEDICATION CHARGE: Performed by: OBSTETRICS & GYNECOLOGY

## 2022-09-05 PROCEDURE — 82962 GLUCOSE BLOOD TEST: CPT

## 2022-09-05 PROCEDURE — 700111 HCHG RX REV CODE 636 W/ 250 OVERRIDE (IP)

## 2022-09-05 RX ORDER — ACETAMINOPHEN 500 MG
1000 TABLET ORAL EVERY 6 HOURS PRN
Qty: 30 TABLET | Refills: 0 | Status: SHIPPED | OUTPATIENT
Start: 2022-09-05 | End: 2023-11-29

## 2022-09-05 RX ORDER — OXYCODONE HYDROCHLORIDE 5 MG/1
5 TABLET ORAL EVERY 4 HOURS PRN
Qty: 15 TABLET | Refills: 0 | Status: SHIPPED | OUTPATIENT
Start: 2022-09-05 | End: 2022-09-10

## 2022-09-05 RX ORDER — IBUPROFEN 600 MG/1
600 TABLET ORAL EVERY 6 HOURS PRN
Qty: 30 TABLET | Refills: 0 | Status: SHIPPED | OUTPATIENT
Start: 2022-09-05 | End: 2023-12-21

## 2022-09-05 RX ORDER — PSEUDOEPHEDRINE HCL 30 MG
100 TABLET ORAL 2 TIMES DAILY PRN
Qty: 60 CAPSULE | Refills: 0 | Status: SHIPPED | OUTPATIENT
Start: 2022-09-05 | End: 2022-11-15

## 2022-09-05 RX ADMIN — ENOXAPARIN SODIUM 40 MG: 40 INJECTION SUBCUTANEOUS at 06:37

## 2022-09-05 RX ADMIN — IBUPROFEN 800 MG: 800 TABLET, FILM COATED ORAL at 02:52

## 2022-09-05 RX ADMIN — IBUPROFEN 800 MG: 800 TABLET, FILM COATED ORAL at 11:42

## 2022-09-05 RX ADMIN — LABETALOL HYDROCHLORIDE 200 MG: 100 TABLET, FILM COATED ORAL at 06:37

## 2022-09-05 RX ADMIN — ACETAMINOPHEN 1000 MG: 500 TABLET ORAL at 02:53

## 2022-09-05 RX ADMIN — METFORMIN HYDROCHLORIDE 1000 MG: 500 TABLET ORAL at 08:01

## 2022-09-05 RX ADMIN — ACETAMINOPHEN 1000 MG: 500 TABLET ORAL at 09:37

## 2022-09-05 ASSESSMENT — PAIN DESCRIPTION - PAIN TYPE
TYPE: ACUTE PAIN

## 2022-09-05 NOTE — DISCHARGE SUMMARY
OB Discharge Summary:      Priti Begum      Admit Date:   2022  Discharge Date:  2022     Admitting diagnosis:    Encounter for induction of labor [Z34.90]  Intra amniotic infection  Failure to progress in labor  Discharge Diagnosis:   2. Same         History:  Past Medical History:   Diagnosis Date    Diabetes (HCC)     Hypertension      OB History    Para Term  AB Living   1 1 1 0 0 1   SAB IAB Ectopic Molar Multiple Live Births   0 0 0   0 1      # Outcome Date GA Lbr Jaison/2nd Weight Sex Delivery Anes PTL Lv   1 Term 22 38w2d  3.18 kg (7 lb 0.2 oz) M CS-LTranv EPI, Spinal N GENOVEVA      Complications: Failure to Progress in First Stage        Glimepiride [kdc:yellow dye+brilliant blue fcf+glimepiride]  Patient Active Problem List    Diagnosis Date Noted    Encounter for induction of labor 2022    Pre-existing type 2 diabetes mellitus during pregnancy in third trimester 2022    Multigravida of advanced maternal age in second trimester 2022    Long term (current) use of oral hypoglycemic drugs 2020    Metabolic syndrome 2020    Controlled type 2 diabetes mellitus without complication, without long-term current use of insulin ([facility]) 2019    Vitamin D deficiency 2019    Hypertension due to endocrine disorder 2019    Fatty liver disease, nonalcoholic 2016    Morbidly obese (HCC) 10/31/2013    PCOS (polycystic ovarian syndrome) 10/31/2013        Hospital Course:   37 y.o. , was admitted for induction of labor. She dilated to 8cm before having an intraamniotic infection. A primary  was performed and she received amp, gent, and clindamycin for 48 hours after delivery. She was also anticoagulated during her postpartum course. She remained afebrile and was discharged in stable condition.     Complications during pregnancy:  Patient Active Problem List   Diagnosis    Morbidly obese (HCC)    PCOS (polycystic ovarian  syndrome)    Fatty liver disease, nonalcoholic    Hypertension due to endocrine disorder    Controlled type 2 diabetes mellitus without complication, without long-term current use of insulin (HCC)    Vitamin D deficiency    Metabolic syndrome    Long term (current) use of oral hypoglycemic drugs    Multigravida of advanced maternal age in second trimester    Pre-existing type 2 diabetes mellitus during pregnancy in third trimester    Encounter for induction of labor       Vitals:    09/04/22 2200 09/05/22 0200 09/05/22 0542 09/05/22 0543   BP: 117/66 121/85  117/73   Pulse: 79 87  84   Resp: 18 18     Temp: 36.1 °C (96.9 °F) 36.4 °C (97.6 °F) 36.7 °C (98.1 °F)    TempSrc: Temporal Temporal  Temporal   SpO2: 95% 96%  96%   Weight:       Height:           Current Facility-Administered Medications   Medication Dose    insulin GLARGINE (Lantus,Semglee) injection  15 Units    acetaminophen (TYLENOL) tablet 1,000 mg  1,000 mg    lactated ringers infusion      ibuprofen (MOTRIN) tablet 800 mg  800 mg    Followed by    [START ON 9/7/2022] ibuprofen (MOTRIN) tablet 800 mg  800 mg    oxyCODONE immediate-release (ROXICODONE) tablet 5 mg  5 mg    oxyCODONE immediate-release (ROXICODONE) tablet 10 mg  10 mg    ondansetron (ZOFRAN) syringe/vial injection 4 mg  4 mg    Or    ondansetron (ZOFRAN ODT) dispertab 4 mg  4 mg    diphenhydrAMINE (BENADRYL) tablet/capsule 25 mg  25 mg    Or    diphenhydrAMINE (BENADRYL) injection 25 mg  25 mg    docusate sodium (COLACE) capsule 100 mg  100 mg    labetalol (NORMODYNE) tablet 200 mg  200 mg    enoxaparin (Lovenox) inj 40 mg  40 mg    metoclopramide (REGLAN) injection 10 mg  10 mg    oxytocin (PITOCIN) infusion (for post delivery)  125 mL/hr    metFORMIN (GLUCOPHAGE) tablet 1,000 mg  1,000 mg        Labs:  Recent Labs     09/03/22  0715 09/04/22  0805   WBC 22.5* 10.8   RBC 4.58 3.67*   HEMOGLOBIN 11.9* 9.5*   HEMATOCRIT 36.8* 29.6*   MCV 80.3* 80.7*   MCH 26.0* 25.9*   MCHC 32.3* 32.1*    RDW 43.5 45.6   PLATELETCT 252 216   MPV 10.3 10.2        Activity:   Discharge to home  Pelvic Rest x 6 weeks    Assessment:  normal postpartum course  Discharge Assessment: No areas of skin breakdown/redness; surgical incision intact/healing, No heavy bleeding or foul vaginal discharge , Voiding without difficulty     Follow up:   [facility] in 5 weeks for vaginal; 1-2 week for incision check.   To resume daily PNV and iron supplement if needed with hydration.     Patient to return to clinic or ER if any of the following occur:  Fever over 100.5  Severe abdominal pain  Red streaks or painful masses in the breasts  Foul smelling discharge or lochia  Heavy vaginal bleeding saturating a pad per hour  S/s of PP depression     Discharge Meds:   Current Outpatient Medications   Medication Sig Dispense Refill    acetaminophen (TYLENOL) 500 MG Tab Take 2 Tablets by mouth every 6 hours as needed for Moderate Pain. 30 Tablet 0    docusate sodium 100 MG Cap Take 100 mg by mouth 2 times a day as needed for Constipation. 60 Capsule 0    ibuprofen (MOTRIN) 600 MG Tab Take 1 Tablet by mouth every 6 hours as needed for Moderate Pain. Indications: Joint Damage causing Pain and Loss of Function 30 Tablet 0    oxyCODONE immediate-release (ROXICODONE) 5 MG Tab Take 1 Tablet by mouth every four hours as needed for Severe Pain for up to 5 days. 15 Tablet 0       Marce Mendoza D.O.

## 2022-09-05 NOTE — PROGRESS NOTES
"Wall Osmel Begum  POD 3  section     Subjective:   Patient seen at bedside. Lochia is light. Having soft bowel movements and passing flatus. Was having more pain yesterday but today feels very good. Has been voiding without difficulty.     Objective:   /73   Pulse 84   Temp 36.7 °C (98.1 °F)   Resp 18   Ht 1.575 m (5' 2\")   Wt (!) 171 kg (377 lb)   SpO2 96%   Gen: AAO in NAD  Breasts: deferred  CVS: RRR  Resp: CTA bilaterally  Abdomen: Abdomen soft, non-tender. BS normal. No masses,  No organomegaly  Incision: wound vac dressing was saturated and unable to maintain pressure. It was removed and area cleansed. Mepilex dressing applied.   Fundus: Non tender, below umbilicus  Extremities no cyanosis, no clubbing    Meds:   No current facility-administered medications on file prior to encounter.     Current Outpatient Medications on File Prior to Encounter   Medication Sig Dispense Refill    aspirin (ASA) 81 MG Chew Tab chewable tablet Chew 81 mg every day.      Blood Glucose Monitoring Suppl (ONETOUCH VERIO FLEX SYSTEM) w/Device Kit Use as directed 1 Kit 0    Insulin Degludec (TRESIBA FLEXTOUCH) 100 UNIT/ML Solution Pen-injector Inject 37 units at bedtime 30 mL 1    glucose blood strip Use as directed subcutaneously 4-5 times a day for 30 days. 150 Strip 5    Microlet Lancets Misc Use as directed by fingerstick 4-5 times a day for 30 days. 100 Each 1    Insulin Pen Needle 32 G x 4 mm Use as directed subcutaneously four times daily 100 Each 5    glucose blood strip Use as directed subcutaneously 4-5 times per day 30 days 150 Strip 5    labetalol (NORMODYNE) 200 MG Tab Take 1 Tablet by mouth 2 times a day. 60 Tablet 13    Prenatal MV & Min w/FA-DHA (PRENATAL ADULT GUMMY/DHA/FA) 0.4-25 MG Chew Tab Chew.      Blood Glucose Test Strips Testing before and 1 hour after meals for insulin adjustment during pregnancy. One Touch Verio test strips 500 Strip 3    Lancets Lancets order: Lancets for " One Touch Delica.  Use 6 times daily for blood sugar testing. 200 Each 11    insulin lispro (HUMALOG KWIKPEN) 100 UNIT/ML Solution Pen-injector injection PEN Inject 4-10 Units under the skin 3 times a day before meals. 15 mL 6    hydrOXYzine HCl (ATARAX) 25 MG Tab Take 1 Tab by mouth 3 times a day as needed for Anxiety. 30 Tablet 1    triamcinolone acetonide (KENALOG) 0.1 % Cream Apply once or twice daily to affected area of leg rash for 2 weeks 15 g 1    Insulin Degludec 100 UNIT/ML Solution Pen-injector Inject 30 Units under the skin at bedtime. (Patient taking differently: Inject 37 Units under the skin at bedtime.) 15 mL 6    Continuous Blood Gluc Sensor (FREESTYLE CORA 2 SENSOR) [facility] Use 1 sensor as directed every 14 days. 2 Each 11    ONETOUCH VERIO strip Use to test blood sugar 5 Times a Day. 200 Strip 6    metFORMIN ER (GLUCOPHAGE XR) 500 MG TABLET SR 24 HR Take 2 Tablets by mouth 2 times a day. 360 Tablet 3    vitamin D (CHOLECALCIFEROL) 1000 UNIT Tab Take 1,000 Units by mouth every day.         Lab:   Lab Results   Component Value Date/Time    WBC 10.8 09/04/2022 08:05 AM    RBC 3.67 (L) 09/04/2022 08:05 AM    HEMOGLOBIN 9.5 (L) 09/04/2022 08:05 AM    HEMATOCRIT 29.6 (L) 09/04/2022 08:05 AM    MCV 80.7 (L) 09/04/2022 08:05 AM    MCH 25.9 (L) 09/04/2022 08:05 AM    MCHC 32.1 (L) 09/04/2022 08:05 AM    MPV 10.2 09/04/2022 08:05 AM    NEUTSPOLYS 77.30 (H) 09/04/2022 08:05 AM    LYMPHOCYTES 15.00 (L) 09/04/2022 08:05 AM    MONOCYTES 5.60 09/04/2022 08:05 AM    EOSINOPHILS 1.30 09/04/2022 08:05 AM    BASOPHILS 0.30 09/04/2022 08:05 AM        Assessment and Plan  POD#3 s/p primary LTCS at 38w2d for IAI, failure to progress in labor    Patient Active Problem List   Diagnosis    Morbidly obese (HCC)    PCOS (polycystic ovarian syndrome)    Fatty liver disease, nonalcoholic    Hypertension due to endocrine disorder    Controlled type 2 diabetes mellitus without complication, without long-term current use of  insulin (HCC)    Vitamin D deficiency    Metabolic syndrome    Long term (current) use of oral hypoglycemic drugs    Multigravida of advanced maternal age in second trimester    Pre-existing type 2 diabetes mellitus during pregnancy in third trimester    Encounter for induction of labor     - Wound vacuum dressing was completely saturated and non adherent so it was removed and cleaned. It was replaced with a mepilex.   - Post operative care: encourage ambulation, abdominal binder PRN, may shower when indicated, advance diet as tolerated to regulars. Cont lovenox 40mg BID  -IAI: s/p 48 hrs of triple antibiotics, has been afebrile for over 48 hrs.   - Pain control: Motrin 600mg q 6hrs around the clock and alternating with 650mg Tylenol and use Oxycodone 5-10mg PO for break through pain.   - Infant care: encouraged breast feeding or pumping q 3hours and lactation consultation PRN.   - Health Maint: Rh positive.  - Postpartum BCM: undecided. Pelvic rest x 6 weeks

## 2022-09-05 NOTE — CARE PLAN
Problem: Knowledge Deficit - Postpartum  Goal: Patient will verbalize and demonstrate understanding of self and infant care  Outcome: Progressing     Problem: Psychosocial - Postpartum  Goal: Patient will verbalize and demonstrate effective bonding and parenting behavior  Outcome: Progressing     Problem: Altered Physiologic Condition  Goal: Patient physiologically stable as evidenced by normal lochia, palpable uterine involution and vitals within normal limits  Outcome: Progressing   The patient is Stable - Low risk of patient condition declining or worsening    Shift Goals  Clinical Goals: stable VS  Patient Goals: rest  Family Goals: sleep

## 2022-09-05 NOTE — DISCHARGE INSTRUCTIONS

## 2022-09-05 NOTE — PROGRESS NOTES
Assessed patient, vital signs stable, fundus firm, lochia light.  Discussed plan of care. Patient will call for pain medication.

## 2022-09-05 NOTE — CARE PLAN
Problem: Psychosocial - L&D  Goal: Patient's level of anxiety will decrease  Outcome: Progressing  Goal: Patient's ability to re-evaluate and adapt role responsibilities will improve  Outcome: Progressing   The patient is Stable - Low risk of patient condition declining or worsening    Shift Goals  Clinical Goals: stable VS  Patient Goals: rest  Family Goals: sleep    Progress made toward(s) clinical / shift goals:      Patient is not progressing towards the following goals:

## 2022-09-06 LAB
BACTERIA SPEC ANAEROBE CULT: NORMAL
SIGNIFICANT IND 70042: NORMAL
SITE SITE: NORMAL
SOURCE SOURCE: NORMAL

## 2022-09-07 DIAGNOSIS — I10 ESSENTIAL HYPERTENSION: ICD-10-CM

## 2022-09-07 PROCEDURE — RXMED WILLOW AMBULATORY MEDICATION CHARGE: Performed by: INTERNAL MEDICINE

## 2022-09-07 RX ORDER — LISINOPRIL 10 MG/1
10 TABLET ORAL DAILY
Qty: 90 TABLET | Refills: 2 | Status: SHIPPED | OUTPATIENT
Start: 2022-09-07 | End: 2023-06-26 | Stop reason: SDUPTHER

## 2022-09-07 ASSESSMENT — EDINBURGH POSTNATAL DEPRESSION SCALE (EPDS)
I HAVE BEEN SO UNHAPPY THAT I HAVE BEEN CRYING: NO, NEVER
I HAVE BEEN ABLE TO LAUGH AND SEE THE FUNNY SIDE OF THINGS: AS MUCH AS I ALWAYS COULD
I HAVE BEEN ANXIOUS OR WORRIED FOR NO GOOD REASON: NO, NOT AT ALL
I HAVE BEEN SO UNHAPPY THAT I HAVE HAD DIFFICULTY SLEEPING: NOT AT ALL
I HAVE BLAMED MYSELF UNNECESSARILY WHEN THINGS WENT WRONG: NOT VERY OFTEN
I HAVE LOOKED FORWARD WITH ENJOYMENT TO THINGS: AS MUCH AS I EVER DID
THINGS HAVE BEEN GETTING ON TOP OF ME: NO, I HAVE BEEN COPING AS WELL AS EVER
I HAVE FELT SCARED OR PANICKY FOR NO GOOD REASON: NO, NOT AT ALL
I HAVE FELT SAD OR MISERABLE: NO, NOT AT ALL
TOTAL SCORE: 1
THE THOUGHT OF HARMING MYSELF HAS OCCURRED TO ME: NEVER

## 2022-09-08 ENCOUNTER — PHARMACY VISIT (OUTPATIENT)
Dept: PHARMACY | Facility: MEDICAL CENTER | Age: 37
End: 2022-09-08
Payer: COMMERCIAL

## 2022-09-09 ENCOUNTER — POST PARTUM (OUTPATIENT)
Dept: OBGYN | Facility: CLINIC | Age: 37
End: 2022-09-09
Payer: COMMERCIAL

## 2022-09-09 VITALS — BODY MASS INDEX: 67.13 KG/M2 | WEIGHT: 293 LBS

## 2022-09-09 DIAGNOSIS — Z09 POSTOP CHECK: ICD-10-CM

## 2022-09-09 PROCEDURE — 99024 POSTOP FOLLOW-UP VISIT: CPT | Performed by: OBSTETRICS & GYNECOLOGY

## 2022-09-09 ASSESSMENT — EDINBURGH POSTNATAL DEPRESSION SCALE (EPDS)
I HAVE LOOKED FORWARD WITH ENJOYMENT TO THINGS: AS MUCH AS I EVER DID
I HAVE BEEN SO UNHAPPY THAT I HAVE HAD DIFFICULTY SLEEPING: NOT AT ALL
TOTAL SCORE: 3
I HAVE BEEN SO UNHAPPY THAT I HAVE BEEN CRYING: ONLY OCCASIONALLY
I HAVE BEEN ABLE TO LAUGH AND SEE THE FUNNY SIDE OF THINGS: AS MUCH AS I ALWAYS COULD
I HAVE BLAMED MYSELF UNNECESSARILY WHEN THINGS WENT WRONG: NOT VERY OFTEN
THINGS HAVE BEEN GETTING ON TOP OF ME: NO, I HAVE BEEN COPING AS WELL AS EVER
THE THOUGHT OF HARMING MYSELF HAS OCCURRED TO ME: NEVER
I HAVE FELT SCARED OR PANICKY FOR NO GOOD REASON: NO, NOT AT ALL
I HAVE FELT SAD OR MISERABLE: NO, NOT AT ALL
I HAVE BEEN ANXIOUS OR WORRIED FOR NO GOOD REASON: HARDLY EVER

## 2022-09-09 ASSESSMENT — FIBROSIS 4 INDEX: FIB4 SCORE: .5883484054145520959

## 2022-09-09 NOTE — PROGRESS NOTES
C/c: INCISION CHECK    HPI: Patient a  postop from  section on 9/3/2022.  She had her wound vacuum removed in the hospital and has a Mepilex in place.  Has resumed regular diet.  Bowel movements are normal  Is having troubles latching however is pumping and bottlefeeding without difficulties.  Lochia: mild  EDPS is 3    Pregnancy complicated by:   Patient Active Problem List   Diagnosis    Morbidly obese (HCC)    PCOS (polycystic ovarian syndrome)    Fatty liver disease, nonalcoholic    Hypertension due to endocrine disorder    Controlled type 2 diabetes mellitus without complication, without long-term current use of insulin (HCC)    Vitamin D deficiency    Metabolic syndrome    Long term (current) use of oral hypoglycemic drugs    Multigravida of advanced maternal age in second trimester    Pre-existing type 2 diabetes mellitus during pregnancy in third trimester    Encounter for induction of labor       Review of systems:  Gen: denies fever or chills  Breast: denies breast pain or tenderness  GI: denies constipation, passing gas, no diarrhea  : denies dysuria or excessive vaginal bleeding  Ext: nontender bl, no edema, no lesions    O:  Gen: AAO in NAD  Abd: nontender, non distended, no rebound or guarding,   Mepilex was removed.  There is a 3 cm area of epidermal opening over the left lateral aspect of the incision.  The right aspect of the incision is well approximated.  Steri-Strips were placed over this area and the wound was dressed with Telfa pad and Tegaderm.  Extremities: nonedematous bilaterally  Psych: Normal mood, appriopriate affect    AP: 37 y.o.  post op from  section  - postpartum prophylaxis: We discussed postpartum methods of contraception today.  Patient states that she wants to wait 2 years before having another baby.  Advised that she is a candidate for IUD, Nexplanon, Depo-Provera, or combination oral contraceptives.  She would like to try the Nexplanon.   Discussed that she may have this inserted in another 4 weeks.  - cont PNV while breast feeding  - continue pelvic rest  - RTC in 4 weeks for postpartum exam

## 2022-09-09 NOTE — PROGRESS NOTES
Pt here today for C/s check.  C/s was on 09/02/2022  Currently both bottle and breast  C/o some soreness  Phone # [phone] (home)   Pharmacy verified.

## 2022-10-03 PROCEDURE — RXMED WILLOW AMBULATORY MEDICATION CHARGE: Performed by: NURSE PRACTITIONER

## 2022-10-04 ENCOUNTER — PHARMACY VISIT (OUTPATIENT)
Dept: PHARMACY | Facility: MEDICAL CENTER | Age: 37
End: 2022-10-04
Payer: COMMERCIAL

## 2022-10-17 ENCOUNTER — GYNECOLOGY VISIT (OUTPATIENT)
Dept: OBGYN | Facility: CLINIC | Age: 37
End: 2022-10-17
Payer: COMMERCIAL

## 2022-10-17 VITALS — SYSTOLIC BLOOD PRESSURE: 131 MMHG | BODY MASS INDEX: 64.25 KG/M2 | DIASTOLIC BLOOD PRESSURE: 82 MMHG | WEIGHT: 293 LBS

## 2022-10-17 DIAGNOSIS — Z30.017 NEXPLANON INSERTION: ICD-10-CM

## 2022-10-17 PROCEDURE — 11981 INSERTION DRUG DLVR IMPLANT: CPT | Performed by: OBSTETRICS & GYNECOLOGY

## 2022-10-17 ASSESSMENT — FIBROSIS 4 INDEX: FIB4 SCORE: .5883484054145520959

## 2022-10-17 NOTE — PROCEDURES
Procedure Note : Nexplanon Insertion :     Today I discussed with the patient subdermal implant, Nexplanon.  We discussed its mechanism of action and how it prevents pregnancy  Discussed that the implant is a progesterone only form of contraception.  Also discussed with patient risks associated with placement of the device which can include infection bruising and pain. We also discussed the possibility of the device can be displaced. We discussed that the device would be palpable under the skin of the arm.  I also discussed with the patient side effects of the device which can include but are not limited to, irregular bleeding which can include amenorrhea, irregular spotting and also worsening of menstrual cycles. There is an increased risk of headaches related to the device and potential for weight gain.  After thorough discussion the patient had opportunity to ask questions regarding the device and at this time desires the device to be placed today.  Patient information handout has been given for the device.    After informed consent and discussion of risks and benefits, patient who describes her dominant hand as right, was prepped and draped to expose the inner medial surface of the upper left  Insertion site marked at 8 cm distal to the medial epicondyl  1% lidocaine placed sub-Q along course of implant . Area totally anesthestized.   Nexplanon applicator placed with bevel of needle down and skin lifted and inserted to hub. Trigger fired and nexplanon released.  Implant palpated by myself and patient in proper location.   Small needle puncture hemostatic   Steri Strips placed   Arm wrapped with self sealing gauze     Patient given Postoperative Advice  Take NSAIDS and tylenol for pain, ice packs prn bruising/discomfort  Remove gauze wrap after 24 hrs, or at anytime it becomes uncomfortable   Steri Strips to be removed at 3-4 days  RTC as needed

## 2022-10-17 NOTE — PROGRESS NOTES
Pt is here for Nexplanon insertion   LMP:Not yet  C/S on 9/2/2022  Confirmed good ph#, pharmacy, drug allergy, and medications on file.  Pt states no other complaints for today.  UPT negative, done in clinic.

## 2022-11-01 ENCOUNTER — DOCUMENTATION (OUTPATIENT)
Dept: PHARMACY | Facility: MEDICAL CENTER | Age: 37
End: 2022-11-01
Payer: COMMERCIAL

## 2022-11-01 NOTE — PROGRESS NOTES
- I have spoke to pt regarding Humulin and Tesiba refills. Pt mentioned she has an appt with MD on 11/11 and MD is switching her to Mounjaro. MD discontinued Humulin a few months ago and took her off Tresiba last month. I will follow up with pt on Monday 11/14 when new RX will be in to avoid D/c now and having to re onboard.

## 2022-11-04 ENCOUNTER — HOSPITAL ENCOUNTER (OUTPATIENT)
Dept: LAB | Facility: MEDICAL CENTER | Age: 37
End: 2022-11-04
Attending: INTERNAL MEDICINE
Payer: COMMERCIAL

## 2022-11-04 DIAGNOSIS — E55.9 VITAMIN D DEFICIENCY: ICD-10-CM

## 2022-11-04 DIAGNOSIS — I10 ESSENTIAL HYPERTENSION: ICD-10-CM

## 2022-11-04 DIAGNOSIS — Z34.93 PREGNANT AND NOT YET DELIVERED IN THIRD TRIMESTER: ICD-10-CM

## 2022-11-04 DIAGNOSIS — E11.9 CONTROLLED TYPE 2 DIABETES MELLITUS WITHOUT COMPLICATION, WITHOUT LONG-TERM CURRENT USE OF INSULIN (HCC): ICD-10-CM

## 2022-11-04 LAB
25(OH)D3 SERPL-MCNC: 42 NG/ML (ref 30–100)
ALBUMIN SERPL BCP-MCNC: 4.1 G/DL (ref 3.2–4.9)
ALBUMIN/GLOB SERPL: 1.2 G/DL
ALP SERPL-CCNC: 89 U/L (ref 30–99)
ALT SERPL-CCNC: 29 U/L (ref 2–50)
ANION GAP SERPL CALC-SCNC: 12 MMOL/L (ref 7–16)
AST SERPL-CCNC: 17 U/L (ref 12–45)
BILIRUB SERPL-MCNC: 0.3 MG/DL (ref 0.1–1.5)
BUN SERPL-MCNC: 13 MG/DL (ref 8–22)
CALCIUM SERPL-MCNC: 9.4 MG/DL (ref 8.5–10.5)
CHLORIDE SERPL-SCNC: 105 MMOL/L (ref 96–112)
CO2 SERPL-SCNC: 22 MMOL/L (ref 20–33)
CREAT SERPL-MCNC: 0.56 MG/DL (ref 0.5–1.4)
FASTING STATUS PATIENT QL REPORTED: NORMAL
GFR SERPLBLD CREATININE-BSD FMLA CKD-EPI: 121 ML/MIN/1.73 M 2
GLOBULIN SER CALC-MCNC: 3.4 G/DL (ref 1.9–3.5)
GLUCOSE SERPL-MCNC: 122 MG/DL (ref 65–99)
POTASSIUM SERPL-SCNC: 4.1 MMOL/L (ref 3.6–5.5)
PROT SERPL-MCNC: 7.5 G/DL (ref 6–8.2)
SODIUM SERPL-SCNC: 139 MMOL/L (ref 135–145)
T4 FREE SERPL-MCNC: 1.2 NG/DL (ref 0.93–1.7)
TSH SERPL DL<=0.005 MIU/L-ACNC: 3.31 UIU/ML (ref 0.38–5.33)

## 2022-11-04 PROCEDURE — 84439 ASSAY OF FREE THYROXINE: CPT

## 2022-11-04 PROCEDURE — 80053 COMPREHEN METABOLIC PANEL: CPT

## 2022-11-04 PROCEDURE — 36415 COLL VENOUS BLD VENIPUNCTURE: CPT

## 2022-11-04 PROCEDURE — 84443 ASSAY THYROID STIM HORMONE: CPT

## 2022-11-04 PROCEDURE — 82306 VITAMIN D 25 HYDROXY: CPT

## 2022-11-15 ENCOUNTER — NON-PROVIDER VISIT (OUTPATIENT)
Dept: ENDOCRINOLOGY | Facility: MEDICAL CENTER | Age: 37
End: 2022-11-15
Attending: INTERNAL MEDICINE
Payer: COMMERCIAL

## 2022-11-15 VITALS
DIASTOLIC BLOOD PRESSURE: 70 MMHG | BODY MASS INDEX: 50.02 KG/M2 | WEIGHT: 293 LBS | HEIGHT: 64 IN | HEART RATE: 84 BPM | SYSTOLIC BLOOD PRESSURE: 106 MMHG | OXYGEN SATURATION: 96 %

## 2022-11-15 DIAGNOSIS — E11.9 CONTROLLED TYPE 2 DIABETES MELLITUS WITHOUT COMPLICATION, WITHOUT LONG-TERM CURRENT USE OF INSULIN (HCC): ICD-10-CM

## 2022-11-15 LAB
HBA1C MFR BLD: 6 % (ref 0–5.6)
INT CON NEG: ABNORMAL
INT CON POS: ABNORMAL

## 2022-11-15 PROCEDURE — 83036 HEMOGLOBIN GLYCOSYLATED A1C: CPT

## 2022-11-15 PROCEDURE — 99212 OFFICE O/P EST SF 10 MIN: CPT | Performed by: INTERNAL MEDICINE

## 2022-11-15 RX ORDER — TIRZEPATIDE 5 MG/.5ML
5 INJECTION, SOLUTION SUBCUTANEOUS
Qty: 6 ML | Refills: 1 | Status: SHIPPED | OUTPATIENT
Start: 2022-11-15 | End: 2023-02-09

## 2022-11-15 ASSESSMENT — FIBROSIS 4 INDEX: FIB4 SCORE: 0.53

## 2022-11-15 NOTE — PROGRESS NOTES
RN-CDE Note    Subjective:   Endocrinology Clinic Progress Note  PCP: BEN Fernández    HPI:  Priti Begum is a 37 y.o. old patient who is seen today by the Diabetes Nurse Specialist for review of Type 2 Diabetes.  Recent changes in health: Had baby boy on 9/2/22.  She is not breastfeeding and is interested in going on a GLP-1.    DM:   Last A1c:   Lab Results   Component Value Date/Time    HBA1C 6.0 (A) 11/15/2022 03:40 PM      Previous A1c was 5.4 on 7/29/22  A1C GOAL: < 7    Diabetes Medications:   Metformin  mg 2 BID    Exercise: Walking  Diet: Cheese or peanut butter and toast for breakfast.  Lunch is sandwich.  Dinner protein, vegetables, and carbohydrates.  Patient's body mass index is 61.62 kg/m². Exercise and nutrition counseling were performed at this visit.    Glucose monitoring frequency: Testing daily  100-120  Hypoglycemic episodes: no  Last Retinal Exam: on file and up-to-date  Daily Foot Exam: Yes   Foot Exam:  Monofilament: done  Monofilament testing with a 10 gram force: sensation intact: intact bilaterally  Visual Inspection: Feet without maceration, ulcers, fissures.  Pedal pulses: intact bilaterally   Lab Results   Component Value Date/Time    MALBCRT 29 02/24/2022 06:21 AM    MICROALBUR 7.1 02/24/2022 06:21 AM        ACR Albumin/Creatinine Ratio goal <30     HTN:   Blood pressure goal <140/<80 .   Currently Rx ACE/ARB: Yes     Dyslipidemia:    Lab Results   Component Value Date/Time    CHOLSTRLTOT 136 02/24/2022 06:21 AM    LDL 62 02/24/2022 06:21 AM    HDL 55 02/24/2022 06:21 AM    TRIGLYCERIDE 96 02/24/2022 06:21 AM         Currently Rx Statin: No     She  reports that she has never smoked. She has never used smokeless tobacco.      Plan:     Discussed and educated on:   - All medications, side effects and compliance (discussed carefully)  - Annual eye examinations at Ophthalmology  - Home glucose monitoring emphasized  - Weight control and daily  exercise    Recommended medication changes: Start Mounjaro 2.5 mg weekly for 4 weeks and then increase to Mounjaro 5 mg weekly.  Follow up with Dr. Rivera in 1 week.

## 2022-11-16 PROCEDURE — RXMED WILLOW AMBULATORY MEDICATION CHARGE: Performed by: INTERNAL MEDICINE

## 2022-11-17 ENCOUNTER — DOCUMENTATION (OUTPATIENT)
Dept: PHARMACY | Facility: MEDICAL CENTER | Age: 37
End: 2022-11-17
Payer: COMMERCIAL

## 2022-11-17 NOTE — PROGRESS NOTES
I have spoken to pt regarding Mounjaro refill $25/84. This is pt's first time filling medication. Pt had discontinued Tresiba and Humulin on 9/5 per providers decision. Pt had no other changes to report. Delivery scheduled 1/22 via .

## 2022-11-22 ENCOUNTER — PHARMACY VISIT (OUTPATIENT)
Dept: PHARMACY | Facility: MEDICAL CENTER | Age: 37
End: 2022-11-22
Payer: COMMERCIAL

## 2022-12-04 PROCEDURE — RXMED WILLOW AMBULATORY MEDICATION CHARGE: Performed by: INTERNAL MEDICINE

## 2022-12-12 ENCOUNTER — PHARMACY VISIT (OUTPATIENT)
Dept: PHARMACY | Facility: MEDICAL CENTER | Age: 37
End: 2022-12-12
Payer: COMMERCIAL

## 2023-01-03 PROCEDURE — RXMED WILLOW AMBULATORY MEDICATION CHARGE: Performed by: INTERNAL MEDICINE

## 2023-01-04 ENCOUNTER — PHARMACY VISIT (OUTPATIENT)
Dept: PHARMACY | Facility: MEDICAL CENTER | Age: 38
End: 2023-01-04
Payer: COMMERCIAL

## 2023-01-28 PROCEDURE — RXMED WILLOW AMBULATORY MEDICATION CHARGE: Performed by: INTERNAL MEDICINE

## 2023-01-31 ENCOUNTER — PHARMACY VISIT (OUTPATIENT)
Dept: PHARMACY | Facility: MEDICAL CENTER | Age: 38
End: 2023-01-31
Payer: COMMERCIAL

## 2023-02-09 DIAGNOSIS — E11.9 CONTROLLED TYPE 2 DIABETES MELLITUS WITHOUT COMPLICATION, WITHOUT LONG-TERM CURRENT USE OF INSULIN (HCC): ICD-10-CM

## 2023-02-14 ENCOUNTER — HOSPITAL ENCOUNTER (OUTPATIENT)
Facility: MEDICAL CENTER | Age: 38
End: 2023-02-14
Attending: OBSTETRICS & GYNECOLOGY
Payer: COMMERCIAL

## 2023-02-14 ENCOUNTER — GYNECOLOGY VISIT (OUTPATIENT)
Dept: OBGYN | Facility: CLINIC | Age: 38
End: 2023-02-14
Payer: COMMERCIAL

## 2023-02-14 VITALS
SYSTOLIC BLOOD PRESSURE: 154 MMHG | HEIGHT: 62 IN | WEIGHT: 293 LBS | DIASTOLIC BLOOD PRESSURE: 96 MMHG | BODY MASS INDEX: 53.92 KG/M2

## 2023-02-14 DIAGNOSIS — E66.01 MORBID OBESITY (HCC): ICD-10-CM

## 2023-02-14 DIAGNOSIS — N93.0 POSTCOITAL BLEEDING: ICD-10-CM

## 2023-02-14 DIAGNOSIS — E11.9 CONTROLLED TYPE 2 DIABETES MELLITUS WITHOUT COMPLICATION, WITHOUT LONG-TERM CURRENT USE OF INSULIN (HCC): ICD-10-CM

## 2023-02-14 DIAGNOSIS — Z01.419 ENCOUNTER FOR ANNUAL ROUTINE GYNECOLOGICAL EXAMINATION: ICD-10-CM

## 2023-02-14 DIAGNOSIS — Z12.4 CERVICAL CANCER SCREENING: ICD-10-CM

## 2023-02-14 DIAGNOSIS — E88.810 METABOLIC SYNDROME: ICD-10-CM

## 2023-02-14 PROCEDURE — 87624 HPV HI-RISK TYP POOLED RSLT: CPT

## 2023-02-14 PROCEDURE — 88175 CYTOPATH C/V AUTO FLUID REDO: CPT

## 2023-02-14 PROCEDURE — 99395 PREV VISIT EST AGE 18-39: CPT | Performed by: OBSTETRICS & GYNECOLOGY

## 2023-02-14 PROCEDURE — RXMED WILLOW AMBULATORY MEDICATION CHARGE: Performed by: INTERNAL MEDICINE

## 2023-02-14 ASSESSMENT — FIBROSIS 4 INDEX: FIB4 SCORE: 0.53

## 2023-02-14 NOTE — PROGRESS NOTES
Pt is here for Annual exam  Confirmed good ph#, pharmacy, drug allergy, and medications on file.  Last Pap:1/10/2020, neg with neg HPV.  Abnormal Pap:Never  Last Mammo:Never  BCM:Nexplanon since 2022  LMP:Unk  Pt states no other complaints for today.

## 2023-02-15 LAB
CYTOLOGY REG CYTOL: NORMAL
HPV HR 12 DNA CVX QL NAA+PROBE: NEGATIVE
HPV16 DNA SPEC QL NAA+PROBE: NEGATIVE
HPV18 DNA SPEC QL NAA+PROBE: NEGATIVE
SPECIMEN SOURCE: NORMAL

## 2023-03-10 ENCOUNTER — PHARMACY VISIT (OUTPATIENT)
Dept: PHARMACY | Facility: MEDICAL CENTER | Age: 38
End: 2023-03-10
Payer: COMMERCIAL

## 2023-03-15 ENCOUNTER — APPOINTMENT (OUTPATIENT)
Dept: RADIOLOGY | Facility: MEDICAL CENTER | Age: 38
End: 2023-03-15
Attending: OBSTETRICS & GYNECOLOGY
Payer: COMMERCIAL

## 2023-04-03 ENCOUNTER — NON-PROVIDER VISIT (OUTPATIENT)
Dept: MEDICAL GROUP | Facility: MEDICAL CENTER | Age: 38
End: 2023-04-03
Payer: COMMERCIAL

## 2023-04-03 DIAGNOSIS — J02.9 SORE THROAT: ICD-10-CM

## 2023-04-03 PROCEDURE — 0241U POCT CEPHEID COV-2, FLU A/B, RSV - PCR: CPT | Performed by: FAMILY MEDICINE

## 2023-04-03 RX ORDER — DEXAMETHASONE SODIUM PHOSPHATE 10 MG/ML
10 INJECTION INTRAMUSCULAR; INTRAVENOUS ONCE
Status: COMPLETED | OUTPATIENT
Start: 2023-04-03 | End: 2023-04-03

## 2023-04-03 RX ADMIN — DEXAMETHASONE SODIUM PHOSPHATE 10 MG: 10 INJECTION INTRAMUSCULAR; INTRAVENOUS at 16:12

## 2023-04-04 LAB
FLUAV RNA SPEC QL NAA+PROBE: NEGATIVE
FLUBV RNA SPEC QL NAA+PROBE: NEGATIVE
RSV RNA SPEC QL NAA+PROBE: NEGATIVE
SARS-COV-2 RNA RESP QL NAA+PROBE: NEGATIVE

## 2023-04-04 NOTE — PROGRESS NOTES
Priti Begum is a 38 y.o. female here for a non-provider visit for Covid screening     If abnormal was an in office provider notified today (if so, indicate provider)? Yes    Routed to PCP? No

## 2023-04-05 DIAGNOSIS — E11.9 CONTROLLED TYPE 2 DIABETES MELLITUS WITHOUT COMPLICATION, WITHOUT LONG-TERM CURRENT USE OF INSULIN (HCC): ICD-10-CM

## 2023-04-05 PROCEDURE — RXMED WILLOW AMBULATORY MEDICATION CHARGE: Performed by: INTERNAL MEDICINE

## 2023-04-05 RX ORDER — TIRZEPATIDE 10 MG/.5ML
10 INJECTION, SOLUTION SUBCUTANEOUS
Qty: 2 ML | Refills: 6 | Status: SHIPPED | OUTPATIENT
Start: 2023-04-05 | End: 2023-05-18 | Stop reason: DRUGHIGH

## 2023-04-05 RX ORDER — METFORMIN HYDROCHLORIDE 500 MG/1
1000 TABLET, EXTENDED RELEASE ORAL 2 TIMES DAILY
Qty: 360 TABLET | Refills: 3 | Status: CANCELLED | OUTPATIENT
Start: 2023-04-05

## 2023-04-06 ENCOUNTER — TELEPHONE (OUTPATIENT)
Dept: ENDOCRINOLOGY | Facility: MEDICAL CENTER | Age: 38
End: 2023-04-06
Payer: COMMERCIAL

## 2023-04-06 DIAGNOSIS — I10 ESSENTIAL HYPERTENSION: ICD-10-CM

## 2023-04-06 DIAGNOSIS — E55.9 VITAMIN D DEFICIENCY: ICD-10-CM

## 2023-04-06 DIAGNOSIS — E11.9 CONTROLLED TYPE 2 DIABETES MELLITUS WITHOUT COMPLICATION, WITHOUT LONG-TERM CURRENT USE OF INSULIN (HCC): ICD-10-CM

## 2023-04-06 PROCEDURE — RXMED WILLOW AMBULATORY MEDICATION CHARGE: Performed by: INTERNAL MEDICINE

## 2023-04-06 RX ORDER — METFORMIN HYDROCHLORIDE 500 MG/1
1000 TABLET, EXTENDED RELEASE ORAL 2 TIMES DAILY
Qty: 360 TABLET | Refills: 3 | Status: SHIPPED | OUTPATIENT
Start: 2023-04-06

## 2023-04-07 ENCOUNTER — DOCUMENTATION (OUTPATIENT)
Dept: PHARMACY | Facility: MEDICAL CENTER | Age: 38
End: 2023-04-07
Payer: COMMERCIAL

## 2023-04-07 ENCOUNTER — PHARMACY VISIT (OUTPATIENT)
Dept: PHARMACY | Facility: MEDICAL CENTER | Age: 38
End: 2023-04-07
Payer: COMMERCIAL

## 2023-04-07 NOTE — PROGRESS NOTES
04/06/23: Spoke with [Casper]. She is doing well on the [Mounjaro]. She reports no side effects to the medication and no new allergies. She reports 0 missed doses and will be due Monday 04/10. MD increased to Mounjaro 10mg/0.5ml as the 7.5mg is on backorder. Sending delivery for [04/07] via . Along with Lisinopril 10mg, refill req still pending for Metformin.

## 2023-04-28 PROCEDURE — RXMED WILLOW AMBULATORY MEDICATION CHARGE: Performed by: INTERNAL MEDICINE

## 2023-05-01 ENCOUNTER — DOCUMENTATION (OUTPATIENT)
Dept: PHARMACY | Facility: MEDICAL CENTER | Age: 38
End: 2023-05-01
Payer: COMMERCIAL

## 2023-05-01 NOTE — PROGRESS NOTES
04/28/23: Spoke with Marcos. She is doing well on the Mounjaro 10mg/0.5mL. She reports no side effects to the medication and no new allergies. She reports 0 missed doses and has 1 dose  on hand for next week. No supplies. Sending delivery for 05/04 via . Okay to charge $25 copay to CC on file on 05/04.

## 2023-05-04 ENCOUNTER — PHARMACY VISIT (OUTPATIENT)
Dept: PHARMACY | Facility: MEDICAL CENTER | Age: 38
End: 2023-05-04
Payer: COMMERCIAL

## 2023-05-15 ENCOUNTER — HOSPITAL ENCOUNTER (OUTPATIENT)
Dept: LAB | Facility: MEDICAL CENTER | Age: 38
End: 2023-05-15
Attending: INTERNAL MEDICINE
Payer: COMMERCIAL

## 2023-05-15 DIAGNOSIS — E55.9 VITAMIN D DEFICIENCY: ICD-10-CM

## 2023-05-15 DIAGNOSIS — E11.9 CONTROLLED TYPE 2 DIABETES MELLITUS WITHOUT COMPLICATION, WITHOUT LONG-TERM CURRENT USE OF INSULIN (HCC): ICD-10-CM

## 2023-05-15 DIAGNOSIS — I10 ESSENTIAL HYPERTENSION: ICD-10-CM

## 2023-05-15 LAB
25(OH)D3 SERPL-MCNC: 46 NG/ML (ref 30–100)
ALBUMIN SERPL BCP-MCNC: 3.8 G/DL (ref 3.2–4.9)
ALBUMIN/GLOB SERPL: 1.1 G/DL
ALP SERPL-CCNC: 99 U/L (ref 30–99)
ALT SERPL-CCNC: 19 U/L (ref 2–50)
ANION GAP SERPL CALC-SCNC: 8 MMOL/L (ref 7–16)
AST SERPL-CCNC: 12 U/L (ref 12–45)
BILIRUB SERPL-MCNC: 0.3 MG/DL (ref 0.1–1.5)
BUN SERPL-MCNC: 13 MG/DL (ref 8–22)
CALCIUM ALBUM COR SERPL-MCNC: 8.7 MG/DL (ref 8.5–10.5)
CALCIUM SERPL-MCNC: 8.5 MG/DL (ref 8.5–10.5)
CHLORIDE SERPL-SCNC: 107 MMOL/L (ref 96–112)
CHOLEST SERPL-MCNC: 132 MG/DL (ref 100–199)
CO2 SERPL-SCNC: 24 MMOL/L (ref 20–33)
CREAT SERPL-MCNC: 0.53 MG/DL (ref 0.5–1.4)
CREAT UR-MCNC: 161.1 MG/DL
FASTING STATUS PATIENT QL REPORTED: NORMAL
GFR SERPLBLD CREATININE-BSD FMLA CKD-EPI: 122 ML/MIN/1.73 M 2
GLOBULIN SER CALC-MCNC: 3.5 G/DL (ref 1.9–3.5)
GLUCOSE SERPL-MCNC: 136 MG/DL (ref 65–99)
HDLC SERPL-MCNC: 35 MG/DL
LDLC SERPL CALC-MCNC: 83 MG/DL
MICROALBUMIN UR-MCNC: <1.2 MG/DL
MICROALBUMIN/CREAT UR: NORMAL MG/G (ref 0–30)
POTASSIUM SERPL-SCNC: 4.5 MMOL/L (ref 3.6–5.5)
PROT SERPL-MCNC: 7.3 G/DL (ref 6–8.2)
SODIUM SERPL-SCNC: 139 MMOL/L (ref 135–145)
T4 FREE SERPL-MCNC: 1.25 NG/DL (ref 0.93–1.7)
TRIGL SERPL-MCNC: 71 MG/DL (ref 0–149)
TSH SERPL DL<=0.005 MIU/L-ACNC: 2.69 UIU/ML (ref 0.38–5.33)

## 2023-05-15 PROCEDURE — 82570 ASSAY OF URINE CREATININE: CPT

## 2023-05-15 PROCEDURE — 36415 COLL VENOUS BLD VENIPUNCTURE: CPT

## 2023-05-15 PROCEDURE — 82043 UR ALBUMIN QUANTITATIVE: CPT

## 2023-05-15 PROCEDURE — 80053 COMPREHEN METABOLIC PANEL: CPT

## 2023-05-15 PROCEDURE — 80061 LIPID PANEL: CPT

## 2023-05-15 PROCEDURE — 84439 ASSAY OF FREE THYROXINE: CPT

## 2023-05-15 PROCEDURE — 82306 VITAMIN D 25 HYDROXY: CPT

## 2023-05-15 PROCEDURE — 84443 ASSAY THYROID STIM HORMONE: CPT

## 2023-05-18 ENCOUNTER — OFFICE VISIT (OUTPATIENT)
Dept: ENDOCRINOLOGY | Facility: MEDICAL CENTER | Age: 38
End: 2023-05-18
Attending: INTERNAL MEDICINE
Payer: COMMERCIAL

## 2023-05-18 VITALS
HEIGHT: 62 IN | BODY MASS INDEX: 53.92 KG/M2 | HEART RATE: 117 BPM | WEIGHT: 293 LBS | OXYGEN SATURATION: 90 % | SYSTOLIC BLOOD PRESSURE: 108 MMHG | DIASTOLIC BLOOD PRESSURE: 66 MMHG

## 2023-05-18 DIAGNOSIS — E55.9 VITAMIN D DEFICIENCY: ICD-10-CM

## 2023-05-18 DIAGNOSIS — I10 ESSENTIAL HYPERTENSION: ICD-10-CM

## 2023-05-18 DIAGNOSIS — E66.01 MORBID OBESITY WITH BMI OF 60.0-69.9, ADULT (HCC): ICD-10-CM

## 2023-05-18 DIAGNOSIS — Z79.84 LONG TERM (CURRENT) USE OF ORAL HYPOGLYCEMIC DRUGS: ICD-10-CM

## 2023-05-18 DIAGNOSIS — E11.9 CONTROLLED TYPE 2 DIABETES MELLITUS WITHOUT COMPLICATION, WITHOUT LONG-TERM CURRENT USE OF INSULIN (HCC): ICD-10-CM

## 2023-05-18 LAB
HBA1C MFR BLD: 6.9 % (ref ?–5.8)
POCT INT CON NEG: NEGATIVE
POCT INT CON POS: POSITIVE

## 2023-05-18 PROCEDURE — 3078F DIAST BP <80 MM HG: CPT | Performed by: INTERNAL MEDICINE

## 2023-05-18 PROCEDURE — RXMED WILLOW AMBULATORY MEDICATION CHARGE: Performed by: INTERNAL MEDICINE

## 2023-05-18 PROCEDURE — 3074F SYST BP LT 130 MM HG: CPT | Performed by: INTERNAL MEDICINE

## 2023-05-18 PROCEDURE — 99212 OFFICE O/P EST SF 10 MIN: CPT | Performed by: INTERNAL MEDICINE

## 2023-05-18 PROCEDURE — 99214 OFFICE O/P EST MOD 30 MIN: CPT | Performed by: INTERNAL MEDICINE

## 2023-05-18 PROCEDURE — 83036 HEMOGLOBIN GLYCOSYLATED A1C: CPT | Performed by: INTERNAL MEDICINE

## 2023-05-18 RX ORDER — TIRZEPATIDE 12.5 MG/.5ML
0.5 INJECTION, SOLUTION SUBCUTANEOUS
Qty: 2 ML | Refills: 6 | Status: SHIPPED | OUTPATIENT
Start: 2023-05-18 | End: 2023-06-01

## 2023-05-18 RX ORDER — DEXAMETHASONE 1 MG
1 TABLET ORAL
Qty: 2 TABLET | Refills: 0 | Status: SHIPPED | OUTPATIENT
Start: 2023-05-18 | End: 2023-09-21

## 2023-05-18 RX ORDER — PHENTERMINE HYDROCHLORIDE 37.5 MG/1
37.5 CAPSULE ORAL EVERY MORNING
Qty: 30 CAPSULE | Refills: 5 | Status: SHIPPED | OUTPATIENT
Start: 2023-05-18 | End: 2023-11-24 | Stop reason: SDUPTHER

## 2023-05-18 ASSESSMENT — FIBROSIS 4 INDEX: FIB4 SCORE: 0.47

## 2023-05-18 NOTE — PROGRESS NOTES
CHIEF COMPLAINT: Patient is here for follow up of Type 2 Diabetes Mellitus .    HPI:     Priti Begum is a 38 y.o. female with Type 2 Diabetes Mellitus here for follow up.      Labs from 5/18/2023 show a1c is 6.9%  Labs from 7/29/2022 show a1c is 5.4%  Labs from 6/28/2022 show a1c was 5.9%  Labs from  2/2/2022 show A1c was 5.6%  Labs from 11/5/2021 show a1c was 5.9%  Labs from 4/28/2021 show Hba1c was 6.1%  Labs from 11/3/2020 show Hba1c was 5.8%  Labs from 4/13/2020 show HbA1c was 6.1%    She was on   Metformin 500mg ER 2 pills  twice daily   Mounjaro 10mg weekly      She denies SE with her meds  We talked about conducting a test for abnormal cortisol     She is morbidly obese  with BMI of 69  She wants to restart phentermine      She is currently not on a statin for primary prevention because she she is of reproductive age and she is still young and low risk for cardiovascular disease  LDL-C was 83 on 5/2023      She also has HTN which is well controlled  She was on lisinopril but is currently on labetalol  She does not have diabetic kidney disease   UACR was < 30 on 5/2023      She had an eye exam with [facility] in 2022            BG Diary:  Patient brought blood sugar logs    Weight has been stable    Diabetes Complications   Retinopathy: No known retinopathy.  Last eye exam: She will get an eye exam this year (2023)  Neuropathy: Denies paresthesias or numbness in hands or feet. Denies any foot wounds.  Exercise: Minimal.  Diet: Fair.  Patient's medications, allergies, and social histories were reviewed and updated as appropriate.    ROS:     CONS:     No fever, no chills   EYES:     No diplopia, no blurry vision   CV:           No chest pain, no palpitations   PULM:     No SOB, no cough, no hemoptysis.   GI:            No nausea, no vomiting, no diarrhea, no constipation   ENDO:     No polyuria, no polydipsia, no heat intolerance, no cold intolerance       Past Medical  "History:  Problem List:  2022: Encounter for induction of labor  2022: Pre-existing type 2 diabetes mellitus during pregnancy in   third trimester  2022: Multigravida of advanced maternal age in second trimester  2020: Long term (current) use of oral hypoglycemic drugs  2020: Metabolic syndrome  2019: Controlled type 2 diabetes mellitus without complication,   without long-term current use of insulin (HCC)  2019: Vitamin D deficiency  2019: Preventative health care  2019: Hypertension due to endocrine disorder  2019: Uncontrolled type 2 diabetes mellitus with hyperglycemia   (HCC)  2019: Candidiasis of skin  2016: Fatty liver disease, nonalcoholic  2016: Hypomenorrhea  2016: Family history of diabetes mellitus (DM)  2013-10: Morbidly obese (HCC)  2013-10: PCOS (polycystic ovarian syndrome)      Past Surgical History:  Past Surgical History:   Procedure Laterality Date    PRIMARY C SECTION Bilateral 2022    Procedure:  SECTION, PRIMARY;  Surgeon: Starr Tanner M.D.;  Location[facility];  Service: Labor and Delivery        Allergies:  Glimepiride [kdc:yellow dye+brilliant blue fcf+glimepiride]     Social History:  Social History     Tobacco Use    Smoking status: Never    Smokeless tobacco: Never   Vaping Use    Vaping Use: Never used   Substance Use Topics    Alcohol use: No    Drug use: No        Family History:   family history includes Diabetes in her maternal grandmother and mother; Hyperlipidemia in her mother; Hypertension in her mother; Kidney Disease in her mother; Lung Disease in her maternal grandmother; No Known Problems in her brother, brother, father, and sister.      PHYSICAL EXAM:   OBJECTIVE:  Vital signs: /66 (BP Location: Left arm, Patient Position: Sitting)   Pulse (!) 117   Ht 1.575 m (5' 2\")   Wt (!) 174 kg (382 lb 9.6 oz)   SpO2 90%   BMI 69.98 kg/m²   GENERAL: Morbidly obese female in no apparent distress. "   EYE:  No ocular asymmetry, PERRLA  HENT: Pink, moist mucous membranes.    NECK: No thyromegaly.   CARDIOVASCULAR:  No murmurs  LUNGS: Clear breath sounds  ABDOMEN: Soft, nontender gravid abdomen  EXTREMITIES: No clubbing, cyanosis, or edema.   NEUROLOGICAL: No gross focal motor abnormalities   LYMPH: No cervical adenopathy palpated.   SKIN: No rashes, lesions.         Labs:  Lab Results   Component Value Date/Time    HBA1C 6.9 (A) 05/18/2023 03:27 PM        Lab Results   Component Value Date/Time    WBC 10.8 09/04/2022 08:05 AM    RBC 3.67 (L) 09/04/2022 08:05 AM    HEMOGLOBIN 9.5 (L) 09/04/2022 08:05 AM    MCV 80.7 (L) 09/04/2022 08:05 AM    MCH 25.9 (L) 09/04/2022 08:05 AM    MCHC 32.1 (L) 09/04/2022 08:05 AM    RDW 45.6 09/04/2022 08:05 AM    MPV 10.2 09/04/2022 08:05 AM       Lab Results   Component Value Date/Time    SODIUM 139 05/15/2023 07:08 AM    POTASSIUM 4.5 05/15/2023 07:08 AM    CHLORIDE 107 05/15/2023 07:08 AM    CO2 24 05/15/2023 07:08 AM    ANION 8.0 05/15/2023 07:08 AM    GLUCOSE 136 (H) 05/15/2023 07:08 AM    BUN 13 05/15/2023 07:08 AM    CREATININE 0.53 05/15/2023 07:08 AM    CALCIUM 8.5 05/15/2023 07:08 AM    ASTSGOT 12 05/15/2023 07:08 AM    ALTSGPT 19 05/15/2023 07:08 AM    TBILIRUBIN 0.3 05/15/2023 07:08 AM    ALBUMIN 3.8 05/15/2023 07:08 AM    TOTPROTEIN 7.3 05/15/2023 07:08 AM    GLOBULIN 3.5 05/15/2023 07:08 AM    AGRATIO 1.1 05/15/2023 07:08 AM       Lab Results   Component Value Date/Time    CHOLSTRLTOT 140 12/24/2019 0619    TRIGLYCERIDE 123 12/24/2019 0619    HDL 40 12/24/2019 0619    LDL 75 12/24/2019 0619       Lab Results   Component Value Date/Time    MALBCRT see below 05/15/2023 07:08 AM    MICROALBUR <1.2 05/15/2023 07:08 AM        Lab Results   Component Value Date/Time    TSHULTRASEN 3.320 02/14/2019 1142     No results found for: FREEDIR  No results found for: FREET3  No results found for: THYSTIMIG        ASSESSMENT/PLAN:     1. Controlled type 2 diabetes mellitus without  complication, without long-term current use of insulin (HCC)  Controlled  A1c is 6.9%  We will increase Mounjaro to 12.5 mg weekly  Continue metformin  We will conduct a work-up to rule out abnormal cortisol levels   and I am ordering a DST  She is up-to-date with her labs  I want her to get an eye exam  Follow-up with me in 6 months    2. Essential hypertension  Stable  Continue monitoring urine albumin    3. Vitamin D deficiency  Stable   Vitamin D labs were reviewed with patient  Continue current supplements  Continue monitoring levels     4. Long term (current) use of oral hypoglycemic drugs  Patient is on oral agents for type 2 diabetes management    5. Morbid obesity with BMI of 60.0-69.9, adult (HCC)  Stable restart phentermine      Follow-up in 6 months      Thank you kindly for allowing me to participate in the diabetes care plan for this patient.    Dickson Rivera MD, FACE, [facility]      CC:   BEN Fernández

## 2023-05-18 NOTE — PROGRESS NOTES
RN-CDE Note    Subjective:   Endocrinology Clinic Progress Note  PCP: BEN Fernández    HPI:  Priti Begum is a 38 y.o. old patient who is seen today by the Diabetes Nurse Specialist for review of her controlled type 2 diabetes.    Recent changes in health: no changes in health, complaining of weight gain.   DM:   Last A1c:   Lab Results   Component Value Date/Time    HBA1C 6.9 (A) 05/18/2023 03:27 PM      Previous A1c was 6 on 11/15/22  A1C GOAL: < 7    Diabetes Medications:   Mounjaro 5 mg weekly  Metformin 1000 mg bid        Glucose monitoring frequency: testing one time per day, alternating meals  Fasting in the 130-140 range  Ac lunch, dinner or hs 115-120 range.     Lab Results   Component Value Date/Time    MALBCRT see below 05/15/2023 07:08 AM    MICROALBUR <1.2 05/15/2023 07:08 AM        ACR Albumin/Creatinine Ratio goal <30     HTN:   Blood pressure goal <130/<80 at goal.   Currently Rx ACE/ARB: Yes     Dyslipidemia:    Lab Results   Component Value Date/Time    CHOLSTRLTOT 132 05/15/2023 07:08 AM    LDL 83 05/15/2023 07:08 AM    HDL 35 (A) 05/15/2023 07:08 AM    TRIGLYCERIDE 71 05/15/2023 07:08 AM         Currently Rx Statin: No     She  reports that she has never smoked. She has never used smokeless tobacco.      Plan:     Discussed and educated on:   - All medications, side effects and compliance (discussed carefully)  - Annual eye examinations at Ophthalmology  - HbA1C: target  - Home glucose monitoring emphasized  - Weight control and daily exercise    Recommended medication changes: Increase Mounjaro to 12.5 mg.

## 2023-05-19 ENCOUNTER — PHARMACY VISIT (OUTPATIENT)
Dept: PHARMACY | Facility: MEDICAL CENTER | Age: 38
End: 2023-05-19
Payer: COMMERCIAL

## 2023-06-01 DIAGNOSIS — E11.9 CONTROLLED TYPE 2 DIABETES MELLITUS WITHOUT COMPLICATION, WITHOUT LONG-TERM CURRENT USE OF INSULIN (HCC): ICD-10-CM

## 2023-06-01 PROCEDURE — RXMED WILLOW AMBULATORY MEDICATION CHARGE: Performed by: INTERNAL MEDICINE

## 2023-06-01 RX ORDER — TIRZEPATIDE 10 MG/.5ML
10 INJECTION, SOLUTION SUBCUTANEOUS
Qty: 2 ML | Refills: 5 | Status: SHIPPED | OUTPATIENT
Start: 2023-06-01 | End: 2023-11-29

## 2023-06-02 ENCOUNTER — PHARMACY VISIT (OUTPATIENT)
Dept: PHARMACY | Facility: MEDICAL CENTER | Age: 38
End: 2023-06-02
Payer: COMMERCIAL

## 2023-06-26 DIAGNOSIS — E11.9 CONTROLLED TYPE 2 DIABETES MELLITUS WITHOUT COMPLICATION, WITHOUT LONG-TERM CURRENT USE OF INSULIN (HCC): ICD-10-CM

## 2023-06-26 DIAGNOSIS — I10 ESSENTIAL HYPERTENSION: ICD-10-CM

## 2023-06-26 PROCEDURE — RXMED WILLOW AMBULATORY MEDICATION CHARGE: Performed by: INTERNAL MEDICINE

## 2023-06-27 PROCEDURE — RXMED WILLOW AMBULATORY MEDICATION CHARGE: Performed by: INTERNAL MEDICINE

## 2023-06-27 RX ORDER — LISINOPRIL 10 MG/1
10 TABLET ORAL DAILY
Qty: 90 TABLET | Refills: 2 | Status: SHIPPED | OUTPATIENT
Start: 2023-06-27 | End: 2023-12-21

## 2023-06-27 RX ORDER — TIRZEPATIDE 12.5 MG/.5ML
0.5 INJECTION, SOLUTION SUBCUTANEOUS
Qty: 2 ML | Refills: 6 | Status: SHIPPED | OUTPATIENT
Start: 2023-06-27 | End: 2023-11-29

## 2023-06-29 ENCOUNTER — PHARMACY VISIT (OUTPATIENT)
Dept: PHARMACY | Facility: MEDICAL CENTER | Age: 38
End: 2023-06-29
Payer: COMMERCIAL

## 2023-07-28 PROCEDURE — RXMED WILLOW AMBULATORY MEDICATION CHARGE: Performed by: INTERNAL MEDICINE

## 2023-07-31 ENCOUNTER — DOCUMENTATION (OUTPATIENT)
Dept: PHARMACY | Facility: MEDICAL CENTER | Age: 38
End: 2023-07-31
Payer: COMMERCIAL

## 2023-07-31 PROCEDURE — RXMED WILLOW AMBULATORY MEDICATION CHARGE: Performed by: INTERNAL MEDICINE

## 2023-08-01 ENCOUNTER — PHARMACY VISIT (OUTPATIENT)
Dept: PHARMACY | Facility: MEDICAL CENTER | Age: 38
End: 2023-08-01
Payer: COMMERCIAL

## 2023-08-01 NOTE — PROGRESS NOTES
07/31/23: Spoke with Margarita. She is doing well on the Mounjaro 12.5mg/0.5mL. She reports no side effects to the medication and no new allergies. She reports 0 missed doses and has 0 doses on hand, with next dose due today. (Every Monday)She also requested a refill of Phentermine 37.5mg caps. Sending delivery for 08/01 via . Okay to charge $28.69 to [facility]. She is aware that the Phentermine is out of stock but will be ordered for tomorrow. Changing the Mounjaro dose to every Tuesday going forward.

## 2023-08-25 PROCEDURE — RXMED WILLOW AMBULATORY MEDICATION CHARGE: Performed by: INTERNAL MEDICINE

## 2023-08-28 ENCOUNTER — TELEPHONE (OUTPATIENT)
Dept: PHARMACY | Facility: MEDICAL CENTER | Age: 38
End: 2023-08-28
Payer: COMMERCIAL

## 2023-08-28 NOTE — TELEPHONE ENCOUNTER
Contact:      Phone number: [phone], Mobile    Name of person spoken with and relationship to patient: Margarita Begum, Self   Patient’s Adherence:      How patient is doing on medication: Good    How many missed doses and reason: 0    Any new medications: No    Any new conditions: No    Any new allergies: No    Any new side effects: No    Any new diagnoses: No    How many doses remainin ()    Did patient want to speak with pharmacist: No  Delivery:      Delivery date and method:  via     Needs by Date:     Signature required: Yes    Any additional details for : Afternoon delivery, 4-6pm  Teach Appointment Date: N/A  Shipping Address: [address] [place] [id number]  Medication (name, strength and dose): Mounjaro 12.5mg/0.5mL; Phentermine 37.5mg caps  Copay: $28.69  Payment Method: CCOF   Supplies: None  Additional Information: Pt is aware that we need to order Mounjaro. Verified with Sangeeta BANERJEE that Mounjaro 12.5mg was available to order. Next call date: .

## 2023-08-29 ENCOUNTER — PHARMACY VISIT (OUTPATIENT)
Dept: PHARMACY | Facility: MEDICAL CENTER | Age: 38
End: 2023-08-29
Payer: COMMERCIAL

## 2023-08-29 PROCEDURE — RXMED WILLOW AMBULATORY MEDICATION CHARGE: Performed by: INTERNAL MEDICINE

## 2023-09-08 ENCOUNTER — HOSPITAL ENCOUNTER (OUTPATIENT)
Dept: LAB | Facility: MEDICAL CENTER | Age: 38
End: 2023-09-08
Attending: INTERNAL MEDICINE
Payer: COMMERCIAL

## 2023-09-08 DIAGNOSIS — E11.9 CONTROLLED TYPE 2 DIABETES MELLITUS WITHOUT COMPLICATION, WITHOUT LONG-TERM CURRENT USE OF INSULIN (HCC): ICD-10-CM

## 2023-09-08 LAB — CORTIS SERPL-MCNC: 0.8 UG/DL (ref 0–23)

## 2023-09-08 PROCEDURE — 80299 QUANTITATIVE ASSAY DRUG: CPT

## 2023-09-08 PROCEDURE — 36415 COLL VENOUS BLD VENIPUNCTURE: CPT

## 2023-09-08 PROCEDURE — 82533 TOTAL CORTISOL: CPT

## 2023-09-14 LAB — TEST NAME 95000: NORMAL

## 2023-09-21 ENCOUNTER — OFFICE VISIT (OUTPATIENT)
Dept: MEDICAL GROUP | Facility: MEDICAL CENTER | Age: 38
End: 2023-09-21
Payer: COMMERCIAL

## 2023-09-21 VITALS
HEART RATE: 99 BPM | BODY MASS INDEX: 53.92 KG/M2 | WEIGHT: 293 LBS | TEMPERATURE: 98.1 F | HEIGHT: 62 IN | DIASTOLIC BLOOD PRESSURE: 82 MMHG | SYSTOLIC BLOOD PRESSURE: 120 MMHG | OXYGEN SATURATION: 96 %

## 2023-09-21 DIAGNOSIS — Z00.00 PHYSICAL EXAM: ICD-10-CM

## 2023-09-21 DIAGNOSIS — M25.562 CHRONIC PAIN OF LEFT KNEE: ICD-10-CM

## 2023-09-21 DIAGNOSIS — E66.01 MORBID OBESITY WITH BMI OF 60.0-69.9, ADULT (HCC): ICD-10-CM

## 2023-09-21 DIAGNOSIS — Z11.59 ENCOUNTER FOR HEPATITIS C SCREENING TEST FOR LOW RISK PATIENT: ICD-10-CM

## 2023-09-21 DIAGNOSIS — R09.81 NASAL CONGESTION: ICD-10-CM

## 2023-09-21 DIAGNOSIS — Z30.46 NEXPLANON REMOVAL: ICD-10-CM

## 2023-09-21 DIAGNOSIS — U07.1 COVID-19: ICD-10-CM

## 2023-09-21 DIAGNOSIS — L98.9 SKIN LESION: ICD-10-CM

## 2023-09-21 DIAGNOSIS — E11.9 CONTROLLED TYPE 2 DIABETES MELLITUS WITHOUT COMPLICATION, WITHOUT LONG-TERM CURRENT USE OF INSULIN (HCC): ICD-10-CM

## 2023-09-21 DIAGNOSIS — G89.29 CHRONIC PAIN OF LEFT KNEE: ICD-10-CM

## 2023-09-21 DIAGNOSIS — L65.9 HAIR LOSS: ICD-10-CM

## 2023-09-21 DIAGNOSIS — Z23 IMMUNIZATION DUE: ICD-10-CM

## 2023-09-21 LAB
FLUAV RNA SPEC QL NAA+PROBE: NEGATIVE
FLUBV RNA SPEC QL NAA+PROBE: NEGATIVE
HBA1C MFR BLD: 6.4 % (ref ?–5.8)
POCT INT CON NEG: NEGATIVE
POCT INT CON POS: POSITIVE
RSV RNA SPEC QL NAA+PROBE: NEGATIVE
SARS-COV-2 RNA RESP QL NAA+PROBE: POSITIVE

## 2023-09-21 PROCEDURE — 3074F SYST BP LT 130 MM HG: CPT | Performed by: FAMILY MEDICINE

## 2023-09-21 PROCEDURE — 90471 IMMUNIZATION ADMIN: CPT | Performed by: FAMILY MEDICINE

## 2023-09-21 PROCEDURE — 99214 OFFICE O/P EST MOD 30 MIN: CPT | Mod: 25 | Performed by: FAMILY MEDICINE

## 2023-09-21 PROCEDURE — 3079F DIAST BP 80-89 MM HG: CPT | Performed by: FAMILY MEDICINE

## 2023-09-21 PROCEDURE — 0241U POCT CEPHEID COV-2, FLU A/B, RSV - PCR: CPT | Performed by: FAMILY MEDICINE

## 2023-09-21 PROCEDURE — 90686 IIV4 VACC NO PRSV 0.5 ML IM: CPT | Performed by: FAMILY MEDICINE

## 2023-09-21 PROCEDURE — 11982 REMOVE DRUG IMPLANT DEVICE: CPT | Performed by: FAMILY MEDICINE

## 2023-09-21 PROCEDURE — 83036 HEMOGLOBIN GLYCOSYLATED A1C: CPT | Performed by: FAMILY MEDICINE

## 2023-09-21 ASSESSMENT — PATIENT HEALTH QUESTIONNAIRE - PHQ9: CLINICAL INTERPRETATION OF PHQ2 SCORE: 0

## 2023-09-21 ASSESSMENT — FIBROSIS 4 INDEX: FIB4 SCORE: 0.47

## 2023-09-21 NOTE — LETTER
[employer] [facility]  RAFAEL FernándezRLeticiaNLeticia  [address]  [place] [address]  Fax: [phone]   Authorization for Release/Disclosure of   Protected Health Information   Name: IVONE CHAIDEZ : 1985 SSN: [id number]   Address: [address] [place] [address]  [place] [address] Phone:    [phone] (home)    I authorize the entity listed below to release/disclose the PHI below to:   [facility]/BEN Fernández and BEN Fernández   Provider or Entity Name:  [facility]   Address   City, State, Zip   Phone:      Fax:     Reason for request: continuity of care   Information to be released:    [  ] LAST COLONOSCOPY,  including any PATH REPORT and follow-up  [  ] LAST FIT/COLOGUARD RESULT [  ] LAST DEXA  [  ] LAST MAMMOGRAM  [  ] LAST PAP  [  ] LAST LABS [ X ] RETINA EXAM REPORT  [  ] IMMUNIZATION RECORDS  [  ] Release all info      [  ] Check here and initial the line next to each item to release ALL health information INCLUDING  _____ Care and treatment for drug and / or alcohol abuse  _____ HIV testing, infection status, or AIDS  _____ Genetic Testing    DATES OF SERVICE OR TIME PERIOD TO BE DISCLOSED: _____________  I understand and acknowledge that:  * This Authorization may be revoked at any time by you in writing, except if your health information has already been used or disclosed.  * Your health information that will be used or disclosed as a result of you signing this authorization could be re-disclosed by the recipient. If this occurs, your re-disclosed health information may no longer be protected by State or Federal laws.  * You may refuse to sign this Authorization. Your refusal will not affect your ability to obtain treatment.  * This Authorization becomes effective upon signing and will  on (date) __________.      If no date is indicated, this Authorization will  one (1) year from the signature date.    Name: Ivone Sanchez  Kel  Signature: Date:   9/21/2023     PLEASE FAX REQUESTED RECORDS BACK TO: [phone]

## 2023-09-21 NOTE — LETTER
[employer] [facility]  RAFAEL FernándezRLeticiaNLeticia  [address]  [place] [address]  Fax: [phone]   Authorization for Release/Disclosure of   Protected Health Information   Name: IVONE BEGUM : 1985 SSN: [id number]   Address: [address] [place] [address]  [place] [address] Phone:    [phone] (home)    I authorize the entity listed below to release/disclose the PHI below to:   [facility]/BEN Fernández and BEN Fernández   Provider or Entity Name:     Address   City, State, Zip   Phone:      Fax:     Reason for request: continuity of care   Information to be released:    [  ] LAST COLONOSCOPY,  including any PATH REPORT and follow-up  [  ] LAST FIT/COLOGUARD RESULT [  ] LAST DEXA  [  ] LAST MAMMOGRAM  [  ] LAST PAP  [  ] LAST LABS [  ] RETINA EXAM REPORT  [  ] IMMUNIZATION RECORDS  [  ] Release all info      [  ] Check here and initial the line next to each item to release ALL health information INCLUDING  _____ Care and treatment for drug and / or alcohol abuse  _____ HIV testing, infection status, or AIDS  _____ Genetic Testing    DATES OF SERVICE OR TIME PERIOD TO BE DISCLOSED: _____________  I understand and acknowledge that:  * This Authorization may be revoked at any time by you in writing, except if your health information has already been used or disclosed.  * Your health information that will be used or disclosed as a result of you signing this authorization could be re-disclosed by the recipient. If this occurs, your re-disclosed health information may no longer be protected by State or Federal laws.  * You may refuse to sign this Authorization. Your refusal will not affect your ability to obtain treatment.  * This Authorization becomes effective upon signing and will  on (date) __________.      If no date is indicated, this Authorization will  one (1) year from the signature date.    Name: Ivone Begum  Signature: Date:    9/21/2023     PLEASE FAX REQUESTED RECORDS BACK TO: [phone]

## 2023-09-22 PROBLEM — U07.1 COVID-19: Status: ACTIVE | Noted: 2023-09-21

## 2023-09-22 NOTE — PROGRESS NOTES
Priti Begum is a pleasant 38 y.o. female here for   Chief Complaint   Patient presents with    Referral Needed     Derm , knee,     Requesting Labs    Immunizations     flu    Diabetes Follow-up     POCT A1c     Contraception     Removal       HPI:   Problem   Skin Lesion    For many years she has had a left upper thigh skin lesion that is more oval shaped, dark in color with distinct borders.  Recently has noticed an increase in size some skin changes such as dryness to the location.  Concerned and would like to follow with a dermatologist for this     Left Knee Pain    Has been going on for years.  Now with increase in pain when she bends her knee.  Has been ordered for PT in the past, had a large bill so she stopped going.  Saw a friend who helped her, but there was no improvement in her symptoms.   Pain would go away after about a week, but this time the pain has been going on over 1 year ago.     Covid-19    Last few days she started to develop some increasing nasal congestion.  Positive exposures at work and with coworkers.  Denies any fevers or cough.  Primarily nasal congestion.       Nexplanon Removal    Has had her Nexplanon for less than 1 year.  She is here today as she would like to have removal.  Her and her  would like to start trying to have another baby.     Controlled Type 2 Diabetes Mellitus Without Complication, Without Long-Term Current Use of Insulin ([facility])    Followed by Dr. Briceño with endocrinology  Taking Metformin 1000 mg 2 times a day, Mounjaro 12.5 mg every 7 days.  A1C now at 6.4, previous 6.9  Weight has also improved.     Morbid Obesity With Bmi of 60.0-69.9, Adult ([facility])    Currently being treated with phentermine and Mounjaro to help with weight reduction.  States that the phentermine is doing a great job in helping to curb her cravings for foods.  Has been eating a lot healthier.          Current Medicines (including changes today)  Current Outpatient  Medications   Medication Sig Dispense Refill    lisinopril (PRINIVIL) 10 MG Tab Take 1 Tablet by mouth every day. 90 Tablet 2    Tirzepatide (MOUNJARO) 12.5 MG/0.5ML Solution Pen-injector Inject 0.5 mL under the skin every 7 days. 2 mL 6    Tirzepatide (MOUNJARO) 10 MG/0.5ML Solution Pen-injector Inject 10 mg under the skin every 7 days. 2 mL 5    phentermine 37.5 MG capsule Take 1 Capsule by mouth every morning for 180 days. 30 Capsule 5    metFORMIN ER (GLUCOPHAGE XR) 500 MG TABLET SR 24 HR Take 2 Tablets by mouth 2 times a day. 360 Tablet 3    acetaminophen (TYLENOL) 500 MG Tab Take 2 Tablets by mouth every 6 hours as needed for Moderate Pain. 30 Tablet 0    ibuprofen (MOTRIN) 600 MG Tab Take 1 Tablet by mouth every 6 hours as needed for Moderate Pain. Indications: Joint Damage causing Pain and Loss of Function 30 Tablet 0    Blood Glucose Monitoring Suppl (ONETOUCH VERIO FLEX SYSTEM) w/Device Kit Use as directed 1 Kit 0    glucose blood strip Use as directed subcutaneously 4-5 times a day for 30 days. 150 Strip 5    Microlet Lancets Misc Use as directed by fingerstick 4-5 times a day for 30 days. 100 Each 1    Blood Glucose Test Strips Testing before and 1 hour after meals for insulin adjustment during pregnancy. One Touch Verio test strips 500 Strip 3    Lancets Lancets order: Lancets for One Touch Delica.  Use 6 times daily for blood sugar testing. 200 Each 11    hydrOXYzine HCl (ATARAX) 25 MG Tab Take 1 Tab by mouth 3 times a day as needed for Anxiety. 30 Tablet 1    triamcinolone acetonide (KENALOG) 0.1 % Cream Apply once or twice daily to affected area of leg rash for 2 weeks 15 g 1    ONETOUCH VERIO strip Use to test blood sugar 5 Times a Day. 200 Strip 6    vitamin D (CHOLECALCIFEROL) 1000 UNIT Tab Take 2,000 Units by mouth every day.       No current facility-administered medications for this visit.     Past Medical/ Surgical History  She  has a past medical history of Diabetes (HCC) and  "Hypertension.    She has no past medical history of Allergy, Arrhythmia, Asthma, or Cancer (HCC).  She  has a past surgical history that includes primary c section (Bilateral, 9/2/2022).     Objective:     /82 (BP Location: Left arm, Patient Position: Sitting, BP Cuff Size: Adult)   Pulse 99   Temp 36.7 °C (98.1 °F) (Temporal)   Ht 1.575 m (5' 2\")   Wt (!) 166 kg (365 lb)   SpO2 96%  Body mass index is 66.76 kg/m².    Physical exam was made by observation  Physical Exam  Constitutional:       General: She is not in acute distress.  HENT:      Head: Normocephalic and atraumatic.   Eyes:      Conjunctiva/sclera: Conjunctivae normal.      Pupils: Pupils are equal, round, and reactive to light.   Pulmonary:      Effort: Pulmonary effort is normal. No respiratory distress.   Abdominal:      General: There is no distension.   Musculoskeletal:      Cervical back: Normal range of motion and neck supple.        Legs:    Skin:     General: Skin is warm and dry.      Findings: No rash.   Neurological:      Mental Status: She is alert and oriented to person, place, and time.      Gait: Gait is intact.   Psychiatric:         Mood and Affect: Affect normal.          General Procedure    Date/Time: 9/21/2023 5:05 PM    Performed by: BEN Fernández  Authorized by: BEN Fernández  Preparation: Patient was prepped and draped in the usual sterile fashion.  Local anesthesia used: yes    Anesthesia:  Local anesthesia used: yes  Local Anesthetic: lidocaine 2% with epinephrine  Anesthetic total: 2 mL    Sedation:  Patient sedated: no    Patient tolerance: patient tolerated the procedure well with no immediate complications  Comments: Nexplanon Removal Procedure Note    Date of Procedure: 09/21/23    Pre-Op Diagnosis: Desires removal of Nexplanon implant    Post-Op Diagnosis: Status post Nexplanon removal    Anesthesia/Analgesia: 1% lidocaine with epinephrine 2ml     Provider: BEN Fernández      "                The risks, benefits, and alternatives were discussed prior to the procedure and informed consent was obtained. Signed consent form will be scanned into the patient's medical record.      Time out was performed with correct patient and procedure identified.    Patient was placed in the supine position with non-dominant hand above her head. The proximal end of the Nexplanon was palpated and marked. The area was cleaned with Betadine X 3 and 1 ml of 1% lidocaine with 2% epinephrine was injected. After firmly pressing down on the proximal end of the implant, a 11 blade scalpel was used to be make an incision by the distal end of the implant. The device was grasped using curved hemostats. Blunt dissection was not needed. The device was removed intact with gentle traction and measured to be 4 cm in length. The area was cleaned and dressed with dermabond and a compression bandage. Patient tolerated the procedure well.    Complications: None    Estimated Blood Loss:  Less than 5ml                  Imaging:  No imaging    Labs  09/21/2023 POC COVID: Positive  Assessment and Plan:   The following treatment plan was discussed     Problem List Items Addressed This Visit       Morbid obesity with BMI of 60.0-69.9, adult (HCC)     Chronic, improving.  Continue with recommendations of phentermine to help with weight reduction.         Controlled type 2 diabetes mellitus without complication, without long-term current use of insulin (HCC)     Chronic, stable.  Continue metformin 1000 mg twice daily, Mounjaro 12.5 mg once every 7 days.  Continue to follow with endocrinology.         Relevant Orders    Influenza Vaccine Quad Injection (PF) (Completed)    Skin lesion     Chronic, unstable.  Referral to dermatology placed.         Relevant Orders    Referral to Dermatology    Left knee pain     Chronic, unstable.  Recommended gentle stretches and exercises.  Patient not interested in physical therapy.  Referral to  orthopedics placed.  Discussed the importance of continued with weight reduction as this will also help with chronic knee discomfort.         Relevant Orders    Referral to Orthopedics    COVID-19     Acute.  POCT COVID : Positive.  Recommended drink plenty of fluids.  Okay to take Tylenol ibuprofen for any discomfort.  Make sure you self isolate at home for 5 days per the CDC's recommendations.         Nexplanon removal     Please see procedural note for details.  Patient did tolerate procedure well.         Relevant Orders    General Procedure     Other Visit Diagnoses       Encounter for hepatitis C screening test for low risk patient        Relevant Orders    HEP C VIRUS ANTIBODY    Physical exam        Relevant Orders    VARICELLA ZOSTER IGG AB    Hair loss        Relevant Orders    Referral to Dermatology    Immunization due        Relevant Orders    POCT A1C (Completed)             Followup: Return if symptoms worsen or fail to improve.    I have placed vaccination orders.  The MA is preforming vaccination orders under the direction of Dr. Bullard    Please note that this dictation was created using voice recognition software. I have made every reasonable attempt to correct obvious errors, but I expect that there are errors of grammar and possibly content that I did not discover before finalizing the note.

## 2023-09-22 NOTE — PROCEDURES
General Procedure    Date/Time: 9/21/2023 5:05 PM    Performed by: BEN Fernández  Authorized by: BEN Fernández  Preparation: Patient was prepped and draped in the usual sterile fashion.  Local anesthesia used: yes    Anesthesia:  Local anesthesia used: yes  Local Anesthetic: lidocaine 2% with epinephrine  Anesthetic total: 2 mL    Sedation:  Patient sedated: no    Patient tolerance: patient tolerated the procedure well with no immediate complications  Comments: Nexplanon Removal Procedure Note    Date of Procedure: 09/21/23    Pre-Op Diagnosis: Desires removal of Nexplanon implant    Post-Op Diagnosis: Status post Nexplanon removal    Anesthesia/Analgesia: 1% lidocaine with epinephrine 2ml     Provider: BEN Fernández                     The risks, benefits, and alternatives were discussed prior to the procedure and informed consent was obtained. Signed consent form will be scanned into the patient's medical record.      Time out was performed with correct patient and procedure identified.    Patient was placed in the supine position with non-dominant hand above her head. The proximal end of the Nexplanon was palpated and marked. The area was cleaned with Betadine X 3 and 1 ml of 1% lidocaine with 2% epinephrine was injected. After firmly pressing down on the proximal end of the implant, a 11 blade scalpel was used to be make an incision by the distal end of the implant. The device was grasped using curved hemostats. Blunt dissection was not needed. The device was removed intact with gentle traction and measured to be 4 cm in length. The area was cleaned and dressed with dermabond and a compression bandage. Patient tolerated the procedure well.    Complications: None    Estimated Blood Loss:  Less than 5ml

## 2023-09-22 NOTE — ASSESSMENT & PLAN NOTE
Acute.  POCT COVID : Positive.  Recommended drink plenty of fluids.  Okay to take Tylenol ibuprofen for any discomfort.  Make sure you self isolate at home for 5 days per the CDC's recommendations.

## 2023-09-22 NOTE — ASSESSMENT & PLAN NOTE
Chronic, stable.  Continue metformin 1000 mg twice daily, Mounjaro 12.5 mg once every 7 days.  Continue to follow with endocrinology.

## 2023-09-22 NOTE — ASSESSMENT & PLAN NOTE
Chronic, unstable.  Recommended gentle stretches and exercises.  Patient not interested in physical therapy.  Referral to orthopedics placed.  Discussed the importance of continued with weight reduction as this will also help with chronic knee discomfort.

## 2023-09-26 ENCOUNTER — TELEPHONE (OUTPATIENT)
Dept: ENDOCRINOLOGY | Facility: MEDICAL CENTER | Age: 38
End: 2023-09-26
Payer: COMMERCIAL

## 2023-09-26 PROCEDURE — RXMED WILLOW AMBULATORY MEDICATION CHARGE: Performed by: INTERNAL MEDICINE

## 2023-09-26 NOTE — TELEPHONE ENCOUNTER
Prior Authorization for Mounjaro 10mg/0.5mL Pen-Inj has been approved for a quantity of 2mL , day supply 28  Prior Authorization reference number: [id number]  Insurance: [employer]  Effective dates: 9/26/2023 through 9/25/2024  Copay: $25  Is patient eligible to fill with [facility]? Yes  Next Steps: Patient already fills with [facility]. Will advise Darline Jaquez of approval to establish refill/shipment.     Eunice Rosales  RX Coordinator/Liaison

## 2023-09-26 NOTE — TELEPHONE ENCOUNTER
Received Renewal PA request via  EMR message by Darline Jaquez  for Mounjaro 10mg/0.5mL Pen-Inj. (Quantity:2mL, Day Supply:28)  Insurance: [employer] ([employer])  Member ID: [id number]  BIN: [id number]  PCN: [id number]  Group: [id number]   Ran Test claim via [facility] medication Rejects stating prior authorization is required.    Eunice Rosales  RX Coordinator/Liaison

## 2023-09-26 NOTE — TELEPHONE ENCOUNTER
Prior Authorization for Mounjaro 10mg/0.5mL Pen-Inj (Quantity: 2mL, Days: 28) has been submitted via Cover My Meds: Key ([id number])  Insurance: [employer]  Will follow up in 24-48 business hours.     Eunice Rosales  RX Coordinator/Liaison

## 2023-09-28 ENCOUNTER — TELEPHONE (OUTPATIENT)
Dept: PHARMACY | Facility: MEDICAL CENTER | Age: 38
End: 2023-09-28
Payer: COMMERCIAL

## 2023-09-28 NOTE — TELEPHONE ENCOUNTER
Contact:      Phone number: [phone], Mobile    Name of person spoken with and relationship to patient: Margarita Begum, Self   Patient’s Adherence:      How patient is doing on medication: Good    How many missed doses and reason: 1, forgot    Any new medications: No    Any new conditions: No    Any new allergies: No    Any new side effects: No    Any new diagnoses: No    How many doses remainin (10/02)    Did patient want to speak with pharmacist: No  Delivery:      Delivery date and method: 10/05 via      Needs by Date: 10/09    Signature required: No    Any additional details for : None  Teach Appointment Date: N/A  Shipping Address: [address] [place] [id number]  Medication (name, strength and dose): Mounjaro 12.5mg/0.5mL-12.5mg q7d ()  Copay: $25/28ds  Payment Method: [employer] on 10/05   Supplies: None  Additional Information: Next call date: 10/30.

## 2023-10-04 PROCEDURE — RXMED WILLOW AMBULATORY MEDICATION CHARGE: Performed by: INTERNAL MEDICINE

## 2023-10-05 ENCOUNTER — PHARMACY VISIT (OUTPATIENT)
Dept: PHARMACY | Facility: MEDICAL CENTER | Age: 38
End: 2023-10-05
Payer: COMMERCIAL

## 2023-10-19 ENCOUNTER — OFFICE VISIT (OUTPATIENT)
Dept: DERMATOLOGY | Facility: IMAGING CENTER | Age: 38
End: 2023-10-19
Payer: COMMERCIAL

## 2023-10-19 DIAGNOSIS — L53.9 FACIAL ERYTHEMA: ICD-10-CM

## 2023-10-19 DIAGNOSIS — L65.9 HAIR LOSS: ICD-10-CM

## 2023-10-19 DIAGNOSIS — D23.9 DERMATOFIBROMA: ICD-10-CM

## 2023-10-19 PROCEDURE — 99214 OFFICE O/P EST MOD 30 MIN: CPT | Performed by: NURSE PRACTITIONER

## 2023-10-19 NOTE — PROGRESS NOTES
"DERMATOLOGY NOTE  NEW VISIT       Chief complaint: Establish Care  Redness on left cheek. When gets sweating, it gets red and itchy      Hairloss location: scalp or other locations too? Scalp  Any other symptoms (itching, scaling, redness, other)? No  Any dietary restrictions? (vegan, vegetarian/other) No  Any family history of hair thinning/balding in women or men? Mother always had thin hair  Any illnesses, infections, pregnancy, or high stress 3-9 months preceding loss?   Diabetic, Had baby 1yr ago.  Any labs done? No    Treatments tried: No       HPI/location: Left leg  Time present: Approx 1yr  Painful lesion: No  Itching lesion: No  Enlarging lesion: Yes      History of skin cancer: No  History of precancers/actinic keratoses: No  History of biopsies:Yes, Details: Rt cheek  History of blistering/severe sunburns:No  Family history of skin cancer:Yes, Details: Mother melanoma  Family history of atypical moles:No      Allergies   Allergen Reactions    Glimepiride [Kdc:Yellow Dye+Brilliant Blue Fcf+Glimepiride] Unspecified     \"gittery\"        MEDICATIONS:  Medications relevant to specialty reviewed.     REVIEW OF SYSTEMS:   Positive for skin (see HPI)  Negative for fevers and chills       EXAM:  There were no vitals taken for this visit.  Constitutional: Well-developed, well-nourished, and in no distress.     A focused skin exam was performed including the affected areas of the head (including face). Notable findings on exam today listed below and/or in assessment/plan.   notable hair thinning to forehead and temples, 1 area on vertex with AA, with fine hair present  Central face erythema with few erythematous papule  Brown fibrous papule to L anterior thigh    IMPRESSION / PLAN:    1. Facial erythema  Rosacea favored diagnosis  Discussed course/nature of disease, chronic, no cure  Advised to avoid known triggers  Rx below  Follow up 3 months  - metronidazole (METROCREAM) 0.75 % cream; AAA twice a day  Dispense: " 45 g; Refill: 3    2. Hair loss  TE favored diagnosis, likely related to TE, other Dx to consider AA  Advised to practice good hair health  Advised use of minoxidil 7 % BID  Follow up 3 months    3. Dermatofibroma  - Benign-appearing nature of lesions discussed during exam.   - Advised to continue to monitor for any return to clinic for new or concerning changes.        Patient verbalized understanding and agrees with plan regarding the above      Please note that this dictation was created using voice recognition software. I have made every reasonable attempt to correct obvious errors, but I expect that there are errors of grammar and possibly content that I did not discover before finalizing the note.      Return to clinic in: Return in about 3 months (around 1/19/2024) for rosacea, hairloss . and as needed for any new or changing skin lesions.

## 2023-10-30 ENCOUNTER — TELEPHONE (OUTPATIENT)
Dept: PHARMACY | Facility: MEDICAL CENTER | Age: 38
End: 2023-10-30
Payer: COMMERCIAL

## 2023-10-30 PROCEDURE — RXMED WILLOW AMBULATORY MEDICATION CHARGE: Performed by: INTERNAL MEDICINE

## 2023-10-30 PROCEDURE — RXMED WILLOW AMBULATORY MEDICATION CHARGE: Performed by: NURSE PRACTITIONER

## 2023-10-31 NOTE — TELEPHONE ENCOUNTER
Contact:      Phone number: [phone], Mobile    Name of person spoken with and relationship to patient: Margarita Begum, Self   Patient’s Adherence:      How patient is doing on medication: Good    How many missed doses and reason: 0    Any new medications: Yes, Metronidazole 0.75% Cream & OTC Rogaine    Any new conditions: No    Any new allergies: No    Any new side effects: No    Any new diagnoses: Yes, Alopecia & Rosacea    How many doses remainin-Mounjaro (last dose 10/30)    Did patient want to speak with pharmacist: No  Delivery:      Delivery date and method:  via     Needs by Date:     Signature required: Yes    Any additional details for : Late Afternoon   Teach Appointment Date: N/A  Shipping Address: [address] [place] [id number]  Medication (name, strength and dose): Metformin ER 500mg tabs, Metronidazole 0.75% Cream, Mounjaro 12.5mg/0.5ml, Phentermine 37.5mg caps  Copay: $66.09  Payment Method: [facility] on    Supplies: None  Additional Information: Next call date: .

## 2023-11-02 ENCOUNTER — PHARMACY VISIT (OUTPATIENT)
Dept: PHARMACY | Facility: MEDICAL CENTER | Age: 38
End: 2023-11-02
Payer: COMMERCIAL

## 2023-11-24 DIAGNOSIS — E66.01 MORBID OBESITY WITH BMI OF 60.0-69.9, ADULT (HCC): ICD-10-CM

## 2023-11-27 RX ORDER — PHENTERMINE HYDROCHLORIDE 37.5 MG/1
37.5 CAPSULE ORAL EVERY MORNING
Qty: 30 CAPSULE | Refills: 5 | Status: SHIPPED | OUTPATIENT
Start: 2023-11-27 | End: 2023-11-29 | Stop reason: SDUPTHER

## 2023-11-29 ENCOUNTER — OFFICE VISIT (OUTPATIENT)
Dept: ENDOCRINOLOGY | Facility: MEDICAL CENTER | Age: 38
End: 2023-11-29
Attending: INTERNAL MEDICINE
Payer: COMMERCIAL

## 2023-11-29 VITALS
RESPIRATION RATE: 20 BRPM | HEART RATE: 121 BPM | HEIGHT: 62 IN | OXYGEN SATURATION: 95 % | DIASTOLIC BLOOD PRESSURE: 70 MMHG | SYSTOLIC BLOOD PRESSURE: 114 MMHG | BODY MASS INDEX: 53.92 KG/M2 | WEIGHT: 293 LBS

## 2023-11-29 DIAGNOSIS — E66.01 MORBID OBESITY WITH BMI OF 60.0-69.9, ADULT (HCC): ICD-10-CM

## 2023-11-29 DIAGNOSIS — E78.5 DYSLIPIDEMIA: ICD-10-CM

## 2023-11-29 DIAGNOSIS — I10 ESSENTIAL HYPERTENSION: ICD-10-CM

## 2023-11-29 DIAGNOSIS — E11.9 CONTROLLED TYPE 2 DIABETES MELLITUS WITHOUT COMPLICATION, WITHOUT LONG-TERM CURRENT USE OF INSULIN (HCC): ICD-10-CM

## 2023-11-29 DIAGNOSIS — E55.9 VITAMIN D DEFICIENCY: ICD-10-CM

## 2023-11-29 DIAGNOSIS — Z79.84 LONG TERM (CURRENT) USE OF ORAL HYPOGLYCEMIC DRUGS: ICD-10-CM

## 2023-11-29 LAB
HBA1C MFR BLD: 6.5 % (ref ?–5.8)
POCT INT CON NEG: NEGATIVE
POCT INT CON POS: POSITIVE

## 2023-11-29 PROCEDURE — 3078F DIAST BP <80 MM HG: CPT | Performed by: INTERNAL MEDICINE

## 2023-11-29 PROCEDURE — 99213 OFFICE O/P EST LOW 20 MIN: CPT | Performed by: INTERNAL MEDICINE

## 2023-11-29 PROCEDURE — 83036 HEMOGLOBIN GLYCOSYLATED A1C: CPT | Performed by: INTERNAL MEDICINE

## 2023-11-29 PROCEDURE — 99214 OFFICE O/P EST MOD 30 MIN: CPT | Performed by: INTERNAL MEDICINE

## 2023-11-29 PROCEDURE — 3074F SYST BP LT 130 MM HG: CPT | Performed by: INTERNAL MEDICINE

## 2023-11-29 RX ORDER — TIRZEPATIDE 15 MG/.5ML
15 INJECTION, SOLUTION SUBCUTANEOUS
Qty: 2 ML | Refills: 5 | Status: SHIPPED | OUTPATIENT
Start: 2023-11-29 | End: 2023-12-21

## 2023-11-29 RX ORDER — PHENTERMINE HYDROCHLORIDE 37.5 MG/1
37.5 CAPSULE ORAL EVERY MORNING
Qty: 30 CAPSULE | Refills: 5 | Status: SHIPPED | OUTPATIENT
Start: 2023-11-29 | End: 2023-12-21

## 2023-11-29 RX ORDER — CALCIUM POLYCARBOPHIL 625 MG 625 MG/1
625 TABLET ORAL DAILY
COMMUNITY
End: 2023-12-21

## 2023-11-29 ASSESSMENT — FIBROSIS 4 INDEX: FIB4 SCORE: 0.47

## 2023-11-30 ENCOUNTER — PATIENT MESSAGE (OUTPATIENT)
Dept: MEDICAL GROUP | Facility: MEDICAL CENTER | Age: 38
End: 2023-11-30
Payer: COMMERCIAL

## 2023-11-30 DIAGNOSIS — E11.9 CONTROLLED TYPE 2 DIABETES MELLITUS WITHOUT COMPLICATION, WITHOUT LONG-TERM CURRENT USE OF INSULIN (HCC): ICD-10-CM

## 2023-11-30 PROCEDURE — RXMED WILLOW AMBULATORY MEDICATION CHARGE: Performed by: NURSE PRACTITIONER

## 2023-11-30 PROCEDURE — RXMED WILLOW AMBULATORY MEDICATION CHARGE: Performed by: INTERNAL MEDICINE

## 2023-11-30 RX ORDER — BLOOD-GLUCOSE SENSOR
1 EACH MISCELLANEOUS
Qty: 6 EACH | Refills: 3 | Status: SHIPPED | OUTPATIENT
Start: 2023-11-30 | End: 2024-01-25

## 2023-11-30 NOTE — PROGRESS NOTES
CHIEF COMPLAINT: Patient is here for follow up of Type 2 Diabetes Mellitus .    HPI:     Priti Begum is a 38 y.o. female with Type 2 Diabetes Mellitus here for follow up.    Labs from 11/29/2023 show A1c is 6.5%  Labs from 5/18/2023 show a1c was 6.9%  Labs from 7/29/2022 show a1c was 5.4%  Labs from 6/28/2022 show a1c was 5.9%  Labs from  2/2/2022 show A1c was 5.6%  Labs from 11/5/2021 show a1c was 5.9%  Labs from 4/28/2021 show Hba1c was 6.1%  Labs from 11/3/2020 show Hba1c was 5.8%  Labs from 4/13/2020 show HbA1c was 6.1%      She has obesity with a baseline BMI > 68 and baseline wt was over 380 lbs      She was on   Metformin 500mg ER 2 pills  twice daily   Mounjaro 12.5mg weekly      She denies SE with her meds  She reports over 20 lbs weight loss since taking the higher dose of mounjaro   She is also on phentermine 37.5mg daily      She is currently not on a statin for primary prevention because she she is of reproductive age and she is still young and low risk for cardiovascular disease  LDL-C was 83 on 5/2023      She also has HTN which is well controlled  She is back on lisinopril   She does not have diabetic kidney disease   UACR was < 30 on 5/2023      She had an eye exam with [facility] in  7/5/2023  See media tab            BG Diary:  Patient brought blood sugar logs    Weight has been stable    Diabetes Complications   Retinopathy: No known retinopathy.  Last eye exam: 7/5/2023   Neuropathy: Denies paresthesias or numbness in hands or feet. Denies any foot wounds.  Exercise: Minimal.  Diet: Fair.  Patient's medications, allergies, and social histories were reviewed and updated as appropriate.    ROS:     CONS:     No fever, no chills   EYES:     No diplopia, no blurry vision   CV:           No chest pain, no palpitations   PULM:     No SOB, no cough, no hemoptysis.   GI:            No nausea, no vomiting, no diarrhea, no constipation   ENDO:     No polyuria, no polydipsia, no  heat intolerance, no cold intolerance       Past Medical History:  Problem List:  2023: Skin lesion  2023: Left knee pain  2023: COVID-19  2023: Nexplanon removal  2022: Encounter for induction of labor  2022: Pre-existing type 2 diabetes mellitus during pregnancy in   third trimester  2022: Multigravida of advanced maternal age in second trimester  2020: Long term (current) use of oral hypoglycemic drugs  2020: Metabolic syndrome  2019: Controlled type 2 diabetes mellitus without complication,   without long-term current use of insulin ([facility])  2019: Vitamin D deficiency  2019: Preventative health care  2019: Hypertension due to endocrine disorder  2019: Uncontrolled type 2 diabetes mellitus with hyperglycemia   (Former[facility])  2019: Candidiasis of skin  2016: Fatty liver disease, nonalcoholic  2016: Hypomenorrhea  2016: Family history of diabetes mellitus (DM)  2013-10: Morbid obesity with BMI of 60.0-69.9, adult ([facility])  2013-10: PCOS (polycystic ovarian syndrome)      Past Surgical History:  Past Surgical History:   Procedure Laterality Date    PRIMARY C SECTION Bilateral 2022    Procedure:  SECTION, PRIMARY;  Surgeon: Starr Tanner M.D.;  Location: [facility];  Service: Labor and Delivery        Allergies:  Glimepiride [kdc:yellow dye+brilliant blue fcf+glimepiride]     Social History:  Social History     Tobacco Use    Smoking status: Never    Smokeless tobacco: Never   Vaping Use    Vaping Use: Never used   Substance Use Topics    Alcohol use: No    Drug use: No        Family History:   family history includes Diabetes in her maternal grandmother and mother; Hyperlipidemia in her mother; Hypertension in her mother; Kidney Disease in her mother; Lung Disease in her maternal grandmother; No Known Problems in her brother, brother, father, and sister.      PHYSICAL EXAM:   OBJECTIVE:  Vital signs: /70 (BP Location: Left arm, Patient  "Position: Sitting, BP Cuff Size: Adult)   Pulse (!) 121   Resp 20   Ht 1.575 m (5' 2\")   Wt (!) 166 kg (366 lb 12.8 oz)   LMP 10/25/2023 (Exact Date)   SpO2 95%   BMI 67.09 kg/m²   GENERAL: Morbidly obese female in no apparent distress.   EYE:  No ocular asymmetry, PERRLA  HENT: Pink, moist mucous membranes.    NECK: No thyromegaly.   CARDIOVASCULAR:  No murmurs  LUNGS: Clear breath sounds  ABDOMEN: Soft, nontender gravid abdomen  EXTREMITIES: No clubbing, cyanosis, or edema.   NEUROLOGICAL: No gross focal motor abnormalities   LYMPH: No cervical adenopathy palpated.   SKIN: No rashes, lesions.         Labs:  Lab Results   Component Value Date/Time    HBA1C 6.4 (A) 09/21/2023 04:33 PM        Lab Results   Component Value Date/Time    WBC 10.8 09/04/2022 08:05 AM    RBC 3.67 (L) 09/04/2022 08:05 AM    HEMOGLOBIN 9.5 (L) 09/04/2022 08:05 AM    MCV 80.7 (L) 09/04/2022 08:05 AM    MCH 25.9 (L) 09/04/2022 08:05 AM    MCHC 32.1 (L) 09/04/2022 08:05 AM    RDW 45.6 09/04/2022 08:05 AM    MPV 10.2 09/04/2022 08:05 AM       Lab Results   Component Value Date/Time    SODIUM 139 05/15/2023 07:08 AM    POTASSIUM 4.5 05/15/2023 07:08 AM    CHLORIDE 107 05/15/2023 07:08 AM    CO2 24 05/15/2023 07:08 AM    ANION 8.0 05/15/2023 07:08 AM    GLUCOSE 136 (H) 05/15/2023 07:08 AM    BUN 13 05/15/2023 07:08 AM    CREATININE 0.53 05/15/2023 07:08 AM    CALCIUM 8.5 05/15/2023 07:08 AM    ASTSGOT 12 05/15/2023 07:08 AM    ALTSGPT 19 05/15/2023 07:08 AM    TBILIRUBIN 0.3 05/15/2023 07:08 AM    ALBUMIN 3.8 05/15/2023 07:08 AM    TOTPROTEIN 7.3 05/15/2023 07:08 AM    GLOBULIN 3.5 05/15/2023 07:08 AM    AGRATIO 1.1 05/15/2023 07:08 AM       Lab Results   Component Value Date/Time    CHOLSTRLTOT 140 12/24/2019 0619    TRIGLYCERIDE 123 12/24/2019 0619    HDL 40 12/24/2019 0619    LDL 75 12/24/2019 0619       Lab Results   Component Value Date/Time    MALBCRT see below 05/15/2023 07:08 AM    MICROALBUR <1.2 05/15/2023 07:08 AM        Lab " Results   Component Value Date/Time    INGE 3.320 02/14/2019 1142     No results found for: FREEDIR  No results found for: FREET3  No results found for: THYSTIMIG        ASSESSMENT/PLAN:     1. Controlled type 2 diabetes mellitus without complication, without long-term current use of insulin (HCC)  Controlled  A1c is 6.5%  We will increase Mounjaro to 15 mg weekly  Continue metformin  She is up-to-date with her labs  Follow-up with me in 6 months    2. Essential hypertension  Stable  Continue monitoring urine albumin    3. Vitamin D deficiency  Stable   Vitamin D labs were reviewed with patient  Continue current supplements  Continue monitoring levels     4. Long term (current) use of oral hypoglycemic drugs  Patient is on oral agents for type 2 diabetes management    5. Morbid obesity with BMI of 60.0-69.9, adult (HCC)  Improved  Significant wt loss noted  Continue phentermine  Combine with diet and exercise       Follow-up in 6 months      Thank you kindly for allowing me to participate in the diabetes care plan for this patient.    Dickson Rivera MD, AME, PADILLA      CC:   BEN Fernández

## 2023-12-01 ENCOUNTER — TELEPHONE (OUTPATIENT)
Dept: PHARMACY | Facility: MEDICAL CENTER | Age: 38
End: 2023-12-01
Payer: COMMERCIAL

## 2023-12-01 ENCOUNTER — PHARMACY VISIT (OUTPATIENT)
Dept: PHARMACY | Facility: MEDICAL CENTER | Age: 38
End: 2023-12-01
Payer: COMMERCIAL

## 2023-12-01 NOTE — TELEPHONE ENCOUNTER
Contact:           Phone number: [phone]   Name of person spoken with and relationship to patient: IVONE CHAIDEZ [id number]  Medication:  Patient’s Adherence:           How patient is doing on medication: well   How many missed doses and reason: 0   Any new medications: no   Any new conditions: no   Any new allergies: no   Any new side effects: no    Any new diagnoses: no    How many doses remainin   Did patient want to speak with pharmacist: no  Delivery:           Delivery date and method:     Needs by Date:    Signature required: no   Any additional details for : SUN Morgan Appointment Date: SUN  Shipping Address: [address] [place] [id number]  Medication(name, strength and dose): MOUNJARO 15MG 2/28DS, PHENTERMINE 37.5MG 30DS, METRONIDAZOLE CREAM 28DS  Copay: $38.69  Payment Method: [facility]COF  Supplies: NA

## 2023-12-20 ENCOUNTER — TELEPHONE (OUTPATIENT)
Dept: ENDOCRINOLOGY | Facility: MEDICAL CENTER | Age: 38
End: 2023-12-20
Payer: COMMERCIAL

## 2023-12-20 NOTE — TELEPHONE ENCOUNTER
Patient came in to let Dr. QUINTERO know she is pregnant and ask what medications to stop taking.   advised to stop all medications except metformin, Patient scheduled for appointment

## 2023-12-21 ENCOUNTER — TELEPHONE (OUTPATIENT)
Dept: PHARMACY | Facility: MEDICAL CENTER | Age: 38
End: 2023-12-21

## 2023-12-21 ENCOUNTER — OFFICE VISIT (OUTPATIENT)
Dept: MEDICAL GROUP | Facility: MEDICAL CENTER | Age: 38
End: 2023-12-21
Payer: COMMERCIAL

## 2023-12-21 VITALS
DIASTOLIC BLOOD PRESSURE: 72 MMHG | HEART RATE: 120 BPM | BODY MASS INDEX: 67.09 KG/M2 | SYSTOLIC BLOOD PRESSURE: 126 MMHG | HEIGHT: 62 IN | OXYGEN SATURATION: 94 % | TEMPERATURE: 97.3 F | RESPIRATION RATE: 16 BRPM

## 2023-12-21 DIAGNOSIS — I15.2 HYPERTENSION DUE TO ENDOCRINE DISORDER: ICD-10-CM

## 2023-12-21 DIAGNOSIS — Z32.01 POSITIVE PREGNANCY TEST: ICD-10-CM

## 2023-12-21 PROCEDURE — 99214 OFFICE O/P EST MOD 30 MIN: CPT | Performed by: FAMILY MEDICINE

## 2023-12-21 PROCEDURE — 3078F DIAST BP <80 MM HG: CPT | Performed by: FAMILY MEDICINE

## 2023-12-21 PROCEDURE — RXMED WILLOW AMBULATORY MEDICATION CHARGE: Performed by: FAMILY MEDICINE

## 2023-12-21 PROCEDURE — 3074F SYST BP LT 130 MM HG: CPT | Performed by: FAMILY MEDICINE

## 2023-12-21 RX ORDER — DOXYLAMINE SUCCINATE AND PYRIDOXINE HYDROCHLORIDE, DELAYED RELEASE TABLETS 10 MG/10 MG 10; 10 MG/1; MG/1
1 TABLET, DELAYED RELEASE ORAL 2 TIMES DAILY
Qty: 60 TABLET | Refills: 3 | Status: SHIPPED | OUTPATIENT
Start: 2023-12-21

## 2023-12-21 RX ORDER — LABETALOL 100 MG/1
100 TABLET, FILM COATED ORAL 2 TIMES DAILY
Qty: 60 TABLET | Refills: 3 | Status: SHIPPED | OUTPATIENT
Start: 2023-12-21 | End: 2024-03-25 | Stop reason: SDUPTHER

## 2023-12-21 NOTE — PROGRESS NOTES
Priti Begum is a pleasant 38 y.o. female here for   Chief Complaint   Patient presents with    Requesting Labs      HPI:   Problem   Positive Pregnancy Test    LMP: 10/24/2023  Took a pregnancy test last month after feeling tired, fatigue was negative pregnancy test.  Concerned she is sick.  Negative COVID.  The last week has been gagging and very fatigue.  Yesterday feeling dizzy, fatigue, tired.  Positive pregnancy test          Current Medicines (including changes today)  Current Outpatient Medications   Medication Sig Dispense Refill    Doxylamine-Pyridoxine 10-10 MG Tablet Delayed Response delayed-release tablet Take 1 Tablet by mouth 2 times a day. 60 Tablet 3    labetalol (NORMODYNE) 100 MG Tab Take 1 Tablet by mouth 2 times a day. 60 Tablet 3    Continuous Blood Gluc Sensor (FREESTYLE CORA 3 SENSOR) Misc 1 Each every 14 days. 6 Each 3    metFORMIN ER (GLUCOPHAGE XR) 500 MG TABLET SR 24 HR Take 2 Tablets by mouth 2 times a day. 360 Tablet 3    Blood Glucose Monitoring Suppl (ONETOUCH VERIO FLEX SYSTEM) w/Device Kit Use as directed 1 Kit 0    glucose blood strip Use as directed subcutaneously 4-5 times a day for 30 days. 150 Strip 5    Microlet Lancets Misc Use as directed by fingerstick 4-5 times a day for 30 days. 100 Each 1    Blood Glucose Test Strips Testing before and 1 hour after meals for insulin adjustment during pregnancy. One Touch Verio test strips 500 Strip 3    Lancets Lancets order: Lancets for One Touch Delica.  Use 6 times daily for blood sugar testing. 200 Each 11    ONETOUCH VERIO strip Use to test blood sugar 5 Times a Day. 200 Strip 6    vitamin D (CHOLECALCIFEROL) 1000 UNIT Tab Take 2,000 Units by mouth every day.       No current facility-administered medications for this visit.     Past Medical/ Surgical History  She  has a past medical history of Diabetes ([facility]) and Hypertension.    She has no past medical history of Allergy, Arrhythmia, Asthma, or Cancer ([facility]).  She  " has a past surgical history that includes primary c section (Bilateral, 9/2/2022).     Objective:     /72   Pulse (!) 120   Temp 36.3 °C (97.3 °F) (Temporal)   Resp 16   Ht 1.575 m (5' 2\")   SpO2 94%  Body mass index is 67.09 kg/m².    Physical exam was completed through observation  Physical Exam  Constitutional:       General: She is not in acute distress.  HENT:      Head: Normocephalic and atraumatic.   Eyes:      Conjunctiva/sclera: Conjunctivae normal.      Pupils: Pupils are equal, round, and reactive to light.   Pulmonary:      Effort: Pulmonary effort is normal. No respiratory distress.   Abdominal:      General: There is no distension.   Musculoskeletal:      Cervical back: Normal range of motion and neck supple.   Skin:     General: Skin is warm and dry.      Findings: No rash.   Neurological:      Mental Status: She is alert and oriented to person, place, and time.      Gait: Gait is intact.   Psychiatric:         Mood and Affect: Affect normal.          Imaging:  No new imaging    Labs  No updated labs    Assessment and Plan:   The following treatment plan was discussed     Problem List Items Addressed This Visit       Hypertension due to endocrine disorder    Relevant Medications    labetalol (NORMODYNE) 100 MG Tab    Positive pregnancy test     New diagnosis for the patient.  Okay to use B6 vitamins to help with nausea.  Prescription for likely just sent to her preferred pharmacy, recommended to go on the 's website to get a discounted price.  hCG quantitative ordered  OB ultrasound ordered for the patient.  Stop all medications, follow with endocrinology.  Prescribed labetalol 100 mg twice daily and lieu of her lisinopril.  Monitor BP daily.  Follow-up with OB         Relevant Medications    Doxylamine-Pyridoxine 10-10 MG Tablet Delayed Response delayed-release tablet    Other Relevant Orders    HCG QUANTITATIVE    US-OB 1ST TRIMESTER SINGLE GEST        Followup: Return if " symptoms worsen or fail to improve.    Please note that this dictation was created using voice recognition software. I have made every reasonable attempt to correct obvious errors, but I expect that there are errors of grammar and possibly content that I did not discover before finalizing the note.

## 2023-12-21 NOTE — TELEPHONE ENCOUNTER
Contact:      Phone number: [phone], Mobile    Name of person spoken with and relationship to patient: Margarita Begum, Self   Patient’s Adherence:      Did patient want to speak with pharmacist: No  Delivery:      Delivery date and method: 12/22 via     Needs by Date:     Signature required: No    Any additional details for : Afternoon delivery  Shipping Address: [address] [place] [id number]  Medication (name, strength and dose): Labetalol 100mg tabs  Copay: $10/30ds  Payment Method: CCOF  Additional Information: Margarita confirmed that Mounjaro has been discontinued at this time due to a positive pregnancy test. She is aware that we will be discontinuing her from our services at this time but can resume when she restarts Mounjaro. She inquired about filling the Diclegis and Labetalol. Due to the high cost of the Diclegis she has chosen to only fill the Labetalol at this time. I attempted to search for discount cards or mfg savings for the Diclegis however her average copay would be about $100+.

## 2023-12-21 NOTE — PATIENT INSTRUCTIONS
"Type of Remedy: Allergy    Safe Medications to Take During Pregnancy    Diphenhydramine (Benadryl®)  Loratidine (Claritin®)  Cetirizine (Zyrtec®)    Type of Remedy: Cold and Flu    Safe Medications to Take During Pregnancy    Diphenhydramine (Benadryl)*  Dextromethorphan (Robitussin®)*  Guaifenesin (Mucinex® [plain]) *  Vicks Vapor Rub® mentholated cream  Mentholated or non-mentholated cough drops  (Sugar-free cough drops for gestational diabetes should not contain blends of herbs or aspartame)  Pseudoephedrine ([Sudafed®] after 1st trimester)  Acetaminophen (Tylenol®)*  Saline nasal drops or spray  Warm salt/water gargle  *Note: Do not take the \"SA\" (Sustained Action) form of these drugs or the \"Multi-Symptom\" form of these drugs. Do not use Nyquil® due to its high alcohol content.    Type of Remedy: Diarrhea    Safe Medications to Take During Pregnancy    Loperamide ([Imodium®] after 1st trimester, for 24 hours only)    Type of Remedy: Constipation    Safe Medications to Take During Pregnancy    Methylcellulose fiber (Citrucel®)  Docusate (Colace®)  psyllium (Fiberall®, Metamucil®)  polycarbophil (FiberCon®)  polyethylene glycol (MiraLAX®)*  *Occasional use only    Type of Remedy: First Aid Ointment    Safe Medications to Take During Pregnancy    Bacitracin  Neomycin/polymyxin B/bacitracin (Neosporin®)    Type of Remedy: Headache    Safe Medications to Take During Pregnancy    Acetaminophen (Tylenol)    Type of Remedy: Heartburn    Safe Medications to Take During Pregnancy    Aluminum hydroxide/magnesium carbonate (Gaviscon®)*  Famotidine (Pepcid AC®)  Aluminum hydroxide/magnesium hydroxide (Maalox®)  Calcium carbonate/magnesium carbonate (Mylanta®)  Calcium carbonate (Titralac®, Tums®)  Ranitidine (Zantac®)  *Occasional use only    Type of Remedy: Hemorrhoids    Safe Medications to Take During Pregnancy    Phenylephrine/mineral oil/petrolatum (Preparation H®)  Witch hazel (Tucks® pads or ointment)    Type of " Remedy: Insect repellant    Safe Medications to Take During Pregnancy    N,N-diethyl-meta-toluamide (DEET®)    Type of Remedy: Nausea and Vomiting    Safe Medications to Take During Pregnancy    Diphenhydramine (Benadryl)  Vitamin B6    Type of Remedy: Rashes    Safe Medications to Take During Pregnancy    Diphenhydramine cream (Benadryl)  Hydrocortisone cream or ointment  Oatmeal bath (Aveeno®)    Type of Remedy: Sleep    Safe Medications to Take During Pregnancy    Diphenhydramine (Unisom SleepGels®, Benadryl)    Type of Remedy: Yeast Infection    Safe Medications to Take During Pregnancy    Miconazole (Monistat®)      *Please note: No drug can be considered 100% safe to use during pregnancy.

## 2023-12-21 NOTE — ASSESSMENT & PLAN NOTE
New diagnosis for the patient.  Okay to use B6 vitamins to help with nausea.  Prescription for likely just sent to her preferred pharmacy, recommended to go on the 's website to get a discounted price.  hCG quantitative ordered  OB ultrasound ordered for the patient.  Stop all medications, follow with endocrinology.  Prescribed labetalol 100 mg twice daily and lieu of her lisinopril.  Monitor BP daily.  Follow-up with OB

## 2023-12-22 ENCOUNTER — PHARMACY VISIT (OUTPATIENT)
Dept: PHARMACY | Facility: MEDICAL CENTER | Age: 38
End: 2023-12-22
Payer: COMMERCIAL

## 2023-12-27 ENCOUNTER — HOSPITAL ENCOUNTER (OUTPATIENT)
Dept: LAB | Facility: MEDICAL CENTER | Age: 38
End: 2023-12-27
Attending: FAMILY MEDICINE
Payer: COMMERCIAL

## 2023-12-27 DIAGNOSIS — Z32.01 POSITIVE PREGNANCY TEST: ICD-10-CM

## 2023-12-27 PROCEDURE — 36415 COLL VENOUS BLD VENIPUNCTURE: CPT

## 2023-12-27 PROCEDURE — 84702 CHORIONIC GONADOTROPIN TEST: CPT

## 2023-12-28 ENCOUNTER — OFFICE VISIT (OUTPATIENT)
Dept: MEDICAL GROUP | Facility: MEDICAL CENTER | Age: 38
End: 2023-12-28
Payer: COMMERCIAL

## 2023-12-28 VITALS
HEIGHT: 62 IN | WEIGHT: 293 LBS | BODY MASS INDEX: 53.92 KG/M2 | OXYGEN SATURATION: 97 % | SYSTOLIC BLOOD PRESSURE: 128 MMHG | DIASTOLIC BLOOD PRESSURE: 78 MMHG | TEMPERATURE: 97 F | HEART RATE: 103 BPM

## 2023-12-28 DIAGNOSIS — Z32.01 POSITIVE PREGNANCY TEST: ICD-10-CM

## 2023-12-28 DIAGNOSIS — R39.89 URINARY PROBLEM: ICD-10-CM

## 2023-12-28 DIAGNOSIS — Z3A.08 8 WEEKS GESTATION OF PREGNANCY: ICD-10-CM

## 2023-12-28 DIAGNOSIS — L73.9 FOLLICULITIS: ICD-10-CM

## 2023-12-28 LAB — B-HCG SERPL-ACNC: 6935 MIU/ML (ref 0–5)

## 2023-12-28 PROCEDURE — RXMED WILLOW AMBULATORY MEDICATION CHARGE: Performed by: FAMILY MEDICINE

## 2023-12-28 PROCEDURE — 3078F DIAST BP <80 MM HG: CPT | Performed by: FAMILY MEDICINE

## 2023-12-28 PROCEDURE — 99214 OFFICE O/P EST MOD 30 MIN: CPT | Performed by: FAMILY MEDICINE

## 2023-12-28 PROCEDURE — 3074F SYST BP LT 130 MM HG: CPT | Performed by: FAMILY MEDICINE

## 2023-12-28 RX ORDER — CEPHALEXIN 500 MG/1
500 CAPSULE ORAL 4 TIMES DAILY
Qty: 20 CAPSULE | Refills: 0 | Status: SHIPPED | OUTPATIENT
Start: 2023-12-28 | End: 2024-01-03

## 2023-12-28 ASSESSMENT — ENCOUNTER SYMPTOMS
FEVER: 0
CHILLS: 0

## 2023-12-28 ASSESSMENT — FIBROSIS 4 INDEX: FIB4 SCORE: 0.47

## 2023-12-28 NOTE — PROGRESS NOTES
FAMILY MEDICINE VISIT                                                               Chief complaint::Diagnoses of 8 weeks gestation of pregnancy, Urinary problem, and Folliculitis were pertinent to this visit.    History of present illness: Priti Begum is a 38 y.o. female who presented for labial lesion.    She is currently about 8 weeks pregnant.  This is her second pregnancy.  She felt that there is a orange color discharge on toilet paper when she is going to the bathroom.  She denies any vaginal itching, discharge.  She is having urinary frequency but no other symptoms.  No fever.    She has history of recurrent folliculitis    Review of systems:     Review of Systems   Constitutional:  Negative for chills, fever and malaise/fatigue.   Genitourinary:  Positive for frequency.        Labial lesion        Medications and Allergies:     Current Outpatient Medications   Medication Sig Dispense Refill    cephALEXin (KEFLEX) 500 MG Cap Take 1 Capsule by mouth 4 times a day for 5 days. 20 Capsule 0    Doxylamine-Pyridoxine 10-10 MG Tablet Delayed Response delayed-release tablet Take 1 Tablet by mouth 2 times a day. 60 Tablet 3    labetalol (NORMODYNE) 100 MG Tab Take 1 Tablet by mouth 2 times a day. 60 Tablet 3    Continuous Blood Gluc Sensor (FREESTYLE CORA 3 SENSOR) Misc 1 Each every 14 days. 6 Each 3    metFORMIN ER (GLUCOPHAGE XR) 500 MG TABLET SR 24 HR Take 2 Tablets by mouth 2 times a day. 360 Tablet 3    Blood Glucose Monitoring Suppl (ONETOUCH VERIO FLEX SYSTEM) w/Device Kit Use as directed 1 Kit 0    glucose blood strip Use as directed subcutaneously 4-5 times a day for 30 days. 150 Strip 5    Microlet Lancets Misc Use as directed by fingerstick 4-5 times a day for 30 days. 100 Each 1    Blood Glucose Test Strips Testing before and 1 hour after meals for insulin adjustment during pregnancy. One Touch Verio test strips 500 Strip 3    Lancets Lancets order: Lancets for One Touch Delica.  Use 6  "times daily for blood sugar testing. 200 Each 11    ONETOUCH VERIO strip Use to test blood sugar 5 Times a Day. 200 Strip 6    vitamin D (CHOLECALCIFEROL) 1000 UNIT Tab Take 2,000 Units by mouth every day.       No current facility-administered medications for this visit.          Vitals:    /78   Pulse (!) 103   Temp 36.1 °C (97 °F) (Temporal)   Ht 1.575 m (5' 2\")   Wt (!) 170 kg (374 lb)   SpO2 97%  Body mass index is 68.41 kg/m².    Physical Exam:     Physical Exam  Constitutional:       Appearance: Normal appearance. She is well-developed and well-groomed.   HENT:      Head: Normocephalic and atraumatic.      Right Ear: External ear normal.      Left Ear: External ear normal.   Eyes:      General:         Right eye: No discharge.         Left eye: No discharge.      Conjunctiva/sclera: Conjunctivae normal.   Cardiovascular:      Rate and Rhythm: Normal rate.   Pulmonary:      Effort: Pulmonary effort is normal. No respiratory distress.   Musculoskeletal:      Cervical back: Neck supple.   Skin:     Findings: No rash.   Neurological:      Mental Status: She is alert.   Psychiatric:         Mood and Affect: Mood and affect normal.         Behavior: Behavior normal.        Pelvic exam:  Mildly erythematous nodular lesion palpated at right labia, there is no drainage currently coming from that area currently.    Assessment/Plan:         1. 8 weeks gestation of pregnancy  Stable currently, she is scheduled for ultrasound on 5th January.  She has OB appointment on 18th January.  Recommended to start aspirin 81 mg daily as she has history of type 2 diabetes mellitus, hypertension.    2. Urinary problem  New problem, urinalysis in office came back negative for nitrites and leukocytes.  Urinary frequency is likely due to pregnancy.  Drink plenty of water to maintain hydration.  She is mildly tachycardic today.  Monitor heart rate closely.  - POCT Urinalysis    3. Folliculitis  New problem, unstable, symptoms " are consistent with folliculitis, recommended warm compresses on this area.  Start Keflex 1 tablet 4 times daily for 5 days.    She had side effects with glimepiride which is listed in her allergy chart.  When I was prescribing Keflex, there was contraindication coming with she did not had any allergic or anaphylactic reaction with glimepiride.  Discussed with patient that Augmentin has some studies showing enterocolitis in fetus so we will prescribe her Keflex 1 tablet 4 times daily for 5 days which is much safer in pregnancy.    - cephALEXin (KEFLEX) 500 MG Cap; Take 1 Capsule by mouth 4 times a day for 5 days.  Dispense: 20 Capsule; Refill: 0     Please note that this dictation was created using voice recognition software. I have made every reasonable attempt to correct obvious errors, but I expect that there are errors of grammar and possibly content that I did not discover before finalizing the note.    Follow up as needed if symptoms are not getting better

## 2023-12-29 ENCOUNTER — PHARMACY VISIT (OUTPATIENT)
Dept: PHARMACY | Facility: MEDICAL CENTER | Age: 38
End: 2023-12-29
Payer: COMMERCIAL

## 2023-12-29 ENCOUNTER — HOSPITAL ENCOUNTER (OUTPATIENT)
Dept: LAB | Facility: MEDICAL CENTER | Age: 38
End: 2023-12-29
Attending: FAMILY MEDICINE
Payer: COMMERCIAL

## 2023-12-29 DIAGNOSIS — Z32.01 POSITIVE PREGNANCY TEST: ICD-10-CM

## 2023-12-29 LAB — B-HCG SERPL-ACNC: ABNORMAL MIU/ML (ref 0–5)

## 2023-12-29 PROCEDURE — 84702 CHORIONIC GONADOTROPIN TEST: CPT

## 2023-12-29 PROCEDURE — 36415 COLL VENOUS BLD VENIPUNCTURE: CPT

## 2024-01-05 ENCOUNTER — HOSPITAL ENCOUNTER (OUTPATIENT)
Dept: RADIOLOGY | Facility: MEDICAL CENTER | Age: 39
End: 2024-01-05
Attending: FAMILY MEDICINE
Payer: COMMERCIAL

## 2024-01-05 DIAGNOSIS — Z32.01 POSITIVE PREGNANCY TEST: ICD-10-CM

## 2024-01-05 PROCEDURE — 76801 OB US < 14 WKS SINGLE FETUS: CPT

## 2024-01-11 ENCOUNTER — PATIENT MESSAGE (OUTPATIENT)
Dept: MEDICAL GROUP | Facility: MEDICAL CENTER | Age: 39
End: 2024-01-11
Payer: COMMERCIAL

## 2024-01-12 RX ORDER — TRIAMCINOLONE ACETONIDE 1 MG/G
1 CREAM TOPICAL 2 TIMES DAILY
Qty: 45 G | Refills: 0 | Status: SHIPPED | OUTPATIENT
Start: 2024-01-12 | End: 2024-03-22 | Stop reason: SDUPTHER

## 2024-01-12 RX ORDER — TRIAMCINOLONE ACETONIDE 1 MG/G
CREAM TOPICAL 2 TIMES DAILY
COMMUNITY
End: 2024-01-12 | Stop reason: SDUPTHER

## 2024-01-18 ENCOUNTER — OFFICE VISIT (OUTPATIENT)
Dept: DERMATOLOGY | Facility: IMAGING CENTER | Age: 39
End: 2024-01-18
Payer: COMMERCIAL

## 2024-01-18 ENCOUNTER — INITIAL PRENATAL (OUTPATIENT)
Dept: OBGYN | Facility: CLINIC | Age: 39
End: 2024-01-18
Payer: COMMERCIAL

## 2024-01-18 VITALS — BODY MASS INDEX: 50.02 KG/M2 | SYSTOLIC BLOOD PRESSURE: 136 MMHG | DIASTOLIC BLOOD PRESSURE: 87 MMHG | WEIGHT: 273.5 LBS

## 2024-01-18 DIAGNOSIS — O10.919 CHRONIC HYPERTENSION AFFECTING PREGNANCY: ICD-10-CM

## 2024-01-18 DIAGNOSIS — L65.9 HAIR LOSS: ICD-10-CM

## 2024-01-18 DIAGNOSIS — L02.92 FURUNCLES: ICD-10-CM

## 2024-01-18 DIAGNOSIS — O09.522 SUPERVISION OF ELDERLY MULTIGRAVIDA, SECOND TRIMESTER: ICD-10-CM

## 2024-01-18 DIAGNOSIS — L71.9 ROSACEA: ICD-10-CM

## 2024-01-18 DIAGNOSIS — O09.91 SUPERVISION OF HIGH RISK PREGNANCY IN FIRST TRIMESTER: Primary | ICD-10-CM

## 2024-01-18 DIAGNOSIS — O99.210 OBESITY IN PREGNANCY: ICD-10-CM

## 2024-01-18 DIAGNOSIS — E11.9 CONTROLLED TYPE 2 DIABETES MELLITUS WITHOUT COMPLICATION, WITHOUT LONG-TERM CURRENT USE OF INSULIN (HCC): ICD-10-CM

## 2024-01-18 DIAGNOSIS — Z98.891 HISTORY OF CESAREAN DELIVERY: ICD-10-CM

## 2024-01-18 PROBLEM — U07.1 COVID-19: Status: RESOLVED | Noted: 2023-09-21 | Resolved: 2024-01-18

## 2024-01-18 PROBLEM — O09.90 PREGNANCY, SUPERVISION, HIGH-RISK: Status: ACTIVE | Noted: 2024-01-18

## 2024-01-18 PROBLEM — Z30.46 NEXPLANON REMOVAL: Status: RESOLVED | Noted: 2023-09-21 | Resolved: 2024-01-18

## 2024-01-18 PROBLEM — Z32.01 POSITIVE PREGNANCY TEST: Status: RESOLVED | Noted: 2023-12-21 | Resolved: 2024-01-18

## 2024-01-18 PROBLEM — Z34.90 ENCOUNTER FOR INDUCTION OF LABOR: Status: RESOLVED | Noted: 2022-08-31 | Resolved: 2024-01-18

## 2024-01-18 PROBLEM — O24.113 PRE-EXISTING TYPE 2 DIABETES MELLITUS DURING PREGNANCY IN THIRD TRIMESTER: Status: RESOLVED | Noted: 2022-07-05 | Resolved: 2024-01-18

## 2024-01-18 PROCEDURE — 0501F PRENATAL FLOW SHEET: CPT | Performed by: OBSTETRICS & GYNECOLOGY

## 2024-01-18 PROCEDURE — 3075F SYST BP GE 130 - 139MM HG: CPT | Performed by: OBSTETRICS & GYNECOLOGY

## 2024-01-18 PROCEDURE — 76817 TRANSVAGINAL US OBSTETRIC: CPT | Performed by: OBSTETRICS & GYNECOLOGY

## 2024-01-18 PROCEDURE — 99212 OFFICE O/P EST SF 10 MIN: CPT | Performed by: NURSE PRACTITIONER

## 2024-01-18 PROCEDURE — 3079F DIAST BP 80-89 MM HG: CPT | Performed by: OBSTETRICS & GYNECOLOGY

## 2024-01-18 RX ORDER — ASPIRIN 81 MG/1
81 TABLET ORAL DAILY
COMMUNITY

## 2024-01-18 ASSESSMENT — EDINBURGH POSTNATAL DEPRESSION SCALE (EPDS)
TOTAL SCORE: 3
I HAVE BEEN SO UNHAPPY THAT I HAVE HAD DIFFICULTY SLEEPING: NOT AT ALL
I HAVE BLAMED MYSELF UNNECESSARILY WHEN THINGS WENT WRONG: NOT VERY OFTEN
I HAVE FELT SCARED OR PANICKY FOR NO GOOD REASON: NO, NOT AT ALL
I HAVE BEEN ANXIOUS OR WORRIED FOR NO GOOD REASON: HARDLY EVER
I HAVE LOOKED FORWARD WITH ENJOYMENT TO THINGS: AS MUCH AS I EVER DID
THE THOUGHT OF HARMING MYSELF HAS OCCURRED TO ME: NEVER
I HAVE BEEN SO UNHAPPY THAT I HAVE BEEN CRYING: NO, NEVER
THINGS HAVE BEEN GETTING ON TOP OF ME: NO, MOST OF THE TIME I HAVE COPED QUITE WELL
I HAVE BEEN ABLE TO LAUGH AND SEE THE FUNNY SIDE OF THINGS: AS MUCH AS I ALWAYS COULD
I HAVE FELT SAD OR MISERABLE: NO, NOT AT ALL

## 2024-01-18 ASSESSMENT — FIBROSIS 4 INDEX: FIB4 SCORE: 0.47

## 2024-01-18 NOTE — PROGRESS NOTES
Establish Pregnancy Visit    CC: First OB Visit    HPI: Patient is a 38 y.o.  at 9w0d who presents for her first OB visit.  She is not yet feeling fetal movement.  She denies vaginal bleeding, denies leakage of fluid, denies contractions.   She denies nausea/vomiting, headaches, or urinary symptoms.      DATING:   Estimated Date of Delivery: 24  by 7wk US obtained (ordered by PCP) and c/w US today at 9w1d; not c/w LMP    GYN HX:   Last Pap: 2023 - wnl  Hx Moderate or Severe Dysplasia : no  Hx STD : no    OBSTETRIC HISTORY:  OB History    Para Term  AB Living   2 1 1 0 0 1   SAB IAB Ectopic Molar Multiple Live Births   0 0 0   0 1      # Outcome Date GA Lbr Jaison/2nd Weight Sex Delivery Anes PTL Lv   2 Current            1 Term 22 38w2d  7 lb 0.2 oz M CS-LTranv EPI, Spinal N GENOVEVA      Complications: Failure to Progress in First Stage       MEDICAL HISTORY:  Past Medical History:   Diagnosis Date    Diabetes (HCC)     Hypertension        MEDICATIONS:  Current Outpatient Medications on File Prior to Visit   Medication Sig Dispense Refill    aspirin 81 MG EC tablet Take 81 mg by mouth every day.      triamcinolone acetonide (KENALOG) 0.1 % Cream Apply 1 Application topically 2 times a day. 45 g 0    Doxylamine-Pyridoxine 10-10 MG Tablet Delayed Response delayed-release tablet Take 1 Tablet by mouth 2 times a day. 60 Tablet 3    labetalol (NORMODYNE) 100 MG Tab Take 1 Tablet by mouth 2 times a day. 60 Tablet 3    metFORMIN ER (GLUCOPHAGE XR) 500 MG TABLET SR 24 HR Take 2 Tablets by mouth 2 times a day. 360 Tablet 3    vitamin D (CHOLECALCIFEROL) 1000 UNIT Tab Take 2,000 Units by mouth every day.      Continuous Blood Gluc Sensor (FREESTYLE CORA 3 SENSOR) Misc 1 Each every 14 days. 6 Each 3    Blood Glucose Monitoring Suppl (ONETOUCH VERIO FLEX SYSTEM) w/Device Kit Use as directed 1 Kit 0    glucose blood strip Use as directed subcutaneously 4-5 times a day for 30 days. 150 Strip 5     "Microlet Lancets Misc Use as directed by fingerstick 4-5 times a day for 30 days. 100 Each 1    Blood Glucose Test Strips Testing before and 1 hour after meals for insulin adjustment during pregnancy. One Touch Verio test strips 500 Strip 3    Lancets Lancets order: Lancets for One Touch Delica.  Use 6 times daily for blood sugar testing. 200 Each 11    ONETOUCH VERIO strip Use to test blood sugar 5 Times a Day. 200 Strip 6     No current facility-administered medications on file prior to visit.       FAMILY HISTORY:  Family History   Problem Relation Age of Onset    Diabetes Mother     Hypertension Mother     Kidney Disease Mother     Hyperlipidemia Mother     Lung Disease Maternal Grandmother         emphysema (smoker)    Diabetes Maternal Grandmother     No Known Problems Sister     No Known Problems Brother     No Known Problems Brother     No Known Problems Father        SURGICAL HISTORY:  Past Surgical History:   Procedure Laterality Date    PRIMARY C SECTION Bilateral 2022    Procedure:  SECTION, PRIMARY;  Surgeon: Starr Tanner M.D.;  Location: [facility];  Service: Labor and Delivery       ALLERGIES / REACTIONS:  Allergies   Allergen Reactions    Glimepiride [Kdc:Yellow Dye+Brilliant Blue Fcf+Glimepiride] Unspecified     \"gittery\"                SOCIAL HISTORY:   reports that she has never smoked. She has never used smokeless tobacco. She reports that she does not drink alcohol and does not use drugs.    ROS:   Gen: no fevers or chills, no significant weight loss or gain, excessive fatigue  Respiratory:  no cough or dyspnea  Cardiac:  no chest pain, no palpitations, no syncope  Breast: no breast discharge, pain, lump or skin changes  GI:  no heartburn, no abdominal pain, no nausea or vomiting  Urinary: no dysuria, urgency, frequency, incontinence   Psych: no depression or anxiety  Neuro: no migraines with aura, fainting spells, numbness or tingling  Extremities: no joint " pain, persistently swollen ankles, recurrent leg cramps         PHYSICAL EXAMINATION:  Vital Signs:   Vitals:    01/18/24 1139   BP: 136/87   Weight: 273 lb 8 oz     Body mass index is 50.02 kg/m².  Constitutional: The patient is well developed and well nourished.  Psychiatric: Patient is oriented to time place and person.   Skin: No rash observed.  Neck: Neck appears symmetric. There are no masses or adenopathy present.  Respiratory: normal effort  Abdomen: Soft, non-tender.  Extremeties: Legs are symmetric and without tenderness. There is no edema present.      Procedure:  Transvaginal US performed by me and per my read:    Indication: Amenorrhea, +UPT.     Findings:   Knott intrauterine pregnancy @ 9w1d by CRL.   Positive yolk sac.   Positive fetal cardiac activity    Right ovary not visualized.   Left Ovary not visualized.   No free fluid in the cul-de-sac.    Impression:   Viable IUP @ 9w1d.  EVAN by US of 8/21/24.    Final EVAN: 8/22/24 by prior 7wk US    ACOG SCREENING  Infection Prevention  1. High Risk For HIV: No 6. Rash Or Illness Since LMP: No     2. High Risk For Hepatitis B or C: No 7. History Of STD, GC, Chlamydia, HPV Syphilis: No     3. Live With Someone With TB Or Exposed To TB: No 8. Have a cat in the home?: No     4. Patient Or Partner Has A History Of Herpes: No      5. History of Chicken Pox: No             Genetic Screening/Teratology Counseling- Includes patient, baby's father, or anyone in either family with:  Patient's age 35 years or older as of estimated date of delivery: Yes     Thalassemia (Italian, Greek, Mediterranean, or  background): MCV less than 80: No     Neural tube defect (Meningomyelocele, Spina bifida, or Anencephaly): No     Congenital heart defect: No     Down syndrome: No     Bashir-Sachs (Ashkenazi Jew, Cajun, Slovenian [place]): No     Canavan disease (Ashkenazi Jew): No     Familial dysautonomia (Ashkenazi Jew): No     Sickle cell disease or trait ():  No     Hemophilia or other blood disorders: No     Muscular dystrophy: No    Cystic fibrosis: No     DuPage's chorea: No     Mental retardation/autism: No     Other inherited genetic or chromosomal disorder: No     Maternal metabolic disorder (eg. Type 1 diabetes, PKU): Yes     Patient or baby's father had child with birth defects not listed above: No     Recurrent pregnancy loss, or a stillbirth: No     Medications (including supplements, vitamins, herbs, or OTC drugs)/illicit/recreational drugs/alcohol since last menstrual period: No                 ASSESSMENT AND PLAN:  38 y.o.  at 9w0d     Pregnancy Problems (from 25 to present)       Problem Noted Resolved    Pregnancy, supervision, high-risk 2024 by Shelby Tony D.O. No    Priority:  High      Overview Signed 2024 12:10 PM by Shelby Tony D.O.     AMA, cHTN, Type II DM (on metformin; no need for insulin in prior pregnancy), h/o , morbid obesity - ref to MFM 2024   PNL, NIPT, baseline pre-e labs 2024          History of  delivery 2024 by Shelby Tony D.O. No    Priority:  Medium      Multigravida of advanced maternal age in second trimester 2022 by Nixon Arita M.D. No    Priority:  Medium      Controlled type 2 diabetes mellitus without complication, without long-term current use of insulin ([facility]) 2019 by Radha Cervantes M.D. No    Priority:  Medium      Overview Addendum 2023  4:32 PM by BEN Fernández     Followed by Dr. Briceño with endocrinology  Taking Metformin 1000 mg 2 times a day, Mounjaro 12.5 mg every 7 days.  A1C now at 6.4, previous 6.9  Weight has also improved.         Chronic hypertension affecting pregnancy 3/7/2019 by Radha Cervantes M.D. No    Priority:  Medium      Overview Addendum 2024 11:55 AM by Shelby Tony D.O.     2021: Lisinopril 10 mg daily  Switched to labetalol 100mg BID for pregnancy by PCP          Obesity in pregnancy 10/31/2013 by Chayito Ruiz M.D. No    Priority:  Medium      Overview Addendum 2024 11:53 AM by Shelby Tony D.O.     Pre-pregnancy BMI 50                 1. Controlled type 2 diabetes mellitus without complication, without long-term current use of insulin (HCC)    - HEMOGLOBIN A1C; Future  - Comp Metabolic Panel; Future  - Referral to Perinatology    2. Obesity in pregnancy  - Referral to Perinatology    3. Chronic hypertension affecting pregnancy  - Comp Metabolic Panel; Future  - PROTEIN/CREAT RATIO URINE; Future  - Referral to Perinatology    4. Supervision of high risk pregnancy in first trimester  - Chlamydia/GC, PCR (Urine); Future  - PREG CNTR PRENATAL PN; Future  - URINE DRUG SCREEN W/CONF (AR); Future  - Referral to Perinatology    5. Supervision of elderly multigravida, second trimester  - PANORAMA PRENATAL TEST    6. History of  delivery      Shelby Tony D.O.

## 2024-01-19 NOTE — PROGRESS NOTES
"DERMATOLOGY NOTE  NEW VISIT       Chief complaint: Follow-Up    HPI: Patient returns for follow up for management of rosacea that is improving.  Current treatment: metrocream     Pt is currently 9 weeks pregnant feel improvement with hair loss thanks it could be prenatal vitamins     - pt also reports hx of cyst on groin area,  has been given keflex most recent flare up beginning of this month, no flare ups today         Hairloss location: scalp or other locations too? Scalp  Any other symptoms (itching, scaling, redness, other)? No  Any dietary restrictions? (vegan, vegetarian/other) No  Any family history of hair thinning/balding in women or men? Mother always had thin hair  Any illnesses, infections, pregnancy, or high stress 3-9 months preceding loss?   Diabetic, Had baby 1yr ago.  Any labs done? No  Treatments tried: No    History of skin cancer: No  History of precancers/actinic keratoses: No  History of biopsies:Yes, Details: Rt cheek  History of blistering/severe sunburns:No  Family history of skin cancer:Yes, Details: Mother melanoma  Family history of atypical moles:No    Allergies   Allergen Reactions    Glimepiride [Kdc:Yellow Dye+Brilliant Blue Fcf+Glimepiride] Unspecified     \"gittery\"        MEDICATIONS:  Medications relevant to specialty reviewed.     REVIEW OF SYSTEMS:   Positive for skin (see HPI)  Negative for fevers and chills       EXAM:  LMP 10/24/2023   Constitutional: Well-developed, well-nourished, and in no distress.     A focused skin exam was performed including the affected areas of the head (including face). Notable findings on exam today listed below and/or in assessment/plan.   notable hair thinning to forehead and temples, 1 area on vertex with AA, with fine hair present--improved  Central face erythema with few erythematous papule--improved      IMPRESSION / PLAN:    1. Facial erythema, improved  Rosacea favored diagnosis  Can continue metrocream 1-2 times per day  Safe in " pregnancy    2. Hair loss--improved  Discontinued minoxidil, as pt is now pregnant      3. Furuncles  Suspect HS, discussed course/nature of disease  No flare ups right now  Discussed supportive measures          Patient verbalized understanding and agrees with plan regarding the above      Please note that this dictation was created using voice recognition software. I have made every reasonable attempt to correct obvious errors, but I expect that there are errors of grammar and possibly content that I did not discover before finalizing the note.      Return to clinic in: Return for PRN. and as needed for any new or changing skin lesions.

## 2024-01-19 NOTE — PROGRESS NOTES
Pt here for NOB visit  LMP: 10/24/23   : 1/19/24   EVAN:08/22/24  Confirmed good ph#, pharmacy, drug allergy, and medications on file.  Last Pap: 2/14/23 Neg. HPV Neg.  Pt states no other complaints for today.  Pt will bring her glucose log at next visit.

## 2024-01-25 ENCOUNTER — OFFICE VISIT (OUTPATIENT)
Dept: ENDOCRINOLOGY | Facility: MEDICAL CENTER | Age: 39
End: 2024-01-25
Attending: INTERNAL MEDICINE
Payer: COMMERCIAL

## 2024-01-25 VITALS
OXYGEN SATURATION: 94 % | DIASTOLIC BLOOD PRESSURE: 74 MMHG | SYSTOLIC BLOOD PRESSURE: 118 MMHG | HEIGHT: 62 IN | WEIGHT: 293 LBS | HEART RATE: 109 BPM | BODY MASS INDEX: 53.92 KG/M2 | RESPIRATION RATE: 18 BRPM

## 2024-01-25 DIAGNOSIS — O24.414 INSULIN CONTROLLED GESTATIONAL DIABETES MELLITUS (GDM) IN SECOND TRIMESTER: ICD-10-CM

## 2024-01-25 DIAGNOSIS — Z79.84 LONG TERM (CURRENT) USE OF ORAL HYPOGLYCEMIC DRUGS: ICD-10-CM

## 2024-01-25 DIAGNOSIS — Z3A.01 LESS THAN 8 WEEKS GESTATION OF PREGNANCY: ICD-10-CM

## 2024-01-25 DIAGNOSIS — E11.9 CONTROLLED TYPE 2 DIABETES MELLITUS WITHOUT COMPLICATION, WITHOUT LONG-TERM CURRENT USE OF INSULIN (HCC): ICD-10-CM

## 2024-01-25 DIAGNOSIS — E55.9 VITAMIN D DEFICIENCY: ICD-10-CM

## 2024-01-25 DIAGNOSIS — I10 ESSENTIAL HYPERTENSION: ICD-10-CM

## 2024-01-25 DIAGNOSIS — E78.5 DYSLIPIDEMIA: ICD-10-CM

## 2024-01-25 LAB
HBA1C MFR BLD: 6.6 % (ref ?–5.8)
POCT INT CON NEG: NEGATIVE
POCT INT CON POS: POSITIVE

## 2024-01-25 PROCEDURE — 83036 HEMOGLOBIN GLYCOSYLATED A1C: CPT | Performed by: INTERNAL MEDICINE

## 2024-01-25 PROCEDURE — 3074F SYST BP LT 130 MM HG: CPT | Performed by: INTERNAL MEDICINE

## 2024-01-25 PROCEDURE — 3078F DIAST BP <80 MM HG: CPT | Performed by: INTERNAL MEDICINE

## 2024-01-25 PROCEDURE — 99214 OFFICE O/P EST MOD 30 MIN: CPT | Performed by: INTERNAL MEDICINE

## 2024-01-25 RX ORDER — ACYCLOVIR 400 MG/1
1 TABLET ORAL
Qty: 3 EACH | Refills: 6 | Status: SHIPPED | OUTPATIENT
Start: 2024-01-25

## 2024-01-25 RX ORDER — PEN NEEDLE, DIABETIC 32GX 5/32"
1 NEEDLE, DISPOSABLE MISCELLANEOUS 4 TIMES DAILY
Qty: 300 EACH | Refills: 3 | Status: SHIPPED | OUTPATIENT
Start: 2024-01-25

## 2024-01-25 RX ORDER — BLOOD-GLUCOSE SENSOR
EACH MISCELLANEOUS
Status: CANCELLED | OUTPATIENT
Start: 2024-01-25

## 2024-01-25 RX ORDER — INSULIN LISPRO 100 [IU]/ML
2-15 INJECTION, SOLUTION INTRAVENOUS; SUBCUTANEOUS
Qty: 15 ML | Refills: 6 | Status: SHIPPED | OUTPATIENT
Start: 2024-01-25

## 2024-01-25 ASSESSMENT — FIBROSIS 4 INDEX: FIB4 SCORE: 0.47

## 2024-01-25 NOTE — PROGRESS NOTES
CHIEF COMPLAINT: Patient is here for follow up of Type 2 Diabetes Mellitus .    HPI:     Priti Begum is a 38 y.o. female with Type 2 Diabetes Mellitus here for follow up.    Labs from 11/29/2023 show A1c is 6.5%  Labs from 5/18/2023 show a1c was 6.9%  Labs from 7/29/2022 show a1c was 5.4%  Labs from 6/28/2022 show a1c was 5.9%  Labs from  2/2/2022 show A1c was 5.6%  Labs from 11/5/2021 show a1c was 5.9%  Labs from 4/28/2021 show Hba1c was 6.1%  Labs from 11/3/2020 show Hba1c was 5.8%  Labs from 4/13/2020 show HbA1c was 6.1%      She has obesity with a baseline BMI > 68 and baseline wt was over 380 lbs  She has had significant weight loss with medical management  She was previously on metformin and Mounjaro        On follow-up patient had a positive pregnancy test and has appropriately discontinued her lisinopril and Mounjaro but is still on metformin and this is her second pregnancy.    She reports that her fasting sugars are above goal for pregnancy and we discussed initiation of basal insulin therapy    She is aware of the diet for pregestational diabetes  And she is aware of the blood sugar targets        She is currently not on a statin for primary prevention because she she is of reproductive age and she is still young and low risk for cardiovascular disease  LDL-C was 83 on 5/2023      She also has HTN which is well controlled  She does not have diabetic kidney disease   UACR was < 30 on 5/2023      [facility]  [facility]  See media tab            BG Diary:  Patient brought blood sugar logs    Weight has been stable    Diabetes Complications   Retinopathy: No known retinopathy.  Last eye exam: 7/5/2023   Neuropathy: Denies paresthesias or numbness in hands or feet. Denies any foot wounds.  Exercise: Minimal.  Diet: Fair.  Patient's medications, allergies, and social histories were reviewed and updated as appropriate.    ROS:     CONS:     No fever, no  chills   EYES:     No diplopia, no blurry vision   CV:           No chest pain, no palpitations   PULM:     No SOB, no cough, no hemoptysis.   GI:            No nausea, no vomiting, no diarrhea, no constipation   ENDO:     No polyuria, no polydipsia, no heat intolerance, no cold intolerance       Past Medical History:  Problem List:  2024: Pregnancy, supervision, high-risk  2024: History of  delivery  2023: Positive pregnancy test  2023: Skin lesion  2023: Left knee pain  2023: COVID-19  2023: Nexplanon removal  2022: Encounter for induction of labor  2022: Pre-existing type 2 diabetes mellitus during pregnancy in   third trimester  2022: Multigravida of advanced maternal age in second trimester  2020: Long term (current) use of oral hypoglycemic drugs  2020: Metabolic syndrome  2019: Controlled type 2 diabetes mellitus without complication,   without long-term current use of insulin (HCC)  2019: Vitamin D deficiency  2019: Preventative health care  2019: Chronic hypertension affecting pregnancy  2019: Uncontrolled type 2 diabetes mellitus with hyperglycemia   (HCC)  2019: Candidiasis of skin  2016: Fatty liver disease, nonalcoholic  2016: Hypomenorrhea  2016: Family history of diabetes mellitus (DM)  2013-10: Obesity in pregnancy  2013-10: PCOS (polycystic ovarian syndrome)      Past Surgical History:  Past Surgical History:   Procedure Laterality Date    PRIMARY C SECTION Bilateral 2022    Procedure:  SECTION, PRIMARY;  Surgeon: Starr Tanner M.D.;  Location: [facility];  Service: Labor and Delivery        Allergies:  Glimepiride [kdc:yellow dye+brilliant blue fcf+glimepiride]     Social History:  Social History     Tobacco Use    Smoking status: Never    Smokeless tobacco: Never   Vaping Use    Vaping Use: Never used   Substance Use Topics    Alcohol use: No    Drug use: No        Family History:   family  "history includes Diabetes in her maternal grandmother and mother; Hyperlipidemia in her mother; Hypertension in her mother; Kidney Disease in her mother; Lung Disease in her maternal grandmother; No Known Problems in her brother, brother, father, and sister.      PHYSICAL EXAM:   OBJECTIVE:  Vital signs: /74   Pulse (!) 109   Resp 18   Ht 1.575 m (5' 2\")   Wt (!) 170 kg (374 lb)   LMP 10/24/2023   SpO2 94%   BMI 68.41 kg/m²   GENERAL: Morbidly obese female in no apparent distress.   EYE:  No ocular asymmetry, PERRLA  HENT: Pink, moist mucous membranes.    NECK: No thyromegaly.   CARDIOVASCULAR:  No murmurs  LUNGS: Clear breath sounds  ABDOMEN: Soft, nontender gravid abdomen  EXTREMITIES: No clubbing, cyanosis, or edema.   NEUROLOGICAL: No gross focal motor abnormalities   LYMPH: No cervical adenopathy palpated.   SKIN: No rashes, lesions.         Labs:  Lab Results   Component Value Date/Time    HBA1C 6.6 (A) 01/25/2024 10:12 AM        Lab Results   Component Value Date/Time    WBC 10.8 09/04/2022 08:05 AM    RBC 3.67 (L) 09/04/2022 08:05 AM    HEMOGLOBIN 9.5 (L) 09/04/2022 08:05 AM    MCV 80.7 (L) 09/04/2022 08:05 AM    MCH 25.9 (L) 09/04/2022 08:05 AM    MCHC 32.1 (L) 09/04/2022 08:05 AM    RDW 45.6 09/04/2022 08:05 AM    MPV 10.2 09/04/2022 08:05 AM       Lab Results   Component Value Date/Time    SODIUM 139 05/15/2023 07:08 AM    POTASSIUM 4.5 05/15/2023 07:08 AM    CHLORIDE 107 05/15/2023 07:08 AM    CO2 24 05/15/2023 07:08 AM    ANION 8.0 05/15/2023 07:08 AM    GLUCOSE 136 (H) 05/15/2023 07:08 AM    BUN 13 05/15/2023 07:08 AM    CREATININE 0.53 05/15/2023 07:08 AM    CALCIUM 8.5 05/15/2023 07:08 AM    ASTSGOT 12 05/15/2023 07:08 AM    ALTSGPT 19 05/15/2023 07:08 AM    TBILIRUBIN 0.3 05/15/2023 07:08 AM    ALBUMIN 3.8 05/15/2023 07:08 AM    TOTPROTEIN 7.3 05/15/2023 07:08 AM    GLOBULIN 3.5 05/15/2023 07:08 AM    AGRATIO 1.1 05/15/2023 07:08 AM       Lab Results   Component Value Date/Time    " CHOLSTRLTOT 140 12/24/2019 0619    TRIGLYCERIDE 123 12/24/2019 0619    HDL 40 12/24/2019 0619    LDL 75 12/24/2019 0619       Lab Results   Component Value Date/Time    MALBCRT see below 05/15/2023 07:08 AM    MICROALBUR <1.2 05/15/2023 07:08 AM        Lab Results   Component Value Date/Time    TSHULTRASEN 3.320 02/14/2019 1142     No results found for: FREEDIR  No results found for: FREET3  No results found for: THYSTIMIG        ASSESSMENT/PLAN:     1. Controlled type 2 diabetes mellitus without complication, without long-term current use of insulin ([facility])  Controlled  A1c is 6.6%  Stop Mounjaro and phentermine   Continue metformin  Start Lantus 10u daily and titrate and adjust long-acting insulin until fasting sugars below 90  Start Humalog 2 units 3 times a day with each meal if fasting blood sugar is above 130   1-hour after meal  Will order CGM  Follow-up in 3 months with Marie and then after that follow-up with Nano    2. Essential hypertension  Stable  Stop lisinopril   Continue labetalol   Continue monitoring urine albumin    3. Vitamin D deficiency  Stable   Vitamin D labs were reviewed with patient  Continue current supplements  Continue monitoring levels     4. Long term (current) use of oral hypoglycemic drugs  Patient is on oral agents for type 2 diabetes management    5. Morbid obesity with BMI of 60.0-69.9, adult (HCC)  Improved  Significant wt loss noted from medical weight management  She will have to discontinue phentermine at this time because of pregnancy        Follow-up in 3 months      Thank you kindly for allowing me to participate in the diabetes care plan for this patient.    Dickson Rivera MD, FACE, ECNU      CC:   BEN Fernández

## 2024-01-25 NOTE — PROGRESS NOTES
RN-CDE Note    Subjective:   Endocrinology Clinic Progress Note  PCP: BEN Fernández    HPI:  Priti Begum is a 38 y.o. old patient who is seen today by the Diabetes Nurse Specialist for review of her type 2 diabetes complicated by pregnancy.  Was on Mounjaro and Metformin prior to becoming pregnant.  Stopped Mounjaro about 1 month ago and currently still on Metformin.  Will need to start insulin.      DM:   Last A1c:   Lab Results   Component Value Date/Time    HBA1C 6.6 (A) 01/25/2024 10:12 AM      Previous A1c was 6.5 on 11/29/23  A1C GOAL: < 6  during pregnancy.     Diabetes Medications:   Metformin 1000 mg bid        Diet: has made some dietary changes since finding out she is pregnant.  Decrease carbohydrates, more vegetables and no cereal or fruit in the morning.   Patient's body mass index is unknown because there is no height or weight on file. Exercise and nutrition counseling were performed at this visit.    Glucose monitoring frequency: 4-5 times per day    Hypoglycemic episodes: no  Last Retinal Exam: on file and up-to-date     Foot Exam:  Monofilament: current.   Lab Results   Component Value Date/Time    MALBCRT see below 05/15/2023 07:08 AM    MICROALBUR <1.2 05/15/2023 07:08 AM        ACR Albumin/Creatinine Ratio goal <30     HTN:   Blood pressure goal <130/<80 .   Currently Rx ACE/ARB:  NA    Dyslipidemia:    Lab Results   Component Value Date/Time    CHOLSTRLTOT 132 05/15/2023 07:08 AM    LDL 83 05/15/2023 07:08 AM    HDL 35 (A) 05/15/2023 07:08 AM    TRIGLYCERIDE 71 05/15/2023 07:08 AM         Currently Rx Statin:  no    She  reports that she has never smoked. She has never used smokeless tobacco.      Plan:     Discussed and educated on:   - All medications, side effects and compliance (discussed carefully)  - HbA1C: target  less than 6%  - Home glucose monitoring emphasized:  needs to test at least fasting, and 1 hour after every meal.   - Insulin: using an Insulin  Pen  - Weight control and daily exercise   Blood glucose goals of pregnancy with diabetes.  Fasting less than 90, 1 hour post prandial less than 130.     Recommended medication changes: start with 10 units per day, adjust every 2 days until fasting blood sugars consistently less than 90.  Humalog  will check 1 hour post prandial blood sugars if they are running greater than 130 she will start taking 1-2 units of Humalog with meals

## 2024-01-26 ENCOUNTER — PATIENT MESSAGE (OUTPATIENT)
Dept: ENDOCRINOLOGY | Facility: MEDICAL CENTER | Age: 39
End: 2024-01-26
Payer: COMMERCIAL

## 2024-01-26 ENCOUNTER — TELEPHONE (OUTPATIENT)
Dept: ENDOCRINOLOGY | Facility: MEDICAL CENTER | Age: 39
End: 2024-01-26
Payer: COMMERCIAL

## 2024-01-26 PROCEDURE — RXMED WILLOW AMBULATORY MEDICATION CHARGE: Performed by: INTERNAL MEDICINE

## 2024-01-26 PROCEDURE — RXMED WILLOW AMBULATORY MEDICATION CHARGE: Performed by: FAMILY MEDICINE

## 2024-01-26 NOTE — TELEPHONE ENCOUNTER
Contact:  Phone number:[phone] (mobile)    Name of person spoken with and relationship to patient: Self/Patient   Patient’s Adherence:  How patient is doing on medication: Ok or neutral    How many missed doses and reason: 0     Any new medications: N/A, new start    Any new conditions: N/A, new start    Any new allergies: N/A, new start    Any new side effects: N/A, new start    Any new diagnoses: N/A, new start    How many doses remaining: N/A    Did patient want to speak with pharmacist: No   Delivery:  Delivery date and method: 1/29/2024 via     Needs by Date: 1/29/2024    Signature required: No     Any additional details for : N/A   Teach Appointment Date:  N/A   Shipping Address:  [address] [place] [id number]   Medication(name,strength and dose):  Insulin Glargin (15mL/33DS), Insulin Lispro (15mL/34DS), Dexcom G7 Sensor (3/30DS), BD Pen Needles (300/30DS), Labetalol (60/30DS), & Triamcinolone 0.1% Cream (45g/30DS)   Copay:  $103.06   Payment Method:  Credit card on file   Supplies:  [employer], Alcohol Swabs, & Bandaids   Additional Information:  New re-onboarded patient     Thank you,  Eunice Jha, [facility]  Endocrinology Pharmacy Coordinator

## 2024-01-29 ENCOUNTER — PHARMACY VISIT (OUTPATIENT)
Dept: PHARMACY | Facility: MEDICAL CENTER | Age: 39
End: 2024-01-29
Payer: COMMERCIAL

## 2024-02-16 ENCOUNTER — APPOINTMENT (OUTPATIENT)
Dept: LAB | Facility: MEDICAL CENTER | Age: 39
End: 2024-02-16
Payer: COMMERCIAL

## 2024-02-16 ENCOUNTER — HOSPITAL ENCOUNTER (OUTPATIENT)
Dept: LAB | Facility: MEDICAL CENTER | Age: 39
End: 2024-02-16
Attending: OBSTETRICS & GYNECOLOGY
Payer: COMMERCIAL

## 2024-02-16 ENCOUNTER — HOSPITAL ENCOUNTER (OUTPATIENT)
Dept: LAB | Facility: MEDICAL CENTER | Age: 39
End: 2024-02-16
Attending: INTERNAL MEDICINE
Payer: COMMERCIAL

## 2024-02-16 ENCOUNTER — ANCILLARY PROCEDURE (OUTPATIENT)
Dept: MATERNAL FETAL MEDICINE | Facility: MEDICAL CENTER | Age: 39
End: 2024-02-16
Attending: OBSTETRICS & GYNECOLOGY
Payer: COMMERCIAL

## 2024-02-16 VITALS — DIASTOLIC BLOOD PRESSURE: 88 MMHG | WEIGHT: 293 LBS | SYSTOLIC BLOOD PRESSURE: 138 MMHG | BODY MASS INDEX: 68.37 KG/M2

## 2024-02-16 DIAGNOSIS — E11.9 CONTROLLED TYPE 2 DIABETES MELLITUS WITHOUT COMPLICATION, WITHOUT LONG-TERM CURRENT USE OF INSULIN (HCC): ICD-10-CM

## 2024-02-16 DIAGNOSIS — O09.91 SUPERVISION OF HIGH RISK PREGNANCY IN FIRST TRIMESTER: ICD-10-CM

## 2024-02-16 DIAGNOSIS — E78.5 DYSLIPIDEMIA: ICD-10-CM

## 2024-02-16 DIAGNOSIS — E55.9 VITAMIN D DEFICIENCY: ICD-10-CM

## 2024-02-16 DIAGNOSIS — O10.919 CHRONIC HYPERTENSION AFFECTING PREGNANCY: ICD-10-CM

## 2024-02-16 DIAGNOSIS — O99.210 OBESITY IN PREGNANCY: ICD-10-CM

## 2024-02-16 DIAGNOSIS — O24.414 INSULIN CONTROLLED GESTATIONAL DIABETES MELLITUS (GDM) IN SECOND TRIMESTER: ICD-10-CM

## 2024-02-16 LAB
25(OH)D3 SERPL-MCNC: 38 NG/ML (ref 30–100)
ABO GROUP BLD: NORMAL
ALBUMIN SERPL BCP-MCNC: 3.5 G/DL (ref 3.2–4.9)
ALBUMIN SERPL BCP-MCNC: 3.6 G/DL (ref 3.2–4.9)
ALBUMIN/GLOB SERPL: 1.1 G/DL
ALBUMIN/GLOB SERPL: 1.1 G/DL
ALP SERPL-CCNC: 86 U/L (ref 30–99)
ALP SERPL-CCNC: 88 U/L (ref 30–99)
ALT SERPL-CCNC: 22 U/L (ref 2–50)
ALT SERPL-CCNC: 26 U/L (ref 2–50)
ANION GAP SERPL CALC-SCNC: 11 MMOL/L (ref 7–16)
ANION GAP SERPL CALC-SCNC: 13 MMOL/L (ref 7–16)
AST SERPL-CCNC: 18 U/L (ref 12–45)
AST SERPL-CCNC: 20 U/L (ref 12–45)
BILIRUB SERPL-MCNC: 0.3 MG/DL (ref 0.1–1.5)
BILIRUB SERPL-MCNC: 0.3 MG/DL (ref 0.1–1.5)
BLD GP AB SCN SERPL QL: NORMAL
BUN SERPL-MCNC: 6 MG/DL (ref 8–22)
BUN SERPL-MCNC: 7 MG/DL (ref 8–22)
CALCIUM ALBUM COR SERPL-MCNC: 8.9 MG/DL (ref 8.5–10.5)
CALCIUM ALBUM COR SERPL-MCNC: 8.9 MG/DL (ref 8.5–10.5)
CALCIUM SERPL-MCNC: 8.5 MG/DL (ref 8.5–10.5)
CALCIUM SERPL-MCNC: 8.6 MG/DL (ref 8.5–10.5)
CHLORIDE SERPL-SCNC: 102 MMOL/L (ref 96–112)
CHLORIDE SERPL-SCNC: 103 MMOL/L (ref 96–112)
CHOLEST SERPL-MCNC: 139 MG/DL (ref 100–199)
CO2 SERPL-SCNC: 20 MMOL/L (ref 20–33)
CO2 SERPL-SCNC: 21 MMOL/L (ref 20–33)
CREAT SERPL-MCNC: 0.35 MG/DL (ref 0.5–1.4)
CREAT SERPL-MCNC: 0.38 MG/DL (ref 0.5–1.4)
CREAT UR-MCNC: 136.36 MG/DL
CREAT UR-MCNC: 140.32 MG/DL
EST. AVERAGE GLUCOSE BLD GHB EST-MCNC: 126 MG/DL
FASTING STATUS PATIENT QL REPORTED: NORMAL
GFR SERPLBLD CREATININE-BSD FMLA CKD-EPI: 132 ML/MIN/1.73 M 2
GFR SERPLBLD CREATININE-BSD FMLA CKD-EPI: 134 ML/MIN/1.73 M 2
GLOBULIN SER CALC-MCNC: 3.3 G/DL (ref 1.9–3.5)
GLOBULIN SER CALC-MCNC: 3.3 G/DL (ref 1.9–3.5)
GLUCOSE SERPL-MCNC: 113 MG/DL (ref 65–99)
GLUCOSE SERPL-MCNC: 114 MG/DL (ref 65–99)
HBA1C MFR BLD: 6 % (ref 4–5.6)
HDLC SERPL-MCNC: 53 MG/DL
HIV 1+2 AB+HIV1 P24 AG SERPL QL IA: NORMAL
LDLC SERPL CALC-MCNC: 66 MG/DL
MICROALBUMIN UR-MCNC: <1.2 MG/DL
MICROALBUMIN/CREAT UR: NORMAL MG/G (ref 0–30)
POTASSIUM SERPL-SCNC: 3.9 MMOL/L (ref 3.6–5.5)
POTASSIUM SERPL-SCNC: 4 MMOL/L (ref 3.6–5.5)
PROT SERPL-MCNC: 6.8 G/DL (ref 6–8.2)
PROT SERPL-MCNC: 6.9 G/DL (ref 6–8.2)
PROT UR-MCNC: 13 MG/DL (ref 0–15)
PROT/CREAT UR: 93 MG/G (ref 10–107)
RH BLD: NORMAL
SODIUM SERPL-SCNC: 135 MMOL/L (ref 135–145)
SODIUM SERPL-SCNC: 135 MMOL/L (ref 135–145)
TRIGL SERPL-MCNC: 102 MG/DL (ref 0–149)

## 2024-02-16 PROCEDURE — 36415 COLL VENOUS BLD VENIPUNCTURE: CPT

## 2024-02-16 PROCEDURE — 87491 CHLMYD TRACH DNA AMP PROBE: CPT

## 2024-02-16 PROCEDURE — 99203 OFFICE O/P NEW LOW 30 MIN: CPT | Performed by: OBSTETRICS & GYNECOLOGY

## 2024-02-16 PROCEDURE — 80061 LIPID PANEL: CPT

## 2024-02-16 PROCEDURE — 86803 HEPATITIS C AB TEST: CPT

## 2024-02-16 PROCEDURE — 82306 VITAMIN D 25 HYDROXY: CPT

## 2024-02-16 PROCEDURE — 87389 HIV-1 AG W/HIV-1&-2 AB AG IA: CPT

## 2024-02-16 PROCEDURE — 76801 OB US < 14 WKS SINGLE FETUS: CPT | Performed by: OBSTETRICS & GYNECOLOGY

## 2024-02-16 PROCEDURE — 86900 BLOOD TYPING SEROLOGIC ABO: CPT

## 2024-02-16 PROCEDURE — 87086 URINE CULTURE/COLONY COUNT: CPT

## 2024-02-16 PROCEDURE — 83036 HEMOGLOBIN GLYCOSYLATED A1C: CPT

## 2024-02-16 PROCEDURE — 84156 ASSAY OF PROTEIN URINE: CPT

## 2024-02-16 PROCEDURE — 82570 ASSAY OF URINE CREATININE: CPT

## 2024-02-16 PROCEDURE — 81422 FETAL CHRMOML MICRODELTJ: CPT

## 2024-02-16 PROCEDURE — 86850 RBC ANTIBODY SCREEN: CPT

## 2024-02-16 PROCEDURE — 80053 COMPREHEN METABOLIC PANEL: CPT | Mod: 91

## 2024-02-16 PROCEDURE — 87340 HEPATITIS B SURFACE AG IA: CPT

## 2024-02-16 PROCEDURE — 87591 N.GONORRHOEAE DNA AMP PROB: CPT

## 2024-02-16 PROCEDURE — 80053 COMPREHEN METABOLIC PANEL: CPT

## 2024-02-16 PROCEDURE — 82043 UR ALBUMIN QUANTITATIVE: CPT

## 2024-02-16 PROCEDURE — G0480 DRUG TEST DEF 1-7 CLASSES: HCPCS

## 2024-02-16 PROCEDURE — 85027 COMPLETE CBC AUTOMATED: CPT

## 2024-02-16 PROCEDURE — 86901 BLOOD TYPING SEROLOGIC RH(D): CPT

## 2024-02-16 PROCEDURE — 82570 ASSAY OF URINE CREATININE: CPT | Mod: 91

## 2024-02-16 PROCEDURE — 81420 FETAL CHRMOML ANEUPLOIDY: CPT

## 2024-02-16 PROCEDURE — 80307 DRUG TEST PRSMV CHEM ANLYZR: CPT

## 2024-02-16 PROCEDURE — 86762 RUBELLA ANTIBODY: CPT

## 2024-02-16 PROCEDURE — 86780 TREPONEMA PALLIDUM: CPT

## 2024-02-16 ASSESSMENT — FIBROSIS 4 INDEX: FIB4 SCORE: 0.47

## 2024-02-17 LAB
C TRACH DNA SPEC QL NAA+PROBE: NEGATIVE
ERYTHROCYTE [DISTWIDTH] IN BLOOD BY AUTOMATED COUNT: 42.5 FL (ref 35.9–50)
HBV SURFACE AG SER QL: ABNORMAL
HCT VFR BLD AUTO: 36.5 % (ref 37–47)
HCV AB SER QL: ABNORMAL
HGB BLD-MCNC: 11.3 G/DL (ref 12–16)
MCH RBC QN AUTO: 23.8 PG (ref 27–33)
MCHC RBC AUTO-ENTMCNC: 31 G/DL (ref 32.2–35.5)
MCV RBC AUTO: 77 FL (ref 81.4–97.8)
N GONORRHOEA DNA SPEC QL NAA+PROBE: NEGATIVE
PLATELET # BLD AUTO: 292 K/UL (ref 164–446)
PMV BLD AUTO: 10.4 FL (ref 9–12.9)
RBC # BLD AUTO: 4.74 M/UL (ref 4.2–5.4)
RUBV AB SER QL: 18.1 IU/ML
SPECIMEN SOURCE: NORMAL
T PALLIDUM AB SER QL IA: ABNORMAL
WBC # BLD AUTO: 8.1 K/UL (ref 4.8–10.8)

## 2024-02-18 LAB
BACTERIA UR CULT: NORMAL
SIGNIFICANT IND 70042: NORMAL
SITE SITE: NORMAL
SOURCE SOURCE: NORMAL

## 2024-02-19 LAB
AMPHET CTO UR CFM-MCNC: NORMAL NG/ML
BARBITURATES CTO UR CFM-MCNC: NEGATIVE NG/ML
BENZODIAZ CTO UR CFM-MCNC: NEGATIVE NG/ML
CANNABINOIDS CTO UR CFM-MCNC: NEGATIVE NG/ML
COCAINE CTO UR CFM-MCNC: NEGATIVE NG/ML
CREAT UR-MCNC: 149.6 MG/DL (ref 20–400)
DRUG COMMENT 753798: NORMAL
METHADONE CTO UR CFM-MCNC: NEGATIVE NG/ML
OPIATES CTO UR CFM-MCNC: NEGATIVE NG/ML
PCP CTO UR CFM-MCNC: NEGATIVE NG/ML
PROPOXYPH CTO UR CFM-MCNC: NEGATIVE NG/ML

## 2024-02-20 ENCOUNTER — NON-PROVIDER VISIT (OUTPATIENT)
Dept: MEDICAL GROUP | Facility: MEDICAL CENTER | Age: 39
End: 2024-02-20
Payer: COMMERCIAL

## 2024-02-20 DIAGNOSIS — O10.919 CHRONIC HYPERTENSION AFFECTING PREGNANCY: ICD-10-CM

## 2024-02-20 PROCEDURE — 93000 ELECTROCARDIOGRAM COMPLETE: CPT | Performed by: FAMILY MEDICINE

## 2024-02-21 LAB
AMPHET UR CFM-MCNC: <50 NG/ML
MDA UR CFM-MCNC: <200 NG/ML
MDEA UR CFM-MCNC: <200 NG/ML
MDMA UR CFM-MCNC: <200 NG/ML
METHAMPHET UR CFM-MCNC: <200 NG/ML
PHENTERMINE UR CFM-MCNC: <200 NG/ML

## 2024-02-22 ENCOUNTER — ROUTINE PRENATAL (OUTPATIENT)
Dept: OBGYN | Facility: CLINIC | Age: 39
End: 2024-02-22
Payer: COMMERCIAL

## 2024-02-22 VITALS — DIASTOLIC BLOOD PRESSURE: 84 MMHG | SYSTOLIC BLOOD PRESSURE: 166 MMHG | WEIGHT: 293 LBS | BODY MASS INDEX: 68.22 KG/M2

## 2024-02-22 DIAGNOSIS — O09.92 SUPERVISION OF HIGH RISK PREGNANCY IN SECOND TRIMESTER: Primary | ICD-10-CM

## 2024-02-22 DIAGNOSIS — O10.919 CHRONIC HYPERTENSION AFFECTING PREGNANCY: ICD-10-CM

## 2024-02-22 DIAGNOSIS — E66.01 MORBID OBESITY (HCC): ICD-10-CM

## 2024-02-22 DIAGNOSIS — O09.522 MULTIGRAVIDA OF ADVANCED MATERNAL AGE IN SECOND TRIMESTER: ICD-10-CM

## 2024-02-22 DIAGNOSIS — E11.9 CONTROLLED TYPE 2 DIABETES MELLITUS WITHOUT COMPLICATION, WITHOUT LONG-TERM CURRENT USE OF INSULIN (HCC): ICD-10-CM

## 2024-02-22 DIAGNOSIS — O99.210 OBESITY IN PREGNANCY: ICD-10-CM

## 2024-02-22 PROCEDURE — 3079F DIAST BP 80-89 MM HG: CPT | Performed by: OBSTETRICS & GYNECOLOGY

## 2024-02-22 PROCEDURE — 0502F SUBSEQUENT PRENATAL CARE: CPT | Performed by: OBSTETRICS & GYNECOLOGY

## 2024-02-22 PROCEDURE — 3077F SYST BP >= 140 MM HG: CPT | Performed by: OBSTETRICS & GYNECOLOGY

## 2024-02-22 ASSESSMENT — FIBROSIS 4 INDEX: FIB4 SCORE: 0.54

## 2024-02-22 NOTE — PROGRESS NOTES
Well woman visit     [facility] Daniel Begum is a 37 y.o.  who presents to [facility] care for her Annual Exam.  Today she has symptoms of postcoital bleeding.  She is trying to lose weight. Her endocrinologist is managing weight and diabetes.     GYN History:  Menarche: 17  Menses: regular when she was young before weight gain  Last pap: last pap , no hx abnormals  Sexually active: yes  History of STDs: no  Fam Hx of breast, ovarian, or colon cancer: no     OB History:        Health Maintenance:  Last mammogram: no  Last colorectal screening: n/a  Immunizations: UTD and did have Gardasil     Review of Systems:   ROS:  Constitutional: No fever, weight loss, weight gain, or fatigue  Skin/breast: No breast lump, nipple discharge, acne  Cardiovascular: No chest pain, leg swelling, palpitations  Respiratory: No shortness of breath, wheezing, or cough  Genitourinary: No pelvic pain, vaginal discharge, painful urination, abnormal periods, involuntary loss of urine, or vaginal bulge.  GI: No diarrhea, constipation, blood in stool, vomiting, abdominal pain  Endocrine: No hot flashes, abnormal thirst  Psychiatric: No depression, anxiety, or thoughts of self-harm  Neurologic: No headaches or seizures  Heme/lymph: No enlarged lymph nodes, denies bruising/bleeding that will not stop  Allergic: Denies allergies  MSK: Denies muscle weakness    All PMH, PSH, allergies, social history and FH reviewed and updated today:  Past Medical History:  Past Medical History:   Diagnosis Date    Diabetes (HCC)     Hypertension        Past Surgical History:  Past Surgical History:   Procedure Laterality Date    PRIMARY C SECTION Bilateral 2022    Procedure:  SECTION, PRIMARY;  Surgeon: Starr Tanner M.D.;  Location: SURGERY LABOR AND DELIVERY;  Service: Labor and Delivery       Medications:   Current Outpatient Medications Ordered in Epic   Medication Sig Dispense Refill    Tirzepatide 7.5 MG/0.5ML  Solution Pen-injector Inject 7.5 mg under the skin every 7 days. 2 mL 6    lisinopril (PRINIVIL) 10 MG Tab Take 1 Tablet by mouth every day. 90 Tablet 2    acetaminophen (TYLENOL) 500 MG Tab Take 2 Tablets by mouth every 6 hours as needed for Moderate Pain. 30 Tablet 0    ibuprofen (MOTRIN) 600 MG Tab Take 1 Tablet by mouth every 6 hours as needed for Moderate Pain. Indications: Joint Damage causing Pain and Loss of Function 30 Tablet 0    Blood Glucose Monitoring Suppl (ONETOUCH VERIO FLEX SYSTEM) w/Device Kit Use as directed 1 Kit 0    glucose blood strip Use as directed subcutaneously 4-5 times a day for 30 days. 150 Strip 5    Microlet Lancets Misc Use as directed by fingerstick 4-5 times a day for 30 days. 100 Each 1    Blood Glucose Test Strips Testing before and 1 hour after meals for insulin adjustment during pregnancy. One Touch Verio test strips 500 Strip 3    Lancets Lancets order: Lancets for One Touch Delica.  Use 6 times daily for blood sugar testing. 200 Each 11    hydrOXYzine HCl (ATARAX) 25 MG Tab Take 1 Tab by mouth 3 times a day as needed for Anxiety. 30 Tablet 1    triamcinolone acetonide (KENALOG) 0.1 % Cream Apply once or twice daily to affected area of leg rash for 2 weeks 15 g 1    ONETOUCH VERIO strip Use to test blood sugar 5 Times a Day. 200 Strip 6    metFORMIN ER (GLUCOPHAGE XR) 500 MG TABLET SR 24 HR Take 2 Tablets by mouth 2 times a day. 360 Tablet 3    vitamin D (CHOLECALCIFEROL) 1000 UNIT Tab Take 1 Tablet by mouth every day.       No current [facility]-ordered facility-administered medications on file.       Allergies: Glimepiride [kdc:yellow dye+brilliant blue fcf+glimepiride]    Social History:  Social History     Socioeconomic History    Marital status:    Tobacco Use    Smoking status: Never    Smokeless tobacco: Never   Vaping Use    Vaping Use: Never used   Substance and Sexual Activity    Alcohol use: No    Drug use: No    Sexual activity: Yes     Partners: Male     Comment:  "       Family History:  Family History   Problem Relation Age of Onset    Diabetes Mother     Hypertension Mother     Kidney Disease Mother     Hyperlipidemia Mother     Lung Disease Maternal Grandmother         emphysema (smoker)    Diabetes Maternal Grandmother     No Known Problems Sister     No Known Problems Brother     No Known Problems Brother     No Known Problems Father            Objective:   Vitals:  BP (!) 154/96 (BP Location: Other (Comment), Patient Position: Sitting, BP Cuff Size: Adult)   Ht 5' 2\"   Wt (!) 366 lb   Body mass index is 66.94 kg/m². (Goal BM I>18 <25)    Physical Exam:   Nursing note and vitals reviewed.  Physical Exam   Constitutional: She is oriented to person, place, and time and well-developed, well-nourished, and in no distress.   HENT:   Head: Normocephalic and atraumatic.   Right Ear: External ear normal.   Eyes: Pupils are equal, round, and reactive to light. Conjunctivae are normal.   Neck: No thyromegaly present.   Cardiovascular: Intact distal pulses.   Pulmonary/Chest: Effort normal and breath sounds normal.   Abdominal: Soft. Bowel sounds are normal.   Musculoskeletal:         General: Normal range of motion.      Cervical back: Normal range of motion and neck supple.   Neurological: She is alert and oriented to person, place, and time. Gait normal.   Skin: Skin is warm and dry.   Psychiatric: Mood, memory, affect and judgment normal.   Genitourinary:  Normal appearing external female genitalia without any rashes, lesions, labial fusion or tenderness.  Vagina is pink moist and well rugated. Physiologic discharge present within vaginal vault.  Cervix well visualized without masses or lesions.  On bimanual exam there is no cervical motion tenderness, uterus is midline, not enlarged, fixed, or tender.  No adnexal masses or tenderness.   Breast: No masses, skin dimpling, or lymphadenopathy noted bilaterally.  No nipple discharge.  Nipples everted.      Assessment/Plan: "     1. Postcoital bleeding  US-PELVIC COMPLETE (TRANSABDOMINAL/TRANSVAGINAL) (COMBO)    THINPREP PAP WITH HPV      2. Cervical cancer screening  THINPREP PAP WITH HPV      3. Morbidly obese (HCC)        4. Controlled type 2 diabetes mellitus without complication, without long-term current use of insulin (HCC)        5. Metabolic syndrome        6. Encounter for annual routine gynecological examination            Priti Begum is a 37 y.o.  female who presents for her annual gynecologic exam.   # Preventative health care:   --Breast self awareness discussed. Mammogram (annually starting at age 40).  --Cervical cancer screening. Last Pap . Collected today. HPV sent. I will follow up with patient once results are back as per ASCCP guidelines.   --Smoking - not a smoker.   --Colorectal screening not indicated.   --Immunizations are up to date.  --Diet, exercise, vitamin supplementation, and hydration discussed.  # Contraception: Nexplanon  # STD screening: not requested  # weight and diabetes managed by endocrinology. Discussed weight loss surgery and patient wants to try on her own first because she has been able to do it in the past.   # Follow up annually or prn.      hair removal not indicated

## 2024-02-26 ENCOUNTER — TELEPHONE (OUTPATIENT)
Dept: ENDOCRINOLOGY | Facility: MEDICAL CENTER | Age: 39
End: 2024-02-26
Payer: COMMERCIAL

## 2024-02-26 LAB
22Q112 DEL SYND Q0669: NORMAL
ANNOTATION COMMENT IMP: NORMAL
FET CHR X + Y ANEUP RISK PLAS.CFDNA QN: NORMAL
FET SEX US: NORMAL
FET TS 13 RISK PLAS.CFDNA QL: NORMAL
FET TS 18 RISK PLAS.CFDNA QL: NORMAL
FET TS 21 RISK PLAS.CFDNA QL: NORMAL
FETAL ANEUPLOIDY - TRIPLOIDY NV11651: NORMAL
FETAL FRACTION Q0204: 3.3 %
GA (DAYS): 16 D
GA (WEEKS): 3 WK
LABORATORY REPORT: NORMAL
NUMBER OF FETUSES Q0671: 1
REPORT FETAL SEX NL11652: YES

## 2024-02-26 NOTE — TELEPHONE ENCOUNTER
Called patient and patient reported that she would like to push the refill dates for both her insulins. Patient stated that now that her glucose is controlled after eating, she is not utilizing the Insulin Lispro and would like push the refill call to 3/25/2024. As for the Insulin Glargine, because there is still an issue with the , she would like to push the refill call to 2/28/2024 (she has 2 pens still on hand).     Thank you,  Eunice Jha, [facility]  Endocrinology Pharmacy Coordinator

## 2024-03-06 ENCOUNTER — TELEPHONE (OUTPATIENT)
Dept: PHARMACY | Facility: MEDICAL CENTER | Age: 39
End: 2024-03-06
Payer: COMMERCIAL

## 2024-03-06 PROCEDURE — RXMED WILLOW AMBULATORY MEDICATION CHARGE: Performed by: INTERNAL MEDICINE

## 2024-03-06 NOTE — TELEPHONE ENCOUNTER
LVM regarding refill on insulin glargine 100 UNIT/ML SC SOPN injection #[id number] 33 day supply $20.00    Wandy Rosales, Pharmacy Liaison/ RX Coordinator

## 2024-03-07 ENCOUNTER — PHARMACY VISIT (OUTPATIENT)
Dept: PHARMACY | Facility: MEDICAL CENTER | Age: 39
End: 2024-03-07
Payer: COMMERCIAL

## 2024-03-07 NOTE — TELEPHONE ENCOUNTER
Contact:  Phone number:[phone] (mobile)    Name of person spoken with and relationship to patient: Margarita patient   Patient’s Adherence:  How patient is doing on medication: Very Well    How many missed doses and reason: 0 N/A    Any new medications: No    Any new conditions: No    Any new allergies: No    Any new side effects: No    Any new diagnoses: No    How many doses remainin    Did patient want to speak with pharmacist: Yes   Delivery:  Delivery date and method: 24 via     Needs by Date: 24    Signature required: No     Any additional details for : N/A   Teach Appointment Date:  N/A   Shipping Address:  [address] [place] [id number]   Medication(name,strength and dose):  Insulin Glargine 100iu/mL Sopn Injection   Copay:  $20.00   Payment Method:  Credit card on file   Supplies:  Alcohol Swabs    Additional Information:  NONE     Wandy Rosales, Pharmacy Liaison/ RX Coordinator

## 2024-03-18 ENCOUNTER — NON-PROVIDER VISIT (OUTPATIENT)
Dept: MEDICAL GROUP | Facility: MEDICAL CENTER | Age: 39
End: 2024-03-18
Payer: COMMERCIAL

## 2024-03-18 DIAGNOSIS — R05.1 ACUTE COUGH: ICD-10-CM

## 2024-03-18 LAB
FLUAV RNA SPEC QL NAA+PROBE: NEGATIVE
FLUBV RNA SPEC QL NAA+PROBE: NEGATIVE
RSV RNA SPEC QL NAA+PROBE: NEGATIVE
S PYO DNA SPEC NAA+PROBE: NOT DETECTED
SARS-COV-2 RNA RESP QL NAA+PROBE: NEGATIVE

## 2024-03-18 PROCEDURE — 87651 STREP A DNA AMP PROBE: CPT | Performed by: FAMILY MEDICINE

## 2024-03-18 PROCEDURE — 0241U POCT CEPHEID COV-2, FLU A/B, RSV - PCR: CPT | Performed by: FAMILY MEDICINE

## 2024-03-22 ENCOUNTER — PATIENT MESSAGE (OUTPATIENT)
Dept: MEDICAL GROUP | Facility: MEDICAL CENTER | Age: 39
End: 2024-03-22
Payer: COMMERCIAL

## 2024-03-22 DIAGNOSIS — E11.9 CONTROLLED TYPE 2 DIABETES MELLITUS WITHOUT COMPLICATION, WITHOUT LONG-TERM CURRENT USE OF INSULIN (HCC): ICD-10-CM

## 2024-03-22 PROCEDURE — RXMED WILLOW AMBULATORY MEDICATION CHARGE: Performed by: FAMILY MEDICINE

## 2024-03-22 PROCEDURE — RXMED WILLOW AMBULATORY MEDICATION CHARGE: Performed by: INTERNAL MEDICINE

## 2024-03-22 RX ORDER — BLOOD SUGAR DIAGNOSTIC
1 STRIP MISCELLANEOUS
Qty: 200 STRIP | Refills: 6 | Status: SHIPPED | OUTPATIENT
Start: 2024-03-22

## 2024-03-22 RX ORDER — TRIAMCINOLONE ACETONIDE 1 MG/G
1 CREAM TOPICAL 2 TIMES DAILY
Qty: 45 G | Refills: 0 | Status: SHIPPED | OUTPATIENT
Start: 2024-03-22

## 2024-03-25 ENCOUNTER — PHARMACY VISIT (OUTPATIENT)
Dept: PHARMACY | Facility: MEDICAL CENTER | Age: 39
End: 2024-03-25
Payer: COMMERCIAL

## 2024-03-25 DIAGNOSIS — E11.9 CONTROLLED TYPE 2 DIABETES MELLITUS WITHOUT COMPLICATION, WITHOUT LONG-TERM CURRENT USE OF INSULIN (HCC): ICD-10-CM

## 2024-03-25 DIAGNOSIS — I15.2 HYPERTENSION DUE TO ENDOCRINE DISORDER: ICD-10-CM

## 2024-03-25 RX ORDER — LABETALOL 100 MG/1
100 TABLET, FILM COATED ORAL 2 TIMES DAILY
Qty: 180 TABLET | Refills: 3 | Status: SHIPPED | OUTPATIENT
Start: 2024-03-25

## 2024-03-25 RX ORDER — METFORMIN HYDROCHLORIDE 500 MG/1
1000 TABLET, EXTENDED RELEASE ORAL 2 TIMES DAILY
Qty: 360 TABLET | Refills: 3 | Status: SHIPPED | OUTPATIENT
Start: 2024-03-25

## 2024-03-26 ENCOUNTER — PHARMACY VISIT (OUTPATIENT)
Dept: PHARMACY | Facility: MEDICAL CENTER | Age: 39
End: 2024-03-26

## 2024-03-28 ENCOUNTER — ANCILLARY PROCEDURE (OUTPATIENT)
Dept: MATERNAL FETAL MEDICINE | Facility: MEDICAL CENTER | Age: 39
End: 2024-03-28
Attending: OBSTETRICS & GYNECOLOGY
Payer: COMMERCIAL

## 2024-03-28 VITALS
HEART RATE: 95 BPM | DIASTOLIC BLOOD PRESSURE: 84 MMHG | SYSTOLIC BLOOD PRESSURE: 128 MMHG | BODY MASS INDEX: 69.16 KG/M2 | WEIGHT: 293 LBS

## 2024-03-28 DIAGNOSIS — O10.919 CHRONIC HYPERTENSION IN OBSTETRIC CONTEXT, ANTEPARTUM: ICD-10-CM

## 2024-03-28 PROCEDURE — 76811 OB US DETAILED SNGL FETUS: CPT | Performed by: OBSTETRICS & GYNECOLOGY

## 2024-03-28 PROCEDURE — 76817 TRANSVAGINAL US OBSTETRIC: CPT | Performed by: OBSTETRICS & GYNECOLOGY

## 2024-03-28 ASSESSMENT — FIBROSIS 4 INDEX: FIB4 SCORE: 0.55

## 2024-04-08 ENCOUNTER — ROUTINE PRENATAL (OUTPATIENT)
Dept: OBGYN | Facility: CLINIC | Age: 39
End: 2024-04-08
Payer: COMMERCIAL

## 2024-04-08 VITALS — SYSTOLIC BLOOD PRESSURE: 125 MMHG | BODY MASS INDEX: 69.87 KG/M2 | DIASTOLIC BLOOD PRESSURE: 76 MMHG | WEIGHT: 293 LBS

## 2024-04-08 DIAGNOSIS — R40.0 DAYTIME SOMNOLENCE: ICD-10-CM

## 2024-04-08 DIAGNOSIS — O99.210 OBESITY IN PREGNANCY: ICD-10-CM

## 2024-04-08 DIAGNOSIS — E66.01 MORBID OBESITY (HCC): ICD-10-CM

## 2024-04-08 DIAGNOSIS — E11.9 CONTROLLED TYPE 2 DIABETES MELLITUS WITHOUT COMPLICATION, WITHOUT LONG-TERM CURRENT USE OF INSULIN (HCC): ICD-10-CM

## 2024-04-08 DIAGNOSIS — O10.919 CHRONIC HYPERTENSION AFFECTING PREGNANCY: ICD-10-CM

## 2024-04-08 DIAGNOSIS — O09.92 SUPERVISION OF HIGH RISK PREGNANCY IN SECOND TRIMESTER: Primary | ICD-10-CM

## 2024-04-08 PROCEDURE — 0502F SUBSEQUENT PRENATAL CARE: CPT | Performed by: OBSTETRICS & GYNECOLOGY

## 2024-04-08 PROCEDURE — 3074F SYST BP LT 130 MM HG: CPT | Performed by: OBSTETRICS & GYNECOLOGY

## 2024-04-08 PROCEDURE — 3078F DIAST BP <80 MM HG: CPT | Performed by: OBSTETRICS & GYNECOLOGY

## 2024-04-08 ASSESSMENT — FIBROSIS 4 INDEX: FIB4 SCORE: 0.55

## 2024-04-08 NOTE — PROGRESS NOTES
OB Visit Note - 20w4d     MEDICAL DECISION MAKING:  Marcos Osmel Begum is a 39 y.o. female  at 20w4d    Today's visit addressed:   1. C/o daytime somnolence   - states she can be found falling asleep at desk at work.  This didn't happen in prior pregnancy   - feels like she is sleeping well   - does have a 19mo old at home and works and pregnant, so being chronically tired is not uncommon   - does have significant risk factors however for WILLIAM.  States  will tell her she snores   - referral placed for WILLIAM/sleep eval  2. Pre-exisiting DM   - Lantus has been increased from 32 --> 45 since last visit   - states endocrinologist is managing but she takes her logs to visits. States they check her legs for wounds, etc. Discussed this is standard DM care but unsure they are managing as regularly and strictly as we would in pregnancy   - requested pt bring her logs to each appt   - she thought 45u of Lantus was max.  Informed that it is not.  States fastings are better than last visit ranging around 92-98.  She will increase lantus to 48u and return in 2 weeks with log to OB visit   - fetal echo in 4 weeks   - EFW on anatomy US 98% (measuring 1 wk ahead).  Again stressed importance of glucose control  3. cHTN   - BP wnl today. On labetalol BID    Pregnancy complicated by:  Patient Active Problem List   Diagnosis    Obesity in pregnancy    PCOS (polycystic ovarian syndrome)    Fatty liver disease, nonalcoholic    Chronic hypertension affecting pregnancy    Controlled type 2 diabetes mellitus without complication, without long-term current use of insulin (HCC)    Vitamin D deficiency    Metabolic syndrome    Long term (current) use of oral hypoglycemic drugs    Multigravida of advanced maternal age in second trimester    Skin lesion    Left knee pain    Pregnancy, supervision, high-risk    History of  delivery       The patient will follow up in 2 week(s). She was counseled to call or return  for vaginal bleeding, regular contractions, leakage of fluid or decreased fetal movement.    Shelby Tony D.O.

## 2024-04-08 NOTE — PROGRESS NOTES
Pt states no contractions, no bleeding, no loss of fluids.   Pt states fetal movement   Pt states she is very tired and wants to know if there is anything she can do about it. Especially for work  OB follow up   + fetal movement.  No VB, LOF or UC's.  Phone # [phone]   Preferred pharmacy confirmed.

## 2024-04-25 ENCOUNTER — ANCILLARY PROCEDURE (OUTPATIENT)
Dept: MATERNAL FETAL MEDICINE | Facility: MEDICAL CENTER | Age: 39
End: 2024-04-25
Attending: OBSTETRICS & GYNECOLOGY
Payer: COMMERCIAL

## 2024-04-25 VITALS
WEIGHT: 293 LBS | DIASTOLIC BLOOD PRESSURE: 72 MMHG | SYSTOLIC BLOOD PRESSURE: 127 MMHG | BODY MASS INDEX: 69.72 KG/M2 | HEART RATE: 80 BPM

## 2024-04-25 DIAGNOSIS — E11.9 CONTROLLED TYPE 2 DIABETES MELLITUS WITHOUT COMPLICATION, WITHOUT LONG-TERM CURRENT USE OF INSULIN (HCC): ICD-10-CM

## 2024-04-25 DIAGNOSIS — O09.92 SUPERVISION OF HIGH RISK PREGNANCY IN SECOND TRIMESTER: ICD-10-CM

## 2024-04-25 DIAGNOSIS — O09.522 MULTIGRAVIDA OF ADVANCED MATERNAL AGE IN SECOND TRIMESTER: ICD-10-CM

## 2024-04-25 DIAGNOSIS — O99.210 OBESITY IN PREGNANCY: ICD-10-CM

## 2024-04-25 DIAGNOSIS — Z98.891 HISTORY OF CESAREAN DELIVERY: ICD-10-CM

## 2024-04-25 DIAGNOSIS — O16.2 HYPERTENSION AFFECTING PREGNANCY IN SECOND TRIMESTER: ICD-10-CM

## 2024-04-25 DIAGNOSIS — Z3A.23 23 WEEKS GESTATION OF PREGNANCY: ICD-10-CM

## 2024-04-25 DIAGNOSIS — O10.919 CHRONIC HYPERTENSION IN OBSTETRIC CONTEXT, ANTEPARTUM: Primary | ICD-10-CM

## 2024-04-25 DIAGNOSIS — O24.912 DIABETES MELLITUS AFFECTING PREGNANCY IN SECOND TRIMESTER: ICD-10-CM

## 2024-04-25 DIAGNOSIS — O10.919 CHRONIC HYPERTENSION AFFECTING PREGNANCY: ICD-10-CM

## 2024-04-25 LAB
APPEARANCE UR: NORMAL
BILIRUB UR STRIP-MCNC: NEGATIVE MG/DL
COLOR UR AUTO: YELLOW
GLUCOSE UR STRIP.AUTO-MCNC: NEGATIVE MG/DL
KETONES UR STRIP.AUTO-MCNC: NEGATIVE MG/DL
LEUKOCYTE ESTERASE UR QL STRIP.AUTO: NEGATIVE
NITRITE UR QL STRIP.AUTO: NEGATIVE
PH UR STRIP.AUTO: 6 [PH] (ref 5–8)
PROT UR QL STRIP: NEGATIVE MG/DL
RBC UR QL AUTO: NEGATIVE
SP GR UR STRIP.AUTO: 1.03
UROBILINOGEN UR STRIP-MCNC: 0.2 MG/DL

## 2024-04-25 PROCEDURE — 81002 URINALYSIS NONAUTO W/O SCOPE: CPT | Performed by: OBSTETRICS & GYNECOLOGY

## 2024-04-25 ASSESSMENT — FIBROSIS 4 INDEX: FIB4 SCORE: 0.55

## 2024-04-29 PROCEDURE — RXMED WILLOW AMBULATORY MEDICATION CHARGE: Performed by: INTERNAL MEDICINE

## 2024-04-30 ENCOUNTER — ROUTINE PRENATAL (OUTPATIENT)
Dept: OBGYN | Facility: CLINIC | Age: 39
End: 2024-04-30
Payer: COMMERCIAL

## 2024-04-30 VITALS — DIASTOLIC BLOOD PRESSURE: 86 MMHG | SYSTOLIC BLOOD PRESSURE: 144 MMHG | BODY MASS INDEX: 69.87 KG/M2 | WEIGHT: 293 LBS

## 2024-04-30 DIAGNOSIS — O99.210 OBESITY IN PREGNANCY: ICD-10-CM

## 2024-04-30 DIAGNOSIS — O09.92 SUPERVISION OF HIGH RISK PREGNANCY IN SECOND TRIMESTER: Primary | ICD-10-CM

## 2024-04-30 DIAGNOSIS — O09.522 MULTIGRAVIDA OF ADVANCED MATERNAL AGE IN SECOND TRIMESTER: ICD-10-CM

## 2024-04-30 DIAGNOSIS — E11.9 CONTROLLED TYPE 2 DIABETES MELLITUS WITHOUT COMPLICATION, WITHOUT LONG-TERM CURRENT USE OF INSULIN (HCC): ICD-10-CM

## 2024-04-30 DIAGNOSIS — O10.919 CHRONIC HYPERTENSION AFFECTING PREGNANCY: ICD-10-CM

## 2024-04-30 ASSESSMENT — FIBROSIS 4 INDEX: FIB4 SCORE: 0.55

## 2024-04-30 NOTE — PROGRESS NOTES
OB follow up   + fetal movement.  No VB, LOF or UC's.  Phone # [phone]  Preferred pharmacy confirmed.

## 2024-05-01 ENCOUNTER — PHARMACY VISIT (OUTPATIENT)
Dept: PHARMACY | Facility: MEDICAL CENTER | Age: 39
End: 2024-05-01
Payer: COMMERCIAL

## 2024-05-07 ENCOUNTER — APPOINTMENT (OUTPATIENT)
Dept: ENDOCRINOLOGY | Facility: MEDICAL CENTER | Age: 39
End: 2024-05-07
Attending: INTERNAL MEDICINE
Payer: COMMERCIAL

## 2024-05-14 ENCOUNTER — TELEPHONE (OUTPATIENT)
Dept: HEALTH INFORMATION MANAGEMENT | Facility: OTHER | Age: 39
End: 2024-05-14
Payer: COMMERCIAL

## 2024-05-18 ENCOUNTER — HOSPITAL ENCOUNTER (EMERGENCY)
Facility: MEDICAL CENTER | Age: 39
End: 2024-05-18
Attending: STUDENT IN AN ORGANIZED HEALTH CARE EDUCATION/TRAINING PROGRAM | Admitting: STUDENT IN AN ORGANIZED HEALTH CARE EDUCATION/TRAINING PROGRAM
Payer: COMMERCIAL

## 2024-05-18 VITALS
HEIGHT: 62 IN | RESPIRATION RATE: 20 BRPM | SYSTOLIC BLOOD PRESSURE: 128 MMHG | WEIGHT: 293 LBS | DIASTOLIC BLOOD PRESSURE: 68 MMHG | OXYGEN SATURATION: 95 % | HEART RATE: 88 BPM | TEMPERATURE: 97.7 F | BODY MASS INDEX: 53.92 KG/M2

## 2024-05-18 LAB
B PARAP IS1001 DNA NPH QL NAA+NON-PROBE: NOT DETECTED
B PERT.PT PRMT NPH QL NAA+NON-PROBE: NOT DETECTED
C PNEUM DNA NPH QL NAA+NON-PROBE: NOT DETECTED
FLUAV RNA NPH QL NAA+NON-PROBE: NOT DETECTED
FLUBV RNA NPH QL NAA+NON-PROBE: NOT DETECTED
HADV DNA NPH QL NAA+NON-PROBE: NOT DETECTED
HCOV 229E RNA NPH QL NAA+NON-PROBE: NOT DETECTED
HCOV HKU1 RNA NPH QL NAA+NON-PROBE: NOT DETECTED
HCOV NL63 RNA NPH QL NAA+NON-PROBE: NOT DETECTED
HCOV OC43 RNA NPH QL NAA+NON-PROBE: NOT DETECTED
HMPV RNA NPH QL NAA+NON-PROBE: NOT DETECTED
HPIV1 RNA NPH QL NAA+NON-PROBE: NOT DETECTED
HPIV2 RNA NPH QL NAA+NON-PROBE: NOT DETECTED
HPIV3 RNA NPH QL NAA+NON-PROBE: NOT DETECTED
HPIV4 RNA NPH QL NAA+NON-PROBE: NOT DETECTED
M PNEUMO DNA NPH QL NAA+NON-PROBE: NOT DETECTED
RSV RNA NPH QL NAA+NON-PROBE: NOT DETECTED
RV+EV RNA NPH QL NAA+NON-PROBE: DETECTED
SARS-COV-2 RNA NPH QL NAA+NON-PROBE: NOTDETECTED

## 2024-05-18 PROCEDURE — 99282 EMERGENCY DEPT VISIT SF MDM: CPT | Mod: GC | Performed by: STUDENT IN AN ORGANIZED HEALTH CARE EDUCATION/TRAINING PROGRAM

## 2024-05-18 ASSESSMENT — FIBROSIS 4 INDEX: FIB4 SCORE: 0.55

## 2024-05-18 ASSESSMENT — PAIN SCALES - GENERAL: PAINLEVEL: 3

## 2024-05-19 NOTE — PROGRESS NOTES
G 2P 1, EDC-8/22 = 26.2 weeks,  Hx- c/s x1, DM type 2 (on metformin and insulin at bedtime) CHTN (takes Labetalol 200 mg BID)    Patient presents to L&D  with C/O cough/ cold for a little over a week and cramps in her abdomen and back. Patient denies any leaking of fluid or any vaginal bleeding. EFM and TOCO applied. States positive fetal movement. VSS.     1700- Dr Morton in department. Updated on patient's arrival and complaints.     1750- Dr Morton at bedside. Assessment done.     1805- respiratory swab collected and sent to lab.

## 2024-05-19 NOTE — PROGRESS NOTES
1900: Report from Juwan ALEJANDRA. Care assumed at this time.    1940: Dr. Baker updated on patient status. Per MD OK for patient to d/c home and to be notified of any positive result for swab. Patient OK with this plan. Phone number in Epic up to date.     1944: Discharge order received. PTL precautions gone over with patient, including to return for contractions, LOF, VB, or a decrease in FM. Hydration enforced. Patient to continue with scheduled prenatal appointments, next one is May 22. Patient verbalized understanding. Ambulated out in stable condition, FOB at side, belongings in hand.

## 2024-05-19 NOTE — ED PROVIDER NOTES
LABOR AND DELIVERY TRIAGE NOTE    PATIENT ID:  NAME:  Priti Begum  MRN:               [id number]  YOB: 1985     39 y.o. female  at 26w2d.    Subjective: Pt presents abdominal cramping today and cough x 1 week. States that she started to develop dry cough last weekend after traveling to [place]. Had known sick contact with nephew. Denies fever/chills, nausea, vomiting, dysuria, headache, SOB, or new swelling. No VB, LOF, normal FM.     Has a hx of T2DM on insulin and metformin. FSBS has been in the 90s and she has been compliant with her insulin 58u nightly. Also takes labetalol 100mg twice daily for chronic hypertension, takes it once in the morning and afternoon and has not taken her afternoon dose yet.     PNL:  Blood Type O positive, Rubella immune, HIV neg, TrepAb neg, HBsAg NR, GC/CT neg/neg    ROS: Patient denies any fever chills, nausea, vomiting, headache, chest pain, shortness of breath, or dysuria or unusual swelling of hands or feet.     PMHx:   Past Medical History:   Diagnosis Date    Diabetes (HCC)     Hypertension         OB History    Para Term  AB Living   2 1 1 0 0 1   SAB IAB Ectopic Molar Multiple Live Births   0 0 0   0 1      # Outcome Date GA Lbr Jaison/2nd Weight Sex Delivery Anes PTL Lv   2 Current            1 Term 22 38w2d  3.18 kg (7 lb 0.2 oz) M CS-LTranv EPI, Spinal N GENOVEVA      Complications: Failure to Progress in First Stage       GYN: denies STIs, no cervical procedures    PSHx:  Past Surgical History:   Procedure Laterality Date    PRIMARY C SECTION Bilateral 2022    Procedure:  SECTION, PRIMARY;  Surgeon: Starr Tanner M.D.;  Location: SURGERY LABOR AND DELIVERY;  Service: Labor and Delivery       Social Hx:  Social History     Tobacco Use    Smoking status: Never    Smokeless tobacco: Never   Vaping Use    Vaping status: Never Used   Substance Use Topics    Alcohol use: No    Drug use: No         Medications:  No  current facility-administered medications on file prior to encounter.     Current Outpatient Medications on File Prior to Encounter   Medication Sig Dispense Refill    Prenatal Multivit-Min-Fe-FA (PRENATAL 1 + IRON PO) Take  by mouth.      labetalol (NORMODYNE) 100 MG Tab Take 1 Tablet by mouth 2 times a day. 180 Tablet 3    metFORMIN ER (GLUCOPHAGE XR) 500 MG TABLET SR 24 HR Take 2 Tablets by mouth 2 times a day. 360 Tablet 3    triamcinolone acetonide (KENALOG) 0.1 % Cream Apply 1 Application topically 2 times a day. 45 g 0    ONETOUCH VERIO strip Use to test blood sugar 5 Times a Day. 200 Strip 6    insulin glargine 100 UNIT/ML SC SOPN injection Inject 45 Units under the skin every evening. 15 mL 6    insulin lispro 100 UNIT/ML SC SOPN injection PEN Inject 2-15 Units under the skin 3 times a day before meals. 15 mL 6    Continuous Blood Gluc Sensor (DEXCOM G7 SENSOR) Misc 1 Each every 10 days. 3 Each 6    BD PEN NEEDLE AMINTA U/F Inject 1 Each under the skin 4 times a day. 300 Each 3    aspirin 81 MG EC tablet Take 81 mg by mouth every day.      Doxylamine-Pyridoxine 10-10 MG Tablet Delayed Response delayed-release tablet Take 1 Tablet by mouth 2 times a day. 60 Tablet 3    metFORMIN ER (GLUCOPHAGE XR) 500 MG TABLET SR 24 HR Take 2 Tablets by mouth 2 times a day. 360 Tablet 3    Blood Glucose Monitoring Suppl (ONETOUCH VERIO FLEX SYSTEM) w/Device Kit Use as directed 1 Kit 0    glucose blood strip Use as directed subcutaneously 4-5 times a day for 30 days. 150 Strip 5    Microlet Lancets Misc Use as directed by fingerstick 4-5 times a day for 30 days. 100 Each 1    Blood Glucose Test Strips Testing before and 1 hour after meals for insulin adjustment during pregnancy. One Touch Verio test strips 500 Strip 3    Lancets Lancets order: Lancets for One Touch Delica.  Use 6 times daily for blood sugar testing. 200 Each 11    vitamin D (CHOLECALCIFEROL) 1000 UNIT Tab Take 2,000 Units by mouth every day.    "      Allergies:  Allergies   Allergen Reactions    Glimepiride [Kdc:Yellow Dye+Brilliant Blue Fcf+Glimepiride] Unspecified     \"gittery\"         Objective:    Vitals:    24 1650 24 1700 24 1710 24 1715   BP:  133/75 138/76 139/78   Pulse: 97 96 94 94   Resp:       Temp:       TempSrc:       SpO2: 95% 95% 90% 97%   Weight:       Height:         Temp (24hrs), Av.5 °C (97.7 °F), Min:36.5 °C (97.7 °F), Max:36.5 °C (97.7 °F)    General: No acute distress, resting comfortably in bed, obese habitus   HEENT: normocephalic, nontraumatic, PERRLA, EOMI  Cardiovascular: Heart RRR with no murmurs, rubs or gallops. Distal Pulses 2+  Respiratory: intermittent cough, lungs CTAB, poor air movement due to habitus, no wheezes, rales, rhonchi   Abdomen: gravid, nontender throughout to deep palpation, no r/g/r  Musculoskeletal: MCRAE spontaneously, no pitting edema to lower extremities   Neuro: non focal with no numbness, tingling or changes in sensation    Cervix:  Deferred  [place]: No uterine contractions   FHRM: Baseline 130s, moderate variability, + accels, no decels    Assessment: 39 y.o. female  at 26w2d presents with abdominal cramping and cough. Arrived to triage for concern for contractions. NST reactive, no contractions on toco.  No VB, LOF, normal FM. Respiratory swab obtained; likely viral URI with cough causing muscular strain. Patient is cleared to return home with family. Encouraged conservative management for cough and symptoms including adequate hydration. She should return for increased painful uterine contractions @ 3-5, vaginal bleeding, loss of fluid, or other serious symptoms.    Discussed case with Dr. Salvador Baker, [facility] Attending. Case was discussed and attending agreed with plan prior to discharge of patient.      Celio Morton D.O.  PGY-1, [employer] [facility]      "

## 2024-05-22 ENCOUNTER — ROUTINE PRENATAL (OUTPATIENT)
Dept: OBGYN | Facility: CLINIC | Age: 39
End: 2024-05-22
Payer: COMMERCIAL

## 2024-05-22 VITALS — DIASTOLIC BLOOD PRESSURE: 86 MMHG | SYSTOLIC BLOOD PRESSURE: 137 MMHG | WEIGHT: 293 LBS | BODY MASS INDEX: 69.14 KG/M2

## 2024-05-22 DIAGNOSIS — E11.9 TYPE 2 DIABETES MELLITUS WITHOUT COMPLICATION, WITHOUT LONG-TERM CURRENT USE OF INSULIN (HCC): ICD-10-CM

## 2024-05-22 DIAGNOSIS — O10.919 CHRONIC HYPERTENSION AFFECTING PREGNANCY: ICD-10-CM

## 2024-05-22 DIAGNOSIS — E66.01 MORBID OBESITY (HCC): ICD-10-CM

## 2024-05-22 DIAGNOSIS — Z34.90 PREGNANCY, UNSPECIFIED GESTATIONAL AGE: ICD-10-CM

## 2024-05-22 PROCEDURE — 3079F DIAST BP 80-89 MM HG: CPT | Performed by: OBSTETRICS & GYNECOLOGY

## 2024-05-22 PROCEDURE — 0502F SUBSEQUENT PRENATAL CARE: CPT | Performed by: OBSTETRICS & GYNECOLOGY

## 2024-05-22 PROCEDURE — 3075F SYST BP GE 130 - 139MM HG: CPT | Performed by: OBSTETRICS & GYNECOLOGY

## 2024-05-22 ASSESSMENT — FIBROSIS 4 INDEX: FIB4 SCORE: 0.55

## 2024-05-22 NOTE — PROGRESS NOTES
OB Visit Note - 26w6d     MEDICAL DECISION MAKING:  Priti Begum is a 39 y.o. female  at 26w6d who presents for prenatal care. She is feeling good fetal movement. She denies leaking or bleeding. She is recovering from a URI so she has had some cramping that has improved, she was seen in triage to rule out  labor. MTL ordered. She is a known Type 2 Diabetic and normally follows with a diabetic clinic. She reports that she forgot her logs, but she increased her Lantus to 60 units nightly and her fastings are in 90's now. She thinks she would like a tubal ligation with her repeat , but is going to continue the conversation with her  before making a final decision. Discussed that she will start NSTs at 32 weeks unless MFM would like us to start them sooner.     Pregnancy complicated by:  Patient Active Problem List   Diagnosis    Obesity in pregnancy    PCOS (polycystic ovarian syndrome)    Fatty liver disease, nonalcoholic    Chronic hypertension affecting pregnancy    Controlled type 2 diabetes mellitus without complication, without long-term current use of insulin (HCC)    Vitamin D deficiency    Metabolic syndrome    Long term (current) use of oral hypoglycemic drugs    Multigravida of advanced maternal age in second trimester    Skin lesion    Left knee pain    Pregnancy, supervision, high-risk    History of  delivery     The patient will follow up in 2 week(s). She was counseled to call or return for vaginal bleeding, regular contractions, leakage of fluid or decreased fetal movement.    Luis Manuel Strickland M.D.

## 2024-05-22 NOTE — PROGRESS NOTES
Pt. Here for OB/FU.   Reports Good FM.   Chaperone offered.   CHET explained and given.   Pt states she has been sick with an upper respiratory infection for 3 weeks now.   Reports she does not have sugar logs with her but has not had any changes since her last visit.   Phone/Pharm verified.

## 2024-05-22 NOTE — LETTER
"Count Your Baby's Movements  Another step to a healthy delivery    Nanjemoy Osmel Begum  [facility]  Dept: [phone]    How Many Weeks Pregnant? 26w6d    Date to Begin Counting: Today !                How to use this chart    One way for your physician to keep track of your baby's health is by knowing how often the baby moves (or \"kicks\") in your womb.  You can help your physician to do this by using this chart every day.    Every day, you should see how many hours it takes for your baby to move 10 times.  Start in the morning, as soon as you get up.    First, write down the time your baby moves until you get to 10.  Check off one box every time your baby moves until you get to 10.  Write down the time you finished counting in the last column.  Total how long it took to count up all 10 movements.  Finally, fill in the box that shows how long this took.  After counting 10 movements, you no longer have to count any more that day.  The next morning, just start counting again as soon as you get up.    What should you call a \"movement\"?  It is hard to say, because it will feel different from one mother to another and from one pregnancy to the next.  The important thing is that you count the movements the same way throughout your pregnancy.  If you have more questions, you should ask your physician.    Count carefully every day!  SAMPLE:  Week 28    How many hours did it take to feel 10 movements?       Start  Time     1     2     3     4     5     6     7     8     9     10   Finish Time   Mon 8:20           11:40   Tue Wed Thu Fri               Sat               Sun                 IMPORTANT: You should contact your physician if it takes more than two hours for you to feel 10 movements.  Each morning, write down the time and start to count the movements of your baby.  Keep track by checking off one box every time you feel one movement.  When " "you have felt 10 \"kicks\", write down the time you finished counting in the last column.  Then fill in the   box (over the check tom) for the number of hours it took.  Be sure to read the complete instructions on the previous page.            "

## 2024-05-29 ENCOUNTER — APPOINTMENT (OUTPATIENT)
Dept: MATERNAL FETAL MEDICINE | Facility: MEDICAL CENTER | Age: 39
End: 2024-05-29
Attending: OBSTETRICS & GYNECOLOGY
Payer: COMMERCIAL

## 2024-05-29 VITALS
DIASTOLIC BLOOD PRESSURE: 90 MMHG | BODY MASS INDEX: 69.56 KG/M2 | SYSTOLIC BLOOD PRESSURE: 125 MMHG | WEIGHT: 293 LBS | HEART RATE: 98 BPM

## 2024-05-29 DIAGNOSIS — O99.210 OBESITY IN PREGNANCY: ICD-10-CM

## 2024-05-29 DIAGNOSIS — Z98.891 HISTORY OF CESAREAN DELIVERY: ICD-10-CM

## 2024-05-29 DIAGNOSIS — E11.9 CONTROLLED TYPE 2 DIABETES MELLITUS WITHOUT COMPLICATION, WITHOUT LONG-TERM CURRENT USE OF INSULIN (HCC): ICD-10-CM

## 2024-05-29 DIAGNOSIS — O10.919 CHRONIC HYPERTENSION AFFECTING PREGNANCY: ICD-10-CM

## 2024-05-29 DIAGNOSIS — O09.522 MULTIGRAVIDA OF ADVANCED MATERNAL AGE IN SECOND TRIMESTER: ICD-10-CM

## 2024-05-29 DIAGNOSIS — O09.92 SUPERVISION OF HIGH RISK PREGNANCY IN SECOND TRIMESTER: ICD-10-CM

## 2024-05-29 DIAGNOSIS — O10.919 CHRONIC HYPERTENSION IN OBSTETRIC CONTEXT, ANTEPARTUM: ICD-10-CM

## 2024-05-29 ASSESSMENT — FIBROSIS 4 INDEX: FIB4 SCORE: 0.55

## 2024-05-30 ENCOUNTER — TELEPHONE (OUTPATIENT)
Dept: ENDOCRINOLOGY | Facility: MEDICAL CENTER | Age: 39
End: 2024-05-30
Payer: COMMERCIAL

## 2024-05-30 PROCEDURE — RXMED WILLOW AMBULATORY MEDICATION CHARGE: Performed by: INTERNAL MEDICINE

## 2024-05-30 PROCEDURE — RXMED WILLOW AMBULATORY MEDICATION CHARGE: Performed by: FAMILY MEDICINE

## 2024-05-30 NOTE — TELEPHONE ENCOUNTER
Contact:  Phone number:[phone] (mobile)    Name of person spoken with and relationship to patient: patient   Patient’s Adherence:  How patient is doing on medication: Ok or neutral    How many missed doses and reason: 0 N/A    Any new medications: No    Any new conditions: No    Any new allergies: No    Any new side effects: No    Any new diagnoses: No    How many doses remainin    Did patient want to speak with pharmacist: No   Delivery:  Delivery date and method: 24 via     Needs by Date: 24    Signature required: No     Any additional details for : N/A   Teach Appointment Date:  N/A   Shipping Address:  [address] [place] [id number]   Medication(name,strength and dose):  Glargine 100unit/ml pen, Labetolol 100mg, Metformin ER 500mg    Copay:  $41.67   Payment Method:  Credit card on file   Supplies:  N/a   Additional Information:  N/a     Thank you,  Radha Geronimo [facility]  Endocrinology Coordinator   [phone]

## 2024-05-31 ENCOUNTER — PHARMACY VISIT (OUTPATIENT)
Dept: PHARMACY | Facility: MEDICAL CENTER | Age: 39
End: 2024-05-31
Payer: COMMERCIAL

## 2024-06-07 ENCOUNTER — ROUTINE PRENATAL (OUTPATIENT)
Dept: OBGYN | Facility: CLINIC | Age: 39
End: 2024-06-07
Payer: COMMERCIAL

## 2024-06-07 ENCOUNTER — HOSPITAL ENCOUNTER (OUTPATIENT)
Dept: LAB | Facility: MEDICAL CENTER | Age: 39
End: 2024-06-07
Attending: OBSTETRICS & GYNECOLOGY
Payer: COMMERCIAL

## 2024-06-07 ENCOUNTER — HOSPITAL ENCOUNTER (OUTPATIENT)
Dept: LAB | Facility: MEDICAL CENTER | Age: 39
End: 2024-06-07
Attending: FAMILY MEDICINE
Payer: COMMERCIAL

## 2024-06-07 VITALS — DIASTOLIC BLOOD PRESSURE: 76 MMHG | WEIGHT: 293 LBS | BODY MASS INDEX: 69.25 KG/M2 | SYSTOLIC BLOOD PRESSURE: 121 MMHG

## 2024-06-07 DIAGNOSIS — E66.01 MORBID OBESITY WITH BMI OF 60.0-69.9, ADULT (HCC): ICD-10-CM

## 2024-06-07 DIAGNOSIS — Z11.59 ENCOUNTER FOR HEPATITIS C SCREENING TEST FOR LOW RISK PATIENT: ICD-10-CM

## 2024-06-07 DIAGNOSIS — Z00.00 PHYSICAL EXAM: ICD-10-CM

## 2024-06-07 DIAGNOSIS — O09.93 HIGH-RISK PREGNANCY IN THIRD TRIMESTER: ICD-10-CM

## 2024-06-07 DIAGNOSIS — O24.113 TYPE 2 DIABETES MELLITUS AFFECTING PREGNANCY IN THIRD TRIMESTER, ANTEPARTUM: ICD-10-CM

## 2024-06-07 DIAGNOSIS — E11.9 TYPE 2 DIABETES MELLITUS WITHOUT COMPLICATION, WITHOUT LONG-TERM CURRENT USE OF INSULIN (HCC): ICD-10-CM

## 2024-06-07 DIAGNOSIS — Z34.90 PREGNANCY, UNSPECIFIED GESTATIONAL AGE: ICD-10-CM

## 2024-06-07 LAB
BASOPHILS # BLD AUTO: 0.3 % (ref 0–1.8)
BASOPHILS # BLD: 0.03 K/UL (ref 0–0.12)
EOSINOPHIL # BLD AUTO: 0.16 K/UL (ref 0–0.51)
EOSINOPHIL NFR BLD: 1.8 % (ref 0–6.9)
ERYTHROCYTE [DISTWIDTH] IN BLOOD BY AUTOMATED COUNT: 46.1 FL (ref 35.9–50)
EST. AVERAGE GLUCOSE BLD GHB EST-MCNC: 128 MG/DL
HBA1C MFR BLD: 6.1 % (ref 4–5.6)
HCT VFR BLD AUTO: 32.8 % (ref 37–47)
HCV AB SER QL: NORMAL
HGB BLD-MCNC: 10 G/DL (ref 12–16)
IMM GRANULOCYTES # BLD AUTO: 0.04 K/UL (ref 0–0.11)
IMM GRANULOCYTES NFR BLD AUTO: 0.4 % (ref 0–0.9)
LYMPHOCYTES # BLD AUTO: 1.74 K/UL (ref 1–4.8)
LYMPHOCYTES NFR BLD: 19.3 % (ref 22–41)
MCH RBC QN AUTO: 24.3 PG (ref 27–33)
MCHC RBC AUTO-ENTMCNC: 30.5 G/DL (ref 32.2–35.5)
MCV RBC AUTO: 79.6 FL (ref 81.4–97.8)
MONOCYTES # BLD AUTO: 0.46 K/UL (ref 0–0.85)
MONOCYTES NFR BLD AUTO: 5.1 % (ref 0–13.4)
NEUTROPHILS # BLD AUTO: 6.59 K/UL (ref 1.82–7.42)
NEUTROPHILS NFR BLD: 73.1 % (ref 44–72)
NRBC # BLD AUTO: 0 K/UL
NRBC BLD-RTO: 0 /100 WBC (ref 0–0.2)
PLATELET # BLD AUTO: 283 K/UL (ref 164–446)
PMV BLD AUTO: 10.2 FL (ref 9–12.9)
RBC # BLD AUTO: 4.12 M/UL (ref 4.2–5.4)
T PALLIDUM AB SER QL IA: NORMAL
WBC # BLD AUTO: 9 K/UL (ref 4.8–10.8)

## 2024-06-07 PROCEDURE — 86787 VARICELLA-ZOSTER ANTIBODY: CPT

## 2024-06-07 PROCEDURE — 90715 TDAP VACCINE 7 YRS/> IM: CPT | Performed by: OBSTETRICS & GYNECOLOGY

## 2024-06-07 PROCEDURE — 3078F DIAST BP <80 MM HG: CPT | Performed by: OBSTETRICS & GYNECOLOGY

## 2024-06-07 PROCEDURE — 90471 IMMUNIZATION ADMIN: CPT | Performed by: OBSTETRICS & GYNECOLOGY

## 2024-06-07 PROCEDURE — 36415 COLL VENOUS BLD VENIPUNCTURE: CPT

## 2024-06-07 PROCEDURE — 83036 HEMOGLOBIN GLYCOSYLATED A1C: CPT

## 2024-06-07 PROCEDURE — 0502F SUBSEQUENT PRENATAL CARE: CPT | Performed by: OBSTETRICS & GYNECOLOGY

## 2024-06-07 PROCEDURE — 86803 HEPATITIS C AB TEST: CPT

## 2024-06-07 PROCEDURE — 86780 TREPONEMA PALLIDUM: CPT

## 2024-06-07 PROCEDURE — 3074F SYST BP LT 130 MM HG: CPT | Performed by: OBSTETRICS & GYNECOLOGY

## 2024-06-07 PROCEDURE — 85025 COMPLETE CBC W/AUTO DIFF WBC: CPT

## 2024-06-07 ASSESSMENT — FIBROSIS 4 INDEX: FIB4 SCORE: 0.55

## 2024-06-07 NOTE — PROGRESS NOTES
Pt. Here for OB/FU.   29w1d  Reports Good FM.   Pt. Denies VB, LOF, or UC's.   Pt c/o a cough that has been ongoing for over a month. She has blood work ordered and states she will be doing them tomorrow AM. She has not been able to track her sugar logs because she has not been able to get her device to work, but she states that her PCP will be working with her to figure this out.   Tdap today.  Phone/Pharm verified.      NDC: 59129-483-11  LOT#:   Expiration Date: 26    Dose: 0.5ml  Site: Left Deltoid    Patient educated on use and side effects of medication. Name and  verified prior to injection. Pt tolerated? Yes     Administered by Nano Baires, Med Ass't at 9:47 AM.

## 2024-06-07 NOTE — PROGRESS NOTES
OB Visit Note - 29w1d     MEDICAL DECISION MAKING:  Priti Begum is a 39 y.o. female  at 29w1d    Today's visit addressed:   1.  Glucose control  2.  Chronic cough  3.  Considering permanent sterilization    The patient does not have her log today however she does report fasting sugars between 90 and 95 on her current dose of insulin.  She is taking 66 units of lispro.  She does follow-up with her primary care physician who is managing her glucose monitor.  The patient reports good fetal movement, no contractions, irregular bleeding or discharge.  She does report a chronic cough after a viral infection approximately 1 month ago.  She is getting a D PT vaccine today.    Pregnancy complicated by:  Patient Active Problem List   Diagnosis    Obesity in pregnancy    PCOS (polycystic ovarian syndrome)    Fatty liver disease, nonalcoholic    Chronic hypertension affecting pregnancy    Controlled type 2 diabetes mellitus without complication, without long-term current use of insulin (HCC)    Vitamin D deficiency    Metabolic syndrome    Long term (current) use of oral hypoglycemic drugs    Multigravida of advanced maternal age in second trimester    Skin lesion    Left knee pain    Pregnancy, supervision, high-risk    History of  delivery       The patient will follow up in 2 weeks or as needed.  She has a MFM appointment in approximately 2 and half weeks. She was counseled to call or return for vaginal bleeding, regular contractions, leakage of fluid or decreased fetal movement.    Nikolay Gee M.D.

## 2024-06-09 LAB — VZV IGG SER IA-ACNC: 789.2 IV

## 2024-06-10 ENCOUNTER — APPOINTMENT (OUTPATIENT)
Dept: OBGYN | Facility: CLINIC | Age: 39
End: 2024-06-10
Payer: COMMERCIAL

## 2024-06-10 DIAGNOSIS — O99.019 ANTEPARTUM ANEMIA: ICD-10-CM

## 2024-06-10 RX ORDER — FERROUS SULFATE 325(65) MG
325 TABLET ORAL DAILY
Qty: 30 TABLET | Refills: 5 | Status: SHIPPED | OUTPATIENT
Start: 2024-06-10

## 2024-06-14 ENCOUNTER — HOSPITAL ENCOUNTER (OUTPATIENT)
Dept: LAB | Facility: MEDICAL CENTER | Age: 39
End: 2024-06-14
Attending: OBSTETRICS & GYNECOLOGY
Payer: COMMERCIAL

## 2024-06-14 ENCOUNTER — PHARMACY VISIT (OUTPATIENT)
Dept: PHARMACY | Facility: MEDICAL CENTER | Age: 39
End: 2024-06-14
Payer: COMMERCIAL

## 2024-06-14 DIAGNOSIS — O99.019 ANTEPARTUM ANEMIA: ICD-10-CM

## 2024-06-14 LAB
FERRITIN SERPL-MCNC: 6.3 NG/ML (ref 10–291)
IRON SATN MFR SERPL: 8 % (ref 15–55)
IRON SERPL-MCNC: 33 UG/DL (ref 40–170)
TIBC SERPL-MCNC: 419 UG/DL (ref 250–450)
UIBC SERPL-MCNC: 386 UG/DL (ref 110–370)

## 2024-06-14 PROCEDURE — 83550 IRON BINDING TEST: CPT

## 2024-06-14 PROCEDURE — 83540 ASSAY OF IRON: CPT

## 2024-06-14 PROCEDURE — RXMED WILLOW AMBULATORY MEDICATION CHARGE: Performed by: OBSTETRICS & GYNECOLOGY

## 2024-06-14 PROCEDURE — 83021 HEMOGLOBIN CHROMOTOGRAPHY: CPT

## 2024-06-14 PROCEDURE — 82728 ASSAY OF FERRITIN: CPT

## 2024-06-14 PROCEDURE — 36415 COLL VENOUS BLD VENIPUNCTURE: CPT

## 2024-06-17 LAB
HGB A1 MFR BLD: 96.8 % (ref 95–97.9)
HGB A2 MFR BLD: 2.9 % (ref 2–3.5)
HGB C MFR BLD: 0 % (ref 0–0)
HGB E MFR BLD: 0 % (ref 0–0)
HGB F MFR BLD: 0.3 % (ref 0–2.1)
HGB FRACT BLD ELPH-IMP: NORMAL
HGB OTHER MFR BLD: 0 % (ref 0–0)
HGB S BLD QL SOLY: NORMAL
HGB S MFR BLD: 0 % (ref 0–0)
PATH INTERP BLD-IMP: NORMAL

## 2024-06-24 ENCOUNTER — TELEPHONE (OUTPATIENT)
Dept: ENDOCRINOLOGY | Facility: MEDICAL CENTER | Age: 39
End: 2024-06-24
Payer: COMMERCIAL

## 2024-06-24 PROCEDURE — RXMED WILLOW AMBULATORY MEDICATION CHARGE: Performed by: INTERNAL MEDICINE

## 2024-06-24 NOTE — TELEPHONE ENCOUNTER
Contact:  Phone number:[phone] (mobile)    Name of person spoken with and relationship to patient: patient   Patient’s Adherence:  How patient is doing on medication: Ok or neutral    How many missed doses and reason: 0 N/A    Any new medications: No    Any new conditions: No    Any new allergies: No    Any new side effects: No    Any new diagnoses: No    How many doses remainin    Did patient want to speak with pharmacist: No   Delivery:  Delivery date and method:  via 24    Needs by Date: 24    Signature required: No     Any additional details for : N/A   Teach Appointment Date:  N/A   Shipping Address:  [address] [place] [id number]   Medication(name,strength and dose):    Insulin Glargine 100unit/ml Inj Pen                   Copay:  $20.00   Payment Method:  Credit card on file   Supplies:  N/a   Additional Information:  N/a   Thank you,  Radha Geronimo [facility]  Endocrinology Coordinator   [phone]

## 2024-06-25 ENCOUNTER — PHARMACY VISIT (OUTPATIENT)
Dept: PHARMACY | Facility: MEDICAL CENTER | Age: 39
End: 2024-06-25
Payer: COMMERCIAL

## 2024-06-26 ENCOUNTER — ANCILLARY PROCEDURE (OUTPATIENT)
Dept: MATERNAL FETAL MEDICINE | Facility: MEDICAL CENTER | Age: 39
End: 2024-06-26
Payer: COMMERCIAL

## 2024-06-26 VITALS
DIASTOLIC BLOOD PRESSURE: 93 MMHG | SYSTOLIC BLOOD PRESSURE: 156 MMHG | BODY MASS INDEX: 70.76 KG/M2 | HEART RATE: 103 BPM | WEIGHT: 293 LBS

## 2024-06-26 DIAGNOSIS — E11.9 CONTROLLED TYPE 2 DIABETES MELLITUS WITHOUT COMPLICATION, WITHOUT LONG-TERM CURRENT USE OF INSULIN (HCC): ICD-10-CM

## 2024-06-26 DIAGNOSIS — O09.523 ADVANCED MATERNAL AGE IN MULTIGRAVIDA, THIRD TRIMESTER: ICD-10-CM

## 2024-06-26 DIAGNOSIS — O24.113 PREGNANCY WITH TYPE 2 DIABETES MELLITUS IN THIRD TRIMESTER: ICD-10-CM

## 2024-06-26 DIAGNOSIS — O09.93 SUPERVISION OF HIGH RISK PREGNANCY IN THIRD TRIMESTER: ICD-10-CM

## 2024-06-26 DIAGNOSIS — O09.522 MULTIGRAVIDA OF ADVANCED MATERNAL AGE IN SECOND TRIMESTER: ICD-10-CM

## 2024-06-26 DIAGNOSIS — O10.913 CHRONIC HYPERTENSION COMPLICATING OR REASON FOR CARE DURING PREGNANCY, THIRD TRIMESTER: ICD-10-CM

## 2024-06-26 DIAGNOSIS — O99.213 OBESITY AFFECTING PREGNANCY IN THIRD TRIMESTER, UNSPECIFIED OBESITY TYPE: ICD-10-CM

## 2024-06-26 DIAGNOSIS — Z98.891 HISTORY OF CESAREAN DELIVERY: ICD-10-CM

## 2024-06-26 DIAGNOSIS — O10.919 CHRONIC HYPERTENSION AFFECTING PREGNANCY: ICD-10-CM

## 2024-06-26 DIAGNOSIS — O99.210 OBESITY IN PREGNANCY: ICD-10-CM

## 2024-06-26 LAB
APPEARANCE UR: CLEAR
BILIRUB UR STRIP-MCNC: NEGATIVE MG/DL
COLOR UR AUTO: YELLOW
GLUCOSE UR STRIP.AUTO-MCNC: NEGATIVE MG/DL
KETONES UR STRIP.AUTO-MCNC: NORMAL MG/DL
LEUKOCYTE ESTERASE UR QL STRIP.AUTO: NEGATIVE
NITRITE UR QL STRIP.AUTO: NEGATIVE
PH UR STRIP.AUTO: 6 [PH] (ref 5–8)
PROT UR QL STRIP: 30 MG/DL
RBC UR QL AUTO: NEGATIVE
SP GR UR STRIP.AUTO: >=1.03
UROBILINOGEN UR STRIP-MCNC: 1 MG/DL

## 2024-06-26 ASSESSMENT — FIBROSIS 4 INDEX: FIB4 SCORE: 0.57

## 2024-06-27 ENCOUNTER — APPOINTMENT (OUTPATIENT)
Dept: OBGYN | Facility: CLINIC | Age: 39
End: 2024-06-27
Payer: COMMERCIAL

## 2024-06-27 ENCOUNTER — ROUTINE PRENATAL (OUTPATIENT)
Dept: OBGYN | Facility: CLINIC | Age: 39
End: 2024-06-27
Payer: COMMERCIAL

## 2024-06-27 VITALS — BODY MASS INDEX: 70.78 KG/M2 | DIASTOLIC BLOOD PRESSURE: 66 MMHG | WEIGHT: 293 LBS | SYSTOLIC BLOOD PRESSURE: 133 MMHG

## 2024-06-27 DIAGNOSIS — O09.523 MULTIGRAVIDA OF ADVANCED MATERNAL AGE IN THIRD TRIMESTER: ICD-10-CM

## 2024-06-27 DIAGNOSIS — E11.9 TYPE 2 DIABETES MELLITUS WITHOUT COMPLICATION, WITHOUT LONG-TERM CURRENT USE OF INSULIN (HCC): ICD-10-CM

## 2024-06-27 DIAGNOSIS — E66.01 MORBID OBESITY (HCC): ICD-10-CM

## 2024-06-27 DIAGNOSIS — O09.93 HIGH-RISK PREGNANCY IN THIRD TRIMESTER: ICD-10-CM

## 2024-06-27 DIAGNOSIS — O10.919 CHRONIC HYPERTENSION AFFECTING PREGNANCY: ICD-10-CM

## 2024-06-27 PROCEDURE — 59025 FETAL NON-STRESS TEST: CPT | Performed by: OBSTETRICS & GYNECOLOGY

## 2024-06-27 PROCEDURE — 0502F SUBSEQUENT PRENATAL CARE: CPT | Performed by: OBSTETRICS & GYNECOLOGY

## 2024-06-27 ASSESSMENT — FIBROSIS 4 INDEX: FIB4 SCORE: 0.57

## 2024-06-27 NOTE — PROGRESS NOTES
OB Visit Note - 32w0d     MEDICAL DECISION MAKING:  Priti Begum is a 39 y.o. female  at 32w0d complicated by a history of a previous  section as well as morbid obesity with a BMI of 70.  The patient also has a history of diet-controlled type 2 diabetes as well as chronic hypertension.  Her blood pressures have been in the normal range and her glucose levels have been in the normal range as well.  Her fasting this morning was 87.    Today's visit addressed:   1.  Glucose control  2.  Fetal movement  3.  Fetal movement record.    Pregnancy complicated by:  Patient Active Problem List   Diagnosis    Obesity in pregnancy    PCOS (polycystic ovarian syndrome)    Fatty liver disease, nonalcoholic    Chronic hypertension affecting pregnancy    Controlled type 2 diabetes mellitus without complication, without long-term current use of insulin (HCC)    Vitamin D deficiency    Metabolic syndrome    Long term (current) use of oral hypoglycemic drugs    Multigravida of advanced maternal age in second trimester    Skin lesion    Left knee pain    Pregnancy, supervision, high-risk    History of  delivery     NST-interpretation  Fetal heart tones were 140s with moderate variability and accelerations.  No contractions were identified.  The NST was reactive for 32-week criteria.      The patient will follow up in 1 week(s). She was counseled to call or return for vaginal bleeding, regular contractions, leakage of fluid or decreased fetal movement.  A repeat  delivery was scheduled for 38 weeks per plan.      Nikolay Gee M.D.

## 2024-07-03 ENCOUNTER — NON-PROVIDER VISIT (OUTPATIENT)
Dept: OBGYN | Facility: CLINIC | Age: 39
End: 2024-07-03
Payer: COMMERCIAL

## 2024-07-03 PROCEDURE — 59025 FETAL NON-STRESS TEST: CPT | Performed by: PHYSICIAN ASSISTANT

## 2024-07-12 ENCOUNTER — NON-PROVIDER VISIT (OUTPATIENT)
Dept: OBGYN | Facility: CLINIC | Age: 39
End: 2024-07-12
Payer: COMMERCIAL

## 2024-07-12 ENCOUNTER — APPOINTMENT (OUTPATIENT)
Dept: ADMISSIONS | Facility: MEDICAL CENTER | Age: 39
End: 2024-07-12
Attending: OBSTETRICS & GYNECOLOGY
Payer: COMMERCIAL

## 2024-07-12 VITALS — WEIGHT: 293 LBS | DIASTOLIC BLOOD PRESSURE: 66 MMHG | BODY MASS INDEX: 70.82 KG/M2 | SYSTOLIC BLOOD PRESSURE: 123 MMHG

## 2024-07-12 DIAGNOSIS — E11.9 CONTROLLED TYPE 2 DIABETES MELLITUS WITHOUT COMPLICATION, WITHOUT LONG-TERM CURRENT USE OF INSULIN (HCC): ICD-10-CM

## 2024-07-12 DIAGNOSIS — O10.919 CHRONIC HYPERTENSION AFFECTING PREGNANCY: ICD-10-CM

## 2024-07-12 DIAGNOSIS — O09.522 MULTIGRAVIDA OF ADVANCED MATERNAL AGE IN SECOND TRIMESTER: ICD-10-CM

## 2024-07-12 PROCEDURE — RXMED WILLOW AMBULATORY MEDICATION CHARGE: Performed by: OBSTETRICS & GYNECOLOGY

## 2024-07-12 PROCEDURE — 59025 FETAL NON-STRESS TEST: CPT | Performed by: PHYSICIAN ASSISTANT

## 2024-07-12 ASSESSMENT — FIBROSIS 4 INDEX: FIB4 SCORE: 0.57

## 2024-07-17 ENCOUNTER — PRE-ADMISSION TESTING (OUTPATIENT)
Dept: ADMISSIONS | Facility: MEDICAL CENTER | Age: 39
End: 2024-07-17
Attending: OBSTETRICS & GYNECOLOGY
Payer: COMMERCIAL

## 2024-07-17 NOTE — OR NURSING
RN tele pre admit appointment completed with patient:  OB patient instructions and information sheet for  reviewed with patient including where to check in, visitor information, vitamins, fasting and bathing instructions.  Surgery date 2024.  Patient verbalized an understanding of the above instructions.

## 2024-07-19 ENCOUNTER — PHARMACY VISIT (OUTPATIENT)
Dept: PHARMACY | Facility: MEDICAL CENTER | Age: 39
End: 2024-07-19
Payer: COMMERCIAL

## 2024-07-19 ENCOUNTER — TELEPHONE (OUTPATIENT)
Dept: ENDOCRINOLOGY | Facility: MEDICAL CENTER | Age: 39
End: 2024-07-19

## 2024-07-19 ENCOUNTER — NON-PROVIDER VISIT (OUTPATIENT)
Dept: OBGYN | Facility: CLINIC | Age: 39
End: 2024-07-19
Payer: COMMERCIAL

## 2024-07-19 ENCOUNTER — ROUTINE PRENATAL (OUTPATIENT)
Dept: OBGYN | Facility: CLINIC | Age: 39
End: 2024-07-19
Payer: COMMERCIAL

## 2024-07-19 VITALS — BODY MASS INDEX: 71.81 KG/M2 | DIASTOLIC BLOOD PRESSURE: 56 MMHG | WEIGHT: 293 LBS | SYSTOLIC BLOOD PRESSURE: 118 MMHG

## 2024-07-19 DIAGNOSIS — O09.93 SUPERVISION OF HIGH RISK PREGNANCY IN THIRD TRIMESTER: Primary | ICD-10-CM

## 2024-07-19 DIAGNOSIS — O10.919 CHRONIC HYPERTENSION AFFECTING PREGNANCY: ICD-10-CM

## 2024-07-19 DIAGNOSIS — O99.210 OBESITY IN PREGNANCY: ICD-10-CM

## 2024-07-19 DIAGNOSIS — O09.522 MULTIGRAVIDA OF ADVANCED MATERNAL AGE IN SECOND TRIMESTER: ICD-10-CM

## 2024-07-19 DIAGNOSIS — Z98.891 HISTORY OF CESAREAN DELIVERY: ICD-10-CM

## 2024-07-19 DIAGNOSIS — E11.9 CONTROLLED TYPE 2 DIABETES MELLITUS WITHOUT COMPLICATION, WITHOUT LONG-TERM CURRENT USE OF INSULIN (HCC): ICD-10-CM

## 2024-07-19 PROCEDURE — 59025 FETAL NON-STRESS TEST: CPT | Performed by: OBSTETRICS & GYNECOLOGY

## 2024-07-19 PROCEDURE — RXMED WILLOW AMBULATORY MEDICATION CHARGE: Performed by: INTERNAL MEDICINE

## 2024-07-19 PROCEDURE — 0502F SUBSEQUENT PRENATAL CARE: CPT | Performed by: OBSTETRICS & GYNECOLOGY

## 2024-07-19 ASSESSMENT — FIBROSIS 4 INDEX: FIB4 SCORE: 0.57

## 2024-07-25 ENCOUNTER — OFFICE VISIT (OUTPATIENT)
Dept: OBGYN | Facility: CLINIC | Age: 39
End: 2024-07-25
Payer: COMMERCIAL

## 2024-07-25 VITALS — BODY MASS INDEX: 72.65 KG/M2 | DIASTOLIC BLOOD PRESSURE: 66 MMHG | SYSTOLIC BLOOD PRESSURE: 134 MMHG | WEIGHT: 293 LBS

## 2024-07-25 DIAGNOSIS — O09.523 MULTIGRAVIDA OF ADVANCED MATERNAL AGE IN THIRD TRIMESTER: ICD-10-CM

## 2024-07-25 PROCEDURE — 59025 FETAL NON-STRESS TEST: CPT | Performed by: FAMILY MEDICINE

## 2024-07-25 ASSESSMENT — FIBROSIS 4 INDEX: FIB4 SCORE: 0.57

## 2024-07-30 ENCOUNTER — ANCILLARY PROCEDURE (OUTPATIENT)
Dept: MATERNAL FETAL MEDICINE | Facility: MEDICAL CENTER | Age: 39
End: 2024-07-30
Payer: COMMERCIAL

## 2024-07-30 VITALS
BODY MASS INDEX: 73.33 KG/M2 | HEART RATE: 97 BPM | WEIGHT: 293 LBS | SYSTOLIC BLOOD PRESSURE: 158 MMHG | DIASTOLIC BLOOD PRESSURE: 97 MMHG

## 2024-07-30 DIAGNOSIS — E11.9 CONTROLLED TYPE 2 DIABETES MELLITUS WITHOUT COMPLICATION, WITHOUT LONG-TERM CURRENT USE OF INSULIN (HCC): ICD-10-CM

## 2024-07-30 DIAGNOSIS — Z98.891 HISTORY OF CESAREAN DELIVERY: ICD-10-CM

## 2024-07-30 DIAGNOSIS — O09.93 SUPERVISION OF HIGH RISK PREGNANCY IN THIRD TRIMESTER: ICD-10-CM

## 2024-07-30 DIAGNOSIS — O99.210 OBESITY IN PREGNANCY: ICD-10-CM

## 2024-07-30 DIAGNOSIS — O10.913 CHRONIC HYPERTENSION COMPLICATING OR REASON FOR CARE DURING PREGNANCY, THIRD TRIMESTER: ICD-10-CM

## 2024-07-30 DIAGNOSIS — O09.523 ADVANCED MATERNAL AGE IN MULTIGRAVIDA, THIRD TRIMESTER: ICD-10-CM

## 2024-07-30 DIAGNOSIS — O24.113 PREGNANCY WITH TYPE 2 DIABETES MELLITUS IN THIRD TRIMESTER: ICD-10-CM

## 2024-07-30 DIAGNOSIS — O09.522 MULTIGRAVIDA OF ADVANCED MATERNAL AGE IN SECOND TRIMESTER: ICD-10-CM

## 2024-07-30 DIAGNOSIS — O10.919 CHRONIC HYPERTENSION AFFECTING PREGNANCY: ICD-10-CM

## 2024-07-30 DIAGNOSIS — O99.213 OBESITY AFFECTING PREGNANCY IN THIRD TRIMESTER, UNSPECIFIED OBESITY TYPE: ICD-10-CM

## 2024-07-30 LAB
APPEARANCE UR: CLEAR
BILIRUB UR STRIP-MCNC: NORMAL MG/DL
COLOR UR AUTO: YELLOW
GLUCOSE UR STRIP.AUTO-MCNC: NEGATIVE MG/DL
KETONES UR STRIP.AUTO-MCNC: NORMAL MG/DL
LEUKOCYTE ESTERASE UR QL STRIP.AUTO: NEGATIVE
NITRITE UR QL STRIP.AUTO: NEGATIVE
PH UR STRIP.AUTO: 5.5 [PH] (ref 5–8)
PROT UR QL STRIP: 100 MG/DL
RBC UR QL AUTO: NORMAL
SP GR UR STRIP.AUTO: 1.03
UROBILINOGEN UR STRIP-MCNC: 0.2 MG/DL

## 2024-07-30 ASSESSMENT — FIBROSIS 4 INDEX: FIB4 SCORE: 0.57

## 2024-07-31 ENCOUNTER — OFFICE VISIT (OUTPATIENT)
Dept: OBGYN | Facility: CLINIC | Age: 39
End: 2024-07-31
Payer: COMMERCIAL

## 2024-07-31 VITALS — DIASTOLIC BLOOD PRESSURE: 67 MMHG | WEIGHT: 293 LBS | BODY MASS INDEX: 73.71 KG/M2 | SYSTOLIC BLOOD PRESSURE: 130 MMHG

## 2024-07-31 DIAGNOSIS — O10.919 CHRONIC HYPERTENSION AFFECTING PREGNANCY: ICD-10-CM

## 2024-07-31 ASSESSMENT — FIBROSIS 4 INDEX: FIB4 SCORE: 0.57

## 2024-08-01 ENCOUNTER — OFFICE VISIT (OUTPATIENT)
Dept: ENDOCRINOLOGY | Facility: MEDICAL CENTER | Age: 39
End: 2024-08-01
Attending: INTERNAL MEDICINE
Payer: COMMERCIAL

## 2024-08-01 ENCOUNTER — APPOINTMENT (OUTPATIENT)
Dept: OBGYN | Facility: CLINIC | Age: 39
End: 2024-08-01
Payer: COMMERCIAL

## 2024-08-01 VITALS
BODY MASS INDEX: 53.92 KG/M2 | SYSTOLIC BLOOD PRESSURE: 130 MMHG | DIASTOLIC BLOOD PRESSURE: 60 MMHG | HEART RATE: 105 BPM | WEIGHT: 293 LBS | OXYGEN SATURATION: 96 % | HEIGHT: 62 IN

## 2024-08-01 DIAGNOSIS — Z79.84 LONG TERM (CURRENT) USE OF ORAL HYPOGLYCEMIC DRUGS: ICD-10-CM

## 2024-08-01 DIAGNOSIS — E11.9 CONTROLLED TYPE 2 DIABETES MELLITUS WITHOUT COMPLICATION, WITHOUT LONG-TERM CURRENT USE OF INSULIN (HCC): ICD-10-CM

## 2024-08-01 DIAGNOSIS — I10 ESSENTIAL HYPERTENSION: ICD-10-CM

## 2024-08-01 DIAGNOSIS — E66.01 MORBID OBESITY WITH BMI OF 60.0-69.9, ADULT (HCC): ICD-10-CM

## 2024-08-01 DIAGNOSIS — E55.9 VITAMIN D DEFICIENCY: ICD-10-CM

## 2024-08-01 PROCEDURE — 3075F SYST BP GE 130 - 139MM HG: CPT | Performed by: INTERNAL MEDICINE

## 2024-08-01 PROCEDURE — 3078F DIAST BP <80 MM HG: CPT | Performed by: INTERNAL MEDICINE

## 2024-08-01 PROCEDURE — 99214 OFFICE O/P EST MOD 30 MIN: CPT | Performed by: INTERNAL MEDICINE

## 2024-08-01 PROCEDURE — 99211 OFF/OP EST MAY X REQ PHY/QHP: CPT | Performed by: INTERNAL MEDICINE

## 2024-08-01 ASSESSMENT — FIBROSIS 4 INDEX: FIB4 SCORE: 0.57

## 2024-08-01 NOTE — PROGRESS NOTES
CHIEF COMPLAINT: Patient is here for follow up of Type 2 Diabetes Mellitus .    HPI:     Priti Begum is a 39 y.o. female with Type 2 Diabetes Mellitus here for follow up.      Labs from 2024 show A1c is 6.1%  Labs from 2023 show A1c is 6.5%  Labs from 2023 show a1c was 6.9%  Labs from 2022 show a1c was 5.4%  Labs from 2022 show a1c was 5.9%  Labs from  2022 show A1c was 5.6%  Labs from 2021 show a1c was 5.9%  Labs from 2021 show Hba1c was 6.1%  Labs from 11/3/2020 show Hba1c was 5.8%  Labs from 2020 show HbA1c was 6.1%      She has obesity with a baseline BMI > 68 and baseline wt was over 380 lbs  She has had significant weight loss with medical management  She was previously on metformin and Mounjaro and weight dropped to 360 lbs  Her Mounjaro is on hold due to pregnancy  She is     She is on metformin 1000mg bid  Lantus 65u daily       She denies SE  She is seeing high risk OB  Her fasting BGs are in the 90's  Her 1 hour post meal BG are       She is currently not on a statin for primary prevention because she is pregnant  LDL-C was 66 on 2024      She also has HTN which is well controlled  She is currently on labetalol and blood pressure is at goal  She does not have diabetic kidney disease   UACR was < 30 on 2024      She had an eye exam with [facility] in  2024 but we do not have records            BG Diary:  Patient brought blood sugar logs    Weight has been stable    Diabetes Complications   Retinopathy: No known retinopathy.  Last eye exam: 2024  Neuropathy: Denies paresthesias or numbness in hands or feet. Denies any foot wounds.  Exercise: Minimal.  Diet: Fair.  Patient's medications, allergies, and social histories were reviewed and updated as appropriate.    ROS:     CONS:     No fever, no chills   EYES:     No diplopia, no blurry vision   CV:           No chest pain, no palpitations   PULM:     No SOB, no cough,  no hemoptysis.   GI:            No nausea, no vomiting, no diarrhea, no constipation   ENDO:     No polyuria, no polydipsia, no heat intolerance, no cold intolerance       Past Medical History:  Problem List:  2024: Pregnancy, supervision, high-risk  2024: History of  delivery  2023: Positive pregnancy test  2023: Skin lesion  2023: Left knee pain  2023: COVID-19  2023: Nexplanon removal  2022: Encounter for induction of labor  2022: Pre-existing type 2 diabetes mellitus during pregnancy in   third trimester  2022: Multigravida of advanced maternal age in second trimester  2020: Long term (current) use of oral hypoglycemic drugs  2020: Metabolic syndrome  2019: Controlled type 2 diabetes mellitus without complication,   without long-term current use of insulin (HCC)  2019: Vitamin D deficiency  2019: Preventative health care  2019: Chronic hypertension affecting pregnancy  2019: Uncontrolled type 2 diabetes mellitus with hyperglycemia   (HCC)  2019: Candidiasis of skin  2016: Fatty liver disease, nonalcoholic  2016: Hypomenorrhea  2016: Family history of diabetes mellitus (DM)  2013-10: Obesity in pregnancy  2013-10: PCOS (polycystic ovarian syndrome)      Past Surgical History:  Past Surgical History:   Procedure Laterality Date    PRIMARY C SECTION Bilateral 2022    Procedure:  SECTION, PRIMARY;  Surgeon: Starr Tanner M.D.;  Location: [facility] DEL[facility];  Service: Labor and Delivery    NO PERTINENT PAST SURGICAL HISTORY  2022    My son being born via c- section    OTHER  2022    My son was born via         Allergies:  Glimepiride [kdc:yellow dye+brilliant blue fcf+glimepiride]     Social History:  Social History     Tobacco Use    Smoking status: Never    Smokeless tobacco: Never   Vaping Use    Vaping status: Never Used   Substance Use Topics    Alcohol use: No    Drug use: No        Family  "History:   family history includes Diabetes in her maternal grandmother and mother; Hyperlipidemia in her mother; Hypertension in her mother; Kidney Disease in her mother; Lung Disease in her maternal grandmother; No Known Problems in her brother, brother, father, and sister.      PHYSICAL EXAM:   OBJECTIVE:  Vital signs: /60   Pulse (!) 105   Ht 1.575 m (5' 2\") Comment: pt stated  Wt (!) 183 kg (403 lb 8 oz)   LMP 10/24/2023   SpO2 96%   BMI 73.80 kg/m²   GENERAL: Morbidly obese female in no apparent distress.   EYE:  No ocular asymmetry, PERRLA  HENT: Pink, moist mucous membranes.    NECK: No thyromegaly.   CARDIOVASCULAR:  No murmurs  LUNGS: Clear breath sounds  ABDOMEN: Soft, nontender gravid abdomen  EXTREMITIES: No clubbing, cyanosis, or edema.   NEUROLOGICAL: No gross focal motor abnormalities   LYMPH: No cervical adenopathy palpated.   SKIN: No rashes, lesions.         Labs:  Lab Results   Component Value Date/Time    HBA1C 6.1 (H) 06/07/2024 10:16 AM        Lab Results   Component Value Date/Time    WBC 9.0 06/07/2024 10:16 AM    RBC 4.12 (L) 06/07/2024 10:16 AM    HEMOGLOBIN 10.0 (L) 06/07/2024 10:16 AM    MCV 79.6 (L) 06/07/2024 10:16 AM    MCH 24.3 (L) 06/07/2024 10:16 AM    MCHC 30.5 (L) 06/07/2024 10:16 AM    RDW 46.1 06/07/2024 10:16 AM    MPV 10.2 06/07/2024 10:16 AM       Lab Results   Component Value Date/Time    SODIUM 135 02/16/2024 08:52 AM    POTASSIUM 3.9 02/16/2024 08:52 AM    CHLORIDE 102 02/16/2024 08:52 AM    CO2 20 02/16/2024 08:52 AM    ANION 13.0 02/16/2024 08:52 AM    GLUCOSE 113 (H) 02/16/2024 08:52 AM    BUN 6 (L) 02/16/2024 08:52 AM    CREATININE 0.35 (L) 02/16/2024 08:52 AM    CALCIUM 8.6 02/16/2024 08:52 AM    ASTSGOT 20 02/16/2024 08:52 AM    ALTSGPT 22 02/16/2024 08:52 AM    TBILIRUBIN 0.3 02/16/2024 08:52 AM    ALBUMIN 3.6 02/16/2024 08:52 AM    TOTPROTEIN 6.9 02/16/2024 08:52 AM    GLOBULIN 3.3 02/16/2024 08:52 AM    AGRATIO 1.1 02/16/2024 08:52 AM       Lab " Results   Component Value Date/Time    CHOLSTRLTOT 140 12/24/2019 0619    TRIGLYCERIDE 123 12/24/2019 0619    HDL 40 12/24/2019 0619    LDL 75 12/24/2019 0619       Lab Results   Component Value Date/Time    MALBCRT see below 02/16/2024 08:51 AM    MICROALBUR <1.2 02/16/2024 08:51 AM        Lab Results   Component Value Date/Time    TSHULTRASEN 3.320 02/14/2019 1142     No results found for: FREEDIR  No results found for: FREET3  No results found for: THYSTIMIG        ASSESSMENT/PLAN:     1. Controlled type 2 diabetes mellitus without complication, without long-term current use of insulin ([facility])  Controlled  A1c is 6.1%  Continue metformin and Lantus  She is ready and wants to transition back to metformin and Mounjaro after she completes this pregnancy  Therefore I am want to make her appoint with our nurse Marie in 2 months so that she can discuss reinitiation of GLP-1 analog therapy and other weight loss management agents post pregnancy  She is also going to get copies of her eye exam    2. Essential hypertension  Stable  Continue labetalol   Continue monitoring urine albumin    3. Vitamin D deficiency  Stable   Vitamin D labs were reviewed with patient  Continue current supplements  Continue monitoring levels     4. Long term (current) use of oral hypoglycemic drugs  Patient is on oral agents for type 2 diabetes management    5. Morbid obesity with BMI of 60.0-69.9, adult (HCC)  Unstable she has gained weight during pregnancy which is expected and normal however she is anxious and wants to lose weight again.  She is ready to start back on weight management drugs after her pregnancy        Follow-up in 2 months      Thank you kindly for allowing me to participate in the diabetes care plan for this patient.    Dickson Rivera MD, FACE, N      CC:   BEN Fernández

## 2024-08-08 ENCOUNTER — HOSPITAL ENCOUNTER (INPATIENT)
Facility: MEDICAL CENTER | Age: 39
LOS: 3 days | End: 2024-08-11
Attending: OBSTETRICS & GYNECOLOGY | Admitting: OBSTETRICS & GYNECOLOGY
Payer: COMMERCIAL

## 2024-08-08 ENCOUNTER — ANESTHESIA EVENT (OUTPATIENT)
Dept: OBGYN | Facility: MEDICAL CENTER | Age: 39
End: 2024-08-08
Payer: COMMERCIAL

## 2024-08-08 ENCOUNTER — ANESTHESIA (OUTPATIENT)
Dept: OBGYN | Facility: MEDICAL CENTER | Age: 39
End: 2024-08-08
Payer: COMMERCIAL

## 2024-08-08 LAB
BASOPHILS # BLD AUTO: 0.3 % (ref 0–1.8)
BASOPHILS # BLD AUTO: 0.4 % (ref 0–1.8)
BASOPHILS # BLD: 0.03 K/UL (ref 0–0.12)
BASOPHILS # BLD: 0.03 K/UL (ref 0–0.12)
EOSINOPHIL # BLD AUTO: 0.02 K/UL (ref 0–0.51)
EOSINOPHIL # BLD AUTO: 0.23 K/UL (ref 0–0.51)
EOSINOPHIL NFR BLD: 0.2 % (ref 0–6.9)
EOSINOPHIL NFR BLD: 2.8 % (ref 0–6.9)
ERYTHROCYTE [DISTWIDTH] IN BLOOD BY AUTOMATED COUNT: 53.5 FL (ref 35.9–50)
ERYTHROCYTE [DISTWIDTH] IN BLOOD BY AUTOMATED COUNT: 54.6 FL (ref 35.9–50)
GLUCOSE BLD STRIP.AUTO-MCNC: 108 MG/DL (ref 65–99)
GLUCOSE BLD STRIP.AUTO-MCNC: 93 MG/DL (ref 65–99)
HCT VFR BLD AUTO: 32.5 % (ref 37–47)
HCT VFR BLD AUTO: 38.4 % (ref 37–47)
HGB BLD-MCNC: 10.2 G/DL (ref 12–16)
HGB BLD-MCNC: 11.7 G/DL (ref 12–16)
HOLDING TUBE BB 8507: NORMAL
IMM GRANULOCYTES # BLD AUTO: 0.03 K/UL (ref 0–0.11)
IMM GRANULOCYTES # BLD AUTO: 0.04 K/UL (ref 0–0.11)
IMM GRANULOCYTES NFR BLD AUTO: 0.3 % (ref 0–0.9)
IMM GRANULOCYTES NFR BLD AUTO: 0.5 % (ref 0–0.9)
LYMPHOCYTES # BLD AUTO: 1.95 K/UL (ref 1–4.8)
LYMPHOCYTES # BLD AUTO: 1.97 K/UL (ref 1–4.8)
LYMPHOCYTES NFR BLD: 19.3 % (ref 22–41)
LYMPHOCYTES NFR BLD: 23.9 % (ref 22–41)
MCH RBC QN AUTO: 25.4 PG (ref 27–33)
MCH RBC QN AUTO: 25.8 PG (ref 27–33)
MCHC RBC AUTO-ENTMCNC: 30.5 G/DL (ref 32.2–35.5)
MCHC RBC AUTO-ENTMCNC: 31.4 G/DL (ref 32.2–35.5)
MCV RBC AUTO: 82.1 FL (ref 81.4–97.8)
MCV RBC AUTO: 83.3 FL (ref 81.4–97.8)
MONOCYTES # BLD AUTO: 0.35 K/UL (ref 0–0.85)
MONOCYTES # BLD AUTO: 0.54 K/UL (ref 0–0.85)
MONOCYTES NFR BLD AUTO: 4.2 % (ref 0–13.4)
MONOCYTES NFR BLD AUTO: 5.3 % (ref 0–13.4)
NEUTROPHILS # BLD AUTO: 5.63 K/UL (ref 1.82–7.42)
NEUTROPHILS # BLD AUTO: 7.55 K/UL (ref 1.82–7.42)
NEUTROPHILS NFR BLD: 68.2 % (ref 44–72)
NEUTROPHILS NFR BLD: 74.6 % (ref 44–72)
NRBC # BLD AUTO: 0 K/UL
NRBC # BLD AUTO: 0 K/UL
NRBC BLD-RTO: 0 /100 WBC (ref 0–0.2)
NRBC BLD-RTO: 0 /100 WBC (ref 0–0.2)
PLATELET # BLD AUTO: 222 K/UL (ref 164–446)
PLATELET # BLD AUTO: 257 K/UL (ref 164–446)
PMV BLD AUTO: 10.2 FL (ref 9–12.9)
PMV BLD AUTO: 10.4 FL (ref 9–12.9)
RBC # BLD AUTO: 3.96 M/UL (ref 4.2–5.4)
RBC # BLD AUTO: 4.61 M/UL (ref 4.2–5.4)
T PALLIDUM AB SER QL IA: NORMAL
WBC # BLD AUTO: 10.1 K/UL (ref 4.8–10.8)
WBC # BLD AUTO: 8.3 K/UL (ref 4.8–10.8)

## 2024-08-08 PROCEDURE — 94760 N-INVAS EAR/PLS OXIMETRY 1: CPT

## 2024-08-08 PROCEDURE — A9270 NON-COVERED ITEM OR SERVICE: HCPCS

## 2024-08-08 PROCEDURE — 770002 HCHG ROOM/CARE - OB PRIVATE (112)

## 2024-08-08 PROCEDURE — A9270 NON-COVERED ITEM OR SERVICE: HCPCS | Performed by: ANESTHESIOLOGY

## 2024-08-08 PROCEDURE — 160041 HCHG SURGERY MINUTES - EA ADDL 1 MIN LEVEL 4: Performed by: OBSTETRICS & GYNECOLOGY

## 2024-08-08 PROCEDURE — 85025 COMPLETE CBC W/AUTO DIFF WBC: CPT

## 2024-08-08 PROCEDURE — 160029 HCHG SURGERY MINUTES - 1ST 30 MINS LEVEL 4: Performed by: OBSTETRICS & GYNECOLOGY

## 2024-08-08 PROCEDURE — 700105 HCHG RX REV CODE 258: Performed by: ANESTHESIOLOGY

## 2024-08-08 PROCEDURE — 700111 HCHG RX REV CODE 636 W/ 250 OVERRIDE (IP): Mod: JZ | Performed by: ANESTHESIOLOGY

## 2024-08-08 PROCEDURE — 160002 HCHG RECOVERY MINUTES (STAT): Performed by: OBSTETRICS & GYNECOLOGY

## 2024-08-08 PROCEDURE — 86780 TREPONEMA PALLIDUM: CPT

## 2024-08-08 PROCEDURE — 36415 COLL VENOUS BLD VENIPUNCTURE: CPT

## 2024-08-08 PROCEDURE — 700102 HCHG RX REV CODE 250 W/ 637 OVERRIDE(OP)

## 2024-08-08 PROCEDURE — C1755 CATHETER, INTRASPINAL: HCPCS | Performed by: OBSTETRICS & GYNECOLOGY

## 2024-08-08 PROCEDURE — 82962 GLUCOSE BLOOD TEST: CPT

## 2024-08-08 PROCEDURE — 160048 HCHG OR STATISTICAL LEVEL 1-5: Performed by: OBSTETRICS & GYNECOLOGY

## 2024-08-08 PROCEDURE — 59510 CESAREAN DELIVERY: CPT | Performed by: OBSTETRICS & GYNECOLOGY

## 2024-08-08 PROCEDURE — 59514 CESAREAN DELIVERY ONLY: CPT | Mod: 80 | Performed by: STUDENT IN AN ORGANIZED HEALTH CARE EDUCATION/TRAINING PROGRAM

## 2024-08-08 PROCEDURE — 160009 HCHG ANES TIME/MIN: Performed by: OBSTETRICS & GYNECOLOGY

## 2024-08-08 PROCEDURE — 700102 HCHG RX REV CODE 250 W/ 637 OVERRIDE(OP): Performed by: ANESTHESIOLOGY

## 2024-08-08 PROCEDURE — 160035 HCHG PACU - 1ST 60 MINS PHASE I: Performed by: OBSTETRICS & GYNECOLOGY

## 2024-08-08 PROCEDURE — 88302 TISSUE EXAM BY PATHOLOGIST: CPT

## 2024-08-08 PROCEDURE — 700105 HCHG RX REV CODE 258: Performed by: OBSTETRICS & GYNECOLOGY

## 2024-08-08 PROCEDURE — 0UB70ZZ EXCISION OF BILATERAL FALLOPIAN TUBES, OPEN APPROACH: ICD-10-PCS | Performed by: OBSTETRICS & GYNECOLOGY

## 2024-08-08 PROCEDURE — 700111 HCHG RX REV CODE 636 W/ 250 OVERRIDE (IP): Performed by: OBSTETRICS & GYNECOLOGY

## 2024-08-08 PROCEDURE — C1765 ADHESION BARRIER: HCPCS | Performed by: OBSTETRICS & GYNECOLOGY

## 2024-08-08 PROCEDURE — 700111 HCHG RX REV CODE 636 W/ 250 OVERRIDE (IP)

## 2024-08-08 RX ORDER — SODIUM CHLORIDE, SODIUM LACTATE, POTASSIUM CHLORIDE, CALCIUM CHLORIDE 600; 310; 30; 20 MG/100ML; MG/100ML; MG/100ML; MG/100ML
INJECTION, SOLUTION INTRAVENOUS PRN
Status: DISCONTINUED | OUTPATIENT
Start: 2024-08-08 | End: 2024-08-11 | Stop reason: HOSPADM

## 2024-08-08 RX ORDER — OXYCODONE HYDROCHLORIDE 5 MG/1
5 TABLET ORAL EVERY 4 HOURS PRN
Status: DISPENSED | OUTPATIENT
Start: 2024-08-08 | End: 2024-08-09

## 2024-08-08 RX ORDER — IBUPROFEN 800 MG/1
800 TABLET, FILM COATED ORAL EVERY 8 HOURS PRN
Status: DISCONTINUED | OUTPATIENT
Start: 2024-08-12 | End: 2024-08-11 | Stop reason: HOSPADM

## 2024-08-08 RX ORDER — ONDANSETRON 2 MG/ML
4 INJECTION INTRAMUSCULAR; INTRAVENOUS EVERY 6 HOURS PRN
Status: DISCONTINUED | OUTPATIENT
Start: 2024-08-09 | End: 2024-08-08

## 2024-08-08 RX ORDER — OXYCODONE HYDROCHLORIDE 5 MG/1
10 TABLET ORAL EVERY 4 HOURS PRN
Status: DISCONTINUED | OUTPATIENT
Start: 2024-08-09 | End: 2024-08-11 | Stop reason: HOSPADM

## 2024-08-08 RX ORDER — ACETAMINOPHEN 500 MG
1000 TABLET ORAL EVERY 6 HOURS
Status: DISCONTINUED | OUTPATIENT
Start: 2024-08-08 | End: 2024-08-09

## 2024-08-08 RX ORDER — CITRIC ACID/SODIUM CITRATE 334-500MG
30 SOLUTION, ORAL ORAL ONCE
Status: COMPLETED | OUTPATIENT
Start: 2024-08-08 | End: 2024-08-08

## 2024-08-08 RX ORDER — MISOPROSTOL 200 UG/1
800 TABLET ORAL
Status: DISCONTINUED | OUTPATIENT
Start: 2024-08-08 | End: 2024-08-11 | Stop reason: HOSPADM

## 2024-08-08 RX ORDER — ACETAMINOPHEN 500 MG
1000 TABLET ORAL EVERY 6 HOURS PRN
Status: DISCONTINUED | OUTPATIENT
Start: 2024-08-12 | End: 2024-08-09

## 2024-08-08 RX ORDER — ENOXAPARIN SODIUM 100 MG/ML
60 INJECTION SUBCUTANEOUS 2 TIMES DAILY
Status: DISCONTINUED | OUTPATIENT
Start: 2024-08-09 | End: 2024-08-11 | Stop reason: HOSPADM

## 2024-08-08 RX ORDER — ONDANSETRON 2 MG/ML
4 INJECTION INTRAMUSCULAR; INTRAVENOUS EVERY 6 HOURS PRN
Status: DISCONTINUED | OUTPATIENT
Start: 2024-08-08 | End: 2024-08-11 | Stop reason: HOSPADM

## 2024-08-08 RX ORDER — LABETALOL 100 MG/1
100 TABLET, FILM COATED ORAL TWICE DAILY
Status: DISCONTINUED | OUTPATIENT
Start: 2024-08-08 | End: 2024-08-10

## 2024-08-08 RX ORDER — IBUPROFEN 800 MG/1
800 TABLET, FILM COATED ORAL EVERY 8 HOURS PRN
Status: DISCONTINUED | OUTPATIENT
Start: 2024-08-12 | End: 2024-08-08

## 2024-08-08 RX ORDER — DIPHENHYDRAMINE HYDROCHLORIDE 50 MG/ML
12.5 INJECTION INTRAMUSCULAR; INTRAVENOUS EVERY 6 HOURS PRN
Status: ACTIVE | OUTPATIENT
Start: 2024-08-08 | End: 2024-08-09

## 2024-08-08 RX ORDER — ACETAMINOPHEN 500 MG
1000 TABLET ORAL EVERY 6 HOURS
Status: DISCONTINUED | OUTPATIENT
Start: 2024-08-09 | End: 2024-08-09

## 2024-08-08 RX ORDER — DIPHENHYDRAMINE HCL 25 MG
25 TABLET ORAL EVERY 6 HOURS PRN
Status: DISCONTINUED | OUTPATIENT
Start: 2024-08-09 | End: 2024-08-11 | Stop reason: HOSPADM

## 2024-08-08 RX ORDER — METOCLOPRAMIDE HYDROCHLORIDE 5 MG/ML
10 INJECTION INTRAMUSCULAR; INTRAVENOUS ONCE
Status: COMPLETED | OUTPATIENT
Start: 2024-08-08 | End: 2024-08-08

## 2024-08-08 RX ORDER — ONDANSETRON 4 MG/1
4 TABLET, ORALLY DISINTEGRATING ORAL EVERY 6 HOURS PRN
Status: DISCONTINUED | OUTPATIENT
Start: 2024-08-09 | End: 2024-08-08

## 2024-08-08 RX ORDER — OXYCODONE HYDROCHLORIDE 5 MG/1
10 TABLET ORAL EVERY 4 HOURS PRN
Status: DISCONTINUED | OUTPATIENT
Start: 2024-08-09 | End: 2024-08-08

## 2024-08-08 RX ORDER — CEFAZOLIN SODIUM 1 G/3ML
2 INJECTION, POWDER, FOR SOLUTION INTRAMUSCULAR; INTRAVENOUS ONCE
Status: DISCONTINUED | OUTPATIENT
Start: 2024-08-08 | End: 2024-08-08 | Stop reason: HOSPADM

## 2024-08-08 RX ORDER — METHYLERGONOVINE MALEATE 0.2 MG/ML
0.2 INJECTION INTRAVENOUS
Status: DISCONTINUED | OUTPATIENT
Start: 2024-08-08 | End: 2024-08-11 | Stop reason: HOSPADM

## 2024-08-08 RX ORDER — DOCUSATE SODIUM 100 MG/1
100 CAPSULE, LIQUID FILLED ORAL 2 TIMES DAILY PRN
Status: DISCONTINUED | OUTPATIENT
Start: 2024-08-08 | End: 2024-08-11 | Stop reason: HOSPADM

## 2024-08-08 RX ORDER — DIPHENHYDRAMINE HYDROCHLORIDE 50 MG/ML
25 INJECTION INTRAMUSCULAR; INTRAVENOUS EVERY 6 HOURS PRN
Status: ACTIVE | OUTPATIENT
Start: 2024-08-08 | End: 2024-08-09

## 2024-08-08 RX ORDER — ENOXAPARIN SODIUM 100 MG/ML
40 INJECTION SUBCUTANEOUS DAILY
Status: DISCONTINUED | OUTPATIENT
Start: 2024-08-08 | End: 2024-08-08

## 2024-08-08 RX ORDER — OXYTOCIN 10 [USP'U]/ML
10 INJECTION, SOLUTION INTRAMUSCULAR; INTRAVENOUS
Status: DISCONTINUED | OUTPATIENT
Start: 2024-08-08 | End: 2024-08-11 | Stop reason: HOSPADM

## 2024-08-08 RX ORDER — DIPHENHYDRAMINE HCL 25 MG
25 TABLET ORAL EVERY 6 HOURS PRN
Status: DISCONTINUED | OUTPATIENT
Start: 2024-08-09 | End: 2024-08-08

## 2024-08-08 RX ORDER — CEFAZOLIN SODIUM 1 G/3ML
INJECTION, POWDER, FOR SOLUTION INTRAMUSCULAR; INTRAVENOUS PRN
Status: DISCONTINUED | OUTPATIENT
Start: 2024-08-08 | End: 2024-08-08 | Stop reason: SURG

## 2024-08-08 RX ORDER — SODIUM CHLORIDE, SODIUM GLUCONATE, SODIUM ACETATE, POTASSIUM CHLORIDE AND MAGNESIUM CHLORIDE 526; 502; 368; 37; 30 MG/100ML; MG/100ML; MG/100ML; MG/100ML; MG/100ML
1500 INJECTION, SOLUTION INTRAVENOUS ONCE
Status: COMPLETED | OUTPATIENT
Start: 2024-08-08 | End: 2024-08-08

## 2024-08-08 RX ORDER — CARBOPROST TROMETHAMINE 250 UG/ML
250 INJECTION, SOLUTION INTRAMUSCULAR
Status: DISCONTINUED | OUTPATIENT
Start: 2024-08-08 | End: 2024-08-11 | Stop reason: HOSPADM

## 2024-08-08 RX ORDER — KETOROLAC TROMETHAMINE 15 MG/ML
15 INJECTION, SOLUTION INTRAMUSCULAR; INTRAVENOUS EVERY 6 HOURS
Status: DISPENSED | OUTPATIENT
Start: 2024-08-08 | End: 2024-08-09

## 2024-08-08 RX ORDER — ONDANSETRON 2 MG/ML
INJECTION INTRAMUSCULAR; INTRAVENOUS PRN
Status: DISCONTINUED | OUTPATIENT
Start: 2024-08-08 | End: 2024-08-08 | Stop reason: SURG

## 2024-08-08 RX ORDER — METOCLOPRAMIDE HYDROCHLORIDE 5 MG/ML
INJECTION INTRAMUSCULAR; INTRAVENOUS PRN
Status: DISCONTINUED | OUTPATIENT
Start: 2024-08-08 | End: 2024-08-08 | Stop reason: SURG

## 2024-08-08 RX ORDER — SODIUM CHLORIDE, SODIUM GLUCONATE, SODIUM ACETATE, POTASSIUM CHLORIDE AND MAGNESIUM CHLORIDE 526; 502; 368; 37; 30 MG/100ML; MG/100ML; MG/100ML; MG/100ML; MG/100ML
INJECTION, SOLUTION INTRAVENOUS
Status: DISCONTINUED | OUTPATIENT
Start: 2024-08-08 | End: 2024-08-08 | Stop reason: SURG

## 2024-08-08 RX ORDER — IBUPROFEN 800 MG/1
800 TABLET, FILM COATED ORAL EVERY 8 HOURS
Status: DISCONTINUED | OUTPATIENT
Start: 2024-08-09 | End: 2024-08-08

## 2024-08-08 RX ORDER — IBUPROFEN 800 MG/1
800 TABLET, FILM COATED ORAL EVERY 8 HOURS
Status: DISCONTINUED | OUTPATIENT
Start: 2024-08-09 | End: 2024-08-11 | Stop reason: HOSPADM

## 2024-08-08 RX ORDER — DIPHENHYDRAMINE HYDROCHLORIDE 50 MG/ML
25 INJECTION INTRAMUSCULAR; INTRAVENOUS EVERY 6 HOURS PRN
Status: DISCONTINUED | OUTPATIENT
Start: 2024-08-09 | End: 2024-08-11 | Stop reason: HOSPADM

## 2024-08-08 RX ORDER — DIPHENHYDRAMINE HYDROCHLORIDE 50 MG/ML
25 INJECTION INTRAMUSCULAR; INTRAVENOUS EVERY 6 HOURS PRN
Status: DISCONTINUED | OUTPATIENT
Start: 2024-08-09 | End: 2024-08-08

## 2024-08-08 RX ORDER — DEXAMETHASONE SODIUM PHOSPHATE 4 MG/ML
INJECTION, SOLUTION INTRA-ARTICULAR; INTRALESIONAL; INTRAMUSCULAR; INTRAVENOUS; SOFT TISSUE PRN
Status: DISCONTINUED | OUTPATIENT
Start: 2024-08-08 | End: 2024-08-08 | Stop reason: SURG

## 2024-08-08 RX ORDER — OXYCODONE HYDROCHLORIDE 5 MG/1
5 TABLET ORAL EVERY 4 HOURS PRN
Status: DISCONTINUED | OUTPATIENT
Start: 2024-08-09 | End: 2024-08-11 | Stop reason: HOSPADM

## 2024-08-08 RX ORDER — ONDANSETRON 4 MG/1
4 TABLET, ORALLY DISINTEGRATING ORAL EVERY 6 HOURS PRN
Status: DISCONTINUED | OUTPATIENT
Start: 2024-08-08 | End: 2024-08-11 | Stop reason: HOSPADM

## 2024-08-08 RX ORDER — MORPHINE SULFATE 0.5 MG/ML
INJECTION, SOLUTION EPIDURAL; INTRATHECAL; INTRAVENOUS
Status: COMPLETED | OUTPATIENT
Start: 2024-08-08 | End: 2024-08-08

## 2024-08-08 RX ORDER — EPHEDRINE SULFATE 50 MG/ML
10 INJECTION, SOLUTION INTRAVENOUS
Status: ACTIVE | OUTPATIENT
Start: 2024-08-08 | End: 2024-08-09

## 2024-08-08 RX ORDER — DEXAMETHASONE SODIUM PHOSPHATE 4 MG/ML
INJECTION, SOLUTION INTRA-ARTICULAR; INTRALESIONAL; INTRAMUSCULAR; INTRAVENOUS; SOFT TISSUE PRN
Status: DISCONTINUED | OUTPATIENT
Start: 2024-08-08 | End: 2024-08-08

## 2024-08-08 RX ORDER — OXYCODONE HYDROCHLORIDE 5 MG/1
5 TABLET ORAL EVERY 4 HOURS PRN
Status: DISCONTINUED | OUTPATIENT
Start: 2024-08-09 | End: 2024-08-08

## 2024-08-08 RX ORDER — BUPIVACAINE HYDROCHLORIDE 7.5 MG/ML
INJECTION, SOLUTION INTRASPINAL
Status: COMPLETED | OUTPATIENT
Start: 2024-08-08 | End: 2024-08-08

## 2024-08-08 RX ADMIN — FENTANYL CITRATE 10 MCG: 50 INJECTION, SOLUTION INTRAMUSCULAR; INTRAVENOUS at 09:53

## 2024-08-08 RX ADMIN — SODIUM CHLORIDE, POTASSIUM CHLORIDE, SODIUM LACTATE AND CALCIUM CHLORIDE: 600; 310; 30; 20 INJECTION, SOLUTION INTRAVENOUS at 17:12

## 2024-08-08 RX ADMIN — MORPHINE SULFATE 100 MCG: 0.5 INJECTION, SOLUTION EPIDURAL; INTRATHECAL; INTRAVENOUS at 09:53

## 2024-08-08 RX ADMIN — ENOXAPARIN SODIUM 40 MG: 100 INJECTION SUBCUTANEOUS at 18:14

## 2024-08-08 RX ADMIN — BUPIVACAINE HYDROCHLORIDE IN DEXTROSE 1.5 ML: 7.5 INJECTION, SOLUTION SUBARACHNOID at 09:53

## 2024-08-08 RX ADMIN — OXYCODONE 5 MG: 5 TABLET ORAL at 12:06

## 2024-08-08 RX ADMIN — SODIUM CHLORIDE, SODIUM GLUCONATE, SODIUM ACETATE, POTASSIUM CHLORIDE AND MAGNESIUM CHLORIDE: 526; 502; 368; 37; 30 INJECTION, SOLUTION INTRAVENOUS at 09:37

## 2024-08-08 RX ADMIN — CEFAZOLIN 3 G: 1 INJECTION, POWDER, FOR SOLUTION INTRAMUSCULAR; INTRAVENOUS at 09:57

## 2024-08-08 RX ADMIN — SODIUM CITRATE AND CITRIC ACID MONOHYDRATE 30 ML: 334; 500 SOLUTION ORAL at 09:24

## 2024-08-08 RX ADMIN — ACETAMINOPHEN 1000 MG: 500 TABLET ORAL at 21:23

## 2024-08-08 RX ADMIN — DEXAMETHASONE SODIUM PHOSPHATE 4 MG: 4 INJECTION INTRA-ARTICULAR; INTRALESIONAL; INTRAMUSCULAR; INTRAVENOUS; SOFT TISSUE at 09:57

## 2024-08-08 RX ADMIN — SODIUM CHLORIDE, SODIUM GLUCONATE, SODIUM ACETATE, POTASSIUM CHLORIDE AND MAGNESIUM CHLORIDE 1500 ML: 526; 502; 368; 37; 30 INJECTION, SOLUTION INTRAVENOUS at 09:00

## 2024-08-08 RX ADMIN — OXYTOCIN 125 ML/HR: 10 INJECTION, SOLUTION INTRAMUSCULAR; INTRAVENOUS at 11:35

## 2024-08-08 RX ADMIN — METOCLOPRAMIDE 10 MG: 5 INJECTION, SOLUTION INTRAMUSCULAR; INTRAVENOUS at 09:24

## 2024-08-08 RX ADMIN — ACETAMINOPHEN 1000 MG: 500 TABLET ORAL at 14:09

## 2024-08-08 RX ADMIN — ONDANSETRON 4 MG: 2 INJECTION INTRAMUSCULAR; INTRAVENOUS at 10:43

## 2024-08-08 RX ADMIN — METFORMIN HYDROCHLORIDE 1000 MG: 500 TABLET ORAL at 17:09

## 2024-08-08 RX ADMIN — ONDANSETRON 4 MG: 4 TABLET, ORALLY DISINTEGRATING ORAL at 18:14

## 2024-08-08 RX ADMIN — KETOROLAC TROMETHAMINE 15 MG: 15 INJECTION, SOLUTION INTRAMUSCULAR; INTRAVENOUS at 14:10

## 2024-08-08 RX ADMIN — LABETALOL HYDROCHLORIDE 100 MG: 100 TABLET, FILM COATED ORAL at 17:09

## 2024-08-08 RX ADMIN — FAMOTIDINE 20 MG: 10 INJECTION, SOLUTION INTRAVENOUS at 09:25

## 2024-08-08 RX ADMIN — METOCLOPRAMIDE 10 MG: 5 INJECTION, SOLUTION INTRAMUSCULAR; INTRAVENOUS at 09:57

## 2024-08-08 ASSESSMENT — LIFESTYLE VARIABLES
TOTAL SCORE: 0
AVERAGE NUMBER OF DAYS PER WEEK YOU HAVE A DRINK CONTAINING ALCOHOL: 0
DOES PATIENT WANT TO STOP DRINKING: NO
ALCOHOL_USE: NO
HOW MANY TIMES IN THE PAST YEAR HAVE YOU HAD 5 OR MORE DRINKS IN A DAY: 0
ON A TYPICAL DAY WHEN YOU DRINK ALCOHOL HOW MANY DRINKS DO YOU HAVE: 0
HAVE YOU EVER FELT YOU SHOULD CUT DOWN ON YOUR DRINKING: NO
HAVE PEOPLE ANNOYED YOU BY CRITICIZING YOUR DRINKING: NO
EVER_SMOKED: NEVER
EVER FELT BAD OR GUILTY ABOUT YOUR DRINKING: NO
TOTAL SCORE: 0
TOTAL SCORE: 0
EVER HAD A DRINK FIRST THING IN THE MORNING TO STEADY YOUR NERVES TO GET RID OF A HANGOVER: NO
CONSUMPTION TOTAL: NEGATIVE

## 2024-08-08 ASSESSMENT — PATIENT HEALTH QUESTIONNAIRE - PHQ9
SUM OF ALL RESPONSES TO PHQ9 QUESTIONS 1 AND 2: 0
2. FEELING DOWN, DEPRESSED, IRRITABLE, OR HOPELESS: NOT AT ALL
1. LITTLE INTEREST OR PLEASURE IN DOING THINGS: NOT AT ALL

## 2024-08-08 ASSESSMENT — PAIN DESCRIPTION - PAIN TYPE
TYPE: ACUTE PAIN;SURGICAL PAIN

## 2024-08-08 ASSESSMENT — PAIN SCALES - GENERAL
PAIN_LEVEL: 0
PAINLEVEL: 0 - NO PAIN

## 2024-08-08 ASSESSMENT — FIBROSIS 4 INDEX: FIB4 SCORE: 0.57

## 2024-08-08 NOTE — ANESTHESIA POSTPROCEDURE EVALUATION
Patient: Priti Begum    Procedure Summary       Date: 08/08/24 Room / Location: [facility] OR 01 / [facility]    Anesthesia Start: 0937 Anesthesia Stop: 1059    Procedure: REPEAT CAESAREAN SECTION, BILATERAL SALPINGECTOMY (Abdomen) Diagnosis: (same/del)    Surgeons: Nixon Arita M.D. Responsible Provider: Nate Rae M.D.    Anesthesia Type: spinal ASA Status: 3            Final Anesthesia Type: spinal  Last vitals  BP   Blood Pressure: (!) 148/85    Temp   35.8 °C (96.5 °F)    Pulse   93   Resp   18    SpO2   97 %      Anesthesia Post Evaluation    Patient location during evaluation: PACU  Patient participation: complete - patient participated  Level of consciousness: awake and alert  Pain score: 0    Airway patency: patent  Anesthetic complications: no  Cardiovascular status: hemodynamically stable  Respiratory status: acceptable  Hydration status: euvolemic    PONV: none          There were no known notable events for this encounter.     Nurse Pain Score: 3 (NPRS)

## 2024-08-08 NOTE — CARE PLAN
The patient is Watcher - Medium risk of patient condition declining or worsening    Shift Goals  Clinical Goals: fundus firm, lochia WDL  Patient Goals: Healthy mom, healthy baby  Family Goals: SUpport    Progress made toward(s) clinical / shift goals:    Problem: Altered Physiologic Condition  Goal: Patient physiologically stable as evidenced by normal lochia, palpable uterine involution and vitals within normal limits  Outcome: Progressing  Note: Fundus firm and midline. Lochia light, rubra. Vitals within defined limits       Patient is not progressing towards the following goals:      Problem: Respiratory/Oxygenation Function Post-Surgical  Goal: Patient will achieve/maintain normal respiratory rate/effort  Outcome: Not Progressing  Note: Pt requires 2L O2 while sleeping

## 2024-08-08 NOTE — PROGRESS NOTES
1230  Patient transferred to postpartum unit at 1221. Received report from [facility]. Orieneted patient to room and discussed POC for MOB and infant. Patient was assessed, fundus firm, lochia light, rubra. Call light within reach, bed in lowest position. Patient denies any needs at this time. Encouraged to call if any needs arise.

## 2024-08-08 NOTE — CARE PLAN
The patient is Watcher - Medium risk of patient condition declining or worsening    Shift Goals  Clinical Goals: Patient safety  Patient Goals: Healthy mom, healthy baby  Family Goals: SUpport    Progress made toward(s) clinical / shift goals:    Problem: Knowledge Deficit -   Goal: Patient/support person will demonstrate understanding regarding  section  Description: Target End Date:  1-3 days or as soon as patient condition allows    1.  Describe operative procedures in advance and provide rationale as appropriate  2.  Discuss anticipated sensations during operation and recovery period  3.  Provide pre-op education to patient/significant other/support person  4.  Instruct patient in incentive spirometry use  Outcome: Progressing  Note: Patient encouraged to ask RN questions about POC that the patient may require further understanding     Problem: Pain  Goal: Patient's pain will be alleviated or reduced to the patient’s comfort goal  Description: Target End Date:  Prior to discharge or change in level of care    1.  Document pain using the appropriate pain scale per order or unit policy  2.  Administer pain medications per provider order and/or assist with epidural/spinal placement as needed  3.  Pain management medications as ordered  4.  Educate and implement non-pharmacologic comfort measures (i.e. relaxation, distraction, massage, cold/heat therapy, etc.)  5.  Assess for nonverbal signs of ineffective coping with pain and offer pain medication and/or epidural anesthesia  Outcome: Progressing  Note: Patient educated on post surgical pain and encouraged to express pain intervention needs to RN.        Patient is not progressing towards the following goals:

## 2024-08-08 NOTE — ANESTHESIA TIME REPORT
Anesthesia Start and Stop Event Times       Date Time Event    8/8/2024 0937 Ready for Procedure     0937 Anesthesia Start     1059 Anesthesia Stop          Responsible Staff  08/08/24      Name Role Begin End    Nate Rae M.D. Anesth 0937 1059          Overtime Reason:  no overtime (within assigned shift)    Comments:

## 2024-08-08 NOTE — ANESTHESIA PREPROCEDURE EVALUATION
Case: [id number] Date/Time: 24    Procedure: REPEAT CAESAREAN SECTION, BILATERAL SALPINGECTOMY    Pre-op diagnosis: PREVIOUS  SECTION, TYPE 2 DIABETES MELLITUS WITHOUT COMPLICATION, WITHOUT LONG TERM CURRENT USE OF INSULIN, CHRONIC HYPERTENSION AFFECTING PREGNANCY, MORBID OBESITY, PERMANENT STERILIZATION-38 WEEKS    Location: [facility] OR 01 / [facility]    Surgeons: Nixon Arita M.D.            Relevant Problems   CARDIAC   (positive) Chronic hypertension affecting pregnancy         (positive) Fatty liver disease, nonalcoholic      ENDO   (positive) Controlled type 2 diabetes mellitus without complication, without long-term current use of insulin (HCC)      OB   (positive) Chronic hypertension affecting pregnancy       Physical Exam    Airway   Mallampati: II  TM distance: >3 FB  Neck ROM: full       Cardiovascular - normal exam  Rhythm: regular  Rate: normal  (-) murmur     Dental - normal exam           Pulmonary - normal exam  Breath sounds clear to auscultation     Abdominal    Neurological - normal exam                   Anesthesia Plan    ASA 3       Plan - spinal   Neuraxial block will be primary anesthetic                Postoperative Plan: Postoperative administration of opioids is intended.    Pertinent diagnostic labs and testing reviewed    Informed Consent:    Anesthetic plan and risks discussed with patient.

## 2024-08-08 NOTE — ANESTHESIA PROCEDURE NOTES
Spinal Block    Date/Time: 8/8/2024 9:53 AM    Performed by: Nate Rae M.D.  Authorized by: Nate Rae M.D.    Start Time:  8/8/2024 9:53 AM  Reason for Block: primary anesthetic    patient identified, IV checked, site marked, risks and benefits discussed, surgical consent, monitors and equipment checked, pre-op evaluation and timeout performed    Patient Position:  Sitting  Prep: ChloraPrep, patient draped and sterile technique    Monitoring:  Blood pressure, continuous pulse oximetry and heart rate  Approach:  Midline  Location:  L3-4  Injection Technique:  Single-shot  Skin infiltration:  Lidocaine  Strength:  1%  Dose:  3ml  Needle Type:  Pencan  Needle Gauge:  25 G  CSF flowing pre/post injection:  Yes  Sensory Level:  T4

## 2024-08-08 NOTE — H&P
OB H&P:    HPI:  Ms. Priti Begum is a 39 y.o.  @ 38w0d presenting for scheduled  section. Her pregnancy is complicated by AMA and morbid obesity. Patient also has a history of gestational hypertension and type II diabetes with long term insulin use. She has a history of  delivery.     Contractions: No   Loss of fluid: No   Vaginal bleeding: No   Fetal movement: present    Prenatal Care with: [facility]     Complications this pregnancy:  AMA  Morbid Obesity  History of gestational hypertension   Type II DM, long-term insuline use     Prenatal Labs:  Rh+, Rubella immune, HIV non-reactive, TrepAb non-reactive, HBsAg non-reactive , GC/CT neg/neg  GBS negative    ROS:  As in HPI     OB History    Para Term  AB Living   2 1 1 0 0 1   SAB IAB Ectopic Molar Multiple Live Births   0 0 0   0 1      # Outcome Date GA Lbr Jaison/2nd Weight Sex Delivery Anes PTL Lv   2 Current            1 Term 22 38w2d  3.18 kg (7 lb 0.2 oz) M CS-LTranv EPI, Spinal N GENOVEVA      Complications: Failure to Progress in First Stage       GYN: denies STIs, no cervical procedures    Past Medical History:   Diagnosis Date    Anesthesia     Mom has a hard time waking up after anesthesia.    Diabetes (HCC)     Hypertension     PONV (postoperative nausea and vomiting) 2022    After my  the next day until i got nausea medicine i did vomit alot    Pregnant 2023       Past Surgical History:   Procedure Laterality Date    PRIMARY C SECTION Bilateral 2022    Procedure:  SECTION, PRIMARY;  Surgeon: Starr Tanner M.D.;  Location: [facility];  Service: Labor and Delivery    NO PERTINENT PAST SURGICAL HISTORY  2022    My son being born via c- section    OTHER  2022    My son was born via        No current facility-administered medications on file prior to encounter.     Current Outpatient Medications on File Prior to  Encounter   Medication Sig Dispense Refill    Prenatal Multivit-Min-Fe-FA (PRENATAL 1 + IRON PO) Take  by mouth.      labetalol (NORMODYNE) 100 MG Tab Take 1 Tablet by mouth 2 times a day. 180 Tablet 3    aspirin 81 MG EC tablet Take 81 mg by mouth every day.      metFORMIN ER (GLUCOPHAGE XR) 500 MG TABLET SR 24 HR Take 2 Tablets by mouth 2 times a day. 360 Tablet 3    metFORMIN ER (GLUCOPHAGE XR) 500 MG TABLET SR 24 HR Take 2 Tablets by mouth 2 times a day. 360 Tablet 3    triamcinolone acetonide (KENALOG) 0.1 % Cream Apply 1 Application topically 2 times a day. 45 g 0    ONETOUCH VERIO strip Use to test blood sugar 5 Times a Day. 200 Strip 6    insulin glargine 100 UNIT/ML SC SOPN injection Inject 45 Units under the skin every evening. (Patient taking differently: Inject 65 Units under the skin every evening.) 15 mL 6    insulin lispro 100 UNIT/ML SC SOPN injection PEN Inject 2-15 Units under the skin 3 times a day before meals. 15 mL 6    Continuous Blood Gluc Sensor (DEXCOM G7 SENSOR) Misc 1 Each every 10 days. (Patient not taking: Reported on 8/1/2024) 3 Each 6    BD PEN NEEDLE AMINTA U/F Inject 1 Each under the skin 4 times a day. 300 Each 3    Doxylamine-Pyridoxine 10-10 MG Tablet Delayed Response delayed-release tablet Take 1 Tablet by mouth 2 times a day. (Patient not taking: Reported on 7/17/2024) 60 Tablet 3    Blood Glucose Monitoring Suppl (ONETOUCH VERIO FLEX SYSTEM) w/Device Kit Use as directed 1 Kit 0    glucose blood strip Use as directed subcutaneously 4-5 times a day for 30 days. 150 Strip 5    Microlet Lancets Misc Use as directed by fingerstick 4-5 times a day for 30 days. 100 Each 1    Blood Glucose Test Strips Testing before and 1 hour after meals for insulin adjustment during pregnancy. One Touch Verio test strips 500 Strip 3    Lancets Lancets order: Lancets for One Touch Delica.  Use 6 times daily for blood sugar testing. 200 Each 11    vitamin D (CHOLECALCIFEROL) 1000 UNIT Tab Take 2,000  "Units by mouth every day.         Family History   Problem Relation Age of Onset    Diabetes Mother     Hypertension Mother     Kidney Disease Mother     Hyperlipidemia Mother     Lung Disease Maternal Grandmother         emphysema (smoker)    Diabetes Maternal Grandmother     No Known Problems Sister     No Known Problems Brother     No Known Problems Brother     No Known Problems Father        Social History     Tobacco Use    Smoking status: Never    Smokeless tobacco: Never   Vaping Use    Vaping status: Never Used   Substance Use Topics    Alcohol use: No    Drug use: No         PE:   Vitals:    24 0700 24 0810   BP:  (!) 148/85   Pulse:  93   Resp:  18   Temp:  35.8 °C (96.5 °F)   TempSrc:  Temporal   SpO2:  97%   Weight: (!) 183 kg (403 lb 7.1 oz)    Height: 1.575 m (5' 2\")      gen: AAO, NAD, morbid obesity   abd: soft, gravid, NT  Ext: NT,  edema    FHT: 150/moderate variability/- accels/ - decels  martha: no regular contraction pattern     A/P: 39 y.o.  @ 38w0d admitted for scheduled  section, with history of gestational hypertension and diabetes.     Discussed with the patient indications for  delivery. The patient voiced understanding of indications for  section at this time.     Discussed with the patient the risks of  delivery. The risks include bleeding, infection, transfusion, emergency hysterectomy to control bleeding, damage to surrounding organs (bowel, bladder, ureters, nerves, vessels), need for repair or future surgery, fetal injury, unexpected pathology, anesthesia risks, and rarely death.  I also discussed with the patient the risk of wound infection, wound breakdown, and scarring. We discussed that these risks are greater in people that have diabetes or obesity.     The patient had the opportunity to ask questions regarding the procedure. All questions answered to the patient's satisfaction. Plan to proceed with  delivery.         Emma" DO Lg  [facility]  PGY-1

## 2024-08-08 NOTE — PROGRESS NOTES
Late entry due to patient care.     0752 Patient arrival to L&D room for repeat  section. Trevor  EDC  GA 38.0 presents with no  complaints of LOF, VB, or contractions. Patient reports positive fetal movement. EFM and TOCO applied. Patient educated to use call light if assistance is needed.     0900 IV started by NY Gee.     0933 Patient arrival via ambulation to OR 1   1014 Delivery of viable female. 7/8 APGAR  1101 Patient arrival to PACU bed 1.     1219 Patient transported via gurney in apparent stable condition and with all personal belongings to the Postpartum unit. Report given to NY Fernández. Lochia assessed and bleeding assessed. Transfer of care at this time.

## 2024-08-09 LAB
GLUCOSE BLD STRIP.AUTO-MCNC: 86 MG/DL (ref 65–99)
GLUCOSE BLD STRIP.AUTO-MCNC: 88 MG/DL (ref 65–99)
PATHOLOGY CONSULT NOTE: NORMAL

## 2024-08-09 PROCEDURE — 700111 HCHG RX REV CODE 636 W/ 250 OVERRIDE (IP): Performed by: OBSTETRICS & GYNECOLOGY

## 2024-08-09 PROCEDURE — 82962 GLUCOSE BLOOD TEST: CPT

## 2024-08-09 PROCEDURE — A9270 NON-COVERED ITEM OR SERVICE: HCPCS | Performed by: OBSTETRICS & GYNECOLOGY

## 2024-08-09 PROCEDURE — 700102 HCHG RX REV CODE 250 W/ 637 OVERRIDE(OP): Performed by: OBSTETRICS & GYNECOLOGY

## 2024-08-09 PROCEDURE — 700102 HCHG RX REV CODE 250 W/ 637 OVERRIDE(OP): Performed by: ANESTHESIOLOGY

## 2024-08-09 PROCEDURE — 700102 HCHG RX REV CODE 250 W/ 637 OVERRIDE(OP)

## 2024-08-09 PROCEDURE — 770002 HCHG ROOM/CARE - OB PRIVATE (112)

## 2024-08-09 PROCEDURE — 700111 HCHG RX REV CODE 636 W/ 250 OVERRIDE (IP): Mod: JZ | Performed by: ANESTHESIOLOGY

## 2024-08-09 PROCEDURE — A9270 NON-COVERED ITEM OR SERVICE: HCPCS

## 2024-08-09 PROCEDURE — A9270 NON-COVERED ITEM OR SERVICE: HCPCS | Performed by: ANESTHESIOLOGY

## 2024-08-09 RX ORDER — ACETAMINOPHEN 500 MG
1000 TABLET ORAL EVERY 6 HOURS PRN
Status: DISCONTINUED | OUTPATIENT
Start: 2024-08-12 | End: 2024-08-11 | Stop reason: HOSPADM

## 2024-08-09 RX ORDER — ACETAMINOPHEN 500 MG
1000 TABLET ORAL EVERY 6 HOURS
Status: DISCONTINUED | OUTPATIENT
Start: 2024-08-09 | End: 2024-08-11 | Stop reason: HOSPADM

## 2024-08-09 RX ADMIN — ENOXAPARIN SODIUM 60 MG: 100 INJECTION SUBCUTANEOUS at 18:10

## 2024-08-09 RX ADMIN — KETOROLAC TROMETHAMINE 15 MG: 15 INJECTION, SOLUTION INTRAMUSCULAR; INTRAVENOUS at 01:36

## 2024-08-09 RX ADMIN — OXYCODONE HYDROCHLORIDE 10 MG: 5 TABLET ORAL at 21:48

## 2024-08-09 RX ADMIN — IBUPROFEN 800 MG: 800 TABLET, FILM COATED ORAL at 15:24

## 2024-08-09 RX ADMIN — ENOXAPARIN SODIUM 60 MG: 100 INJECTION SUBCUTANEOUS at 06:08

## 2024-08-09 RX ADMIN — METFORMIN HYDROCHLORIDE 1000 MG: 500 TABLET ORAL at 08:43

## 2024-08-09 RX ADMIN — LABETALOL HYDROCHLORIDE 100 MG: 100 TABLET, FILM COATED ORAL at 06:08

## 2024-08-09 RX ADMIN — ACETAMINOPHEN 1000 MG: 500 TABLET ORAL at 18:10

## 2024-08-09 RX ADMIN — METFORMIN HYDROCHLORIDE 1000 MG: 500 TABLET ORAL at 18:09

## 2024-08-09 RX ADMIN — ACETAMINOPHEN 1000 MG: 500 TABLET ORAL at 05:01

## 2024-08-09 RX ADMIN — DOCUSATE SODIUM 100 MG: 100 CAPSULE, LIQUID FILLED ORAL at 21:48

## 2024-08-09 RX ADMIN — IBUPROFEN 800 MG: 800 TABLET, FILM COATED ORAL at 23:30

## 2024-08-09 RX ADMIN — LABETALOL HYDROCHLORIDE 100 MG: 100 TABLET, FILM COATED ORAL at 18:09

## 2024-08-09 RX ADMIN — KETOROLAC TROMETHAMINE 15 MG: 15 INJECTION, SOLUTION INTRAMUSCULAR; INTRAVENOUS at 08:43

## 2024-08-09 RX ADMIN — ACETAMINOPHEN 500 MG: 500 TABLET ORAL at 18:42

## 2024-08-09 RX ADMIN — ACETAMINOPHEN 1000 MG: 500 TABLET ORAL at 11:57

## 2024-08-09 ASSESSMENT — EDINBURGH POSTNATAL DEPRESSION SCALE (EPDS)
THINGS HAVE BEEN GETTING ON TOP OF ME: NO, I HAVE BEEN COPING AS WELL AS EVER
I HAVE BEEN ANXIOUS OR WORRIED FOR NO GOOD REASON: HARDLY EVER
I HAVE BEEN SO UNHAPPY THAT I HAVE BEEN CRYING: NO, NEVER
I HAVE BLAMED MYSELF UNNECESSARILY WHEN THINGS WENT WRONG: YES, SOME OF THE TIME
THE THOUGHT OF HARMING MYSELF HAS OCCURRED TO ME: NEVER
I HAVE BEEN ABLE TO LAUGH AND SEE THE FUNNY SIDE OF THINGS: AS MUCH AS I ALWAYS COULD
I HAVE FELT SAD OR MISERABLE: NO, NOT AT ALL
I HAVE BEEN SO UNHAPPY THAT I HAVE HAD DIFFICULTY SLEEPING: NOT AT ALL
I HAVE LOOKED FORWARD WITH ENJOYMENT TO THINGS: AS MUCH AS I EVER DID
I HAVE FELT SCARED OR PANICKY FOR NO GOOD REASON: NO, NOT AT ALL

## 2024-08-09 ASSESSMENT — PAIN DESCRIPTION - PAIN TYPE
TYPE: SURGICAL PAIN

## 2024-08-09 NOTE — OP REPORT
DATE OF SERVICE: 2024     PREOPERATIVE DIAGNOSES:  1.  Intrauterine pregnancy at 38w0d  2.  Prior  x 1  3.  Gestational hypertension  4.  Type 2 diabetes  5.  Obesity  6.  Desires sterilization     POSTOPERATIVE DIAGNOSES:  1.  Intrauterine pregnancy at 38w0d  2.  same    PROCEDURE PERFORMED: Repeat low transverse  section with bilateral salpingectomy     SURGEON: Nixon Arita MD     ASSISTANT: Salvador Baker DO     ANESTHESIA:  Spinal.     ANESTHESIOLOGIST: Nate Rae MD     SPECIMEN: Cord gases sent for evaluation     ESTIMATED BLOOD LOSS: 600 mL     FINDINGS:  A viable female infant, weight 4080 g, Apgars of 7 8 and  in vertex    presentation with clear amniotic fluid.  There was a normal uterus,   tubes, and ovaries bilaterally.     COMPLICATIONS:  None.     PROCEDURE:  After appropriate consents were obtained, the patient was taken to the operating room where spinal  anesthesia was applied without complications.  The patient was then prepped and draped in the usual sterile manner.  Clamp test on the skin verified adequate anesthesia.  A Pfannenstiel incision was made with a scalpel 3cm superior to the pubic symphysis and extended down to the rectus fascia.  The rectus fascia was incised with the scalpel and tented up. The underlying rectus muscle was  from the fascia first inferiorly and then superiorly using the li scissors.  The rectus muscle was  bluntly in the midline.  The peritoneum was entered bluntly in the midline. The peritoneum incision was extended superiorly and inferiorly with the Metzenbaum scissors with great care to avoid injury to underlying bowel or bladder.  Sky retractor was placed. The vesicouterine peritoneum was tented up and entered with Metzenbaum scissors, and a bladder flap was created.  An incision was made into the lower uterine segment transversely and the incision was extended bluntly.  Amniotomy was performed and there was  noted to be clear amniotic fluid. The Infant's head was grasped and delivered atraumatically followed by the remainder of the body without any complications.  The mouth and nares were suctioned. The cord was doubly clamped and cut and the infant was handed off to awaiting neonatology team.  The placenta was then allowed to spontaneously deliver. The uterus was cleared of clots and debris.  The hysterotomy incision was reapproximated with 1-0 Vicryl suture in a running locked fashion.  Hemostasis was noted.  The tubes and ovaries were examined and noted to be normal.  The pelvis was irrigated with normal saline. The pericolic gutters were examined and any blood clots were removed.  The Sky retractor was removed. The peritoneum was reapproximated with 3-0 Vicryl suture running.  The rectus muscles were examined and hemostatic.  The fascia was reapproximated with 0 Vicryl suture running.  The subcutaneous fat was irrigated and any small bleeders were bovied for hemostasis.  The subcutaneous fat was then reapproximated with 3-0 Vicryl suture running.  The skin was reapproximated with surgical staples and Mepilex dressing placed.  Sponge, needle, instrument, and lap counts were correct x2.  Patient tolerated the procedure well and went to recovery room in stable condition.        ____________________________________  Nixon Arita MD

## 2024-08-09 NOTE — LACTATION NOTE
This note was copied from a baby's chart.  Per RN, patient declines to pump at this time. She wants to hold off pumping until she gets home. RN provided education regarding the milk making process and encouraged her to pump to bring milk in. She was provided with an [facility] pump packet.

## 2024-08-09 NOTE — PROGRESS NOTES
Report received from Jolene RN @ 6652. The patient is awake and alert. Assumed care. Discussed care plan. Zofran and Lovenox were given. Call light is within reach. Will call for help or assistant. FOB is at the bedside.

## 2024-08-09 NOTE — PROGRESS NOTES
0830  Assessment done. Vital signs stable. Patient voiding without difficulty, claims to be passing flatus. Fundus firm at umbilicus with light lochia. Dressing over low abdominal incision clean, dry, and intact.  Patient ambulating with steady gait. Patient claims to have good pain relief with p.o meds. Family at bedside. Pt declines wanting to pump for infant in NICU. Education given

## 2024-08-09 NOTE — CARE PLAN
The patient is Stable - Low risk of patient condition declining or worsening    Shift Goals  Clinical Goals: lochia wdl  Patient Goals: Healthy mom, healthy baby  Family Goals:     Progress made toward(s) clinical / shift goals: FF@U with light lochia     Patient is not progressing towards the following goals:       No

## 2024-08-09 NOTE — CARE PLAN
Problem: Knowledge Deficit - Postpartum  Goal: Patient will verbalize and demonstrate understanding of self and infant care  Outcome: Progressing     Problem: Psychosocial - Postpartum  Goal: Patient will verbalize and demonstrate effective bonding and parenting behavior  Outcome: Progressing     Problem: Altered Physiologic Condition  Goal: Patient physiologically stable as evidenced by normal lochia, palpable uterine involution and vitals within normal limits  Outcome: Progressing   The patient is Stable - Low risk of patient condition declining or worsening    Shift Goals  Clinical Goals: fundus firm, locia WDL, good urine output, pain management  Patient Goals: Healthy mom, healthy baby  Family Goals: SUpport    Progress made toward(s) clinical / shift goals:    Pt reports comfort after pain interventions, Fundus firm and lochia light, VSS, educated on POC, needs met at this time. Questions answered. Will continue to educate.

## 2024-08-09 NOTE — PROGRESS NOTES
Post Partum Progress Note    Name:   Priti Begum   Date/Time:  2024 - 4:58 AM  Chief Admitting Dx:  Pregnancy [Z34.90]  Indication for care in labor or delivery [O75.9]  Delivery Type:   for repeat  Post-Op/Post Partum Days #:  1    Subjective:  Abdominal pain: no  Ambulating:   yes  Tolerating liquids:  yes  Tolerating food:  yes diabetic  Flatus:   yes  BM:    no  Bleeding:   with a small amount of bleeding  Voiding:   yes  Dizziness:   no  Feeding:   Baby in NICU-bottle feeding    Vitals:    24 2300 24 0000 24 0100 24 0200   BP:    122/73   Pulse: 80 74 71 67   Resp: 18 18 17 18   Temp:    36.4 °C (97.6 °F)   TempSrc:    Temporal   SpO2: 95% 96% 98% 99%   Weight:       Height:           Exam:  Breast: Tenderness no  Abdomen: Abdomen soft, non-tender. BS normal. No masses,  No organomegaly-abd binder in place  Fundal Tenderness:  no  Fundus Firm: yes  Incision:Mepilex dsg,  dry and intact  Below umbilicus: no  Perineum: perineum intact  Lochia: mild  Extremities: Normal extremities, peripheral pulses and reflexes normal    Meds:  Current Facility-Administered Medications   Medication Dose    oxytocin (Pitocin) infusion bolus (for post delivery)  20 Units    Followed by    oxytocin (Pitocin) infusion (for post delivery)  125 mL/hr    oxytocin (Pitocin) injection 10 Units  10 Units    miSOPROStol (Cytotec) tablet 800 mcg  800 mcg    methylergonovine (Methergine) injection 0.2 mg  0.2 mg    carboPROST (Hemabate) injection 250 mcg  250 mcg    acetaminophen (Tylenol) tablet 1,000 mg  1,000 mg    ketorolac (Toradol) 15 MG/ML injection 15 mg  15 mg    oxyCODONE immediate-release (Roxicodone) tablet 5 mg  5 mg    diphenhydrAMINE (Benadryl) injection 12.5 mg  12.5 mg    Or    diphenhydrAMINE (Benadryl) injection 25 mg  25 mg    Or    naloxone HCl (Narcan) 20 mg in  mL infusion  0.4 mg/hr    ePHEDrine injection 10 mg  10 mg    lactated ringers infusion       ibuprofen (Motrin) tablet 800 mg  800 mg    Followed by    [START ON 2024] ibuprofen (Motrin) tablet 800 mg  800 mg    oxyCODONE immediate-release (Roxicodone) tablet 5 mg  5 mg    oxyCODONE immediate-release (Roxicodone) tablet 10 mg  10 mg    diphenhydrAMINE (Benadryl) tablet/capsule 25 mg  25 mg    Or    diphenhydrAMINE (Benadryl) injection 25 mg  25 mg    magnesium hydroxide (Milk Of Magnesia) suspension 30 mL  30 mL    labetalol (Normodyne) tablet 100 mg  100 mg    metFORMIN (Glucophage) tablet 1,000 mg  1,000 mg    ondansetron (Zofran) syringe/vial injection 4 mg  4 mg    Or    ondansetron (Zofran ODT) dispertab 4 mg  4 mg    lactated ringers infusion      acetaminophen (Tylenol) tablet 1,000 mg  1,000 mg    Followed by    [START ON 2024] acetaminophen (Tylenol) tablet 1,000 mg  1,000 mg    docusate sodium (Colace) capsule 100 mg  100 mg    enoxaparin (Lovenox) inj 60 mg  60 mg       Labs:   Recent Labs     24  0851 24  2139   WBC 8.3 10.1   RBC 4.61 3.96*   HEMOGLOBIN 11.7* 10.2*   HEMATOCRIT 38.4 32.5*   MCV 83.3 82.1   MCH 25.4* 25.8*   MCHC 30.5* 31.4*   RDW 54.6* 53.5*   PLATELETCT 257 222   MPV 10.2 10.4       Assessment:  Chief Admitting Dx:  Pregnancy [Z34.90]  Indication for care in labor or delivery [O75.9]  Delivery Type:   for repeat  Tubal Ligation:  yes  CHTN-currently on Labetalol 100mg BID  Type 2 diabetes- metformin 1000mg BID  BMI=73.79-Lovenox 60 mg BID    Plan:  POD#1   Continue routine post partum care.  Baby in NICU-encourage visitation    BEN Timmons

## 2024-08-09 NOTE — OR SURGEON
Immediate Post OP Note    PreOp Diagnosis: Prior  x 1, desires repeat, gestational pretension, type 2 diabetes, desires sterilization      PostOp Diagnosis: Same      Procedure(s):  REPEAT CAESAREAN SECTION, BILATERAL SALPINGECTOMY - Wound Class: Clean Contaminated    Surgeon(s):  TRINI Christianson M.D.    Anesthesiologist/Type of Anesthesia:  Anesthesiologist: Nate Rae M.D./Spinal    Surgical Staff:  Circulator: Jaquelin Epps R.N.  Scrub Person: Alma Rosa PADILLA&COLEEN Circulator Assistant: Katarina Jarvis R.N.  L&COLEEN Baby  Nurse: Manny Howard R.N.    Specimens removed if any:  Cord gases sent for evaluation    Estimated Blood Loss: 600 mL    Findings: Female infant, Apgars of 7 and 8, weight 4080 g.  None placenta with three-vessel cord.  Bilateral normal tubes and ovaries.    Complications: None        2024 6:49 PM Nixon Arita M.D.

## 2024-08-09 NOTE — PROGRESS NOTES
"62-10\" abdominal binder sent to tube station 34, RN notified.     Contact traction for any questions or concerns.   "

## 2024-08-09 NOTE — DISCHARGE PLANNING
Social Work-    Attempted to complete social assessment with MOB. MOB was not on post partum - found MOB in NICU but was actively engaged with nurse, baby and other staff. Could not complete at this time.

## 2024-08-10 LAB
GLUCOSE BLD STRIP.AUTO-MCNC: 85 MG/DL (ref 65–99)
GLUCOSE BLD STRIP.AUTO-MCNC: 87 MG/DL (ref 65–99)

## 2024-08-10 PROCEDURE — 82962 GLUCOSE BLOOD TEST: CPT

## 2024-08-10 PROCEDURE — 700111 HCHG RX REV CODE 636 W/ 250 OVERRIDE (IP): Performed by: OBSTETRICS & GYNECOLOGY

## 2024-08-10 PROCEDURE — 700102 HCHG RX REV CODE 250 W/ 637 OVERRIDE(OP): Performed by: OBSTETRICS & GYNECOLOGY

## 2024-08-10 PROCEDURE — 770002 HCHG ROOM/CARE - OB PRIVATE (112)

## 2024-08-10 PROCEDURE — 700102 HCHG RX REV CODE 250 W/ 637 OVERRIDE(OP)

## 2024-08-10 PROCEDURE — A9270 NON-COVERED ITEM OR SERVICE: HCPCS | Performed by: OBSTETRICS & GYNECOLOGY

## 2024-08-10 PROCEDURE — A9270 NON-COVERED ITEM OR SERVICE: HCPCS

## 2024-08-10 RX ORDER — LABETALOL 100 MG/1
200 TABLET, FILM COATED ORAL TWICE DAILY
Status: DISCONTINUED | OUTPATIENT
Start: 2024-08-11 | End: 2024-08-11 | Stop reason: HOSPADM

## 2024-08-10 RX ADMIN — ACETAMINOPHEN 1000 MG: 500 TABLET ORAL at 05:59

## 2024-08-10 RX ADMIN — ENOXAPARIN SODIUM 60 MG: 100 INJECTION SUBCUTANEOUS at 17:55

## 2024-08-10 RX ADMIN — ACETAMINOPHEN 1000 MG: 500 TABLET ORAL at 17:55

## 2024-08-10 RX ADMIN — METFORMIN HYDROCHLORIDE 1000 MG: 500 TABLET ORAL at 07:57

## 2024-08-10 RX ADMIN — LABETALOL HYDROCHLORIDE 100 MG: 100 TABLET, FILM COATED ORAL at 17:55

## 2024-08-10 RX ADMIN — METFORMIN HYDROCHLORIDE 1000 MG: 500 TABLET ORAL at 17:55

## 2024-08-10 RX ADMIN — IBUPROFEN 800 MG: 800 TABLET, FILM COATED ORAL at 07:57

## 2024-08-10 RX ADMIN — ENOXAPARIN SODIUM 60 MG: 100 INJECTION SUBCUTANEOUS at 05:59

## 2024-08-10 RX ADMIN — IBUPROFEN 800 MG: 800 TABLET, FILM COATED ORAL at 15:51

## 2024-08-10 RX ADMIN — LABETALOL HYDROCHLORIDE 100 MG: 100 TABLET, FILM COATED ORAL at 05:59

## 2024-08-10 RX ADMIN — ACETAMINOPHEN 1000 MG: 500 TABLET ORAL at 12:01

## 2024-08-10 ASSESSMENT — PAIN DESCRIPTION - PAIN TYPE
TYPE: SURGICAL PAIN

## 2024-08-10 NOTE — PROGRESS NOTES
Obstetrics & Gynecology Post-Delivery Progress Note    Date of Service      39 y.o.  s/p  for repeat  Delivery date: 24    Subjective  Pain: No  Bleeding: lochia minimal  Tolerating PO: yes  Voiding: without difficulty  Ambulating: yes  Passing flatus: Yes  Feeding: Baby in NICU bottle-feeding    Objective  24hr VS:  Temp:  [35.8 °C (96.5 °F)-36.4 °C (97.6 °F)] 35.8 °C (96.5 °F)  Pulse:  [60-79] 77  Resp:  [18] 18  BP: (128-149)/(62-88) 144/83  SpO2:  [94 %-97 %] 94 %    Physical Exam  Gen: well  CV: RRR   Resp: unlabored respirations, no intercostal retractions or accessory muscle use  Abd: nontender  Fundus: firm and below umbilicus  Incision: dressing clean, dry, intact  Ext: symmetric, calves nontender    Labs:  Recent Labs     24  0851 24  2139   WBC 8.3 10.1   RBC 4.61 3.96*   HEMOGLOBIN 11.7* 10.2*   HEMATOCRIT 38.4 32.5*   MCV 83.3 82.1   MCH 25.4* 25.8*   RDW 54.6* 53.5*   PLATELETCT 257 222   MPV 10.2 10.4   NEUTSPOLYS 68.20 74.60*   LYMPHOCYTES 23.90 19.30*   MONOCYTES 4.20 5.30   EOSINOPHILS 2.80 0.20   BASOPHILS 0.40 0.30        Medications  acetaminophen, 1,000 mg, Oral, Q6HR  ibuprofen, 800 mg, Oral, Q8HRS  labetalol, 100 mg, Oral, TWICE DAILY  metFORMIN, 1,000 mg, Oral, BID WITH MEALS  enoxaparin (LOVENOX) injection, 60 mg, Subcutaneous, BID      PRN medications: acetaminophen **FOLLOWED BY** [START ON 2024] acetaminophen, oxytocin, misoprostol, methylergonovine, carboPROST, LR, ibuprofen **FOLLOWED BY** [START ON 2024] ibuprofen, oxyCODONE immediate-release, oxyCODONE immediate release, diphenhydrAMINE **OR** diphenhydrAMINE, magnesium hydroxide, ondansetron **OR** ondansetron, LR, docusate sodium      Assessment/Plan  Priti Begum is a 39 y.o.yo  postpartum day #2  s/p  for repeat    - Post care: meeting all goals  - Pain: controlled  - Rh+, Rubella Immune  - Method of Feeding: plans to bottle feed  - Method of  Contraception: Bilateral salpingectomy   VTE prophylaxis: Lovenox 60 mg BID     - Disposition: likely home PPD3    Emma Castanon D.O.

## 2024-08-10 NOTE — CARE PLAN
The patient is Stable - Low risk of patient condition declining or worsening    Shift Goals  Clinical Goals: pain control, lochia WDL, cardenas  Patient Goals: Healthy mom, healthy baby  Family Goals: SUpport    Progress made toward(s) clinical / shift goals:      Problem: Pain - Standard  Goal: Alleviation of pain or a reduction in pain to the patient’s comfort goal  Description: Target End Date:  Prior to discharge or change in level of care    Document on Vitals flowsheet    1.  Document pain using the appropriate pain scale per order or unit policy  2.  Educate and implement non-pharmacologic comfort measures (i.e. relaxation, distraction, massage, cold/heat therapy, etc.)  3.  Pain management medications as ordered  4.  Reassess pain after pain med administration per policy  5.  If opiods administered assess patient's response to pain medication is appropriate per POSS sedation scale  6.  Follow pain management plan developed in collaboration with patient and interdisciplinary team (including palliative care or pain specialists if applicable)  Outcome: Progressing     Problem: Knowledge Deficit - Postpartum  Goal: Patient will verbalize and demonstrate understanding of self and infant care  Description: Target End Date:  1-3 days or as soon as patient condition allows    Document in Patient Education    1.  Assess patient and knowledge of self and infant care  2.  Educate patient verbally, by demonstration and written material  Outcome: Progressing     Problem: Psychosocial - Postpartum  Goal: Patient will verbalize and demonstrate effective bonding and parenting behavior  Description: Target End Date:  1 to 4 days    1.  Assess patient for anxiety or apprehension regarding parenting role  2.  Provide emotional support and encouragement to patient/family/caregiver  Outcome: Progressing       Patient is not progressing towards the following goals:

## 2024-08-10 NOTE — CARE PLAN
The patient is Stable - Low risk of patient condition declining or worsening    Shift Goals  Clinical Goals: rest, pain control, VSS  Patient Goals: Healthy mom, healthy baby  Family Goals: SUpport    Progress made toward(s) clinical / shift goals:  able to maintain normal blood pressure.   Problem: Altered Physiologic Condition  Goal: Patient physiologically stable as evidenced by normal lochia, palpable uterine involution and vitals within normal limits  Outcome: Progressing  Note: Fundus firm at U, lochia rubra minimal. Surgical incision with mepilex dressing CDI. VSS     Problem: Pain - Standard  Goal: Alleviation of pain or a reduction in pain to the patient’s comfort goal  Outcome: Progressing  Note: Pain controlled at this time, medicated as scheduled/needed.        Patient is not progressing towards the following goals:

## 2024-08-10 NOTE — LACTATION NOTE
This note was copied from a baby's chart.  Mom is an 38 y/o P2 who delivered baby girl being cared for in the NICU. When LC came to room mom stated that she will start pumping when she gets home.

## 2024-08-10 NOTE — PROGRESS NOTES
Assessment completed. Fundus firm, lochia scant. POC reviewed with MOB. Verbalized understanding. No further questions at this time. Patient educated on emergency call light. Call light within reach.

## 2024-08-11 ENCOUNTER — PHARMACY VISIT (OUTPATIENT)
Dept: PHARMACY | Facility: MEDICAL CENTER | Age: 39
End: 2024-08-11
Payer: COMMERCIAL

## 2024-08-11 VITALS
BODY MASS INDEX: 53.92 KG/M2 | DIASTOLIC BLOOD PRESSURE: 84 MMHG | SYSTOLIC BLOOD PRESSURE: 140 MMHG | OXYGEN SATURATION: 96 % | HEIGHT: 62 IN | TEMPERATURE: 97.9 F | HEART RATE: 81 BPM | RESPIRATION RATE: 17 BRPM | WEIGHT: 293 LBS

## 2024-08-11 LAB — GLUCOSE BLD STRIP.AUTO-MCNC: 93 MG/DL (ref 65–99)

## 2024-08-11 PROCEDURE — 700102 HCHG RX REV CODE 250 W/ 637 OVERRIDE(OP): Performed by: OBSTETRICS & GYNECOLOGY

## 2024-08-11 PROCEDURE — A9270 NON-COVERED ITEM OR SERVICE: HCPCS | Performed by: OBSTETRICS & GYNECOLOGY

## 2024-08-11 PROCEDURE — 700111 HCHG RX REV CODE 636 W/ 250 OVERRIDE (IP): Performed by: OBSTETRICS & GYNECOLOGY

## 2024-08-11 PROCEDURE — A9270 NON-COVERED ITEM OR SERVICE: HCPCS

## 2024-08-11 PROCEDURE — 700102 HCHG RX REV CODE 250 W/ 637 OVERRIDE(OP)

## 2024-08-11 PROCEDURE — RXMED WILLOW AMBULATORY MEDICATION CHARGE

## 2024-08-11 PROCEDURE — 82962 GLUCOSE BLOOD TEST: CPT

## 2024-08-11 RX ORDER — PSEUDOEPHEDRINE HCL 30 MG
100 TABLET ORAL 2 TIMES DAILY PRN
Qty: 60 CAPSULE | Refills: 0 | Status: CANCELLED | OUTPATIENT
Start: 2024-08-11

## 2024-08-11 RX ORDER — IBUPROFEN 800 MG/1
800 TABLET, FILM COATED ORAL EVERY 8 HOURS
Qty: 60 TABLET | Refills: 0 | Status: SHIPPED | OUTPATIENT
Start: 2024-08-11 | End: 2024-08-15

## 2024-08-11 RX ORDER — IBUPROFEN 800 MG/1
800 TABLET, FILM COATED ORAL EVERY 8 HOURS PRN
Qty: 60 TABLET | Refills: 0 | Status: SHIPPED | OUTPATIENT
Start: 2024-08-11 | End: 2024-08-22

## 2024-08-11 RX ORDER — LABETALOL 200 MG/1
200 TABLET, FILM COATED ORAL 2 TIMES DAILY
Qty: 60 TABLET | Refills: 6 | Status: SHIPPED | OUTPATIENT
Start: 2024-08-11 | End: 2024-08-15

## 2024-08-11 RX ORDER — PSEUDOEPHEDRINE HCL 30 MG
100 TABLET ORAL 2 TIMES DAILY PRN
Qty: 60 CAPSULE | Refills: 0 | Status: SHIPPED | OUTPATIENT
Start: 2024-08-11 | End: 2024-08-15

## 2024-08-11 RX ORDER — LABETALOL 200 MG/1
200 TABLET, FILM COATED ORAL 2 TIMES DAILY
Qty: 60 TABLET | Refills: 5 | Status: CANCELLED | OUTPATIENT
Start: 2024-08-11

## 2024-08-11 RX ADMIN — IBUPROFEN 800 MG: 800 TABLET, FILM COATED ORAL at 00:06

## 2024-08-11 RX ADMIN — IBUPROFEN 800 MG: 800 TABLET, FILM COATED ORAL at 07:32

## 2024-08-11 RX ADMIN — ACETAMINOPHEN 1000 MG: 500 TABLET ORAL at 00:06

## 2024-08-11 RX ADMIN — LABETALOL HYDROCHLORIDE 200 MG: 100 TABLET, FILM COATED ORAL at 06:15

## 2024-08-11 RX ADMIN — METFORMIN HYDROCHLORIDE 1000 MG: 500 TABLET ORAL at 07:32

## 2024-08-11 RX ADMIN — ACETAMINOPHEN 1000 MG: 500 TABLET ORAL at 06:16

## 2024-08-11 RX ADMIN — ACETAMINOPHEN 1000 MG: 500 TABLET ORAL at 12:33

## 2024-08-11 RX ADMIN — ENOXAPARIN SODIUM 60 MG: 100 INJECTION SUBCUTANEOUS at 06:15

## 2024-08-11 NOTE — PROGRESS NOTES
Pt discharged.  Pt left unit PP with SO. Personal belongings with pt when leaving unit. Pt given discharge instructions prior to leaving unit including where to  prescriptions and when to follow-up; verbalizes understanding. Copy of discharge instructions with pt and in the chart. Pt and family refused escort off unit.

## 2024-08-11 NOTE — DISCHARGE PLANNING
Discharge Planning Assessment Post Partum    Reason for Referral: NICU admission  Address:  [place], [place]   Type of Living Situation: Stable Home   Mom Diagnosis:  Pregnancy- c section  Baby Diagnosis: - hypoglycemia   Primary Language: English    Name of Baby: James Begum  Father of the Baby: Nito Begum : 1979  Involved in baby’s care? Yes  Contact Information: [phone]    [facility]renatal Care: [facility]- [facility]   Mom's PCP: Carolyn Cervantes   PCP for new baby: Yes- Dr. Thomas     Support System: Yes  Coping/Bonding between mother & baby: Appropriate   Source of Feeding: Bottle  Supplies for Infant: Yes     Mom's Insurance: [facility]  Baby Covered on Insurance:Yes   Mother Employed/School: Yes- works for [employer] in PCP office and FOB works for a framing company   Other children in the home/names & ages: 1 yo son     Financial Hardship/Income: No   Mom's Mental status: Appropriate and stable   Services used prior to admit: None     CPS History: Denies  Psychiatric History: Denies  Domestic Violence History: Denies   Drug/ETOH History: Denies     Resources Provided: PPD handout   Referrals Made: None      Clearance for Discharge: Baby is cleared to dc home with parents once medically cleared.      Ongoing Plan: LMSW to assist with any needs during NICU admission.

## 2024-08-11 NOTE — PROGRESS NOTES
1900- Patient went to NICU with FOB to visit infant.   2100- Patient back from NICU, assessment done WNL. Plan of care on going.

## 2024-08-11 NOTE — DISCHARGE SUMMARY
Discharge Summary:     Date of Admission: 2024  Date of Discharge: 24      Admitting diagnosis:    1. Pregnancy @ 38w0d  2. Obesity   3. AMA   4. PCOS  5. Fatty liver disease, nonalcoholic  6. Chronic hypertension  7. Type 2 DM, without long-term use of insulin  8. History of  delivery     Discharge Diagnosis:   1. Status post  for repeat.  2. Obesity   3. AMA   4. PCOS  5. Fatty liver disease, nonalcoholic  6. Chronic hypertension  7. Type 2 DM, without long-term use of insulin  8. History of  delivery     Past Medical History:   Diagnosis Date    Anesthesia     Mom has a hard time waking up after anesthesia.    Diabetes (HCC)     Hypertension     PONV (postoperative nausea and vomiting) 2022    After my  the next day until i got nausea medicine i did vomit alot    Pregnant 2023     OB History    Para Term  AB Living   2 2 2 0 0 2   SAB IAB Ectopic Molar Multiple Live Births   0 0 0   0 2      # Outcome Date GA Lbr Jaison/2nd Weight Sex Delivery Anes PTL Lv   2 Term 24 38w0d  4.08 kg (8 lb 15.9 oz) F CS-LTranv Spinal N GENOVEVA   1 Term 22 38w2d  3.18 kg (7 lb 0.2 oz) M CS-LTranv EPI, Spinal N GENOVEVA      Complications: Failure to Progress in First Stage     Past Surgical History:   Procedure Laterality Date    REPEAT C SECTOIN WITH SALPINGECTOMY N/A 2024    Procedure: REPEAT CAESAREAN SECTION, BILATERAL SALPINGECTOMY;  Surgeon: Nixon Arita M.D.;  Location: [facility];  Service: Obstetrics    PRIMARY C SECTION Bilateral 2022    Procedure:  SECTION, PRIMARY;  Surgeon: Starr Tanner M.D.;  Location: [facility];  Service: Labor and Delivery    NO PERTINENT PAST SURGICAL HISTORY  2022    My son being born via c- section    OTHER  2022    My son was born via      Glimepiride [kdc:yellow dye+brilliant blue fcf+glimepiride]    Patient Active Problem List   Diagnosis     Obesity in pregnancy    PCOS (polycystic ovarian syndrome)    Fatty liver disease, nonalcoholic    Chronic hypertension affecting pregnancy    Controlled type 2 diabetes mellitus without complication, without long-term current use of insulin (HCC)    Vitamin D deficiency    Metabolic syndrome    Long term (current) use of oral hypoglycemic drugs    Multigravida of advanced maternal age in second trimester    Skin lesion    Left knee pain    Pregnancy, supervision, high-risk    History of  delivery    Postpartum care following  delivery       Hospital Course:   Pt is a 39 y.o. now  who presented for scheduled repeat  section. Spinal anesthesia was utilized with good effect on pain. Single female infant was delivered at 1014.. Apgars 7, 8 at 1 and 5 minutes respectively.    Postpartum course notable for early ambulation, well managed pain, tolerance of diet, spontaneous voiding, and appropriate feeding of infant. She has remained afebrile and blood pressure has been well controlled. All maternal questions and concerns addressed    Physical Exam:  Temp:  [36.3 °C (97.4 °F)-36.6 °C (97.8 °F)] 36.5 °C (97.7 °F)  Pulse:  [72-90] 83  Resp:  [17-18] 17  BP: (136-156)/(68-94) 137/79  SpO2:  [95 %-98 %] 96 %  Physical Exam  General: well  Abdomen: nontender, obese  Fundus: firm and below umbilicus  Incision: dressing clean, dry, intact  Perineum: deferred  Extremities: symmetric, calves nontender    Current Facility-Administered Medications   Medication Dose    labetalol (Normodyne) tablet 200 mg  200 mg    acetaminophen (Tylenol) tablet 1,000 mg  1,000 mg    Followed by    [START ON 2024] acetaminophen (Tylenol) tablet 1,000 mg  1,000 mg    oxytocin (Pitocin) infusion (for post delivery)  125 mL/hr    oxytocin (Pitocin) injection 10 Units  10 Units    miSOPROStol (Cytotec) tablet 800 mcg  800 mcg    methylergonovine (Methergine) injection 0.2 mg  0.2 mg    carboPROST (Hemabate) injection  250 mcg  250 mcg    lactated ringers infusion      ibuprofen (Motrin) tablet 800 mg  800 mg    Followed by    [START ON 2024] ibuprofen (Motrin) tablet 800 mg  800 mg    oxyCODONE immediate-release (Roxicodone) tablet 5 mg  5 mg    oxyCODONE immediate-release (Roxicodone) tablet 10 mg  10 mg    diphenhydrAMINE (Benadryl) tablet/capsule 25 mg  25 mg    Or    diphenhydrAMINE (Benadryl) injection 25 mg  25 mg    magnesium hydroxide (Milk Of Magnesia) suspension 30 mL  30 mL    metFORMIN (Glucophage) tablet 1,000 mg  1,000 mg    ondansetron (Zofran) syringe/vial injection 4 mg  4 mg    Or    ondansetron (Zofran ODT) dispertab 4 mg  4 mg    lactated ringers infusion      docusate sodium (Colace) capsule 100 mg  100 mg    enoxaparin (Lovenox) inj 60 mg  60 mg       Recent Labs     24  2139   WBC 10.1   RBC 3.96*   HEMOGLOBIN 10.2*   HEMATOCRIT 32.5*   MCV 82.1   MCH 25.8*   MCHC 31.4*   RDW 53.5*   PLATELETCT 222   MPV 10.4       Activity/ Discharge Instructions::   Discharge to home  Pelvic Rest x 6 weeks  No heavy lifting x4 weeks  Call or come to ED for: heavy vaginal bleeding, fever >100.4, severe abdominal pain, severe headache, chest pain, shortness of breath,  N/V, incisional drainage, or other concerns.    Follow up:  [facility] in 1 week for incision check for  delivery. 1 week f/u for BP checkup.     Discharge Meds:   Current Outpatient Medications   Medication Sig Dispense Refill    labetalol (NORMODYNE) 200 MG Tab Take 1 Tablet by mouth 2 times a day. 60 Tablet 6    docusate sodium 100 MG Cap Take 100 mg by mouth 2 times a day as needed for Constipation. 60 Capsule 0    ibuprofen (MOTRIN) 800 MG Tab Take 1 Tablet by mouth every 8 hours. 60 Tablet 0       Emma Castanon D.O.

## 2024-08-11 NOTE — CARE PLAN
The patient is Stable - Low risk of patient condition declining or worsening    Shift Goals  Clinical Goals: rest, pain control, VSS  Patient Goals: Healthy mom, healthy baby  Family Goals: SUpport    Progress made toward(s) clinical / shift goals:  maintain BP within parameters  Problem: Infection - Postpartum  Goal: Postpartum patient will be free of signs and symptoms of infection  Outcome: Progressing  Note: Remains free from signs and symptoms of infection     Problem: Pain - Standard  Goal: Alleviation of pain or a reduction in pain to the patient’s comfort goal  Outcome: Progressing  Note: Pain level within patient comfort goal. Medicated as scheduled.        Patient is not progressing towards the following goals:

## 2024-08-11 NOTE — DISCHARGE INSTRUCTIONS

## 2024-08-13 ENCOUNTER — APPOINTMENT (OUTPATIENT)
Dept: OBGYN | Facility: CLINIC | Age: 39
End: 2024-08-13
Payer: COMMERCIAL

## 2024-08-14 ENCOUNTER — TELEPHONE (OUTPATIENT)
Dept: PHARMACY | Facility: MEDICAL CENTER | Age: 39
End: 2024-08-14
Payer: COMMERCIAL

## 2024-08-14 PROCEDURE — RXMED WILLOW AMBULATORY MEDICATION CHARGE: Performed by: FAMILY MEDICINE

## 2024-08-14 PROCEDURE — RXMED WILLOW AMBULATORY MEDICATION CHARGE: Performed by: INTERNAL MEDICINE

## 2024-08-14 NOTE — TELEPHONE ENCOUNTER
Contact:  Phone number:[phone] (mobile)    Name of person spoken with and relationship to patient: Margarita patient   Patient’s Adherence:  How patient is doing on medication: Very Well    How many missed doses and reason: 0 N/A    Any new medications: No    Any new conditions: No    Any new allergies: No    Any new side effects: No    Any new diagnoses: No    How many doses remainin    Did patient want to speak with pharmacist: No   Delivery:  Delivery date and method: 2024 via     Needs by Date: 2024    Signature required: No     Any additional details for : N/A   Teach Appointment Date:  N/A   Shipping Address:  [address] [place] [address]   Medication(name,strength and dose):  Insulin Glargine Solution 100 UNIT/ML metFORMIN HCl ER Tablet Extended Release 24 Hour 500 MG    Copay:  $28.83   Payment Method:  Credit card on file   Supplies:  Alcohol Swabs   Additional Information:  NONE     Wandy Rosales, Pharmacy Liaison/ RX Coordinator

## 2024-08-15 ENCOUNTER — GYNECOLOGY VISIT (OUTPATIENT)
Dept: OBGYN | Facility: CLINIC | Age: 39
End: 2024-08-15
Payer: COMMERCIAL

## 2024-08-15 VITALS
BODY MASS INDEX: 53.92 KG/M2 | WEIGHT: 293 LBS | SYSTOLIC BLOOD PRESSURE: 144 MMHG | DIASTOLIC BLOOD PRESSURE: 86 MMHG | HEIGHT: 62 IN

## 2024-08-15 DIAGNOSIS — I10 CHRONIC HYPERTENSION: ICD-10-CM

## 2024-08-15 DIAGNOSIS — I10 ELEVATED BLOOD PRESSURE READING WITH DIAGNOSIS OF HYPERTENSION: ICD-10-CM

## 2024-08-15 PROBLEM — O09.90 PREGNANCY, SUPERVISION, HIGH-RISK: Status: RESOLVED | Noted: 2024-01-18 | Resolved: 2024-08-15

## 2024-08-15 PROBLEM — O09.522 MULTIGRAVIDA OF ADVANCED MATERNAL AGE IN SECOND TRIMESTER: Status: RESOLVED | Noted: 2022-04-20 | Resolved: 2024-08-15

## 2024-08-15 PROCEDURE — 0503F POSTPARTUM CARE VISIT: CPT | Performed by: FAMILY MEDICINE

## 2024-08-15 PROCEDURE — 3077F SYST BP >= 140 MM HG: CPT | Performed by: FAMILY MEDICINE

## 2024-08-15 PROCEDURE — 3079F DIAST BP 80-89 MM HG: CPT | Performed by: FAMILY MEDICINE

## 2024-08-15 RX ORDER — LABETALOL 200 MG/1
200 TABLET, FILM COATED ORAL EVERY 8 HOURS
Qty: 90 TABLET | Refills: 12 | Status: SHIPPED | OUTPATIENT
Start: 2024-08-15

## 2024-08-15 ASSESSMENT — FIBROSIS 4 INDEX: FIB4 SCORE: 0.73

## 2024-08-15 NOTE — PROGRESS NOTES
Pt. here for C/S check.  Delivered on : 8/8/2024  Post partum visit on :  going to schedule one today   Chaperone offered and indicated  Pt states she is very tired   Phone/Pharmacy Verified

## 2024-08-15 NOTE — PROGRESS NOTES
"Postpartum Clinic Note    ID: Priti Begum is a 39 y.o. now  who presents for postpartum exam.     Subjective     CC:    Chief Complaint   Patient presents with    Postpartum care     C/S check      HPI:   Pt is here for PP care. She is doing well. Staples in place and to be removed today.    I have reviewed the prenatal and intrapartum course.   Date of delivery:  repeat  delivery  Type of delivery: 24   Delivery laceration(s): none  Postpartum course: uncomplicated    Infant is feeding via formula, reports this is going well.    Pt reports overall feeling well.   Patient is meeting postpartum milestones.   She feels well healed.   Laceration and/or Incision is well healed.   Lochia is light.   She is getting along well with her partner.  She reports her mood is good. Denies postpartum blues.  She has not had sex.   Denies urinary or stool incontinence.    Objective:     Physical Exam:  BP (!) 144/86 (BP Location: Right arm, Patient Position: Sitting, BP Cuff Size: Adult)   Ht 5' 2\"   Wt (!) 397 lb 3.2 oz   LMP 10/24/2023   Breastfeeding No   BMI 72.65 kg/m²     Gen: Alert and oriented, No apparent distress. Appears well, vital signs normal.  Neck: Neck is supple  Lungs: Normal effort on room air, clear to auscultation bilaterally  CV: Regular rate and rhythm. No murmurs, rubs, or gallops.  Breast: exam deferred.  Ext: No clubbing, cyanosis, edema.  Abdomen: soft, non-tender. Incision is clean, dry, intact. Staples removed    Pelvic Exam: deferred    Problem   Pregnancy, Supervision, High-Risk (Resolved)    AMA, cHTN, Type II DM (on metformin; no need for insulin in prior pregnancy), h/o , morbid obesity - ref to MFM 2024   PNL: Rh+, PNL wnl  NIPT: low risk female  baseline pre-e labs 2024 - UPCr 93     Multigravida of Advanced Maternal Age in Second Trimester (Resolved)   Obesity in Pregnancy (Resolved)    Pre-pregnancy BMI 68         Current Outpatient " Medications Ordered in Epic   Medication Sig Dispense Refill    labetalol (NORMODYNE) 200 MG Tab Take 1 tablet by mouth every 8 hours. 90 Tablet 12    ibuprofen (MOTRIN) 800 MG Tab Take 1 Tablet by mouth every 8 hours as needed for Mild Pain or Moderate Pain. 60 Tablet 0    ferrous sulfate 325 (65 Fe) MG tablet Take 1 Tablet by mouth every day. 30 Tablet 5    Prenatal Multivit-Min-Fe-FA (PRENATAL 1 + IRON PO) Take  by mouth.      triamcinolone acetonide (KENALOG) 0.1 % Cream Apply 1 Application topically 2 times a day. 45 g 0    ONETOUCH VERIO strip Use to test blood sugar 5 Times a Day. 200 Strip 6    insulin glargine 100 UNIT/ML SC SOPN injection Inject 45 Units under the skin every evening. 15 mL 6    BD PEN NEEDLE AMINTA U/F Inject 1 Each under the skin 4 times a day. 300 Each 3    Blood Glucose Monitoring Suppl (ONETOUCH VERIO FLEX SYSTEM) w/Device Kit Use as directed 1 Kit 0    glucose blood strip Use as directed subcutaneously 4-5 times a day for 30 days. 150 Strip 5    Blood Glucose Test Strips Testing before and 1 hour after meals for insulin adjustment during pregnancy. One Touch Verio test strips 500 Strip 3    vitamin D (CHOLECALCIFEROL) 1000 UNIT Tab Take 2,000 Units by mouth every day.      metFORMIN ER (GLUCOPHAGE XR) 500 MG TABLET SR 24 HR Take 2 Tablets by mouth 2 times a day. 360 Tablet 3     No current Epic-ordered facility-administered medications on file.       Assessment & Plan:     Priti Begum is 39 y.o. here for routine postpartum care and doing well.    #postpartum  - Doing well postpartum, meeting all postpartum milestones  - continue prenatal vitamins for six months or as long as breastfeeding    #Acute blood loss anemia  - iron 325mg and ascorbic acid 500mg PO together every other day for six months postpartum    #chronic hypertension  - increase to labetalol 200mg TID  - will continue to follow closely    #diabetes mellitus, type 2  - continue glargine 35u SC QHS  -  continue metformin 1000mg BID  - will follow POC BS testing to determine ongoing management and transition to care with PCP    #fatty liver disease    #PCOS    #obesity, BMI 72.6    #contraception: bilateral salpingectomy    #postpartum depression screening  - EPDS score: not documented    #well woman care  - Last pap date: 23 NILM, HPV neg    # ppx:   - SCDs  - Early ambulation  - Lovenox: was not prescribed at hospital discharge    #Rubella immune    #Tdap given 24      1. Chronic hypertension  - labetalol (NORMODYNE) 200 MG Tab; Take 1 tablet by mouth every 8 hours.  Dispense: 90 Tablet; Refill: 12    2. Postpartum care following  delivery  - labetalol (NORMODYNE) 200 MG Tab; Take 1 tablet by mouth every 8 hours.  Dispense: 90 Tablet; Refill: 12    3. Elevated blood pressure reading with diagnosis of hypertension      Return to clinic in 2 weeks.    Simi Nunez MD, MPH

## 2024-08-16 ENCOUNTER — PHARMACY VISIT (OUTPATIENT)
Dept: PHARMACY | Facility: MEDICAL CENTER | Age: 39
End: 2024-08-16
Payer: COMMERCIAL

## 2024-08-19 SDOH — ECONOMIC STABILITY: INCOME INSECURITY: IN THE LAST 12 MONTHS, WAS THERE A TIME WHEN YOU WERE NOT ABLE TO PAY THE MORTGAGE OR RENT ON TIME?: NO

## 2024-08-19 SDOH — ECONOMIC STABILITY: FOOD INSECURITY: WITHIN THE PAST 12 MONTHS, THE FOOD YOU BOUGHT JUST DIDN'T LAST AND YOU DIDN'T HAVE MONEY TO GET MORE.: NEVER TRUE

## 2024-08-19 SDOH — ECONOMIC STABILITY: TRANSPORTATION INSECURITY
IN THE PAST 12 MONTHS, HAS LACK OF RELIABLE TRANSPORTATION KEPT YOU FROM MEDICAL APPOINTMENTS, MEETINGS, WORK OR FROM GETTING THINGS NEEDED FOR DAILY LIVING?: NO

## 2024-08-19 SDOH — ECONOMIC STABILITY: FOOD INSECURITY: WITHIN THE PAST 12 MONTHS, YOU WORRIED THAT YOUR FOOD WOULD RUN OUT BEFORE YOU GOT MONEY TO BUY MORE.: NEVER TRUE

## 2024-08-19 SDOH — HEALTH STABILITY: PHYSICAL HEALTH: ON AVERAGE, HOW MANY DAYS PER WEEK DO YOU ENGAGE IN MODERATE TO STRENUOUS EXERCISE (LIKE A BRISK WALK)?: 2 DAYS

## 2024-08-19 SDOH — ECONOMIC STABILITY: INCOME INSECURITY: HOW HARD IS IT FOR YOU TO PAY FOR THE VERY BASICS LIKE FOOD, HOUSING, MEDICAL CARE, AND HEATING?: NOT HARD AT ALL

## 2024-08-19 SDOH — ECONOMIC STABILITY: TRANSPORTATION INSECURITY
IN THE PAST 12 MONTHS, HAS THE LACK OF TRANSPORTATION KEPT YOU FROM MEDICAL APPOINTMENTS OR FROM GETTING MEDICATIONS?: NO

## 2024-08-19 SDOH — HEALTH STABILITY: PHYSICAL HEALTH: ON AVERAGE, HOW MANY MINUTES DO YOU ENGAGE IN EXERCISE AT THIS LEVEL?: 20 MIN

## 2024-08-19 SDOH — HEALTH STABILITY: MENTAL HEALTH
STRESS IS WHEN SOMEONE FEELS TENSE, NERVOUS, ANXIOUS, OR CAN'T SLEEP AT NIGHT BECAUSE THEIR MIND IS TROUBLED. HOW STRESSED ARE YOU?: NOT AT ALL

## 2024-08-19 SDOH — ECONOMIC STABILITY: HOUSING INSECURITY
IN THE LAST 12 MONTHS, WAS THERE A TIME WHEN YOU DID NOT HAVE A STEADY PLACE TO SLEEP OR SLEPT IN A SHELTER (INCLUDING NOW)?: NO

## 2024-08-19 SDOH — ECONOMIC STABILITY: TRANSPORTATION INSECURITY
IN THE PAST 12 MONTHS, HAS LACK OF TRANSPORTATION KEPT YOU FROM MEETINGS, WORK, OR FROM GETTING THINGS NEEDED FOR DAILY LIVING?: NO

## 2024-08-19 ASSESSMENT — LIFESTYLE VARIABLES
HOW OFTEN DO YOU HAVE SIX OR MORE DRINKS ON ONE OCCASION: NEVER
AUDIT-C TOTAL SCORE: 0
HOW MANY STANDARD DRINKS CONTAINING ALCOHOL DO YOU HAVE ON A TYPICAL DAY: PATIENT DOES NOT DRINK
HOW OFTEN DO YOU HAVE A DRINK CONTAINING ALCOHOL: NEVER
SKIP TO QUESTIONS 9-10: 1

## 2024-08-19 ASSESSMENT — SOCIAL DETERMINANTS OF HEALTH (SDOH)
IN A TYPICAL WEEK, HOW MANY TIMES DO YOU TALK ON THE PHONE WITH FAMILY, FRIENDS, OR NEIGHBORS?: TWICE A WEEK
HOW OFTEN DO YOU ATTEND CHURCH OR RELIGIOUS SERVICES?: MORE THAN 4 TIMES PER YEAR
HOW OFTEN DO YOU HAVE A DRINK CONTAINING ALCOHOL: NEVER
HOW OFTEN DO YOU ATTEND CHURCH OR RELIGIOUS SERVICES?: MORE THAN 4 TIMES PER YEAR
HOW OFTEN DO YOU ATTENT MEETINGS OF THE CLUB OR ORGANIZATION YOU BELONG TO?: NEVER
HOW OFTEN DO YOU HAVE SIX OR MORE DRINKS ON ONE OCCASION: NEVER
HOW OFTEN DO YOU ATTENT MEETINGS OF THE CLUB OR ORGANIZATION YOU BELONG TO?: NEVER
HOW HARD IS IT FOR YOU TO PAY FOR THE VERY BASICS LIKE FOOD, HOUSING, MEDICAL CARE, AND HEATING?: NOT HARD AT ALL
DO YOU BELONG TO ANY CLUBS OR ORGANIZATIONS SUCH AS CHURCH GROUPS UNIONS, FRATERNAL OR ATHLETIC GROUPS, OR SCHOOL GROUPS?: NO
IN A TYPICAL WEEK, HOW MANY TIMES DO YOU TALK ON THE PHONE WITH FAMILY, FRIENDS, OR NEIGHBORS?: TWICE A WEEK
IN THE PAST 12 MONTHS, HAS THE ELECTRIC, GAS, OIL, OR WATER COMPANY THREATENED TO SHUT OFF SERVICE IN YOUR HOME?: NO
HOW OFTEN DO YOU GET TOGETHER WITH FRIENDS OR RELATIVES?: ONCE A WEEK
WITHIN THE PAST 12 MONTHS, YOU WORRIED THAT YOUR FOOD WOULD RUN OUT BEFORE YOU GOT THE MONEY TO BUY MORE: NEVER TRUE
HOW MANY DRINKS CONTAINING ALCOHOL DO YOU HAVE ON A TYPICAL DAY WHEN YOU ARE DRINKING: PATIENT DOES NOT DRINK
DO YOU BELONG TO ANY CLUBS OR ORGANIZATIONS SUCH AS CHURCH GROUPS UNIONS, FRATERNAL OR ATHLETIC GROUPS, OR SCHOOL GROUPS?: NO
HOW OFTEN DO YOU GET TOGETHER WITH FRIENDS OR RELATIVES?: ONCE A WEEK

## 2024-08-22 ENCOUNTER — OFFICE VISIT (OUTPATIENT)
Dept: MEDICAL GROUP | Facility: MEDICAL CENTER | Age: 39
End: 2024-08-22
Payer: COMMERCIAL

## 2024-08-22 VITALS
HEART RATE: 92 BPM | HEIGHT: 62 IN | DIASTOLIC BLOOD PRESSURE: 86 MMHG | OXYGEN SATURATION: 94 % | WEIGHT: 293 LBS | BODY MASS INDEX: 53.92 KG/M2 | SYSTOLIC BLOOD PRESSURE: 168 MMHG | TEMPERATURE: 98.7 F | RESPIRATION RATE: 14 BRPM

## 2024-08-22 DIAGNOSIS — E11.9 CONTROLLED TYPE 2 DIABETES MELLITUS WITHOUT COMPLICATION, WITHOUT LONG-TERM CURRENT USE OF INSULIN (HCC): ICD-10-CM

## 2024-08-22 DIAGNOSIS — E66.01 CLASS 3 SEVERE OBESITY WITH SERIOUS COMORBIDITY AND BODY MASS INDEX (BMI) GREATER THAN OR EQUAL TO 70 IN ADULT, UNSPECIFIED OBESITY TYPE (HCC): ICD-10-CM

## 2024-08-22 DIAGNOSIS — E55.9 VITAMIN D DEFICIENCY: ICD-10-CM

## 2024-08-22 DIAGNOSIS — B35.1 ONYCHOMYCOSIS: ICD-10-CM

## 2024-08-22 DIAGNOSIS — D64.9 ANEMIA, UNSPECIFIED TYPE: ICD-10-CM

## 2024-08-22 DIAGNOSIS — I10 PRIMARY HYPERTENSION: ICD-10-CM

## 2024-08-22 DIAGNOSIS — E88.810 METABOLIC SYNDROME: ICD-10-CM

## 2024-08-22 PROBLEM — E66.813 CLASS 3 SEVERE OBESITY WITH SERIOUS COMORBIDITY AND BODY MASS INDEX (BMI) GREATER THAN OR EQUAL TO 70 IN ADULT (HCC): Status: ACTIVE | Noted: 2024-08-22

## 2024-08-22 PROBLEM — M25.562 LEFT KNEE PAIN: Status: RESOLVED | Noted: 2023-09-21 | Resolved: 2024-08-22

## 2024-08-22 PROCEDURE — 99214 OFFICE O/P EST MOD 30 MIN: CPT | Performed by: FAMILY MEDICINE

## 2024-08-22 PROCEDURE — 3077F SYST BP >= 140 MM HG: CPT | Performed by: FAMILY MEDICINE

## 2024-08-22 PROCEDURE — 3079F DIAST BP 80-89 MM HG: CPT | Performed by: FAMILY MEDICINE

## 2024-08-22 RX ORDER — LISINOPRIL 10 MG/1
10 TABLET ORAL DAILY
Qty: 90 TABLET | Refills: 3 | Status: SHIPPED | OUTPATIENT
Start: 2024-08-22

## 2024-08-22 RX ORDER — CICLOPIROX 80 MG/ML
1 SOLUTION TOPICAL NIGHTLY
Qty: 6.6 ML | Refills: 1 | Status: SHIPPED | OUTPATIENT
Start: 2024-08-22

## 2024-08-22 RX ORDER — TIRZEPATIDE 5 MG/.5ML
5 INJECTION, SOLUTION SUBCUTANEOUS
Qty: 2 ML | Refills: 0 | Status: SHIPPED | OUTPATIENT
Start: 2024-08-22

## 2024-08-22 ASSESSMENT — ENCOUNTER SYMPTOMS
CHILLS: 0
COUGH: 0
WHEEZING: 0
PALPITATIONS: 0
SHORTNESS OF BREATH: 0
FEVER: 0

## 2024-08-22 ASSESSMENT — FIBROSIS 4 INDEX: FIB4 SCORE: 0.73

## 2024-08-22 NOTE — PROGRESS NOTES
Verbal consent was acquired by the patient to use [employer] ambient listening note generation during this visit.      Margarita was seen today for hypertension and establish care.    Diagnoses and all orders for this visit:    Primary hypertension  -     lisinopril (PRINIVIL) 10 MG Tab; Take 1 Tablet by mouth every day.    Controlled type 2 diabetes mellitus without complication, without long-term current use of insulin (HCC)  -     Tirzepatide (MOUNJARO) 5 MG/0.5ML Solution Pen-injector; Inject 5 mg under the skin every 7 days.  -     Comp Metabolic Panel; Future  -     HEMOGLOBIN A1C; Future  -     Lipid Profile; Future  -     MICROALBUMIN CREAT RATIO URINE; Future    Vitamin D deficiency  -     VITAMIN D,25 HYDROXY (DEFICIENCY); Future    Metabolic syndrome    Onychomycosis  -     ciclopirox (PENLAC) 8 % solution; Apply 1 Application topically every evening.    Anemia, unspecified type  -     CBC WITH DIFFERENTIAL; Future    Class 3 severe obesity with serious comorbidity and body mass index (BMI) greater than or equal to 70 in adult, unspecified obesity type (HCC)                  Assessment & Plan  1. Primary Hypertension.  This is a chronic condition, unstable. Hypertension was exacerbated during pregnancy. She is currently on labetalol 200 mg three times a day. Lisinopril 10 mg daily will be added. She is advised to monitor blood pressure at home. If blood pressure is controlled with lisinopril, labetalol will be tapered off slowly.    2. Type 2 Diabetes Mellitus.  This is a chronic condition, stable based on the last labs. She is currently on insulin, which was initiated during pregnancy. The insulin dose will be reduced to 30 units at bedtime and further reduced by 5 units if blood sugar is low in the morning. Mounjaro 5 mg will be started every 7 days for 1 month, then increased to 7.5 mg. She will continue metformin 1000 mg twice daily. Labs will be checked before the next visit.    3. Vitamin D  Deficiency.  This is a chronic condition, stable. She is recommended to take vitamin D2 2000 international units daily.    4. Onychomycosis.  This is a chronic condition, unstable. She has a fungal infection of her toenails. Ciclopirox nail solution will be started.    5. Anemia.  This is a chronic condition, unstable. She is taking iron supplementation. A CBC will be checked with the next blood test.    6. Class III Obesity.  This is a chronic condition, unstable. Current BMI is 70.9, weight 388 pounds. The goal weight is to go below 300 pounds in 1 year. Mounjaro will be started as she has a history of diabetes. Phentermine will be considered after maximizing Mounjaro. Qsymia is recommended for long-term use. She is advised to increase physical activity, such as walking at the park, and to follow a diet high in protein and low in sugar and carbs.    Follow-up  The patient will follow up in 3 weeks.          Chief complaint::Diagnoses of Primary hypertension, Controlled type 2 diabetes mellitus without complication, without long-term current use of insulin (HCC), Vitamin D deficiency, Metabolic syndrome, Onychomycosis, Anemia, unspecified type, and Class 3 severe obesity with serious comorbidity and body mass index (BMI) greater than or equal to 70 in adult, unspecified obesity type (HCC) were pertinent to this visit.      History of Present Illness  The patient is a 39-year-old female who presents to establish care with us. She recently gave birth to a baby girl and is accompanied by an adult male.    She has been diagnosed with hypertension and is currently on labetalol 200 mg three times a day. She is not breastfeeding and was previously on lisinopril 10 mg. She also reports bilateral foot swelling, which she was informed could persist for up to 20 days post-delivery. She is not experiencing any chest pain or palpitations.    She is managing her diabetes with insulin, taking 35 units only when her evening blood  sugar levels exceed 150. Her morning blood sugar levels are typically around 70, and she occasionally experiences shakiness upon waking. She has been advised not to administer insulin if her blood sugar is below 150. She has an upcoming appointment with Marie. Her blood sugar levels have generally been in the 80s and 90s, even after meals. Her last A1c check was a few months ago, yielding a result of 6.1. She is also taking metformin, two tablets twice a day.    She has gained weight during her pregnancy and is not planning for another child. Her lowest recorded weight was 324 pounds. She has always had a larger build, even during her school years. She plans to start walking at the park and has discussed this with her . She is considering resuming phentermine, which she found helpful for weight loss in the past.    She has a fungal infection in her toenails, which have become discolored and thickened.    She is not currently taking vitamin D supplements but is on iron supplementation.    She saw the OB and they took out the staples. She had a .    FAMILY HISTORY  She denies any family history of cancer, lung disease, or hypertension. She has a family history of diabetes.      Review of Systems   Constitutional:  Negative for chills and fever.   Respiratory:  Negative for cough, shortness of breath and wheezing.    Cardiovascular:  Positive for leg swelling. Negative for chest pain and palpitations.          Medications and Allergies:     Current Outpatient Medications   Medication Sig Dispense Refill    Tirzepatide (MOUNJARO) 5 MG/0.5ML Solution Pen-injector Inject 5 mg under the skin every 7 days. 2 mL 0    lisinopril (PRINIVIL) 10 MG Tab Take 1 Tablet by mouth every day. 90 Tablet 3    ciclopirox (PENLAC) 8 % solution Apply 1 Application topically every evening. 6.6 mL 1    labetalol (NORMODYNE) 200 MG Tab Take 1 tablet by mouth every 8 hours. 90 Tablet 12    ferrous sulfate 325 (65 Fe) MG tablet  "Take 1 Tablet by mouth every day. 30 Tablet 5    Prenatal Multivit-Min-Fe-FA (PRENATAL 1 + IRON PO) Take  by mouth.      metFORMIN ER (GLUCOPHAGE XR) 500 MG TABLET SR 24 HR Take 2 Tablets by mouth 2 times a day. 360 Tablet 3    triamcinolone acetonide (KENALOG) 0.1 % Cream Apply 1 Application topically 2 times a day. 45 g 0    ONETOUCH VERIO strip Use to test blood sugar 5 Times a Day. 200 Strip 6    insulin glargine 100 UNIT/ML SC SOPN injection Inject 45 Units under the skin every evening. 15 mL 6    BD PEN NEEDLE AMINTA U/F Inject 1 Each under the skin 4 times a day. 300 Each 3    Blood Glucose Monitoring Suppl (ONETOUCH VERIO FLEX SYSTEM) w/Device Kit Use as directed 1 Kit 0    glucose blood strip Use as directed subcutaneously 4-5 times a day for 30 days. 150 Strip 5    Blood Glucose Test Strips Testing before and 1 hour after meals for insulin adjustment during pregnancy. One Touch Verio test strips 500 Strip 3    vitamin D (CHOLECALCIFEROL) 1000 UNIT Tab Take 2,000 Units by mouth every day.       No current facility-administered medications for this visit.       BP (!) 168/86 (BP Location: Left arm, Patient Position: Sitting, BP Cuff Size: Large adult)   Pulse 92   Temp 37.1 °C (98.7 °F) (Temporal)   Resp 14   Ht 1.575 m (5' 2\")   Wt (!) 176 kg (388 lb 0.2 oz)   SpO2 94% , Body mass index is 70.97 kg/m².      Physical Exam  Constitutional:       Appearance: Normal appearance. She is well-developed and well-groomed.   HENT:      Head: Normocephalic and atraumatic.   Eyes:      General:         Right eye: No discharge.         Left eye: No discharge.      Conjunctiva/sclera: Conjunctivae normal.   Cardiovascular:      Rate and Rhythm: Normal rate and regular rhythm.      Heart sounds: Normal heart sounds. No murmur heard.     No friction rub. No gallop.   Pulmonary:      Effort: Pulmonary effort is normal. No respiratory distress.      Breath sounds: Normal breath sounds. No wheezing or rales. "   Neurological:      General: No focal deficit present.      Mental Status: She is alert. Mental status is at baseline.      Gait: Gait is intact.   Psychiatric:         Mood and Affect: Mood and affect normal.         Behavior: Behavior normal.            I reviewed with patient lab results resulted on 8/10/2024.        Please note that this dictation was created using voice recognition software. I have made every reasonable attempt to correct obvious errors, but I expect that there are errors of grammar and possibly content that I did not discover before finalizing the note.

## 2024-08-22 NOTE — LETTER
[employer]  KRISHAN Fernández.  [address]  [place] [address]  Fax: [phone]   Authorization for Release/Disclosure of   Protected Health Information   Name: IVONE CHAIDEZ : 1985 SSN: [id number]   Address: [address] [place] [address]  [place] [address] Phone:    [phone] (home)    I authorize the entity listed below to release/disclose the PHI below to:   [employer] [facility]/BEN Fernández and Ollie Webber M.D.   Provider or Entity Name:  [facility]   Address   City, State, [facility]   Phone:      Fax:  [phone]   Reason for request: continuity of care   Information to be released:    [  ] LAST COLONOSCOPY,  including any PATH REPORT and follow-up  [  ] LAST FIT/COLOGUARD RESULT [  ] LAST DEXA  [  ] LAST MAMMOGRAM  [  ] LAST PAP  [  ] LAST LABS [  ] RETINA EXAM REPORT  [  ] IMMUNIZATION RECORDS  [xx  ] Release all info      [  ] Check here and initial the line next to each item to release ALL health information INCLUDING  _____ Care and treatment for drug and / or alcohol abuse  _____ HIV testing, infection status, or AIDS  _____ Genetic Testing    DATES OF SERVICE OR TIME PERIOD TO BE DISCLOSED: _____________  I understand and acknowledge that:  * This Authorization may be revoked at any time by you in writing, except if your health information has already been used or disclosed.  * Your health information that will be used or disclosed as a result of you signing this authorization could be re-disclosed by the recipient. If this occurs, your re-disclosed health information may no longer be protected by State or Federal laws.  * You may refuse to sign this Authorization. Your refusal will not affect your ability to obtain treatment.  * This Authorization becomes effective upon signing and will  on (date) __________.      If no date is indicated, this Authorization will  one (1) year from the signature date.    Name: Ivone Sanchez  Kel  Signature: continuity of care  Date:   8/22/2024     PLEASE FAX REQUESTED RECORDS BACK TO: [phone]

## 2024-08-26 ENCOUNTER — GYNECOLOGY VISIT (OUTPATIENT)
Dept: OBGYN | Facility: CLINIC | Age: 39
End: 2024-08-26
Payer: COMMERCIAL

## 2024-08-26 VITALS — WEIGHT: 293 LBS | BODY MASS INDEX: 71.33 KG/M2 | SYSTOLIC BLOOD PRESSURE: 140 MMHG | DIASTOLIC BLOOD PRESSURE: 95 MMHG

## 2024-08-26 PROCEDURE — 0503F POSTPARTUM CARE VISIT: CPT | Performed by: OBSTETRICS & GYNECOLOGY

## 2024-08-26 PROCEDURE — 3080F DIAST BP >= 90 MM HG: CPT | Performed by: OBSTETRICS & GYNECOLOGY

## 2024-08-26 PROCEDURE — RXMED WILLOW AMBULATORY MEDICATION CHARGE: Performed by: FAMILY MEDICINE

## 2024-08-26 PROCEDURE — RXMED WILLOW AMBULATORY MEDICATION CHARGE: Performed by: OBSTETRICS & GYNECOLOGY

## 2024-08-26 PROCEDURE — 3077F SYST BP >= 140 MM HG: CPT | Performed by: OBSTETRICS & GYNECOLOGY

## 2024-08-26 ASSESSMENT — FIBROSIS 4 INDEX: FIB4 SCORE: 0.73

## 2024-08-27 ENCOUNTER — PHARMACY VISIT (OUTPATIENT)
Dept: PHARMACY | Facility: MEDICAL CENTER | Age: 39
End: 2024-08-27
Payer: COMMERCIAL

## 2024-09-11 DIAGNOSIS — E11.9 CONTROLLED TYPE 2 DIABETES MELLITUS WITHOUT COMPLICATION, WITHOUT LONG-TERM CURRENT USE OF INSULIN (HCC): ICD-10-CM

## 2024-09-11 DIAGNOSIS — O24.414 INSULIN CONTROLLED GESTATIONAL DIABETES MELLITUS (GDM) IN SECOND TRIMESTER: ICD-10-CM

## 2024-09-11 RX ORDER — TIRZEPATIDE 7.5 MG/.5ML
7.5 INJECTION, SOLUTION SUBCUTANEOUS
Qty: 2 ML | Refills: 0 | Status: SHIPPED | OUTPATIENT
Start: 2024-09-11

## 2024-09-11 RX ORDER — TIRZEPATIDE 5 MG/.5ML
5 INJECTION, SOLUTION SUBCUTANEOUS
Qty: 2 ML | Refills: 0 | Status: CANCELLED | OUTPATIENT
Start: 2024-09-11

## 2024-09-11 NOTE — TELEPHONE ENCOUNTER
Received request via: Pharmacy    Was the patient seen in the last year in this department? Yes    Does the patient have an active prescription (recently filled or refills available) for medication(s) requested? No    Pharmacy Name: [facility]    Does the patient have custodial Plus and need 100-day supply? (This applies to ALL medications) Patient does not have SCP

## 2024-09-19 ENCOUNTER — HOSPITAL ENCOUNTER (OUTPATIENT)
Dept: LAB | Facility: MEDICAL CENTER | Age: 39
End: 2024-09-19
Attending: FAMILY MEDICINE
Payer: COMMERCIAL

## 2024-09-19 DIAGNOSIS — E11.9 CONTROLLED TYPE 2 DIABETES MELLITUS WITHOUT COMPLICATION, WITHOUT LONG-TERM CURRENT USE OF INSULIN (HCC): ICD-10-CM

## 2024-09-19 DIAGNOSIS — D64.9 ANEMIA, UNSPECIFIED TYPE: ICD-10-CM

## 2024-09-19 DIAGNOSIS — E55.9 VITAMIN D DEFICIENCY: ICD-10-CM

## 2024-09-19 LAB
BASOPHILS # BLD AUTO: 0.5 % (ref 0–1.8)
BASOPHILS # BLD: 0.04 K/UL (ref 0–0.12)
EOSINOPHIL # BLD AUTO: 0.33 K/UL (ref 0–0.51)
EOSINOPHIL NFR BLD: 4.2 % (ref 0–6.9)
ERYTHROCYTE [DISTWIDTH] IN BLOOD BY AUTOMATED COUNT: 44.3 FL (ref 35.9–50)
HCT VFR BLD AUTO: 44.9 % (ref 37–47)
HGB BLD-MCNC: 13.5 G/DL (ref 12–16)
IMM GRANULOCYTES # BLD AUTO: 0.02 K/UL (ref 0–0.11)
IMM GRANULOCYTES NFR BLD AUTO: 0.3 % (ref 0–0.9)
LYMPHOCYTES # BLD AUTO: 2.49 K/UL (ref 1–4.8)
LYMPHOCYTES NFR BLD: 31.9 % (ref 22–41)
MCH RBC QN AUTO: 25.9 PG (ref 27–33)
MCHC RBC AUTO-ENTMCNC: 30.1 G/DL (ref 32.2–35.5)
MCV RBC AUTO: 86 FL (ref 81.4–97.8)
MONOCYTES # BLD AUTO: 0.4 K/UL (ref 0–0.85)
MONOCYTES NFR BLD AUTO: 5.1 % (ref 0–13.4)
NEUTROPHILS # BLD AUTO: 4.52 K/UL (ref 1.82–7.42)
NEUTROPHILS NFR BLD: 58 % (ref 44–72)
NRBC # BLD AUTO: 0 K/UL
NRBC BLD-RTO: 0 /100 WBC (ref 0–0.2)
PLATELET # BLD AUTO: 322 K/UL (ref 164–446)
PMV BLD AUTO: 10.5 FL (ref 9–12.9)
RBC # BLD AUTO: 5.22 M/UL (ref 4.2–5.4)
WBC # BLD AUTO: 7.8 K/UL (ref 4.8–10.8)

## 2024-09-19 PROCEDURE — 82570 ASSAY OF URINE CREATININE: CPT

## 2024-09-19 PROCEDURE — 83036 HEMOGLOBIN GLYCOSYLATED A1C: CPT

## 2024-09-19 PROCEDURE — 85025 COMPLETE CBC W/AUTO DIFF WBC: CPT

## 2024-09-19 PROCEDURE — 82043 UR ALBUMIN QUANTITATIVE: CPT

## 2024-09-19 PROCEDURE — 80061 LIPID PANEL: CPT

## 2024-09-19 PROCEDURE — 80053 COMPREHEN METABOLIC PANEL: CPT

## 2024-09-19 PROCEDURE — 36415 COLL VENOUS BLD VENIPUNCTURE: CPT

## 2024-09-19 PROCEDURE — RXMED WILLOW AMBULATORY MEDICATION CHARGE: Performed by: FAMILY MEDICINE

## 2024-09-19 PROCEDURE — 82306 VITAMIN D 25 HYDROXY: CPT

## 2024-09-20 LAB
25(OH)D3 SERPL-MCNC: 35 NG/ML (ref 30–100)
ALBUMIN SERPL BCP-MCNC: 4.1 G/DL (ref 3.2–4.9)
ALBUMIN/GLOB SERPL: 1.2 G/DL
ALP SERPL-CCNC: 112 U/L (ref 30–99)
ALT SERPL-CCNC: 90 U/L (ref 2–50)
ANION GAP SERPL CALC-SCNC: 13 MMOL/L (ref 7–16)
AST SERPL-CCNC: 58 U/L (ref 12–45)
BILIRUB SERPL-MCNC: 0.3 MG/DL (ref 0.1–1.5)
BUN SERPL-MCNC: 10 MG/DL (ref 8–22)
CALCIUM ALBUM COR SERPL-MCNC: 9.2 MG/DL (ref 8.5–10.5)
CALCIUM SERPL-MCNC: 9.3 MG/DL (ref 8.5–10.5)
CHLORIDE SERPL-SCNC: 101 MMOL/L (ref 96–112)
CHOLEST SERPL-MCNC: 159 MG/DL (ref 100–199)
CO2 SERPL-SCNC: 26 MMOL/L (ref 20–33)
CREAT SERPL-MCNC: 0.55 MG/DL (ref 0.5–1.4)
CREAT UR-MCNC: 198.8 MG/DL
EST. AVERAGE GLUCOSE BLD GHB EST-MCNC: 131 MG/DL
FASTING STATUS PATIENT QL REPORTED: NORMAL
GFR SERPLBLD CREATININE-BSD FMLA CKD-EPI: 120 ML/MIN/1.73 M 2
GLOBULIN SER CALC-MCNC: 3.4 G/DL (ref 1.9–3.5)
GLUCOSE SERPL-MCNC: 89 MG/DL (ref 65–99)
HBA1C MFR BLD: 6.2 % (ref 4–5.6)
HDLC SERPL-MCNC: 38 MG/DL
LDLC SERPL CALC-MCNC: 92 MG/DL
MICROALBUMIN UR-MCNC: 2 MG/DL
MICROALBUMIN/CREAT UR: 10 MG/G (ref 0–30)
POTASSIUM SERPL-SCNC: 4.3 MMOL/L (ref 3.6–5.5)
PROT SERPL-MCNC: 7.5 G/DL (ref 6–8.2)
SODIUM SERPL-SCNC: 140 MMOL/L (ref 135–145)
TRIGL SERPL-MCNC: 146 MG/DL (ref 0–149)

## 2024-09-26 ENCOUNTER — OFFICE VISIT (OUTPATIENT)
Dept: MEDICAL GROUP | Facility: MEDICAL CENTER | Age: 39
End: 2024-09-26
Payer: COMMERCIAL

## 2024-09-26 VITALS
HEART RATE: 95 BPM | BODY MASS INDEX: 53.92 KG/M2 | DIASTOLIC BLOOD PRESSURE: 72 MMHG | SYSTOLIC BLOOD PRESSURE: 112 MMHG | WEIGHT: 293 LBS | HEIGHT: 62 IN | OXYGEN SATURATION: 96 %

## 2024-09-26 DIAGNOSIS — I10 PRIMARY HYPERTENSION: ICD-10-CM

## 2024-09-26 DIAGNOSIS — E11.9 CONTROLLED TYPE 2 DIABETES MELLITUS WITHOUT COMPLICATION, WITHOUT LONG-TERM CURRENT USE OF INSULIN (HCC): ICD-10-CM

## 2024-09-26 DIAGNOSIS — R79.89 ELEVATED LFTS: ICD-10-CM

## 2024-09-26 DIAGNOSIS — Z23 NEED FOR VACCINATION: ICD-10-CM

## 2024-09-26 DIAGNOSIS — E66.01 CLASS 3 SEVERE OBESITY WITH SERIOUS COMORBIDITY AND BODY MASS INDEX (BMI) OF 60.0 TO 69.9 IN ADULT, UNSPECIFIED OBESITY TYPE (HCC): ICD-10-CM

## 2024-09-26 PROCEDURE — 3074F SYST BP LT 130 MM HG: CPT | Performed by: FAMILY MEDICINE

## 2024-09-26 PROCEDURE — 3078F DIAST BP <80 MM HG: CPT | Performed by: FAMILY MEDICINE

## 2024-09-26 PROCEDURE — 90471 IMMUNIZATION ADMIN: CPT | Performed by: FAMILY MEDICINE

## 2024-09-26 PROCEDURE — 90686 IIV4 VACC NO PRSV 0.5 ML IM: CPT | Performed by: FAMILY MEDICINE

## 2024-09-26 PROCEDURE — 99214 OFFICE O/P EST MOD 30 MIN: CPT | Mod: 25 | Performed by: FAMILY MEDICINE

## 2024-09-26 RX ORDER — PHENTERMINE AND TOPIRAMATE 3.75; 23 MG/1; MG/1
1 CAPSULE, EXTENDED RELEASE ORAL DAILY
Qty: 30 CAPSULE | Refills: 0 | Status: SHIPPED | OUTPATIENT
Start: 2024-09-26 | End: 2024-10-26

## 2024-09-26 RX ORDER — LISINOPRIL 10 MG/1
10 TABLET ORAL 2 TIMES DAILY
Qty: 180 TABLET | Refills: 3 | Status: SHIPPED | OUTPATIENT
Start: 2024-09-26

## 2024-09-26 ASSESSMENT — FIBROSIS 4 INDEX: FIB4 SCORE: 0.72

## 2024-09-26 ASSESSMENT — ENCOUNTER SYMPTOMS
PALPITATIONS: 0
FEVER: 0
CHILLS: 0

## 2024-09-27 NOTE — PROGRESS NOTES
Chronic condition, stable verbal consent was acquired by the patient to use [employer] ambient listening note generation during this visit.      Margarita was seen today for lab results.    Diagnoses and all orders for this visit:    Primary hypertension  -     lisinopril (PRINIVIL) 10 MG Tab; Take 1 Tablet by mouth 2 times a day.    Controlled type 2 diabetes mellitus without complication, without long-term current use of insulin (HCC)    Elevated LFTs  -     Comp Metabolic Panel; Future    Class 3 severe obesity with serious comorbidity and body mass index (BMI) of 60.0 to 69.9 in adult, unspecified obesity type (HCC)  -     Controlled Substance Treatment Agreement  -     Phentermine-Topiramate (QSYMIA) 3.75-23 MG CAPSULE SR 24 HR; Take 1 Tablet by mouth every day for 30 days.    Need for vaccination  -     INFLUENZA VACCINE QUAD INJ (PF)                  Assessment & Plan  1. Hypertension.  Chronic condition, stable, her blood pressure readings are within the normal range today. She will continue taking lisinopril 10 mg twice daily. A prescription for 180 tablets of lisinopril, with three refills, will be provided.    2. Type 2 Diabetes Mellitus.  Chronic condition, stable, her A1c is well-controlled at 6.2. Fasting glucose levels are improving. She will continue taking metformin 1000 mg twice daily. A liver function test will be ordered in a month to monitor elevated liver enzymes, which may be due to aspirin use during pregnancy. If liver function remains abnormal, an ultrasound of the liver may be ordered and metformin may be discontinued. She is advised to inform via [employer] when her Mounjaro 7.5 mg supply is low so that a 10 mg prescription can be sent to the pharmacy.     3. Hyperlipidemia.  Chronic condition, stable her HDL cholesterol is low at 53, and LDL cholesterol has increased from 66 to 92. She is advised to continue eating healthy, exercising, and focusing on weight loss to improve her cholesterol  levels.    4. Vitamin D Deficiency.  Chronic condition, stable her vitamin D level is 35, which is satisfactory but could be improved. She is advised to continue her current vitamin D supplement or increase the dosage to 2000 IU daily.    5. Weight Management/obesity  Chronic condition, stable, although getting significantly better.  She has lost 25 pounds, going from 390 to 365 pounds in a month. A small dose of Qsymia will be prescribed for a month to assess its effectiveness. She is advised to increase her protein intake, preferably through sugar-free protein shakes, and to engage in regular exercise such as walking, biking, or strength training.  Controlled substance treatment signed today.  Will do urine drug screen at next visit    Obtained and reviewed patient utilization report from [facility] pharmacy database on 9/26/2024 5:22 PM  prior to writing prescription for controlled substance II, III or IV per [place] bill . Based on assessment of the report,my physical exam if necessary and the patient's health problem, the prescription is medically necessary.      6. Medication Management.  A urine drug screen will be conducted at the next visit to monitor medication adherence.    Follow-up  Return in 1 month for follow up.          Chief complaint::Diagnoses of Primary hypertension, Controlled type 2 diabetes mellitus without complication, without long-term current use of insulin (HCC), Elevated LFTs, Class 3 severe obesity with serious comorbidity and body mass index (BMI) of 60.0 to 69.9 in adult, unspecified obesity type (HCC), and Need for vaccination were pertinent to this visit.      History of Present Illness  The patient is a 39-year-old female who presents here for follow-up.    She reports that her blood pressure readings at home have been satisfactory for the past few weeks. She is currently on a regimen of lisinopril, taking it twice daily--once in the morning and once at night. She has  discontinued the use of labetalol. During her pregnancy, she was on aspirin.    She is currently taking Mounjaro 7.5 mg and metformin 1000 mg twice daily. She has been making efforts to maintain a healthy diet and has incorporated walking into her routine. She was prescribed Mounjaro 7.5 mg for a duration of 4 weeks last week due to insurance coverage issues. She expresses a desire to increase the dosage of phentermine and Mounjaro. She has noticed an increased craving for junk food. She has an upcoming appointment with Marie next week. She has previously been on a combination of phentermine and Mounjaro. She does not consume protein shakes and has a dislike for meat. She has observed that consuming eggs results in immediate bowel movements. She engages in walking activities with her  at [facility], covering a distance of about a mile, 2 to 3 times a week.    SOCIAL HISTORY  The patient denies any alcohol recently.      Review of Systems   Constitutional:  Negative for chills and fever.   Cardiovascular:  Negative for chest pain, palpitations and leg swelling.          Medications and Allergies:     Current Outpatient Medications   Medication Sig Dispense Refill    lisinopril (PRINIVIL) 10 MG Tab Take 1 Tablet by mouth 2 times a day. 180 Tablet 3    Phentermine-Topiramate (QSYMIA) 3.75-23 MG CAPSULE SR 24 HR Take 1 Tablet by mouth every day for 30 days. 30 Capsule 0    insulin glargine 100 UNIT/ML SC SOPN injection Inject 45 Units under the skin every evening. 15 mL 6    Tirzepatide (MOUNJARO) 7.5 MG/0.5ML Solution Pen-injector Inject 7.5 mg under the skin every 7 days. 2 mL 0    ciclopirox (PENLAC) 8 % solution Apply 1 Application topically every evening. 6.6 mL 1    ferrous sulfate 325 (65 Fe) MG tablet Take 1 Tablet by mouth every day. 30 Tablet 5    Prenatal Multivit-Min-Fe-FA (PRENATAL 1 + IRON PO) Take  by mouth.      metFORMIN ER (GLUCOPHAGE XR) 500 MG TABLET SR 24 HR Take 2 Tablets by mouth 2  "times a day. 360 Tablet 3    triamcinolone acetonide (KENALOG) 0.1 % Cream Apply 1 Application topically 2 times a day. 45 g 0    ONETOUCH VERIO strip Use to test blood sugar 5 Times a Day. 200 Strip 6    BD PEN NEEDLE AMINTA U/F Inject 1 Each under the skin 4 times a day. 300 Each 3    Blood Glucose Monitoring Suppl (ONETOUCH VERIO FLEX SYSTEM) w/Device Kit Use as directed 1 Kit 0    glucose blood strip Use as directed subcutaneously 4-5 times a day for 30 days. 150 Strip 5    Blood Glucose Test Strips Testing before and 1 hour after meals for insulin adjustment during pregnancy. One Touch Verio test strips 500 Strip 3    vitamin D (CHOLECALCIFEROL) 1000 UNIT Tab Take 2,000 Units by mouth every day.       No current facility-administered medications for this visit.       /72 (BP Location: Other (Comment))   Pulse 95   Ht 1.575 m (5' 2\")   Wt (!) 166 kg (365 lb 15.4 oz)   SpO2 96% , Body mass index is 66.94 kg/m².      Physical Exam  Constitutional:       Appearance: Normal appearance. She is well-developed and well-groomed.   HENT:      Head: Normocephalic and atraumatic.      Right Ear: External ear normal.      Left Ear: External ear normal.   Eyes:      General:         Right eye: No discharge.         Left eye: No discharge.      Conjunctiva/sclera: Conjunctivae normal.   Cardiovascular:      Rate and Rhythm: Normal rate.   Pulmonary:      Effort: Pulmonary effort is normal. No respiratory distress.   Musculoskeletal:      Cervical back: Neck supple.   Skin:     Findings: No rash.   Neurological:      Mental Status: She is alert.   Psychiatric:         Mood and Affect: Mood and affect normal.         Behavior: Behavior normal.            I reviewed with patient lab results resulted on 9/19/2024.        Please note that this dictation was created using voice recognition software. I have made every reasonable attempt to correct obvious errors, but I expect that there are errors of grammar and possibly " content that I did not discover before finalizing the note.

## 2024-09-29 ENCOUNTER — PATIENT MESSAGE (OUTPATIENT)
Dept: MEDICAL GROUP | Facility: MEDICAL CENTER | Age: 39
End: 2024-09-29
Payer: COMMERCIAL

## 2024-09-29 DIAGNOSIS — E66.813 CLASS 3 SEVERE OBESITY WITH SERIOUS COMORBIDITY AND BODY MASS INDEX (BMI) OF 60.0 TO 69.9 IN ADULT, UNSPECIFIED OBESITY TYPE (HCC): ICD-10-CM

## 2024-09-29 DIAGNOSIS — E66.01 CLASS 3 SEVERE OBESITY WITH SERIOUS COMORBIDITY AND BODY MASS INDEX (BMI) OF 60.0 TO 69.9 IN ADULT, UNSPECIFIED OBESITY TYPE (HCC): ICD-10-CM

## 2024-09-30 ENCOUNTER — PHARMACY VISIT (OUTPATIENT)
Dept: PHARMACY | Facility: MEDICAL CENTER | Age: 39
End: 2024-09-30
Payer: COMMERCIAL

## 2024-10-01 ENCOUNTER — APPOINTMENT (OUTPATIENT)
Dept: ENDOCRINOLOGY | Facility: MEDICAL CENTER | Age: 39
End: 2024-10-01
Attending: INTERNAL MEDICINE
Payer: COMMERCIAL

## 2024-10-01 RX ORDER — PHENTERMINE HYDROCHLORIDE 15 MG/1
15 CAPSULE ORAL EVERY MORNING
Qty: 30 CAPSULE | Refills: 0 | Status: SHIPPED | OUTPATIENT
Start: 2024-10-01 | End: 2024-11-03

## 2024-10-02 ENCOUNTER — TELEPHONE (OUTPATIENT)
Dept: PHARMACY | Facility: MEDICAL CENTER | Age: 39
End: 2024-10-02
Payer: COMMERCIAL

## 2024-10-03 ENCOUNTER — TELEPHONE (OUTPATIENT)
Dept: PHARMACY | Facility: MEDICAL CENTER | Age: 39
End: 2024-10-03
Payer: COMMERCIAL

## 2024-10-03 PROCEDURE — RXMED WILLOW AMBULATORY MEDICATION CHARGE: Performed by: OBSTETRICS & GYNECOLOGY

## 2024-10-03 PROCEDURE — RXMED WILLOW AMBULATORY MEDICATION CHARGE: Performed by: FAMILY MEDICINE

## 2024-10-03 PROCEDURE — RXMED WILLOW AMBULATORY MEDICATION CHARGE: Performed by: INTERNAL MEDICINE

## 2024-10-04 ENCOUNTER — PHARMACY VISIT (OUTPATIENT)
Dept: PHARMACY | Facility: MEDICAL CENTER | Age: 39
End: 2024-10-04
Payer: COMMERCIAL

## 2024-10-04 ENCOUNTER — TELEPHONE (OUTPATIENT)
Dept: PHARMACY | Facility: MEDICAL CENTER | Age: 39
End: 2024-10-04
Payer: COMMERCIAL

## 2024-10-18 ENCOUNTER — PATIENT MESSAGE (OUTPATIENT)
Dept: MEDICAL GROUP | Facility: MEDICAL CENTER | Age: 39
End: 2024-10-18
Payer: COMMERCIAL

## 2024-10-18 DIAGNOSIS — E11.9 CONTROLLED TYPE 2 DIABETES MELLITUS WITHOUT COMPLICATION, WITHOUT LONG-TERM CURRENT USE OF INSULIN (HCC): ICD-10-CM

## 2024-10-23 ENCOUNTER — TELEPHONE (OUTPATIENT)
Dept: PHARMACY | Facility: MEDICAL CENTER | Age: 39
End: 2024-10-23
Payer: COMMERCIAL

## 2024-10-23 PROCEDURE — RXMED WILLOW AMBULATORY MEDICATION CHARGE: Performed by: FAMILY MEDICINE

## 2024-10-28 ENCOUNTER — PATIENT MESSAGE (OUTPATIENT)
Dept: MEDICAL GROUP | Facility: MEDICAL CENTER | Age: 39
End: 2024-10-28
Payer: COMMERCIAL

## 2024-10-28 ENCOUNTER — PHARMACY VISIT (OUTPATIENT)
Dept: PHARMACY | Facility: MEDICAL CENTER | Age: 39
End: 2024-10-28
Payer: COMMERCIAL

## 2024-10-28 PROCEDURE — RXMED WILLOW AMBULATORY MEDICATION CHARGE: Performed by: INTERNAL MEDICINE

## 2024-10-29 ENCOUNTER — APPOINTMENT (OUTPATIENT)
Dept: ENDOCRINOLOGY | Facility: MEDICAL CENTER | Age: 39
End: 2024-10-29
Attending: INTERNAL MEDICINE
Payer: COMMERCIAL

## 2024-10-31 ENCOUNTER — HOSPITAL ENCOUNTER (OUTPATIENT)
Dept: LAB | Facility: MEDICAL CENTER | Age: 39
End: 2024-10-31
Attending: FAMILY MEDICINE
Payer: COMMERCIAL

## 2024-10-31 DIAGNOSIS — R79.89 ELEVATED LFTS: ICD-10-CM

## 2024-10-31 LAB
ALBUMIN SERPL BCP-MCNC: 4 G/DL (ref 3.2–4.9)
ALBUMIN/GLOB SERPL: 1.1 G/DL
ALP SERPL-CCNC: 111 U/L (ref 30–99)
ALT SERPL-CCNC: 63 U/L (ref 2–50)
ANION GAP SERPL CALC-SCNC: 11 MMOL/L (ref 7–16)
AST SERPL-CCNC: 40 U/L (ref 12–45)
BILIRUB SERPL-MCNC: 0.3 MG/DL (ref 0.1–1.5)
BUN SERPL-MCNC: 9 MG/DL (ref 8–22)
CALCIUM ALBUM COR SERPL-MCNC: 9.3 MG/DL (ref 8.5–10.5)
CALCIUM SERPL-MCNC: 9.3 MG/DL (ref 8.5–10.5)
CHLORIDE SERPL-SCNC: 101 MMOL/L (ref 96–112)
CO2 SERPL-SCNC: 26 MMOL/L (ref 20–33)
CREAT SERPL-MCNC: 0.54 MG/DL (ref 0.5–1.4)
GFR SERPLBLD CREATININE-BSD FMLA CKD-EPI: 120 ML/MIN/1.73 M 2
GLOBULIN SER CALC-MCNC: 3.7 G/DL (ref 1.9–3.5)
GLUCOSE SERPL-MCNC: 90 MG/DL (ref 65–99)
POTASSIUM SERPL-SCNC: 4.5 MMOL/L (ref 3.6–5.5)
PROT SERPL-MCNC: 7.7 G/DL (ref 6–8.2)
SODIUM SERPL-SCNC: 138 MMOL/L (ref 135–145)

## 2024-10-31 PROCEDURE — 80053 COMPREHEN METABOLIC PANEL: CPT

## 2024-10-31 PROCEDURE — 36415 COLL VENOUS BLD VENIPUNCTURE: CPT

## 2024-11-07 ENCOUNTER — HOSPITAL ENCOUNTER (OUTPATIENT)
Facility: MEDICAL CENTER | Age: 39
End: 2024-11-07
Attending: FAMILY MEDICINE
Payer: COMMERCIAL

## 2024-11-07 ENCOUNTER — OFFICE VISIT (OUTPATIENT)
Dept: MEDICAL GROUP | Facility: MEDICAL CENTER | Age: 39
End: 2024-11-07
Payer: COMMERCIAL

## 2024-11-07 VITALS
DIASTOLIC BLOOD PRESSURE: 64 MMHG | SYSTOLIC BLOOD PRESSURE: 116 MMHG | HEART RATE: 93 BPM | TEMPERATURE: 97.6 F | BODY MASS INDEX: 53.92 KG/M2 | HEIGHT: 62 IN | OXYGEN SATURATION: 99 % | WEIGHT: 293 LBS

## 2024-11-07 DIAGNOSIS — E11.9 CONTROLLED TYPE 2 DIABETES MELLITUS WITHOUT COMPLICATION, WITHOUT LONG-TERM CURRENT USE OF INSULIN (HCC): ICD-10-CM

## 2024-11-07 DIAGNOSIS — D64.9 ANEMIA, UNSPECIFIED TYPE: ICD-10-CM

## 2024-11-07 DIAGNOSIS — E66.813 CLASS 3 SEVERE OBESITY WITH SERIOUS COMORBIDITY AND BODY MASS INDEX (BMI) OF 60.0 TO 69.9 IN ADULT, UNSPECIFIED OBESITY TYPE (HCC): ICD-10-CM

## 2024-11-07 DIAGNOSIS — R79.89 ELEVATED LFTS: ICD-10-CM

## 2024-11-07 DIAGNOSIS — Z51.81 MEDICATION MONITORING ENCOUNTER: ICD-10-CM

## 2024-11-07 DIAGNOSIS — E66.01 CLASS 3 SEVERE OBESITY WITH SERIOUS COMORBIDITY AND BODY MASS INDEX (BMI) OF 60.0 TO 69.9 IN ADULT, UNSPECIFIED OBESITY TYPE (HCC): ICD-10-CM

## 2024-11-07 PROCEDURE — 3078F DIAST BP <80 MM HG: CPT | Performed by: FAMILY MEDICINE

## 2024-11-07 PROCEDURE — 99214 OFFICE O/P EST MOD 30 MIN: CPT | Performed by: FAMILY MEDICINE

## 2024-11-07 PROCEDURE — 3074F SYST BP LT 130 MM HG: CPT | Performed by: FAMILY MEDICINE

## 2024-11-07 PROCEDURE — 80307 DRUG TEST PRSMV CHEM ANLYZR: CPT

## 2024-11-07 RX ORDER — PHENTERMINE HYDROCHLORIDE 37.5 MG/1
37.5 CAPSULE ORAL EVERY MORNING
Qty: 30 CAPSULE | Refills: 0 | Status: SHIPPED | OUTPATIENT
Start: 2025-01-06 | End: 2025-02-05

## 2024-11-07 RX ORDER — PHENTERMINE HYDROCHLORIDE 37.5 MG/1
37.5 CAPSULE ORAL EVERY MORNING
Qty: 30 CAPSULE | Refills: 0 | Status: SHIPPED | OUTPATIENT
Start: 2024-12-07 | End: 2025-01-06

## 2024-11-07 RX ORDER — PHENTERMINE HYDROCHLORIDE 30 MG/1
30 CAPSULE ORAL EVERY MORNING
Qty: 30 CAPSULE | Refills: 0 | Status: SHIPPED | OUTPATIENT
Start: 2024-11-07 | End: 2024-12-14

## 2024-11-07 RX ORDER — PHENTERMINE HYDROCHLORIDE 15 MG/1
15 CAPSULE ORAL EVERY MORNING
COMMUNITY
End: 2024-11-07

## 2024-11-07 RX ORDER — TIRZEPATIDE 12.5 MG/.5ML
12.5 INJECTION, SOLUTION SUBCUTANEOUS
Qty: 6 ML | Refills: 3 | Status: SHIPPED | OUTPATIENT
Start: 2024-11-07

## 2024-11-07 ASSESSMENT — FIBROSIS 4 INDEX: FIB4 SCORE: 0.59

## 2024-11-07 ASSESSMENT — ENCOUNTER SYMPTOMS
FEVER: 0
PALPITATIONS: 0
CHILLS: 0

## 2024-11-07 NOTE — PROGRESS NOTES
Verbal consent was acquired by the patient to use [employer] ambient listening note generation during this visit.      Margarita was seen today for follow-up.    Diagnoses and all orders for this visit:    Class 3 severe obesity with serious comorbidity and body mass index (BMI) of 60.0 to 69.9 in adult, unspecified obesity type (HCC)  -     phentermine 30 MG capsule; Take 1 Capsule by mouth every morning for 30 days.  -     phentermine 37.5 MG capsule; Take 1 Capsule by mouth every morning for 30 days.  -     phentermine 37.5 MG capsule; Take 1 Capsule by mouth every morning for 30 days.    Controlled type 2 diabetes mellitus without complication, without long-term current use of insulin (HCC)  -     Tirzepatide (MOUNJARO) 12.5 MG/0.5ML Solution Auto-injector; Inject 12.5 mg under the skin every 7 days.  -     HEMOGLOBIN A1C; Future  -     Lipid Profile; Future  -     VITAMIN B12; Future    Elevated LFTs  -     Comp Metabolic Panel; Future    Medication monitoring encounter  -     URINE DRUG SCREEN W/CONF (AR); Future    Anemia, unspecified type  -     CBC WITH DIFFERENTIAL; Future                  Assessment & Plan  1. Type 2 Diabetes Mellitus.  Chronic condition, her A1c is 6.2, indicating a need for further reduction. The dosage of Mounjaro will be increased to 12.5 mg. She has sufficient Mounjaro 10 mg for now.  Continue metformin at same dose.    2. Obesity.  Chronic condition, unstable the dosage of phentermine will be increased to 30 mg for one month, followed by 37.5 mg for the next two months. . Regular exercise, such as using a stationary bike or treadmill for 15 to 20 minutes, is recommended.  Controlled substance treatment is signed in chart.  Checked PDMP.    Obtained and reviewed patient utilization report from [facility] pharmacy database on 11/7/2024 10:27 AM  prior to writing prescription for controlled substance II, III or IV per [place] bill . Based on assessment of the report,my physical exam  if necessary and the patient's health problem, the prescription is medically necessary.        3. Elevated Liver Enzymes.  Chronic condition, mildly elevated liver LFTs.  Has history of fatty liver.  Liver function is getting better.  Recheck labs in 6-month.    4. Anemia.  Chronic condition, stable, improving.  Continue iron supplementation.      Follow-up  Return in 3 months for lab and medication follow up.          Chief complaint::Diagnoses of Class 3 severe obesity with serious comorbidity and body mass index (BMI) of 60.0 to 69.9 in adult, unspecified obesity type (HCC), Controlled type 2 diabetes mellitus without complication, without long-term current use of insulin (HCC), Elevated LFTs, Medication monitoring encounter, and Anemia, unspecified type were pertinent to this visit.      History of Present Illness  The patient is a 39-year-old female who presents for follow-up.    She reports a slight weight gain of 1 pound, attributing it to a lack of physical activity due to cold weather and the demands of caring for her baby. She acknowledges the need for more physical activity at home and notes that her diet has improved compared to previous habits.     Her blood sugar levels are well-controlled, with readings of 110 before lunch and 95 before dinner. She is currently on Mounjaro 10 mg and phentermine 30 mg, and has a 3-month supply of Mounjaro remaining.    She has not taken any pain medication recently.      Review of Systems   Constitutional:  Negative for chills and fever.   Cardiovascular:  Negative for chest pain, palpitations and leg swelling.          Medications and Allergies:     Current Outpatient Medications   Medication Sig Dispense Refill    Tirzepatide (MOUNJARO) 12.5 MG/0.5ML Solution Auto-injector Inject 12.5 mg under the skin every 7 days. 6 mL 3    phentermine 30 MG capsule Take 1 Capsule by mouth every morning for 30 days. 30 Capsule 0    [START ON 12/7/2024] phentermine 37.5 MG capsule  "Take 1 Capsule by mouth every morning for 30 days. 30 Capsule 0    [START ON 1/6/2025] phentermine 37.5 MG capsule Take 1 Capsule by mouth every morning for 30 days. 30 Capsule 0    lisinopril (PRINIVIL) 10 MG Tab Take 1 Tablet by mouth 2 times a day. 180 Tablet 3    insulin glargine 100 UNIT/ML SC SOPN injection Inject 45 Units under the skin every evening. 15 mL 6    ciclopirox (PENLAC) 8 % solution Apply 1 Application topically every evening. 6.6 mL 1    ferrous sulfate 325 (65 Fe) MG tablet Take 1 Tablet by mouth every day. 30 Tablet 5    Prenatal Multivit-Min-Fe-FA (PRENATAL 1 + IRON PO) Take  by mouth.      metFORMIN ER (GLUCOPHAGE XR) 500 MG TABLET SR 24 HR Take 2 Tablets by mouth 2 times a day. 360 Tablet 3    triamcinolone acetonide (KENALOG) 0.1 % Cream Apply 1 Application topically 2 times a day. 45 g 0    ONETOUCH VERIO strip Use to test blood sugar 5 Times a Day. 200 Strip 6    BD PEN NEEDLE AMINTA U/F Inject 1 Each under the skin 4 times a day. 300 Each 3    Blood Glucose Monitoring Suppl (ONETOUCH VERIO FLEX SYSTEM) w/Device Kit Use as directed 1 Kit 0    glucose blood strip Use as directed subcutaneously 4-5 times a day for 30 days. 150 Strip 5    Blood Glucose Test Strips Testing before and 1 hour after meals for insulin adjustment during pregnancy. One Touch Verio test strips 500 Strip 3    vitamin D (CHOLECALCIFEROL) 1000 UNIT Tab Take 2,000 Units by mouth every day.       No current facility-administered medications for this visit.       /64 (BP Location: Left arm, Patient Position: Sitting, BP Cuff Size: Adult)   Pulse 93   Temp 36.4 °C (97.6 °F) (Temporal)   Ht 1.575 m (5' 2\")   Wt (!) 166 kg (366 lb 10 oz)   SpO2 99% , Body mass index is 67.06 kg/m².      Physical Exam  Constitutional:       Appearance: Normal appearance. She is well-developed and well-groomed.   HENT:      Head: Normocephalic and atraumatic.      Right Ear: External ear normal.      Left Ear: External ear normal. "   Eyes:      General:         Right eye: No discharge.         Left eye: No discharge.      Conjunctiva/sclera: Conjunctivae normal.   Cardiovascular:      Rate and Rhythm: Normal rate.   Pulmonary:      Effort: Pulmonary effort is normal. No respiratory distress.   Musculoskeletal:      Cervical back: Neck supple.   Skin:     Findings: No rash.   Neurological:      Mental Status: She is alert.   Psychiatric:         Mood and Affect: Mood and affect normal.         Behavior: Behavior normal.            I reviewed with patient lab results resulted on 9/19/2024 and 10/31/2024.        Please note that this dictation was created using voice recognition software. I have made every reasonable attempt to correct obvious errors, but I expect that there are errors of grammar and possibly content that I did not discover before finalizing the note.

## 2024-11-09 LAB
AMPHET CTO UR CFM-MCNC: NEGATIVE NG/ML
BARBITURATES CTO UR CFM-MCNC: NEGATIVE NG/ML
BENZODIAZ CTO UR CFM-MCNC: NEGATIVE NG/ML
CANNABINOIDS CTO UR CFM-MCNC: NEGATIVE NG/ML
COCAINE CTO UR CFM-MCNC: NEGATIVE NG/ML
CREAT UR-MCNC: 107.5 MG/DL (ref 20–400)
DRUG COMMENT 753798: NORMAL
METHADONE CTO UR CFM-MCNC: NEGATIVE NG/ML
OPIATES CTO UR CFM-MCNC: NEGATIVE NG/ML
PCP CTO UR CFM-MCNC: NEGATIVE NG/ML
PROPOXYPH CTO UR CFM-MCNC: NEGATIVE NG/ML

## 2024-11-13 ENCOUNTER — TELEPHONE (OUTPATIENT)
Dept: PHARMACY | Facility: MEDICAL CENTER | Age: 39
End: 2024-11-13
Payer: COMMERCIAL

## 2024-11-13 PROCEDURE — RXMED WILLOW AMBULATORY MEDICATION CHARGE: Performed by: OBSTETRICS & GYNECOLOGY

## 2024-11-13 PROCEDURE — RXMED WILLOW AMBULATORY MEDICATION CHARGE: Performed by: FAMILY MEDICINE

## 2024-11-14 ENCOUNTER — PHARMACY VISIT (OUTPATIENT)
Dept: PHARMACY | Facility: MEDICAL CENTER | Age: 39
End: 2024-11-14
Payer: COMMERCIAL

## 2024-12-24 PROCEDURE — RXMED WILLOW AMBULATORY MEDICATION CHARGE: Performed by: OBSTETRICS & GYNECOLOGY

## 2024-12-24 PROCEDURE — RXMED WILLOW AMBULATORY MEDICATION CHARGE: Performed by: FAMILY MEDICINE

## 2024-12-26 ENCOUNTER — PHARMACY VISIT (OUTPATIENT)
Dept: PHARMACY | Facility: MEDICAL CENTER | Age: 39
End: 2024-12-26
Payer: COMMERCIAL

## 2025-01-20 ENCOUNTER — OFFICE VISIT (OUTPATIENT)
Dept: DERMATOLOGY | Facility: IMAGING CENTER | Age: 40
End: 2025-01-20
Payer: COMMERCIAL

## 2025-01-20 DIAGNOSIS — L02.92 FURUNCLES: ICD-10-CM

## 2025-01-20 DIAGNOSIS — L65.9 HAIR LOSS: ICD-10-CM

## 2025-01-20 PROCEDURE — 99213 OFFICE O/P EST LOW 20 MIN: CPT | Performed by: NURSE PRACTITIONER

## 2025-01-20 RX ORDER — CLINDAMYCIN PHOSPHATE 11.9 MG/ML
SOLUTION TOPICAL
Qty: 60 ML | Refills: 3 | Status: SHIPPED | OUTPATIENT
Start: 2025-01-20

## 2025-01-21 ENCOUNTER — NON-PROVIDER VISIT (OUTPATIENT)
Dept: ENDOCRINOLOGY | Facility: MEDICAL CENTER | Age: 40
End: 2025-01-21
Attending: INTERNAL MEDICINE
Payer: COMMERCIAL

## 2025-01-21 VITALS
HEART RATE: 72 BPM | OXYGEN SATURATION: 98 % | WEIGHT: 293 LBS | DIASTOLIC BLOOD PRESSURE: 70 MMHG | BODY MASS INDEX: 53.92 KG/M2 | SYSTOLIC BLOOD PRESSURE: 118 MMHG | HEIGHT: 62 IN

## 2025-01-21 DIAGNOSIS — E55.9 VITAMIN D DEFICIENCY: ICD-10-CM

## 2025-01-21 DIAGNOSIS — E11.9 CONTROLLED TYPE 2 DIABETES MELLITUS WITHOUT COMPLICATION, WITHOUT LONG-TERM CURRENT USE OF INSULIN (HCC): ICD-10-CM

## 2025-01-21 DIAGNOSIS — E78.5 DYSLIPIDEMIA: ICD-10-CM

## 2025-01-21 DIAGNOSIS — O99.019 ANTEPARTUM ANEMIA: ICD-10-CM

## 2025-01-21 LAB
HBA1C MFR BLD: 5.7 % (ref ?–5.8)
POCT INT CON NEG: NEGATIVE
POCT INT CON POS: POSITIVE

## 2025-01-21 PROCEDURE — 83036 HEMOGLOBIN GLYCOSYLATED A1C: CPT

## 2025-01-21 PROCEDURE — 99212 OFFICE O/P EST SF 10 MIN: CPT | Performed by: INTERNAL MEDICINE

## 2025-01-21 RX ORDER — METFORMIN HYDROCHLORIDE 500 MG/1
1000 TABLET, EXTENDED RELEASE ORAL 2 TIMES DAILY
Qty: 360 TABLET | Refills: 3 | Status: SHIPPED | OUTPATIENT
Start: 2025-01-21

## 2025-01-21 RX ORDER — FERROUS SULFATE 325(65) MG
325 TABLET ORAL DAILY
Qty: 90 TABLET | Refills: 3 | Status: SHIPPED | OUTPATIENT
Start: 2025-01-21

## 2025-01-21 ASSESSMENT — FIBROSIS 4 INDEX: FIB4 SCORE: 0.59

## 2025-01-21 NOTE — PROGRESS NOTES
RN-CDE Note    Subjective:   Endocrinology Clinic Progress Note  PCP: Ollie Webber M.D.    HPI:  Priti Begum is a 39 y.o. old patient who is seen today by the Diabetes Nurse Specialist for review of Type 2 Diabetes.  Recent changes in health: Health good.  Has a 5 month old baby girl.  She states her hair has started falling out since delivering her baby and she is seeing a dermatologist for it.  She is trying to lose weight.  .    DM:   Last A1c:   Lab Results   Component Value Date/Time    HBA1C 6.2 (H) 09/19/2024 11:03 AM      HBA1C today is 5.7  A1C GOAL: < 7    Diabetes Medications:   Basaglar 38 units daily  Metformin  mg 2 BID  Mounjaro 12.5 mg weekly         Exercise: Walking and doing videos  Diet: Skips breakfast.  Lunch is chicken, mashed potatoes, fruit, and string cheese.  Dinner is protein, vegetables, and carbohydrates.  She will try the plate method of eating and keep her carbohydrates to 45 grams or less at her meals.    Exercise and nutrition counseling were performed at this visit.    Glucose monitoring frequency: Testing daily at different times  's  Hypoglycemic episodes: yes - if doesn't eat     Last Retinal Exam: on file and up-to-date  Daily Foot Exam: Yes   Foot Exam:  Monofilament: done  Monofilament testing with a 10 gram force: sensation intact: intact bilaterally  Visual Inspection: Feet without maceration, ulcers, fissures.  Pedal pulses: intact bilaterally   Lab Results   Component Value Date/Time    MALBCRT 10 09/19/2024 11:03 AM    MICROALBUR 2.0 09/19/2024 11:03 AM        ACR Albumin/Creatinine Ratio goal <30     HTN:   Blood pressure goal <130/<80   Currently Rx ACE/ARB:  yes    Dyslipidemia:    Lab Results   Component Value Date/Time    CHOLSTRLTOT 159 09/19/2024 11:03 AM    LDL 92 09/19/2024 11:03 AM    HDL 38 (A) 09/19/2024 11:03 AM    TRIGLYCERIDE 146 09/19/2024 11:03 AM         Currently Rx Statin:  no    She  reports that she has never  smoked. She has never used smokeless tobacco.    Plan:     Discussed and educated on:   - All medications, side effects and compliance (discussed carefully)  - Annual eye examinations at Ophthalmology  - Home glucose monitoring emphasized  - Weight control and daily exercise    Recommended medication changes: She would like to get off the insulin.  She will stop her Basaglar insulin and notify us if she has any problems with her blood sugars.  She will increase the Mounjaro to 15 mg weekly and Metformin  mg 2 BID.  She will follow up with Dr. Rivera in 6 months.

## 2025-01-21 NOTE — PROGRESS NOTES
"DERMATOLOGY NOTE  Follow up  VISIT       Chief complaint: Follow-Up and Hair Loss    Per patient thinks maybe hair is starting to grow back slowly . Her daughter was born 5 months ago . Continues to take her prenatal vitamins  . Did not feel that the Rogaine did any thing         Initial Hx   Pt is currently 9 weeks pregnant feel improvement with hair loss thanks it could be prenatal vitamins     Hairloss location: scalp or other locations too? Scalp  Any other symptoms (itching, scaling, redness, other)? No  Any dietary restrictions? (vegan, vegetarian/other) No  Any family history of hair thinning/balding in women or men? Mother always had thin hair  Any illnesses, infections, pregnancy, or high stress 3-9 months preceding loss?   Diabetic, Had baby 1yr ago.  Any labs done? No  Treatments tried: No    History of skin cancer: No  History of precancers/actinic keratoses: No  History of biopsies:Yes, Details: Rt cheek  History of blistering/severe sunburns:No  Family history of skin cancer:Yes, Details: Mother melanoma  Family history of atypical moles:No    Allergies   Allergen Reactions    Glimepiride [Kdc:Yellow Dye+Brilliant Blue Fcf+Glimepiride] Unspecified     \"gittery\"        MEDICATIONS:  Medications relevant to specialty reviewed.     REVIEW OF SYSTEMS:   Positive for skin (see HPI)  Negative for fevers and chills       EXAM:  There were no vitals taken for this visit.  Constitutional: Well-developed, well-nourished, and in no distress.     A focused skin exam was performed including the affected areas of the head (including face). Notable findings on exam today listed below and/or in assessment/plan.     notable hair thinning to forehead and temples, 1 area on vertex with AA  No active furuncles or carbuncles per pt        IMPRESSION / PLAN:    1. 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 5 months ago  Discussed course/nature of disease  Other DDx to consider, AA, androgenic alopecia  Rx below  Follow " up 3 months if no improvement  - NON SPECIFIED; Compound: minoxidil 10% with finasteride 0.1% with latanoprost 0.01% with biotin 0.2%. AAA, scalp 1-2 times per day  Dispense: 30 Each; Refill: 3    2. Furuncles  Suspicious for HS, however per pt no lesions today  Discussed use of Hibiclens, Rx below  Follow up for no improvement  - clindamycin (CLEOCIN) 1 % Solution; AAA, twice a day 2-3 days per week  Dispense: 60 mL; Refill: 3    Patient verbalized understanding and agrees with plan regarding the above      Please note that this dictation was created using voice recognition software. I have made every reasonable attempt to correct obvious errors, but I expect that there are errors of grammar and possibly content that I did not discover before finalizing the note.      Return to clinic in: Return for PRN 3 months if no improvement. and as needed for any new or changing skin lesions.

## 2025-01-22 PROCEDURE — RXMED WILLOW AMBULATORY MEDICATION CHARGE: Performed by: INTERNAL MEDICINE

## 2025-01-23 ENCOUNTER — TELEPHONE (OUTPATIENT)
Dept: PHARMACY | Facility: MEDICAL CENTER | Age: 40
End: 2025-01-23
Payer: COMMERCIAL

## 2025-01-23 PROCEDURE — RXMED WILLOW AMBULATORY MEDICATION CHARGE: Performed by: INTERNAL MEDICINE

## 2025-01-23 PROCEDURE — RXMED WILLOW AMBULATORY MEDICATION CHARGE: Performed by: NURSE PRACTITIONER

## 2025-01-23 PROCEDURE — RXMED WILLOW AMBULATORY MEDICATION CHARGE: Performed by: FAMILY MEDICINE

## 2025-01-24 NOTE — TELEPHONE ENCOUNTER
Contact:  Phone number:[phone] (mobile)    Name of person spoken with and relationship to patient: Margarita patient   Patient’s Adherence:  How patient is doing on medication: Very Well    How many missed doses and reason: 0 N/A    Any new medications: No    Any new conditions: No    Any new allergies: No    Any new side effects: No    Any new diagnoses: No    How many doses remainin    Did patient want to speak with pharmacist: No   Delivery:  Delivery date and method: 2025 via     Needs by Date: 2025    Signature required: No     Any additional details for : N/A   Teach Appointment Date:  N/A   Shipping Address:  [address] [place], [place] [id number]   Medication(name,strength and dose):  Insulin Glargine 100 UNIT/ML metFORMIN HCl  MG Mounjaro 10 MG/0.5ML Lisinopril 10 MG Phentermine 30 MG FeroSul 325 (65 Fe) MG    Copay:  $103.91   Payment Method:  Credit card on file   Supplies:  Alcohol Swabs   Additional Information:  NONE     Wandy Rosales, Pharmacy Liaison/ RX Coordinator

## 2025-01-27 ENCOUNTER — PHARMACY VISIT (OUTPATIENT)
Dept: PHARMACY | Facility: MEDICAL CENTER | Age: 40
End: 2025-01-27
Payer: COMMERCIAL

## 2025-01-28 ENCOUNTER — PHARMACY VISIT (OUTPATIENT)
Dept: PHARMACY | Facility: MEDICAL CENTER | Age: 40
End: 2025-01-28
Payer: COMMERCIAL

## 2025-01-31 ENCOUNTER — PATIENT MESSAGE (OUTPATIENT)
Dept: MEDICAL GROUP | Facility: MEDICAL CENTER | Age: 40
End: 2025-01-31
Payer: COMMERCIAL

## 2025-01-31 PROCEDURE — RXMED WILLOW AMBULATORY MEDICATION CHARGE: Performed by: FAMILY MEDICINE

## 2025-01-31 RX ORDER — OFLOXACIN 3 MG/ML
1 SOLUTION/ DROPS OPHTHALMIC 3 TIMES DAILY
Qty: 5 ML | Refills: 0 | Status: SHIPPED | OUTPATIENT
Start: 2025-01-31 | End: 2025-02-07

## 2025-02-01 ENCOUNTER — PHARMACY VISIT (OUTPATIENT)
Dept: PHARMACY | Facility: MEDICAL CENTER | Age: 40
End: 2025-02-01
Payer: COMMERCIAL

## 2025-02-01 PROCEDURE — RXOTC WILLOW AMBULATORY OTC CHARGE

## 2025-02-07 ENCOUNTER — APPOINTMENT (OUTPATIENT)
Dept: MEDICAL GROUP | Facility: MEDICAL CENTER | Age: 40
End: 2025-02-07
Payer: COMMERCIAL

## 2025-02-07 ENCOUNTER — OFFICE VISIT (OUTPATIENT)
Dept: MEDICAL GROUP | Facility: MEDICAL CENTER | Age: 40
End: 2025-02-07
Payer: COMMERCIAL

## 2025-02-07 VITALS
TEMPERATURE: 97 F | SYSTOLIC BLOOD PRESSURE: 100 MMHG | WEIGHT: 293 LBS | HEART RATE: 88 BPM | OXYGEN SATURATION: 94 % | HEIGHT: 62 IN | DIASTOLIC BLOOD PRESSURE: 66 MMHG | BODY MASS INDEX: 53.92 KG/M2

## 2025-02-07 DIAGNOSIS — E66.813 CLASS 3 SEVERE OBESITY WITH SERIOUS COMORBIDITY AND BODY MASS INDEX (BMI) OF 60.0 TO 69.9 IN ADULT, UNSPECIFIED OBESITY TYPE (HCC): ICD-10-CM

## 2025-02-07 DIAGNOSIS — E11.9 CONTROLLED TYPE 2 DIABETES MELLITUS WITHOUT COMPLICATION, WITHOUT LONG-TERM CURRENT USE OF INSULIN (HCC): ICD-10-CM

## 2025-02-07 DIAGNOSIS — E66.01 CLASS 3 SEVERE OBESITY WITH SERIOUS COMORBIDITY AND BODY MASS INDEX (BMI) OF 60.0 TO 69.9 IN ADULT, UNSPECIFIED OBESITY TYPE (HCC): ICD-10-CM

## 2025-02-07 PROCEDURE — 99214 OFFICE O/P EST MOD 30 MIN: CPT | Performed by: FAMILY MEDICINE

## 2025-02-07 PROCEDURE — 3078F DIAST BP <80 MM HG: CPT | Performed by: FAMILY MEDICINE

## 2025-02-07 PROCEDURE — 3074F SYST BP LT 130 MM HG: CPT | Performed by: FAMILY MEDICINE

## 2025-02-07 RX ORDER — PHENTERMINE HYDROCHLORIDE 37.5 MG/1
37.5 CAPSULE ORAL EVERY MORNING
Qty: 30 CAPSULE | Refills: 0 | Status: SHIPPED | OUTPATIENT
Start: 2025-03-25 | End: 2025-04-24

## 2025-02-07 RX ORDER — PHENTERMINE HYDROCHLORIDE 37.5 MG/1
37.5 CAPSULE ORAL EVERY MORNING
Qty: 30 CAPSULE | Refills: 0 | Status: SHIPPED | OUTPATIENT
Start: 2025-04-24 | End: 2025-05-24

## 2025-02-07 RX ORDER — PHENTERMINE HYDROCHLORIDE 37.5 MG/1
37.5 CAPSULE ORAL EVERY MORNING
Qty: 30 CAPSULE | Refills: 0 | Status: SHIPPED | OUTPATIENT
Start: 2025-02-23 | End: 2025-03-27

## 2025-02-07 RX ORDER — PHENTERMINE HYDROCHLORIDE 37.5 MG/1
37.5 CAPSULE ORAL EVERY MORNING
Qty: 30 CAPSULE | Refills: 0 | Status: SHIPPED | OUTPATIENT
Start: 2025-02-23 | End: 2025-02-07

## 2025-02-07 ASSESSMENT — ENCOUNTER SYMPTOMS
PALPITATIONS: 0
CHILLS: 0
FEVER: 0

## 2025-02-07 ASSESSMENT — PATIENT HEALTH QUESTIONNAIRE - PHQ9: CLINICAL INTERPRETATION OF PHQ2 SCORE: 0

## 2025-02-07 ASSESSMENT — FIBROSIS 4 INDEX: FIB4 SCORE: 0.59

## 2025-02-07 NOTE — LETTER
February 7, 2025       Patient: Priti Begum   YOB: 1985   Date of Visit: 2/7/2025         To Whom It May Concern:    In my medical opinion, I recommend that Priti Begum to use standing desk at work due to her medical condition.    If you have any questions or concerns, please don't hesitate to call [phone]          Sincerely,          Ollie Webber M.D.  Electronically Signed

## 2025-02-07 NOTE — PROGRESS NOTES
Verbal consent was acquired by the patient to use [employer] ambient listening note generation during this visit.      Margarita was seen today for follow-up.    Diagnoses and all orders for this visit:    Class 3 severe obesity with serious comorbidity and body mass index (BMI) of 60.0 to 69.9 in adult, unspecified obesity type (HCC)  -     Discontinue: phentermine 37.5 MG capsule; Take 1 Capsule by mouth every morning for 30 days. DNFU 1/6/25  -     phentermine 37.5 MG capsule; Take 1 Capsule by mouth every morning for 30 days.  -     phentermine 37.5 MG capsule; Take 1 Capsule by mouth every morning for 30 days.  -     phentermine 37.5 MG capsule; Take 1 Capsule by mouth every morning for 30 days.    Controlled type 2 diabetes mellitus without complication, without long-term current use of insulin ([facility])                  Assessment & Plan  1. Diabetes mellitus  Chronic condition, stable  - Blood sugars stable since discontinuing insulin glargine (Basaglar) 30 units at bedtime 2 weeks ago  - Lost about 5 pounds since stopping insulin  - Continue taking Mounjaro 15 mg and metformin  - Goal: reduce weight below 300 pounds this year  - Incorporate more physical activity (hiking, outdoor activities, treadmill at home)    2.  Obesity  Chronic condition, unstable, improving  - Currently taking phentermine 37.5 mg (last filled on 01/24/2025)  - Prescription for 3 refills of phentermine 37.5 mg to be sent to pharmacy  - Continue daily 20-30 minute exercise routine  - Consider using a treadmill to increase physical activity  - Letter recommending a standing desk to be provided  -Controlled substance treatment signed in chart and she is up-to-date for urine drug screen    Obtained and reviewed patient utilization report from [facility] pharmacy database on 2/7/2025 9:40 AM  prior to writing prescription for controlled substance II, III or IV per [place] bill . Based on assessment of the report,my physical exam if  necessary and the patient's health problem, the prescription is medically necessary.      3. Stye in the right eye  New condition, unstable  - Continue using prescribed eyedrops ofloxacin for 10 days  - Inform if no improvement for referral to ophthalmologist  - May schedule an appointment with existing eye doctor    Follow-up  - Patient to follow up in 3 months for medication follow-up.          Chief complaint::Diagnoses of Class 3 severe obesity with serious comorbidity and body mass index (BMI) of 60.0 to 69.9 in adult, unspecified obesity type (HCC) and Controlled type 2 diabetes mellitus without complication, without long-term current use of insulin ([facility]) were pertinent to this visit.      History of Present Illness  The patient is a 39-year-old female who presents for a medication follow-up.    Blood Glucose Levels  - She has been monitoring her blood glucose levels at home, which have remained stable since the discontinuation of insulin approximately 2 weeks ago.  - She has experienced a weight loss of 5 pounds since stopping insulin.  - Her current medication regimen includes Mounjaro 15 mg and metformin.    Physical Activity and Appetite  - She has been engaging in physical activity, specifically following a fitness program for 20 to 30 minutes daily before bedtime.  - She reports a decrease in appetite since discontinuing nighttime insulin, with even small meals such as a bean and cheese burrito causing discomfort.  - She does not currently own a stationary bike but is considering acquiring a treadmill.  - She previously had a standing desk at work but was relocated to a smaller desk where a standing desk cannot be accommodated.    Eye Condition  - She has a stye in her left eye and has been using antibiotic eye drops.  - She has an ophthalmologist and will make an appointment.    Supplemental information: None        Review of Systems   Constitutional:  Negative for chills and fever.   HENT:           Redness at left upper eyelid   Cardiovascular:  Negative for chest pain, palpitations and leg swelling.          Medications and Allergies:     Current Outpatient Medications   Medication Sig Dispense Refill    [START ON 3/25/2025] phentermine 37.5 MG capsule Take 1 Capsule by mouth every morning for 30 days. 30 Capsule 0    [START ON 4/24/2025] phentermine 37.5 MG capsule Take 1 Capsule by mouth every morning for 30 days. 30 Capsule 0    [START ON 2/23/2025] phentermine 37.5 MG capsule Take 1 Capsule by mouth every morning for 30 days. 30 Capsule 0    metFORMIN ER (GLUCOPHAGE XR) 500 MG TABLET SR 24 HR Take 2 Tablets by mouth 2 times a day. 360 Tablet 3    ferrous sulfate 325 (65 Fe) MG tablet Take 1 Tablet by mouth every day. 90 Tablet 3    NON SPECIFIED Compound: minoxidil 10% with finasteride 0.1% with latanoprost 0.01% with biotin 0.2%. AAA, scalp 1-2 times per day 30 Each 3    clindamycin (CLEOCIN) 1 % Solution Apply to affected area twice a day 2-3 days per week 60 mL 3    triamcinolone acetonide (KENALOG) 0.1 % Cream Apply 1 Application topically 2 times a day. 45 g 0    ofloxacin (OCUFLOX) 0.3 % Solution Administer 1 drop into both eyes in the morning, at noon, and at bedtime for 7 days. 5 mL 0    Tirzepatide (MOUNJARO) 15 MG/0.5ML Solution Auto-injector Inject 15 mg under the skin every 7 days. 6 mL 3    lisinopril (PRINIVIL) 10 MG Tab Take 1 Tablet by mouth 2 times a day. 180 Tablet 3    ciclopirox (PENLAC) 8 % solution Apply 1 Application topically every evening. 6.6 mL 1    Prenatal Multivit-Min-Fe-FA (PRENATAL 1 + IRON PO) Take  by mouth.      ONETOUCH VERIO strip Use to test blood sugar 5 Times a Day. 200 Strip 6    BD PEN NEEDLE AMINTA U/F Inject 1 Each under the skin 4 times a day. 300 Each 3    Blood Glucose Monitoring Suppl (ONETOUCH VERIO FLEX SYSTEM) w/Device Kit Use as directed 1 Kit 0    glucose blood strip Use as directed subcutaneously 4-5 times a day for 30 days. 150 Strip 5    Blood Glucose  "Test Strips Testing before and 1 hour after meals for insulin adjustment during pregnancy. One Touch Verio test strips 500 Strip 3    vitamin D (CHOLECALCIFEROL) 1000 UNIT Tab Take 2,000 Units by mouth every day.       No current facility-administered medications for this visit.       /66 (BP Location: Left arm, Patient Position: Sitting, BP Cuff Size: Large adult)   Pulse 88   Temp 36.1 °C (97 °F) (Temporal)   Ht 1.575 m (5' 2\")   Wt (!) 164 kg (362 lb 5.2 oz)   SpO2 94% , Body mass index is 66.27 kg/m².      Physical Exam  Constitutional:       Appearance: Normal appearance. She is well-developed and well-groomed.   HENT:      Head: Normocephalic and atraumatic.      Right Ear: External ear normal.      Left Ear: External ear normal.   Eyes:      General:         Right eye: No discharge.         Left eye: No discharge.      Conjunctiva/sclera:      Left eye: Left conjunctiva is injected.      Comments: Erythema and nodule formation at left upper eyelid   Cardiovascular:      Rate and Rhythm: Normal rate.   Pulmonary:      Effort: Pulmonary effort is normal. No respiratory distress.   Musculoskeletal:      Cervical back: Neck supple.   Skin:     Findings: No rash.   Neurological:      Mental Status: She is alert.   Psychiatric:         Mood and Affect: Mood and affect normal.         Behavior: Behavior normal.              Please note that this dictation was created using voice recognition software. I have made every reasonable attempt to correct obvious errors, but I expect that there are errors of grammar and possibly content that I did not discover before finalizing the note.      "

## 2025-02-23 PROCEDURE — RXMED WILLOW AMBULATORY MEDICATION CHARGE: Performed by: FAMILY MEDICINE

## 2025-02-23 PROCEDURE — RXMED WILLOW AMBULATORY MEDICATION CHARGE: Performed by: NURSE PRACTITIONER

## 2025-02-24 PROCEDURE — RXMED WILLOW AMBULATORY MEDICATION CHARGE: Performed by: FAMILY MEDICINE

## 2025-02-25 ENCOUNTER — PHARMACY VISIT (OUTPATIENT)
Dept: PHARMACY | Facility: MEDICAL CENTER | Age: 40
End: 2025-02-25
Payer: COMMERCIAL

## 2025-02-26 ENCOUNTER — OFFICE VISIT (OUTPATIENT)
Dept: MEDICAL GROUP | Facility: MEDICAL CENTER | Age: 40
End: 2025-02-26
Payer: COMMERCIAL

## 2025-02-26 VITALS
BODY MASS INDEX: 53.92 KG/M2 | HEART RATE: 106 BPM | OXYGEN SATURATION: 95 % | TEMPERATURE: 97.3 F | SYSTOLIC BLOOD PRESSURE: 116 MMHG | WEIGHT: 293 LBS | HEIGHT: 62 IN | DIASTOLIC BLOOD PRESSURE: 64 MMHG

## 2025-02-26 DIAGNOSIS — E11.9 CONTROLLED TYPE 2 DIABETES MELLITUS WITHOUT COMPLICATION, WITHOUT LONG-TERM CURRENT USE OF INSULIN (HCC): ICD-10-CM

## 2025-02-26 DIAGNOSIS — Z79.2 NEED FOR ANTIBIOTIC PROPHYLAXIS FOR DENTAL PROCEDURE: ICD-10-CM

## 2025-02-26 PROCEDURE — RXMED WILLOW AMBULATORY MEDICATION CHARGE: Performed by: FAMILY MEDICINE

## 2025-02-26 RX ORDER — AMOXICILLIN 500 MG/1
CAPSULE ORAL
Qty: 4 CAPSULE | Refills: 0 | Status: SHIPPED | OUTPATIENT
Start: 2025-02-26

## 2025-02-26 RX ORDER — FLUCONAZOLE 150 MG/1
150 TABLET ORAL ONCE
Qty: 1 TABLET | Refills: 0 | Status: SHIPPED | OUTPATIENT
Start: 2025-02-26 | End: 2025-02-28

## 2025-02-26 ASSESSMENT — ENCOUNTER SYMPTOMS
FEVER: 0
PALPITATIONS: 0
CHILLS: 0

## 2025-02-26 ASSESSMENT — FIBROSIS 4 INDEX: FIB4 SCORE: 0.59

## 2025-02-26 NOTE — PROGRESS NOTES
FAMILY MEDICINE VISIT                                                               Assessment/Plan:       1. Controlled type 2 diabetes mellitus without complication, without long-term current use of insulin ([facility])  Chronic condition, stable, continue Mounjaro 15 mg every 7 days.    2. Need for antibiotic prophylaxis for dental procedure  New condition, unstable, scheduled for dental procedure.  Prescribed amoxicillin for dental prophylaxis.  Prescribed fluconazole if she gets yeast infection after taking this antibiotic.    - amoxicillin (AMOXIL) 500 MG Cap; Take 4 capsules by mouth 2 hours before dental procedure  Dispense: 4 Capsule; Refill: 0  - fluconazole (DIFLUCAN) 150 MG tablet; Take 1 Tablet by mouth one time for 1 dose.  Dispense: 1 Tablet; Refill: 0     Follow up 05/2025 for regular follow-up.      Chief complaint::Diagnoses of Controlled type 2 diabetes mellitus without complication, without long-term current use of insulin ([facility]) and Need for antibiotic prophylaxis for dental procedure were pertinent to this visit.    History of present illness: Priti Begum is a 39 y.o. female who presented for dental prophylaxis.    She reports that she has infection in upper molar on right side and they are going to remove this molar.  They are requesting dental prophylaxis.  She has history of type 2 diabetes mellitus.  Her A1c is under control.  Her blood sugars has been under control.  She is on Mounjaro 15 mg every 7 days.  She lost 10 more pounds since I last saw her.  Dental procedure is going to be under local anesthesia.    Review of systems:     Review of Systems   Constitutional:  Negative for chills and fever.   HENT:          Dental pain   Cardiovascular:  Negative for chest pain, palpitations and leg swelling.        Medications and Allergies:     Current Outpatient Medications   Medication Sig Dispense Refill    amoxicillin (AMOXIL) 500 MG Cap Take 4 capsules by mouth 2 hours before  dental procedure 4 Capsule 0    fluconazole (DIFLUCAN) 150 MG tablet Take 1 Tablet by mouth one time for 1 dose. 1 Tablet 0    [START ON 3/25/2025] phentermine 37.5 MG capsule Take 1 capsule by mouth every morning for 30 days. DNFU-3/25/25 30 Capsule 0    Tirzepatide (MOUNJARO) 15 MG/0.5ML Solution Auto-injector Inject 15 mg under the skin every 7 days. 6 mL 3    metFORMIN ER (GLUCOPHAGE XR) 500 MG TABLET SR 24 HR Take 2 Tablets by mouth 2 times a day. 360 Tablet 3    ferrous sulfate 325 (65 Fe) MG tablet Take 1 Tablet by mouth every day. 90 Tablet 3    NON SPECIFIED Compound: minoxidil 10% with finasteride 0.1% with latanoprost 0.01% with biotin 0.2%. AAA, scalp 1-2 times per day 30 Each 3    clindamycin (CLEOCIN) 1 % Solution Apply to affected area twice a day 2-3 days per week 60 mL 3    lisinopril (PRINIVIL) 10 MG Tab Take 1 Tablet by mouth 2 times a day. 180 Tablet 3    ciclopirox (PENLAC) 8 % solution Apply 1 Application topically every evening. 6.6 mL 1    Prenatal Multivit-Min-Fe-FA (PRENATAL 1 + IRON PO) Take  by mouth.      triamcinolone acetonide (KENALOG) 0.1 % Cream Apply 1 Application topically 2 times a day. 45 g 0    ONETOUCH VERIO strip Use to test blood sugar 5 Times a Day. 200 Strip 6    BD PEN NEEDLE AMINTA U/F Inject 1 Each under the skin 4 times a day. 300 Each 3    Blood Glucose Monitoring Suppl (ONETOUCH VERIO FLEX SYSTEM) w/Device Kit Use as directed 1 Kit 0    glucose blood strip Use as directed subcutaneously 4-5 times a day for 30 days. 150 Strip 5    Blood Glucose Test Strips Testing before and 1 hour after meals for insulin adjustment during pregnancy. One Touch Verio test strips 500 Strip 3    vitamin D (CHOLECALCIFEROL) 1000 UNIT Tab Take 2,000 Units by mouth every day.      [START ON 4/24/2025] phentermine 37.5 MG capsule Take 1 Capsule by mouth every morning for 30 days. DNFU-4/24/25 (Patient not taking: Reported on 2/26/2025) 30 Capsule 0    phentermine 37.5 MG capsule Take 1 Capsule  "by mouth every morning for 30 days. DNFU-2/23/25 (Patient not taking: Reported on 2/26/2025) 30 Capsule 0     No current facility-administered medications for this visit.          Vitals:    /64 (BP Location: Left arm, Patient Position: Sitting, BP Cuff Size: Adult)   Pulse (!) 106   Temp 36.3 °C (97.3 °F) (Temporal)   Ht 1.575 m (5' 2\")   Wt (!) 162 kg (356 lb 4.2 oz)   SpO2 95%  Body mass index is 65.16 kg/m².    Physical Exam:     Physical Exam  Constitutional:       Appearance: Normal appearance. She is well-developed and well-groomed.   HENT:      Head: Normocephalic and atraumatic.      Right Ear: External ear normal.      Left Ear: External ear normal.   Eyes:      General:         Right eye: No discharge.         Left eye: No discharge.      Conjunctiva/sclera: Conjunctivae normal.   Cardiovascular:      Rate and Rhythm: Normal rate.   Pulmonary:      Effort: Pulmonary effort is normal. No respiratory distress.   Musculoskeletal:      Cervical back: Neck supple.   Skin:     Findings: No rash.   Neurological:      Mental Status: She is alert.   Psychiatric:         Mood and Affect: Mood and affect normal.         Behavior: Behavior normal.              Please note that this dictation was created using voice recognition software. I have made every reasonable attempt to correct obvious errors, but I expect that there are errors of grammar and possibly content that I did not discover before finalizing the note.      "

## 2025-02-27 ENCOUNTER — PHARMACY VISIT (OUTPATIENT)
Dept: PHARMACY | Facility: MEDICAL CENTER | Age: 40
End: 2025-02-27
Payer: COMMERCIAL

## 2025-02-28 ENCOUNTER — PHARMACY VISIT (OUTPATIENT)
Dept: PHARMACY | Facility: MEDICAL CENTER | Age: 40
End: 2025-02-28
Payer: COMMERCIAL

## 2025-02-28 PROCEDURE — RXMED WILLOW AMBULATORY MEDICATION CHARGE: Performed by: ORAL & MAXILLOFACIAL SURGERY

## 2025-02-28 RX ORDER — AMOXICILLIN 875 MG/1
875 TABLET, COATED ORAL 2 TIMES DAILY
Qty: 10 TABLET | Refills: 0 | OUTPATIENT
Start: 2025-02-28

## 2025-02-28 RX ORDER — HYDROCODONE BITARTRATE AND ACETAMINOPHEN 5; 325 MG/1; MG/1
1-2 TABLET ORAL EVERY 6 HOURS PRN
Qty: 10 TABLET | Refills: 0 | OUTPATIENT
Start: 2025-02-28

## 2025-03-25 ENCOUNTER — TELEPHONE (OUTPATIENT)
Dept: PHARMACY | Facility: MEDICAL CENTER | Age: 40
End: 2025-03-25
Payer: COMMERCIAL

## 2025-03-25 PROCEDURE — RXMED WILLOW AMBULATORY MEDICATION CHARGE: Performed by: FAMILY MEDICINE

## 2025-03-25 NOTE — TELEPHONE ENCOUNTER
Contact:  Phone number:[phone] (mobile)    Name of person spoken with and relationship to patient: Margarita patient   Patient’s Adherence:  How patient is doing on medication: Very Well    How many missed doses and reason: 0 N/A    Any new medications: No    Any new conditions: No    Any new allergies: No    Any new side effects: No    Any new diagnoses: No    How many doses remainin    Did patient want to speak with pharmacist: No   Delivery:  Delivery date and method: 2025 via     Needs by Date: 2025    Signature required: No     Any additional details for : N/A   Teach Appointment Date:  N/A   Shipping Address:  [address] [place], [place] [id number]   Medication(name,strength and dose):  Lisinopril Tablet 10 MG * Phentermine HCl Capsule 30 MG * FeroSul Tablet 325 (65 Fe) MG    Copay:  $14.76   Payment Method:  Credit card on file   Supplies:  NONE   Additional Information:  NONE     Wandy Rosales, Pharmacy Liaison/ RX Coordinator

## 2025-03-26 ENCOUNTER — PHARMACY VISIT (OUTPATIENT)
Dept: PHARMACY | Facility: MEDICAL CENTER | Age: 40
End: 2025-03-26
Payer: COMMERCIAL

## 2025-05-05 PROCEDURE — RXMED WILLOW AMBULATORY MEDICATION CHARGE: Performed by: INTERNAL MEDICINE

## 2025-05-05 PROCEDURE — RXMED WILLOW AMBULATORY MEDICATION CHARGE: Performed by: FAMILY MEDICINE

## 2025-05-05 PROCEDURE — RXMED WILLOW AMBULATORY MEDICATION CHARGE: Performed by: NURSE PRACTITIONER

## 2025-05-06 ENCOUNTER — PHARMACY VISIT (OUTPATIENT)
Dept: PHARMACY | Facility: MEDICAL CENTER | Age: 40
End: 2025-05-06
Payer: COMMERCIAL

## 2025-05-08 ENCOUNTER — RESULTS FOLLOW-UP (OUTPATIENT)
Dept: MEDICAL GROUP | Facility: MEDICAL CENTER | Age: 40
End: 2025-05-08

## 2025-05-08 ENCOUNTER — OFFICE VISIT (OUTPATIENT)
Dept: MEDICAL GROUP | Facility: MEDICAL CENTER | Age: 40
End: 2025-05-08
Payer: COMMERCIAL

## 2025-05-08 VITALS
OXYGEN SATURATION: 98 % | HEIGHT: 63 IN | WEIGHT: 293 LBS | HEART RATE: 101 BPM | DIASTOLIC BLOOD PRESSURE: 76 MMHG | TEMPERATURE: 97.5 F | SYSTOLIC BLOOD PRESSURE: 112 MMHG | BODY MASS INDEX: 51.91 KG/M2

## 2025-05-08 DIAGNOSIS — E66.813 CLASS 3 SEVERE OBESITY WITH SERIOUS COMORBIDITY AND BODY MASS INDEX (BMI) OF 60.0 TO 69.9 IN ADULT: ICD-10-CM

## 2025-05-08 DIAGNOSIS — E11.9 CONTROLLED TYPE 2 DIABETES MELLITUS WITHOUT COMPLICATION, WITHOUT LONG-TERM CURRENT USE OF INSULIN (HCC): ICD-10-CM

## 2025-05-08 LAB
HBA1C MFR BLD: 5.8 % (ref ?–5.8)
POCT INT CON NEG: NEGATIVE
POCT INT CON POS: POSITIVE

## 2025-05-08 PROCEDURE — 3074F SYST BP LT 130 MM HG: CPT | Performed by: FAMILY MEDICINE

## 2025-05-08 PROCEDURE — 3078F DIAST BP <80 MM HG: CPT | Performed by: FAMILY MEDICINE

## 2025-05-08 PROCEDURE — 83036 HEMOGLOBIN GLYCOSYLATED A1C: CPT | Performed by: FAMILY MEDICINE

## 2025-05-08 PROCEDURE — 99214 OFFICE O/P EST MOD 30 MIN: CPT | Performed by: FAMILY MEDICINE

## 2025-05-08 RX ORDER — PHENTERMINE HYDROCHLORIDE 37.5 MG/1
37.5 CAPSULE ORAL EVERY MORNING
Qty: 30 CAPSULE | Refills: 0 | Status: SHIPPED | OUTPATIENT
Start: 2025-07-15 | End: 2025-08-14

## 2025-05-08 RX ORDER — PHENTERMINE HYDROCHLORIDE 37.5 MG/1
37.5 CAPSULE ORAL EVERY MORNING
Qty: 30 CAPSULE | Refills: 0 | Status: SHIPPED | OUTPATIENT
Start: 2025-06-15 | End: 2025-07-15

## 2025-05-08 ASSESSMENT — ENCOUNTER SYMPTOMS
CHILLS: 0
FEVER: 0
PALPITATIONS: 0

## 2025-05-08 ASSESSMENT — FIBROSIS 4 INDEX: FIB4 SCORE: 0.61

## 2025-05-08 NOTE — PROGRESS NOTES
Verbal consent was acquired by the patient to use [employer] ambient listening note generation during this visit.      Margarita was seen today for follow-up.    Diagnoses and all orders for this visit:    Controlled type 2 diabetes mellitus without complication, without long-term current use of insulin ([facility])  -     POCT A1C    Class 3 severe obesity with serious comorbidity and body mass index (BMI) of 60.0 to 69.9 in adult  -     phentermine 37.5 MG capsule; Take 1 Capsule by mouth every morning for 30 days.  -     phentermine 37.5 MG capsule; Take 1 Capsule by mouth every morning for 30 days.                  Assessment & Plan  1.  Obesity  Chronic condition, unstable, although improving  - Demonstrated a commendable weight loss of 12 pounds, reducing from 362 to 350 pounds since the last visit.  - Appetite is well-regulated, although slight tachycardia is noted.  Monitor heart rate closely.  - Will continue the current regimen of phentermine until 06/06/2025, after which a 10-day break.  Controlled substance treatment is signed in chart-, up-to-date for urine drug screen  - Encouraged to increase physical activity, including walking more and using a stationary bike under the desk.    Obtained and reviewed patient utilization report from [facility] pharmacy database on 5/8/2025 2:46 PM  prior to writing prescription for controlled substance II, III or IV per [place] bill . Based on assessment of the report,my physical exam if necessary and the patient's health problem, the prescription is medically necessary.      2.  Type 2 diabetes mellitus  Chronic condition, stable  - Last A1c was 5.7, which is good.  - An A1c test will be conducted today as it is due.  - Results will be communicated via [employer].    3. Eye issue:  - Reports persistent issues with the eye despite using hot compresses and antibiotics.  - Advised to contact the eye doctor for further evaluation and potential drainage of the blocked  duct.    Follow-up:  - The patient will follow up in 3 months for medication follow-up.          Chief complaint::Diagnoses of Controlled type 2 diabetes mellitus without complication, without long-term current use of insulin ([facility]) and Class 3 severe obesity with serious comorbidity and body mass index (BMI) of 60.0 to 69.9 in adult were pertinent to this visit.      History of Present Illness  The patient presents for a follow-up visit.    Weight Loss Progress and Medication Adherence  - The primary reason for this visit is to monitor weight loss progress and medication adherence.  - She has been taking phentermine since February and reports a decrease in appetite and improvement in diet and exercise habits.  - Her physical activity includes stair climbing and walking, particularly when accompanying her children to the park.  - Additionally, she incorporates short walks at work to alleviate leg stiffness.    Eye Issue  - She mentions a persistent eye issue that has not resolved despite various treatments, including hot compresses and antibiotics.  - She plans to schedule an appointment with her ophthalmologist.     She is due for a blood test with Dr. Briceño in 09/2025. Her last A1c was 5.7, which is within the normal range.      Review of Systems   Constitutional:  Negative for chills and fever.   Cardiovascular:  Negative for chest pain, palpitations and leg swelling.          Medications and Allergies:     Current Outpatient Medications   Medication Sig Dispense Refill    [START ON 6/15/2025] phentermine 37.5 MG capsule Take 1 Capsule by mouth every morning for 30 days. 30 Capsule 0    [START ON 7/15/2025] phentermine 37.5 MG capsule Take 1 Capsule by mouth every morning for 30 days. 30 Capsule 0    HYDROcodone-acetaminophen (NORCO) 5-325 MG Tab per tablet Take 1-2 Tablets by mouth every 6 hours as needed for pain not to exceed 8 tabs in 24hrs 10 Tablet 0    amoxicillin (AMOXIL) 500 MG Cap Take 4 capsules by  "mouth 2 hours before dental procedure 4 Capsule 0    Tirzepatide (MOUNJARO) 15 MG/0.5ML Solution Auto-injector Inject 15 mg under the skin every 7 days. 6 mL 3    metFORMIN ER (GLUCOPHAGE XR) 500 MG TABLET SR 24 HR Take 2 Tablets by mouth 2 times a day. 360 Tablet 3    ferrous sulfate 325 (65 Fe) MG tablet Take 1 Tablet by mouth every day. 90 Tablet 3    NON SPECIFIED Compound: minoxidil 10% with finasteride 0.1% with latanoprost 0.01% with biotin 0.2%. AAA, scalp 1-2 times per day 30 Each 3    clindamycin (CLEOCIN) 1 % Solution Apply to affected area twice a day 2-3 days per week 60 mL 3    lisinopril (PRINIVIL) 10 MG Tab Take 1 Tablet by mouth 2 times a day. 180 Tablet 3    ciclopirox (PENLAC) 8 % solution Apply 1 Application topically every evening. 6.6 mL 1    Prenatal Multivit-Min-Fe-FA (PRENATAL 1 + IRON PO) Take  by mouth.      triamcinolone acetonide (KENALOG) 0.1 % Cream Apply 1 Application topically 2 times a day. 45 g 0    ONETOUCH VERIO strip Use to test blood sugar 5 Times a Day. 200 Strip 6    BD PEN NEEDLE AMINTA U/F Inject 1 Each under the skin 4 times a day. 300 Each 3    Blood Glucose Monitoring Suppl (ONETOUCH VERIO FLEX SYSTEM) w/Device Kit Use as directed 1 Kit 0    glucose blood strip Use as directed subcutaneously 4-5 times a day for 30 days. 150 Strip 5    Blood Glucose Test Strips Testing before and 1 hour after meals for insulin adjustment during pregnancy. One Touch Verio test strips 500 Strip 3    vitamin D (CHOLECALCIFEROL) 1000 UNIT Tab Take 2,000 Units by mouth every day.       No current facility-administered medications for this visit.       /76 (BP Location: Left arm, Patient Position: Sitting, BP Cuff Size: Adult)   Pulse (!) 101   Temp 36.4 °C (97.5 °F) (Temporal)   Ht 1.6 m (5' 3\")   Wt (!) 159 kg (350 lb 1.5 oz)   SpO2 98% , Body mass index is 62.02 kg/m².      Physical Exam  Constitutional:       Appearance: Normal appearance. She is well-developed and well-groomed. "   HENT:      Head: Normocephalic and atraumatic.      Right Ear: External ear normal.      Left Ear: External ear normal.   Eyes:      General:         Right eye: No discharge.         Left eye: No discharge.      Conjunctiva/sclera: Conjunctivae normal.   Cardiovascular:      Rate and Rhythm: Normal rate.   Pulmonary:      Effort: Pulmonary effort is normal. No respiratory distress.   Musculoskeletal:      Cervical back: Neck supple.   Skin:     Findings: No rash.   Neurological:      Mental Status: She is alert.   Psychiatric:         Mood and Affect: Mood and affect normal.         Behavior: Behavior normal.              Please note that this dictation was created using voice recognition software. I have made every reasonable attempt to correct obvious errors, but I expect that there are errors of grammar and possibly content that I did not discover before finalizing the note.

## 2025-06-24 PROCEDURE — RXMED WILLOW AMBULATORY MEDICATION CHARGE: Performed by: FAMILY MEDICINE

## 2025-06-24 PROCEDURE — RXMED WILLOW AMBULATORY MEDICATION CHARGE: Performed by: INTERNAL MEDICINE

## 2025-06-26 ENCOUNTER — PHARMACY VISIT (OUTPATIENT)
Dept: PHARMACY | Facility: MEDICAL CENTER | Age: 40
End: 2025-06-26
Payer: COMMERCIAL

## 2025-07-22 PROCEDURE — RXMED WILLOW AMBULATORY MEDICATION CHARGE: Performed by: FAMILY MEDICINE

## 2025-07-22 PROCEDURE — RXMED WILLOW AMBULATORY MEDICATION CHARGE: Performed by: INTERNAL MEDICINE

## 2025-07-25 ENCOUNTER — PHARMACY VISIT (OUTPATIENT)
Dept: PHARMACY | Facility: MEDICAL CENTER | Age: 40
End: 2025-07-25
Payer: COMMERCIAL

## 2025-08-21 DIAGNOSIS — E66.813 CLASS 3 SEVERE OBESITY WITH SERIOUS COMORBIDITY AND BODY MASS INDEX (BMI) OF 60.0 TO 69.9 IN ADULT: ICD-10-CM

## 2025-08-25 RX ORDER — PHENTERMINE HYDROCHLORIDE 37.5 MG/1
37.5 CAPSULE ORAL EVERY MORNING
Qty: 30 CAPSULE | Refills: 0 | OUTPATIENT
Start: 2025-08-25 | End: 2025-09-24

## 2025-08-26 ENCOUNTER — SPECIALTY PHARMACY (OUTPATIENT)
Dept: ENDOCRINOLOGY | Facility: MEDICAL CENTER | Age: 40
End: 2025-08-26
Payer: COMMERCIAL

## 2025-08-26 DIAGNOSIS — E66.813 CLASS 3 SEVERE OBESITY WITH SERIOUS COMORBIDITY AND BODY MASS INDEX (BMI) OF 60.0 TO 69.9 IN ADULT: Primary | ICD-10-CM

## 2025-08-26 PROCEDURE — RXMED WILLOW AMBULATORY MEDICATION CHARGE: Performed by: FAMILY MEDICINE

## 2025-08-26 RX ORDER — PHENTERMINE HYDROCHLORIDE 37.5 MG/1
37.5 CAPSULE ORAL EVERY MORNING
Qty: 30 CAPSULE | Refills: 0 | Status: SHIPPED | OUTPATIENT
Start: 2025-08-26 | End: 2025-09-27

## 2025-08-28 ENCOUNTER — PHARMACY VISIT (OUTPATIENT)
Dept: PHARMACY | Facility: MEDICAL CENTER | Age: 40
End: 2025-08-28
Payer: COMMERCIAL

## 2025-08-29 DIAGNOSIS — D64.9 ANEMIA, UNSPECIFIED TYPE: Primary | ICD-10-CM

## 2025-08-29 DIAGNOSIS — E11.9 CONTROLLED TYPE 2 DIABETES MELLITUS WITHOUT COMPLICATION, WITHOUT LONG-TERM CURRENT USE OF INSULIN (HCC): ICD-10-CM

## (undated) DEVICE — PACK C-SECTION (2EA/CA)

## (undated) DEVICE — TRAY SPINAL ANESTHESIA NON-SAFETY (10/CA)

## (undated) DEVICE — SET EXTENSION WITH 2 PORTS (48EA/CA) ***PART #2C8610 IS A SUBSTITUTE*****

## (undated) DEVICE — TUBING CLEARLINK DUO-VENT - C-FLO (48EA/CA)

## (undated) DEVICE — KIT  I.V. START (100EA/CA)

## (undated) DEVICE — CATHETER IV NON-SAFETY 18 GA X 1 1/4 (50/BX 4BX/CA)

## (undated) DEVICE — BLANKET UNDERBODY FULL ACCES - (5/CA)

## (undated) DEVICE — WATER IRRIGATION STERILE 1000ML (12EA/CA)

## (undated) DEVICE — SUTURE 1 CHROMIC CTX ETHICON - (36PK/BX)

## (undated) DEVICE — GLOVE BIOGEL SZ 6.5 SURGICAL PF LTX (50PR/BX 4BX/CA)

## (undated) DEVICE — SODIUM CHL IRRIGATION 0.9% 1000ML (12EA/CA)

## (undated) DEVICE — SUTURE 0 GUT-PLAIN (36PK/BX)

## (undated) DEVICE — HEAD HOLDER JUNIOR/ADULT

## (undated) DEVICE — SUTURE 0 VICRYL PLUS CT-1 - 36 INCH (36/BX)

## (undated) DEVICE — SUTURE 2-0 CHROMIC GUT CT-1 27 (36PK/BX)"

## (undated) DEVICE — HEMOSTAT ABSORBABLE POWDER SURGICEL 3G (5EA/BX)

## (undated) DEVICE — CHLORAPREP 26 ML APPLICATOR - ORANGE TINT(25/CA)

## (undated) DEVICE — SUTURE 3-0 VICRYL PLUS CT-1 - 36 INCH (36/BX)

## (undated) DEVICE — ELECTRODE DUAL RETURN W/ CORD - (50/PK)

## (undated) DEVICE — CANISTER SUCTION 3000ML MECHANICAL FILTER AUTO SHUTOFF MEDI-VAC NONSTERILE LF DISP (40EA/CA)

## (undated) DEVICE — DRESSING INTERCEED ABSORBABLE ADHESION BARRIER TC7 (10EA/CA)

## (undated) DEVICE — STAPLER SKIN DISP - (6/BX 10BX/CA) VISISTAT

## (undated) DEVICE — SUTURE 0 VICRYL PLUS CT 36 (36PK/BX)"

## (undated) DEVICE — LIGASURE SM JAW SEALER CRVD - (6EA/CA)

## (undated) DEVICE — TAPE CLOTH MEDIPORE 6 INCH - (12RL/CA)

## (undated) DEVICE — RETRACTOR O C SECTION LRY - (5/BX)

## (undated) DEVICE — GLOVE BIOGEL SZ 8 SURGICAL PF LTX - (50PR/BX 4BX/CA)